# Patient Record
Sex: FEMALE | Race: WHITE | NOT HISPANIC OR LATINO | URBAN - METROPOLITAN AREA
[De-identification: names, ages, dates, MRNs, and addresses within clinical notes are randomized per-mention and may not be internally consistent; named-entity substitution may affect disease eponyms.]

---

## 2023-06-01 ENCOUNTER — INPATIENT (INPATIENT)
Facility: HOSPITAL | Age: 83
LOS: 14 days | Discharge: NURSING, REHAB OR LONG-TERM CARE | DRG: 23 | End: 2023-06-16
Attending: PSYCHIATRY & NEUROLOGY | Admitting: PSYCHIATRY & NEUROLOGY
Payer: MEDICAID

## 2023-06-01 ENCOUNTER — TRANSCRIPTION ENCOUNTER (OUTPATIENT)
Age: 83
End: 2023-06-01

## 2023-06-01 VITALS
OXYGEN SATURATION: 95 % | HEIGHT: 65 IN | SYSTOLIC BLOOD PRESSURE: 165 MMHG | WEIGHT: 169.98 LBS | RESPIRATION RATE: 18 BRPM | DIASTOLIC BLOOD PRESSURE: 82 MMHG | HEART RATE: 64 BPM

## 2023-06-01 DIAGNOSIS — I63.9 CEREBRAL INFARCTION, UNSPECIFIED: ICD-10-CM

## 2023-06-01 LAB
ALBUMIN SERPL ELPH-MCNC: 4.6 G/DL — SIGNIFICANT CHANGE UP (ref 3.3–5)
ALP SERPL-CCNC: 322 U/L — HIGH (ref 40–120)
ALT FLD-CCNC: 26 U/L — SIGNIFICANT CHANGE UP (ref 10–45)
ANION GAP SERPL CALC-SCNC: 13 MMOL/L — SIGNIFICANT CHANGE UP (ref 5–17)
APTT BLD: 32.5 SEC — SIGNIFICANT CHANGE UP (ref 27.5–35.5)
AST SERPL-CCNC: 39 U/L — SIGNIFICANT CHANGE UP (ref 10–40)
BASOPHILS # BLD AUTO: 0.07 K/UL — SIGNIFICANT CHANGE UP (ref 0–0.2)
BASOPHILS NFR BLD AUTO: 0.7 % — SIGNIFICANT CHANGE UP (ref 0–2)
BILIRUB SERPL-MCNC: 1.2 MG/DL — SIGNIFICANT CHANGE UP (ref 0.2–1.2)
BUN SERPL-MCNC: 19 MG/DL — SIGNIFICANT CHANGE UP (ref 7–23)
CALCIUM SERPL-MCNC: 9.4 MG/DL — SIGNIFICANT CHANGE UP (ref 8.4–10.5)
CHLORIDE SERPL-SCNC: 105 MMOL/L — SIGNIFICANT CHANGE UP (ref 96–108)
CO2 SERPL-SCNC: 20 MMOL/L — LOW (ref 22–31)
CREAT SERPL-MCNC: 1 MG/DL — SIGNIFICANT CHANGE UP (ref 0.5–1.3)
EGFR: 56 ML/MIN/1.73M2 — LOW
EOSINOPHIL # BLD AUTO: 0.11 K/UL — SIGNIFICANT CHANGE UP (ref 0–0.5)
EOSINOPHIL NFR BLD AUTO: 1 % — SIGNIFICANT CHANGE UP (ref 0–6)
GLUCOSE SERPL-MCNC: 116 MG/DL — HIGH (ref 70–99)
HCT VFR BLD CALC: 40.5 % — SIGNIFICANT CHANGE UP (ref 34.5–45)
HGB BLD-MCNC: 13.3 G/DL — SIGNIFICANT CHANGE UP (ref 11.5–15.5)
IMM GRANULOCYTES NFR BLD AUTO: 0.3 % — SIGNIFICANT CHANGE UP (ref 0–0.9)
INR BLD: 1.79 RATIO — HIGH (ref 0.88–1.16)
LYMPHOCYTES # BLD AUTO: 2.77 K/UL — SIGNIFICANT CHANGE UP (ref 1–3.3)
LYMPHOCYTES # BLD AUTO: 26.2 % — SIGNIFICANT CHANGE UP (ref 13–44)
MCHC RBC-ENTMCNC: 30.4 PG — SIGNIFICANT CHANGE UP (ref 27–34)
MCHC RBC-ENTMCNC: 32.8 GM/DL — SIGNIFICANT CHANGE UP (ref 32–36)
MCV RBC AUTO: 92.5 FL — SIGNIFICANT CHANGE UP (ref 80–100)
MONOCYTES # BLD AUTO: 1.11 K/UL — HIGH (ref 0–0.9)
MONOCYTES NFR BLD AUTO: 10.5 % — SIGNIFICANT CHANGE UP (ref 2–14)
NEUTROPHILS # BLD AUTO: 6.48 K/UL — SIGNIFICANT CHANGE UP (ref 1.8–7.4)
NEUTROPHILS NFR BLD AUTO: 61.3 % — SIGNIFICANT CHANGE UP (ref 43–77)
NRBC # BLD: 0 /100 WBCS — SIGNIFICANT CHANGE UP (ref 0–0)
PLATELET # BLD AUTO: 219 K/UL — SIGNIFICANT CHANGE UP (ref 150–400)
POTASSIUM SERPL-MCNC: 4.2 MMOL/L — SIGNIFICANT CHANGE UP (ref 3.5–5.3)
POTASSIUM SERPL-SCNC: 4.2 MMOL/L — SIGNIFICANT CHANGE UP (ref 3.5–5.3)
PROT SERPL-MCNC: 7.6 G/DL — SIGNIFICANT CHANGE UP (ref 6–8.3)
PROTHROM AB SERPL-ACNC: 20.9 SEC — HIGH (ref 10.5–13.4)
RBC # BLD: 4.38 M/UL — SIGNIFICANT CHANGE UP (ref 3.8–5.2)
RBC # FLD: 13.2 % — SIGNIFICANT CHANGE UP (ref 10.3–14.5)
SODIUM SERPL-SCNC: 138 MMOL/L — SIGNIFICANT CHANGE UP (ref 135–145)
TROPONIN T, HIGH SENSITIVITY RESULT: 15 NG/L — SIGNIFICANT CHANGE UP (ref 0–51)
WBC # BLD: 10.57 K/UL — HIGH (ref 3.8–10.5)
WBC # FLD AUTO: 10.57 K/UL — HIGH (ref 3.8–10.5)

## 2023-06-01 PROCEDURE — 70498 CT ANGIOGRAPHY NECK: CPT | Mod: 26,MA

## 2023-06-01 PROCEDURE — 99291 CRITICAL CARE FIRST HOUR: CPT

## 2023-06-01 PROCEDURE — 0042T: CPT | Mod: MA

## 2023-06-01 PROCEDURE — 70496 CT ANGIOGRAPHY HEAD: CPT | Mod: 26,MA

## 2023-06-01 NOTE — ED ADULT NURSE NOTE - OBJECTIVE STATEMENT
81 y/o F normally ambulatory and AAO4 presents to the ED with EMS after becoming altered around  2100 tonight, as per daughter, she wasn't making sense and had some right sided weakness. Pt has PMH of HTN, HLD, and irregular heart beat as per daughter, on Xarelto last Xarelto taken at 1800 today. Pt family member denies and previous surgery. Pt is restless but not answering any questions. Pt 88 NSR on CM. Pupils are 3mm reactive and equil. Follow some commands but not enough to complete full neuro exam.

## 2023-06-01 NOTE — ED PROVIDER NOTE - PHYSICAL EXAMINATION
Gen: WDWN, non-verbal, leading to right side on stretcher, hemodynamically stable, protecting airway/tolerating secretions   HEENT: Atraumatic head, PERRLA, EOMI, no nasal discharge, mucous membranes moist, no oropharyngeal edema/erythema/exudates   CV: Irregular rhythm (afib) with no RVR, +S1/S2, no M/R/G, equal b/l radial pulses 2+  Resp: CTAB, no W/R/R, SPO2 >95% on RA, no increased WOB   GI: Abdomen soft non-distended, NTTP, no masses/organomegaly   MSK/Skin: No open wounds, no bruising, no LE edema  Neuro: AAOx0, Left sided gaze preference and leaning to right side on stretcher, unable to perform full CN exam due to mental status, moving LUE/LLE spontaneously with no movement of RUE/RLE

## 2023-06-01 NOTE — ED PROVIDER NOTE - PROGRESS NOTE DETAILS
dw neuro - will admit to nscu for planned thrombectomy [phone] - suhail lara - daughter cell phone number

## 2023-06-01 NOTE — STROKE CODE NOTE - MRS SCORE
(1) No significant disability. Able to carry out all usual activities, despite some symptoms. (3) Moderate disability. Requires some help, but able to walk unassisted.

## 2023-06-01 NOTE — ED PROVIDER NOTE - CLINICAL SUMMARY MEDICAL DECISION MAKING FREE TEXT BOX
82-year-old female with TIA in past, afib on Xarelto, presenting with altered mental status, LKN just prior to 9:45 pm with acute onset of UE weakness/became nonverbal, no falls/trauma, AAOx0, Left sided gaze preference and leaning to right side on stretcher, unilateral FND in UE/LE c/f CVA. Code stroke called in triage, pending CTs, neuro recs. Patient not a T&K candidate due to xarelto, possible thrombectomy candidate pending CTs. Protecting airway at this time

## 2023-06-01 NOTE — ED PROVIDER NOTE - ATTENDING CONTRIBUTION TO CARE
This is a 82-year-old lady who has a history of TIA and atrial fibrillation and she is taking Xarelto.  She last took a dose at 6 PM.  Daughter states at 9 PM she could not talk anymore and arm and leg stopped working.  Patient with aphasia and leftward gaze deviation and left upper and lower extremity weakness.  Patient does not meet criteria for thrombolytics as she received 10 a inhibitor just prior to coming into the ER and her INR is above 1.7.  CT CTA performed.  Discussed with neurology.  Patient has LVO sign on CT scan and will be admitted for thrombectomy.  I discussed this with the patient's daughter and she understands.

## 2023-06-01 NOTE — ED ADULT NURSE NOTE - CHPI ED NUR SYMPTOMS POS
Partially impaired: cannot see medication labels or newsprint, but can see obstacles in path, and the surrounding layout; can count fingers at arm's length
CHANGE IN LEVEL OF CONSCIOUSNESS/DISORIENTATION

## 2023-06-01 NOTE — ED PROVIDER NOTE - OBJECTIVE STATEMENT
82-year-old female with TIA in past on Xarelto, presenting with altered mental status.  At 9:45 PM patient had witnessed upper extremity weakness, became nonverbal, with no fall/trauma.  Patient ANO x0 and unable to participate in exam/history. 82-year-old female with TIA in past, afib on Xarelto, presenting with altered mental status. At 9:45 PM patient had witnessed upper extremity weakness, became nonverbal, with no fall/trauma. Patient AAOx0 and unable to participate in exam/history. AAOx4 at baseline. Daughter reports LKN just prior to symptom onset. Last took Xarelto at 6pm.

## 2023-06-02 ENCOUNTER — APPOINTMENT (OUTPATIENT)
Dept: NEUROSURGERY | Facility: HOSPITAL | Age: 83
End: 2023-06-02

## 2023-06-02 LAB
A1C WITH ESTIMATED AVERAGE GLUCOSE RESULT: 5.9 % — HIGH (ref 4–5.6)
ANION GAP SERPL CALC-SCNC: 14 MMOL/L — SIGNIFICANT CHANGE UP (ref 5–17)
BUN SERPL-MCNC: 14 MG/DL — SIGNIFICANT CHANGE UP (ref 7–23)
CALCIUM SERPL-MCNC: 8.5 MG/DL — SIGNIFICANT CHANGE UP (ref 8.4–10.5)
CHLORIDE SERPL-SCNC: 107 MMOL/L — SIGNIFICANT CHANGE UP (ref 96–108)
CHOLEST SERPL-MCNC: 97 MG/DL — SIGNIFICANT CHANGE UP
CO2 SERPL-SCNC: 20 MMOL/L — LOW (ref 22–31)
CREAT SERPL-MCNC: 0.75 MG/DL — SIGNIFICANT CHANGE UP (ref 0.5–1.3)
EGFR: 79 ML/MIN/1.73M2 — SIGNIFICANT CHANGE UP
ESTIMATED AVERAGE GLUCOSE: 123 MG/DL — HIGH (ref 68–114)
GLUCOSE SERPL-MCNC: 156 MG/DL — HIGH (ref 70–99)
HDLC SERPL-MCNC: 47 MG/DL — LOW
LIPID PNL WITH DIRECT LDL SERPL: 33 MG/DL — SIGNIFICANT CHANGE UP
MAGNESIUM SERPL-MCNC: 1.9 MG/DL — SIGNIFICANT CHANGE UP (ref 1.6–2.6)
NON HDL CHOLESTEROL: 50 MG/DL — SIGNIFICANT CHANGE UP
PHOSPHATE SERPL-MCNC: 2.6 MG/DL — SIGNIFICANT CHANGE UP (ref 2.5–4.5)
POTASSIUM SERPL-MCNC: 3.5 MMOL/L — SIGNIFICANT CHANGE UP (ref 3.5–5.3)
POTASSIUM SERPL-SCNC: 3.5 MMOL/L — SIGNIFICANT CHANGE UP (ref 3.5–5.3)
SODIUM SERPL-SCNC: 141 MMOL/L — SIGNIFICANT CHANGE UP (ref 135–145)
T3 SERPL-MCNC: 96 NG/DL — SIGNIFICANT CHANGE UP (ref 80–200)
T4 AB SER-ACNC: 8 UG/DL — SIGNIFICANT CHANGE UP (ref 4.6–12)
T4 FREE SERPL-MCNC: 1.5 NG/DL — SIGNIFICANT CHANGE UP (ref 0.9–1.8)
TRIGL SERPL-MCNC: 85 MG/DL — SIGNIFICANT CHANGE UP
TSH SERPL-MCNC: 1.5 UIU/ML — SIGNIFICANT CHANGE UP (ref 0.27–4.2)

## 2023-06-02 PROCEDURE — 71045 X-RAY EXAM CHEST 1 VIEW: CPT | Mod: 26

## 2023-06-02 PROCEDURE — 99291 CRITICAL CARE FIRST HOUR: CPT

## 2023-06-02 PROCEDURE — 61645 PERQ ART M-THROMBECT &/NFS: CPT | Mod: LT

## 2023-06-02 PROCEDURE — 93306 TTE W/DOPPLER COMPLETE: CPT | Mod: 26

## 2023-06-02 PROCEDURE — 99223 1ST HOSP IP/OBS HIGH 75: CPT

## 2023-06-02 PROCEDURE — 71045 X-RAY EXAM CHEST 1 VIEW: CPT | Mod: 26,77

## 2023-06-02 PROCEDURE — 93970 EXTREMITY STUDY: CPT | Mod: 26

## 2023-06-02 PROCEDURE — 70450 CT HEAD/BRAIN W/O DYE: CPT | Mod: 26

## 2023-06-02 RX ORDER — PANTOPRAZOLE SODIUM 20 MG/1
40 TABLET, DELAYED RELEASE ORAL DAILY
Refills: 0 | Status: DISCONTINUED | OUTPATIENT
Start: 2023-06-02 | End: 2023-06-02

## 2023-06-02 RX ORDER — PROPOFOL 10 MG/ML
10 INJECTION, EMULSION INTRAVENOUS
Qty: 500 | Refills: 0 | Status: DISCONTINUED | OUTPATIENT
Start: 2023-06-02 | End: 2023-06-02

## 2023-06-02 RX ORDER — LOSARTAN POTASSIUM 100 MG/1
50 TABLET, FILM COATED ORAL DAILY
Refills: 0 | Status: DISCONTINUED | OUTPATIENT
Start: 2023-06-02 | End: 2023-06-02

## 2023-06-02 RX ORDER — ENTECAVIR 0.5 MG/1
0.5 TABLET ORAL
Refills: 0 | Status: DISCONTINUED | OUTPATIENT
Start: 2023-06-02 | End: 2023-06-14

## 2023-06-02 RX ORDER — LOSARTAN POTASSIUM 100 MG/1
50 TABLET, FILM COATED ORAL DAILY
Refills: 0 | Status: DISCONTINUED | OUTPATIENT
Start: 2023-06-02 | End: 2023-06-07

## 2023-06-02 RX ORDER — ATENOLOL 25 MG/1
100 TABLET ORAL DAILY
Refills: 0 | Status: DISCONTINUED | OUTPATIENT
Start: 2023-06-02 | End: 2023-06-02

## 2023-06-02 RX ORDER — CHLORHEXIDINE GLUCONATE 213 G/1000ML
1 SOLUTION TOPICAL DAILY
Refills: 0 | Status: DISCONTINUED | OUTPATIENT
Start: 2023-06-02 | End: 2023-06-16

## 2023-06-02 RX ORDER — DEXMEDETOMIDINE HYDROCHLORIDE IN 0.9% SODIUM CHLORIDE 4 UG/ML
0.2 INJECTION INTRAVENOUS
Qty: 200 | Refills: 0 | Status: DISCONTINUED | OUTPATIENT
Start: 2023-06-02 | End: 2023-06-02

## 2023-06-02 RX ORDER — NICARDIPINE HYDROCHLORIDE 30 MG/1
5 CAPSULE, EXTENDED RELEASE ORAL
Qty: 40 | Refills: 0 | Status: DISCONTINUED | OUTPATIENT
Start: 2023-06-02 | End: 2023-06-03

## 2023-06-02 RX ORDER — POLYETHYLENE GLYCOL 3350 17 G/17G
17 POWDER, FOR SOLUTION ORAL DAILY
Refills: 0 | Status: DISCONTINUED | OUTPATIENT
Start: 2023-06-02 | End: 2023-06-14

## 2023-06-02 RX ORDER — ATORVASTATIN CALCIUM 80 MG/1
80 TABLET, FILM COATED ORAL AT BEDTIME
Refills: 0 | Status: DISCONTINUED | OUTPATIENT
Start: 2023-06-02 | End: 2023-06-14

## 2023-06-02 RX ORDER — NICARDIPINE HYDROCHLORIDE 30 MG/1
5 CAPSULE, EXTENDED RELEASE ORAL
Qty: 40 | Refills: 0 | Status: DISCONTINUED | OUTPATIENT
Start: 2023-06-02 | End: 2023-06-02

## 2023-06-02 RX ORDER — CHLORHEXIDINE GLUCONATE 213 G/1000ML
15 SOLUTION TOPICAL EVERY 12 HOURS
Refills: 0 | Status: DISCONTINUED | OUTPATIENT
Start: 2023-06-02 | End: 2023-06-02

## 2023-06-02 RX ORDER — SODIUM CHLORIDE 9 MG/ML
1000 INJECTION INTRAMUSCULAR; INTRAVENOUS; SUBCUTANEOUS
Refills: 0 | Status: DISCONTINUED | OUTPATIENT
Start: 2023-06-02 | End: 2023-06-03

## 2023-06-02 RX ORDER — ATENOLOL 25 MG/1
100 TABLET ORAL DAILY
Refills: 0 | Status: DISCONTINUED | OUTPATIENT
Start: 2023-06-02 | End: 2023-06-03

## 2023-06-02 RX ORDER — SENNA PLUS 8.6 MG/1
2 TABLET ORAL AT BEDTIME
Refills: 0 | Status: DISCONTINUED | OUTPATIENT
Start: 2023-06-02 | End: 2023-06-14

## 2023-06-02 RX ORDER — PANTOPRAZOLE SODIUM 20 MG/1
40 TABLET, DELAYED RELEASE ORAL
Refills: 0 | Status: DISCONTINUED | OUTPATIENT
Start: 2023-06-02 | End: 2023-06-02

## 2023-06-02 RX ADMIN — SODIUM CHLORIDE 70 MILLILITER(S): 9 INJECTION INTRAMUSCULAR; INTRAVENOUS; SUBCUTANEOUS at 19:07

## 2023-06-02 RX ADMIN — NICARDIPINE HYDROCHLORIDE 25 MG/HR: 30 CAPSULE, EXTENDED RELEASE ORAL at 12:46

## 2023-06-02 RX ADMIN — SENNA PLUS 2 TABLET(S): 8.6 TABLET ORAL at 21:37

## 2023-06-02 RX ADMIN — LOSARTAN POTASSIUM 50 MILLIGRAM(S): 100 TABLET, FILM COATED ORAL at 13:27

## 2023-06-02 RX ADMIN — CHLORHEXIDINE GLUCONATE 15 MILLILITER(S): 213 SOLUTION TOPICAL at 17:10

## 2023-06-02 RX ADMIN — POLYETHYLENE GLYCOL 3350 17 GRAM(S): 17 POWDER, FOR SOLUTION ORAL at 17:10

## 2023-06-02 RX ADMIN — ATORVASTATIN CALCIUM 80 MILLIGRAM(S): 80 TABLET, FILM COATED ORAL at 21:37

## 2023-06-02 RX ADMIN — CHLORHEXIDINE GLUCONATE 15 MILLILITER(S): 213 SOLUTION TOPICAL at 06:19

## 2023-06-02 RX ADMIN — NICARDIPINE HYDROCHLORIDE 25 MG/HR: 30 CAPSULE, EXTENDED RELEASE ORAL at 20:27

## 2023-06-02 RX ADMIN — NICARDIPINE HYDROCHLORIDE 25 MG/HR: 30 CAPSULE, EXTENDED RELEASE ORAL at 19:07

## 2023-06-02 RX ADMIN — DEXMEDETOMIDINE HYDROCHLORIDE IN 0.9% SODIUM CHLORIDE 3.73 MICROGRAM(S)/KG/HR: 4 INJECTION INTRAVENOUS at 12:46

## 2023-06-02 RX ADMIN — CHLORHEXIDINE GLUCONATE 1 APPLICATION(S): 213 SOLUTION TOPICAL at 21:37

## 2023-06-02 NOTE — DIETITIAN INITIAL EVALUATION ADULT - PHYSCIAL ASSESSMENT
Unable to conduct Nutrition Focused Physical Assessment as pt intubated; receiving CT scan at time of RD visit.

## 2023-06-02 NOTE — PHYSICAL THERAPY INITIAL EVALUATION ADULT - ADDITIONAL COMMENTS
unable to assess at this time unable to assess at this time.   6/6: Per son at bedside, pt lives in an apartment with her daughter with +15STE. Pt was independent with all functional mobility and ADLs prior to admission, ambulated without an AD.

## 2023-06-02 NOTE — SPEECH LANGUAGE PATHOLOGY EVALUATION - SLP DIAGNOSIS
Consult received and appreciated. Chart reviewed. As per NSCU rounds and d/w SILVIA Yancey, pt remains intubated at this time. Will follow up as medically appropriate.

## 2023-06-02 NOTE — PROGRESS NOTE ADULT - SUBJECTIVE AND OBJECTIVE BOX
HPI:  82y (1940) woman with a PMHx significant for HTN, afib on Xarelto (last dose 6/1 6pm), hepatitis C on antiviral presenting with acute onset of AMS. LKW 10pm. Around 10:30pm patient become acutely altered, slumped to right side, head turned to left, not answering questions properly or moving the right side. NIHSS 22 most notable for decreased arousal, incorrect answers, not following commands, left gaze deviation that could not be overcome, right facial droop, right sided severe weakness compared to left, slurred speech, aphasia. Patient lives at home with daughter who is at bedside. Patient is Spanish speaking. Daughter reports at baseline she intermittently walks with a cane and needs minor help with ADLs such as getting dressed or running a shower, Daughter looks after her affairs. She denies dementia or cognitive impairment. mRS 3.     LKW 10pm (6/1)  NIHSS 22  pre-mRS 3  Xarelto last taken at 6pm, INR=1.79   (02 Jun 2023 01:43)    OVERNIGHT EVENTS:   No acute events overnight.    VITALS:  T(C): , Max: 36.7 (06-02-23 @ 01:50)  HR:  (64 - 104)  BP:  (152/72 - 206/117)  ABP:  (104/62 - 146/58)  RR:  (14 - 26)  SpO2:  (95% - 100%)  Wt(kg): --  Device: Avea, Mode: AC/ CMV (Assist Control/ Continuous Mandatory Ventilation), RR (machine): 14, TV (machine): 450, FiO2: 50, PEEP: 5, ITime: 1, MAP: 9, PIP: 18    06-01-23 @ 07:01  -  06-02-23 @ 07:00  --------------------------------------------------------  IN: 619 mL / OUT: 500 mL / NET: 119 mL      LABS:  Na: 138 (06-01 @ 22:38)  K: 4.2 (06-01 @ 22:38)  Cl: 105 (06-01 @ 22:38)  CO2: 20 (06-01 @ 22:38)  BUN: 19 (06-01 @ 22:38)  Cr: 1.00 (06-01 @ 22:38)  Glu: 116(06-01 @ 22:38)    Hgb: 13.3 (06-01 @ 22:38)  Hct: 40.5 (06-01 @ 22:38)  WBC: 10.57 (06-01 @ 22:38)  Plt: 219 (06-01 @ 22:38)    INR: 1.79 06-01-23 @ 22:38  PTT: 32.5 06-01-23 @ 22:38    IMAGING:   Recent imaging studies were reviewed.    MEDICATIONS:  chlorhexidine 0.12% Liquid 15 milliLiter(s) Oral Mucosa every 12 hours  chlorhexidine 4% Liquid 1 Application(s) Topical daily  dexMEDEtomidine Infusion 0.2 MICROgram(s)/kG/Hr IV Continuous <Continuous>  niCARdipine Infusion 5 mG/Hr IV Continuous <Continuous>  pantoprazole    Tablet 40 milliGRAM(s) Oral before breakfast  polyethylene glycol 3350 17 Gram(s) Oral daily  propofol Infusion 10 MICROgram(s)/kG/Min IV Continuous <Continuous>  senna 2 Tablet(s) Oral at bedtime  sodium chloride 0.9%. 1000 milliLiter(s) IV Continuous <Continuous>    EXAMINATION:  General:  calm  HEENT:  MMM  Neuro:    Cards:  RRR  Respiratory:  no respiratory distress  Adomen:  soft  Extremities:  no edema  Skin:  warm/dry HPI:  82y (1940) woman with a PMHx significant for HTN, afib on Xarelto (last dose 6/1 6pm), hepatitis C on antiviral presenting with acute onset of AMS. LKW 10pm. Around 10:30pm patient become acutely altered, slumped to right side, head turned to left, not answering questions properly or moving the right side. NIHSS 22 most notable for decreased arousal, incorrect answers, not following commands, left gaze deviation that could not be overcome, right facial droop, right sided severe weakness compared to left, slurred speech, aphasia. Patient lives at home with daughter who is at bedside. Patient is Persian speaking. Daughter reports at baseline she intermittently walks with a cane and needs minor help with ADLs such as getting dressed or running a shower, Daughter looks after her affairs. She denies dementia or cognitive impairment. mRS 3.     LKW 10pm (6/1)  NIHSS 22  pre-mRS 3  Xarelto last taken at 6pm, INR=1.79   (02 Jun 2023 01:43)    OVERNIGHT EVENTS:   admitted to [facility]    VITALS:  T(C): , Max: 36.7 (06-02-23 @ 01:50)  HR:  (64 - 104)  BP:  (152/72 - 206/117)  ABP:  (104/62 - 146/58)  RR:  (14 - 26)  SpO2:  (95% - 100%)  Wt(kg): --  Device: Avea, Mode: AC/ CMV (Assist Control/ Continuous Mandatory Ventilation), RR (machine): 14, TV (machine): 450, FiO2: 50, PEEP: 5, ITime: 1, MAP: 9, PIP: 18    06-01-23 @ 07:01  -  06-02-23 @ 07:00  --------------------------------------------------------  IN: 619 mL / OUT: 500 mL / NET: 119 mL      LABS:  Na: 138 (06-01 @ 22:38)  K: 4.2 (06-01 @ 22:38)  Cl: 105 (06-01 @ 22:38)  CO2: 20 (06-01 @ 22:38)  BUN: 19 (06-01 @ 22:38)  Cr: 1.00 (06-01 @ 22:38)  Glu: 116(06-01 @ 22:38)    Hgb: 13.3 (06-01 @ 22:38)  Hct: 40.5 (06-01 @ 22:38)  WBC: 10.57 (06-01 @ 22:38)  Plt: 219 (06-01 @ 22:38)    INR: 1.79 06-01-23 @ 22:38  PTT: 32.5 06-01-23 @ 22:38    IMAGING:   Recent imaging studies were reviewed.    MEDICATIONS:  chlorhexidine 0.12% Liquid 15 milliLiter(s) Oral Mucosa every 12 hours  chlorhexidine 4% Liquid 1 Application(s) Topical daily  dexMEDEtomidine Infusion 0.2 MICROgram(s)/kG/Hr IV Continuous <Continuous>  niCARdipine Infusion 5 mG/Hr IV Continuous <Continuous>  pantoprazole    Tablet 40 milliGRAM(s) Oral before breakfast  polyethylene glycol 3350 17 Gram(s) Oral daily  propofol Infusion 10 MICROgram(s)/kG/Min IV Continuous <Continuous>  senna 2 Tablet(s) Oral at bedtime  sodium chloride 0.9%. 1000 milliLiter(s) IV Continuous <Continuous>    EXAMINATION:  General:  calm  HEENT:  MMM  Neuro:  opens eyes, does not FC, moves L spontaneously, RUE extension, overbreathing  Cards:  RRR  Respiratory:  no respiratory distress  Adomen:  soft  Extremities:  no edema  Skin:  warm/dry

## 2023-06-02 NOTE — OCCUPATIONAL THERAPY INITIAL EVALUATION ADULT - PERTINENT HX OF CURRENT PROBLEM, REHAB EVAL
82y (1940) woman with a PMHx significant for HTN, afib on Xarelto (last dose 6/1 6pm), hepatitis C on antiviral presenting with acute onset of AMS. LKW 10pm. Around 10:30pm patient become acutely altered, slumped to right side, head turned to left, not answering questions properly or moving the right side. NIHSS 22 most notable for decreased arousal, incorrect answers, not following commands, left gaze deviation that could not be overcome, right facial droop, right sided severe weakness compared to left, slurred speech, aphasia. Patient lives at home with daughter who is at bedside. Patient is Malian speaking. Daughter reports at baseline she intermittently walks with a cane and needs minor help with ADLs such as getting dressed or running a shower, Daughter looks after her affairs. She denies dementia or cognitive impairment. mRS 3. CT angiography neck 6/1: No hemodynamically significant stenosis by NASCET criteria. No vascular dissection. Nonspecific groundglass opacities in the visualized apices of the lungs, may represent infectious versus inflammatory disease. CT angiography brain 6/1: Left proximal M1 occlusion. CT perfusion 6/1: Core infarct of 6 cc with a penumbra of 162 cc. CT Brain 6/1- No acute intracranial hemorrhage or mass effect.

## 2023-06-02 NOTE — CHART NOTE - NSCHARTNOTEFT_GEN_A_CORE
CAPRINI SCORE [CLOT] Score on Admission for     AGE RELATED RISK FACTORS                                                       MOBILITY RELATED FACTORS  [ ] Age 41-60 years                                            (1 Point)                  [ x] Bed rest                                                        (1 Point)  [ ] Age: 61-74 years                                           (2 Points)                 [ ] Plaster cast                                                   (2 Points)  [ x] Age= 75 years                                              (3 Points)                 [ ] Bed bound for more than 72 hours                 (2 Points)    DISEASE RELATED RISK FACTORS                                               GENDER SPECIFIC FACTORS  [ ] Edema in the lower extremities                       (1 Point)                  [ ] Pregnancy                                                     (1 Point)  [ ] Varicose veins                                               (1 Point)                  [ ] Post-partum < 6 weeks                                   (1 Point)             [ x] BMI > 25 Kg/m2                                            (1 Point)                  [ ] Hormonal therapy  or oral contraception          (1 Point)                 [ ] Sepsis (in the previous month)                        (1 Point)                  [ ] History of pregnancy complications                 (1 point)  [ ] Pneumonia or serious lung disease                                               [ ] Unexplained or recurrent                     (1 Point)           (in the previous month)                               (1 Point)  [ ] Abnormal pulmonary function test                     (1 Point)                 SURGERY RELATED RISK FACTORS (include planned surgeries)  [ ] Acute myocardial infarction                              (1 Point)                 [ ]  Section                                             (1 Point)  [ ] Congestive heart failure (in the previous month)  (1 Point)         [ ] Minor surgery                                                  (1 Point)   [ ] Inflammatory bowel disease                             (1 Point)                 [ ] Arthroscopic surgery                                        (2 Points)  [ ] Central venous access                                      (2 Points)                [ ] General surgery lasting more than 45 minutes   (2 Points)       [ ] Stroke (in the previous month)                          (5 Points)               [ ] Elective arthroplasty                                         (5 Points)            [ ] current or past malignancy                              (2 Points)                                                                                                       HEMATOLOGY RELATED FACTORS                                                 TRAUMA RELATED RISK FACTORS  [ ] Prior episodes of VTE                                     (3 Points)                [ ] Fracture of the hip, pelvis, or leg                       (5 Points)  [ ] Positive family history for VTE                         (3 Points)                 [ ] Acute spinal cord injury (in the previous month)  (5 Points)  [ ] Prothrombin 31082 A                                     (3 Points)                 [ ] Paralysis  (less than 1 month)                             (5 Points)  [ ] Factor V Leiden                                             (3 Points)                  [ ] Multiple Trauma within 1 month                        (5 Points)  [ ] Lupus anticoagulants                                     (3 Points)                                                           [ ] Anticardiolipin antibodies                               (3 Points)                                                       [ ] High homocysteine in the blood                      (3 Points)                                             [ ] Other congenital or acquired thrombophilia      (3 Points)                                                [ ] Heparin induced thrombocytopenia                  (3 Points)                                          Total Score [     3   ]    Risk:  Very low 0   Low 1 to 2   Moderate 3 to 4   High =5       VTE Prophylasix Recommednations:  [ x] mechanical pneumatic compression devices                                      [ ] contraindicated: _____________________  [ ] chemo prophylasix                                                                                   [x ] contraindicated ___on hold per neurosurgery__    **** HIGH LIKELIHOOD DVT PRESENT ON ADMISSION  [ ] (please order LE dopplers within 24 hours of admission)

## 2023-06-02 NOTE — PHYSICAL THERAPY INITIAL EVALUATION ADULT - ACTIVE RANGE OF MOTION EXAMINATION, REHAB EVAL
decreased AROM of RUE/Left UE Active ROM was WNL (within normal limits)/RLE Active ROM was WNL (within normal limits)/bilateral lower extremity Active ROM was WNL (within normal limits)/Right UE Active ROM was WFL (within functional limits) decreased AROM of RUE/Left UE Active ROM was WFL (within functional limits)/Left LE Active ROM was WFL (within functional limits)

## 2023-06-02 NOTE — PROGRESS NOTE ADULT - SUBJECTIVE AND OBJECTIVE BOX
SUMMARY:    HOSPITAL COURSE:    24 HOUR EVENTS:     ADMISSION SCORES:   GCS: 10 NIHSS: 22    SEDATION SCORES:  RASS: -5     REVIEW OF SYSTEMS: pt intubated    ALLERGIES: Allergies    No Known Allergies    Intolerances        VITALS/DATA/ORDERS: [x] Reviewed    DEVICES:   [] Restraints [x] PIVs [x] ET tube [] central line [] PICC [x] arterial line [x] fu [] NGT/OGT [] EVD [] LD [] EDDIE/HMV [] LiCOX [] ICP monitor [] Trach [] PEG [] Chest Tube [] other:    EXAMINATION:  General: No acute distress  HEENT: Anicteric sclerae  Cardiac: [id number]  Lungs: Clear, mechanical breath sounds  Abdomen: Soft, non-tender, +BS  Extremities: No c/c/e  Skin/Incision Site: Clean, dry and intact  Neurologic:  SUMMARY: 82y (1940) woman with a PMHx significant for HTN, afib on Xarelto (last dose 6/1 6pm), hepatitis C on antiviral presenting with acute onset of AMS. LKW 10pm. Around 10:30pm patient become acutely altered, slumped to right side, head turned to left, not answering questions properly or moving the right side. NIHSS 22 most notable for decreased arousal, incorrect answers, not following commands, left gaze deviation that could not be overcome, right facial droop, right sided severe weakness compared to left, slurred speech, aphasia. Patient lives at home with daughter who is at bedside. Patient is Danish speaking. Daughter reports at baseline she intermittently walks with a cane and needs minor help with ADLs such as getting dressed or running a shower, Daughter looks after her affairs. She denies dementia or cognitive impairment. mRS 3.     LKW 10pm (6/1)  NIHSS 22  pre-mRS 3  Xarelto last taken at 6pm, INR=1.79    HOSPITAL COURSE: s/p L M1 MT tici 2c flow, kept intubated given poor mental status    24 HOUR EVENTS: seen in nscu, arrived HTN 200s and on prop    ADMISSION SCORES:   GCS: 10 NIHSS: 22    SEDATION SCORES:  RASS: -5     REVIEW OF SYSTEMS: pt intubated    ALLERGIES: Allergies    No Known Allergies    Intolerances        VITALS/DATA/ORDERS: [x] Reviewed    DEVICES:   [] Restraints [x] PIVs [x] ET tube [] central line [] PICC [x] arterial line [x] fu [] NGT/OGT [] EVD [] LD [] EDDIE/HMV [] LiCOX [] ICP monitor [] Trach [] PEG [] Chest Tube [] other:    EXAMINATION:  General: No acute distress  HEENT: Anicteric sclerae  Cardiac: [id number]  Lungs: Clear, mechanical breath sounds  Abdomen: Soft, non-tender, +BS  Extremities: No c/c/e  Skin/Incision Site: Clean, dry and intact  Neurologic:  SUMMARY: 82y (1940) woman with a PMHx significant for HTN, afib on Xarelto (last dose 6/1 6pm), hepatitis C on antiviral presenting with acute onset of AMS. LKW 10pm. Around 10:30pm patient become acutely altered, slumped to right side, head turned to left, not answering questions properly or moving the right side. NIHSS 22 most notable for decreased arousal, incorrect answers, not following commands, left gaze deviation that could not be overcome, right facial droop, right sided severe weakness compared to left, slurred speech, aphasia. Patient lives at home with daughter who is at bedside. Patient is Russian speaking. Daughter reports at baseline she intermittently walks with a cane and needs minor help with ADLs such as getting dressed or running a shower, Daughter looks after her affairs. She denies dementia or cognitive impairment. mRS 3.     LKW 10pm (6/1)  NIHSS 22  pre-mRS 3  Xarelto last taken at 6pm, INR=1.79    HOSPITAL COURSE: s/p L M1 MT tici 2c flow, kept intubated given poor mental status    24 HOUR EVENTS: seen in nscu, arrived HTN 200s and on prop    ADMISSION SCORES:   GCS: 10 NIHSS: 22    SEDATION SCORES:  RASS: -5     REVIEW OF SYSTEMS: pt intubated    ALLERGIES: Allergies    No Known Allergies    Intolerances        VITALS/DATA/ORDERS: [x] Reviewed    DEVICES:   [] Restraints [x] PIVs [x] ET tube [] central line [] PICC [x] arterial line [x] fu [] NGT/OGT [] EVD [] LD [] EDDIE/HMV [] LiCOX [] ICP monitor [] Trach [] PEG [] Chest Tube [] other:    EXAMINATION:  General: No acute distress  HEENT: Anicteric sclerae  Cardiac: [id number]  Lungs: Clear, mechanical breath sounds  Abdomen: Soft, non-tender, +BS  Extremities: No c/c/e  Skin/Incision Site: Clean, dry and intact  Neurologic: EO to voice, L gaze does not cross PERRL 3mm R, overbreathing cough/gag+, not FC, LUE/LLE spont AG throughout and RUE WDs to nox and RLE 2/5 spont.

## 2023-06-02 NOTE — DIETITIAN INITIAL EVALUATION ADULT - ADD RECOMMEND
1.  1. Obtain subjective wt/diet history from pt/family PRN.  2. If EN feeds warranted, monitor GI tolerance. RD to remain available to adjust EN formulary, volume/rate PRN.   3. Recommend multivitamin (if no medication contraindications) to aid in prevention of micronutrient deficiencies.   4. If PO diet initiated, consider no therapeutic restrictions and defer consistency to medical team, SLP.   5. Monitor wt trends/labs/skin integrity/hydration status/bowel regularity.

## 2023-06-02 NOTE — DISCHARGE NOTE NURSING/CASE MANAGEMENT/SOCIAL WORK - PATIENT PORTAL LINK FT
You can access the FollowMyHealth Patient Portal offered by [facility] by registering at the following website: [email or web address]. By joining [facility]’s FollowMyHealth portal, you will also be able to view your health information using other applications (apps) compatible with our system.

## 2023-06-02 NOTE — PHARMACOTHERAPY INTERVENTION NOTE - COMMENTS
Reviewed appropriate routes of medication administration  MEDICATIONS  (STANDING):  chlorhexidine 0.12% Liquid 15 milliLiter(s) Oral Mucosa every 12 hours  chlorhexidine 4% Liquid 1 Application(s) Topical daily  dexMEDEtomidine Infusion 0.2 MICROgram(s)/kG/Hr (3.73 mL/Hr) IV Continuous <Continuous>  entecavir Solution 0.5 milliGRAM(s) Oral <User Schedule>  losartan 50 milliGRAM(s) Oral daily  niCARdipine Infusion 5 mG/Hr (25 mL/Hr) IV Continuous <Continuous>  pantoprazole  Injectable 40 milliGRAM(s) IV Push daily  polyethylene glycol 3350 17 Gram(s) Oral daily  senna 2 Tablet(s) Oral at bedtime  sodium chloride 0.9%. 1000 milliLiter(s) (70 mL/Hr) IV Continuous <Continuous>    MEDICATIONS  (PRN):

## 2023-06-02 NOTE — CHART NOTE - NSCHARTNOTEFT_GEN_A_CORE
Interventional Neuro Radiology  Pre-Procedure Note    HPI:  This is an 82yr old female with pmh afib on xarelto presents to [facility] ER as code stroke with altered mental status, non verbal, and limb weakness. CT head showed left mca infarct. CTA head showed left m1 occlusion. Patient transferred to neuro IR for cerebral angiogram mechanical thrombectomy       NIHSS 17 @[facility] on arrival      PAST MEDICAL & SURGICAL HISTORY:    Allergies: No Known Allergies          Labs:                         13.3   10.57 )-----------( 219      ( 01 Jun 2023 22:38 )             40.5       06-01    138  |  105  |  19  ----------------------------<  116<H>  4.2   |  20<L>  |  1.00    Assessment/Plan:   This is a 82yr old female presents as code stroke left m1 occlusion. Patient presents to neuro-IR for selective cerebral angiography and thrombectomy. Procedure/ risks/ benefits/ goals/ alternatives were explained. Risks include but are not limited to stroke/ vessel injury/ hemorrhage/ groin hematoma. All questions answered.   Informed content obtained from patient's family. Consent placed in chart.     H & P not completed prior to pre procedure note due to emergent nature of procedure. Interventional Neuro Radiology  Pre-Procedure Note    HPI:  This is an 82yr old female with pmh afib on xarelto presents to [facility] ER as code stroke with altered mental status, non verbal, and limb weakness. CT head showed left mca infarct. CTA head showed left m1 occlusion. Patient transferred to neuro IR for cerebral angiogram mechanical thrombectomy       NIHSS 17 @[facility] on arrival      PAST MEDICAL & SURGICAL HISTORY:    Allergies: No Known Allergies          Labs:                         13.3   10.57 )-----------( 219      ( 01 Jun 2023 22:38 )             40.5       06-01    138  |  105  |  19  ----------------------------<  116<H>  4.2   |  20<L>  |  1.00    Assessment/Plan:   This is a 82yr old female presents as code stroke left m1 occlusion. Patient presents to neuro-IR for selective cerebral angiography and thrombectomy. Procedure/ risks/ benefits/ goals/ alternatives were explained. Risks include but are not limited to stroke/ vessel injury/ hemorrhage/ groin hematoma. Consent was not completed prior to procedure due to emergent nature of the procedure.    H & P not completed prior to pre procedure note due to emergent nature of procedure.

## 2023-06-02 NOTE — OCCUPATIONAL THERAPY INITIAL EVALUATION ADULT - LONG TERM MEMORY, REHAB EVAL
Pt arrived via  [facility],    Per EMS reports,  Called to hypoglycic Pt. On arrival ot BG was 23, Pt lethargic nd incoherent,  , Pt received 1mg of IM glucagon. Reports that pt is now A/Ox4 .multiple unsuccessful IV attempts. 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 Per EMS reports. Vital signs Prior to arrival, as reported by EMS;   //71     HR: 97 bpm      RR 20     SpO2 98 on Ra.   GCS 15         Karley Haider RN  03/29/19 2321 impaired stated sisters name rather than own, unable to state year/impaired

## 2023-06-02 NOTE — H&P ADULT - NSHPLABSRESULTS_GEN_ALL_CORE
Labs:                        13.3   10.57 )-----------( 219      ( 01 Jun 2023 22:38 )             40.5     06-01    138  |  105  |  19  ----------------------------<  116<H>  4.2   |  20<L>  |  1.00    Ca    9.4      01 Jun 2023 22:38    TPro  7.6  /  Alb  4.6  /  TBili  1.2  /  DBili  x   /  AST  39  /  ALT  26  /  AlkPhos  322<H>  06-01    CAPILLARY BLOOD GLUCOSE    POCT Blood Glucose.: 111 mg/dL (01 Jun 2023 22:34)    LIVER FUNCTIONS - ( 01 Jun 2023 22:38 )  Alb: 4.6 g/dL / Pro: 7.6 g/dL / ALK PHOS: 322 U/L / ALT: 26 U/L / AST: 39 U/L / GGT: x           PT/INR - ( 01 Jun 2023 22:38 )   PT: 20.9 sec;   INR: 1.79 ratio      PTT - ( 01 Jun 2023 22:38 )  PTT:32.5 sec    Rads  CTH: No acute intracranial hemorrhage or mass effect.    CT angiography neck: No hemodynamically significant stenosis by NASCET criteria. No vascular dissection. Nonspecific groundglass opacities in the visualized apices of the lungs, may represent infectious versus inflammatory disease.    CT angiography brain: Left proximal M1 occlusion.    CT perfusion: Core infarct of 6 cc with a penumbra of 162 cc.

## 2023-06-02 NOTE — OCCUPATIONAL THERAPY INITIAL EVALUATION ADULT - GENERAL OBSERVATIONS, REHAB EVAL
pt received supine in bed +IV, +intubated, +ICU monitor, +NG pt received supine in bed +IV, +intubated, +ICU monitor, +NG, +B/l wrist restraints

## 2023-06-02 NOTE — PATIENT PROFILE ADULT - FALL HARM RISK - HARM RISK INTERVENTIONS
Assistance with ambulation/Communicate Risk of Fall with Harm to all staff/Reinforce activity limits and safety measures with patient and family/Tailored Fall Risk Interventions/Visual Cue: Yellow wristband and red socks/Bed in lowest position, wheels locked, appropriate side rails in place/Call bell, personal items and telephone in reach/Instruct patient to call for assistance before getting out of bed or chair/Non-slip footwear when patient is out of bed/[place] to call system/Physically safe environment - no spills, clutter or unnecessary equipment/Purposeful Proactive Rounding/Room/bathroom lighting operational, light cord in reach Assistance with ambulation/Assistance OOB with selected safe patient handling equipment/Communicate Risk of Fall with Harm to all staff/Discuss with provider need for PT consult/Monitor gait and stability/Provide patient with walking aids - walker, cane, crutches/Reinforce activity limits and safety measures with patient and family/Tailored Fall Risk Interventions/Visual Cue: Yellow wristband and red socks/Bed in lowest position, wheels locked, appropriate side rails in place/Call bell, personal items and telephone in reach/Instruct patient to call for assistance before getting out of bed or chair/Non-slip footwear when patient is out of bed/[place] to call system/Physically safe environment - no spills, clutter or unnecessary equipment/Purposeful Proactive Rounding/Room/bathroom lighting operational, light cord in reach

## 2023-06-02 NOTE — DIETITIAN INITIAL EVALUATION ADULT - PERTINENT LABORATORY DATA
06-01    138  |  105  |  19  ----------------------------<  116<H>  4.2   |  20<L>  |  1.00    Ca    9.4      01 Jun 2023 22:38    TPro  7.6  /  Alb  4.6  /  TBili  1.2  /  DBili  x   /  AST  39  /  ALT  26  /  AlkPhos  322<H>  06-01  POCT Blood Glucose.: 111 mg/dL (06-01-23 @ 22:34)

## 2023-06-02 NOTE — CHART NOTE - NSCHARTNOTEFT_GEN_A_CORE
Interventional Neuro- Radiology   Procedure Note    Procedure: Selective Cerebral Angiography and Thrombectomy  Pre- Procedure Diagnosis: left mca m1 occlusion  Post- Procedure Diagnosis: left mca m1 mechanical thrombectomy with tici 2c reperfusion achieved    : Dr. Amrita Piedra  Fellow: Dr. Carlos Eduardo Nicolas  NP: Lucia Cisneros     RN: Tami  Tech: Trey    Anesthesia:   (general anesthesia)    I/Os:  Fluids: 200  Virgen: DTV  Contrast: 60  Estimated Blood Loss: <10cc    NIHSS post procedure: 17    Preliminary Report:  Under general anesthesia, using a 8Fr sheath to the right groin examination of left common carotid artery/left internal carotid artery/ via selective cerebral angiography demonstrates left mca occlusion proceeded with mechanical thrombectomy and tici 2C reperfusion achieved. (Official note to follow).    Patient tolerated procedure well, vital signs stable, hemodynamically stable, no change in neurological status compared to pre procedure. Results discussed with neurosurgery/ patient family. Groin sheath d/c'ed, kelt device deployed, manual compression held to hemostasis, no active bleeding, no hematoma, applied, quick clot and safeguard balloon dressing applied at 130amh. stroke order set post-thrombectomy ordered. Patient transferred to NSCU for further care/ monitoring.

## 2023-06-02 NOTE — H&P ADULT - NSHPPHYSICALEXAM_GEN_ALL_CORE
Vitals:  T(C): 36.5 (06-02-23 @ 00:14), Max: 36.5 (06-01-23 @ 23:36)  HR: 95 (06-02-23 @ 00:14) (64 - 95)  BP: 166/105 (06-02-23 @ 00:14) (165/82 - 175/116)  RR: 18 (06-02-23 @ 00:14) (18 - 18)  SpO2: 98% (06-02-23 @ 00:14) (95% - 99%)    Neurologic Exam:  Mental status - Awakes to verbal or tactile stimuli but needs to be repeated due to closes eyes  Speech is not fluent. Repetition and naming not intact.   Can attempt to follow minimal simple motor commands, does not complete any command (tries to hold left arm and leg up, said name, tried to say age but said 43 though she's 83 which might be a semantic/phonemic error?)    Cranial nerves - PERRLA, visual fields not intact difficult to assess, left gaze preference that cannot be overcome by oculocephalic, right lower facial droop. unable to assess other CNs    Motor -  spontaneous movement of  LUE & LLE against gravity, pt attempts to hold up but falls before 10 seconds  RUE flaccid, no movement. Patient grimaces, extends RUE into noxious stimuli  RLE spontaneous movement noted but not anti-gravity. Moves away in the plane of bed from noxious stimuli    Sensation LUE, LLE, RLE all withdrawal & pt grimaces from noxious stimuli  extends RUE into noxious stimuli but grimaces           DTR's - deferred               Coordination - unable to assess  Gait and station - unable to assess

## 2023-06-02 NOTE — PATIENT PROFILE ADULT - VISION (WITH CORRECTIVE LENSES IF THE PATIENT USUALLY WEARS THEM):
Unable to assess, patient intubated Partially impaired: cannot see medication labels or newsprint, but can see obstacles in path, and the surrounding layout; can count fingers at arm's length

## 2023-06-02 NOTE — OCCUPATIONAL THERAPY INITIAL EVALUATION ADULT - RANGE OF MOTION EXAMINATION, LOWER EXTREMITY
bilateral LE Passive ROM was WFL  (within functional limits) Left LE Active ROM was WFL (within functional limits)/Right LE Passive ROM was WFL  (within functional limits)

## 2023-06-02 NOTE — PROGRESS NOTE ADULT - ASSESSMENT
ASSESSMENT:   stroke in setting of afib, on xarelto, likely cardioembolic    NIHSS 22  LKN 9:45  mRS 3  s/p L M1 MT w TICI 2c flow    NEURO:  [id number]  CT Head in AM, MRI post-op, Pain control  stroke team to follow and core stroke measures  Activity: [] OOB as tolerated [] Bedrest [x] PT [x] OT , SLP [] PMNR    PULM: get cxr  abg  Incentive spirometry, mobilize as tolerated    CV: cardene prn  hx of afib, failure on xarelto  check tte w bubble study  lipid panel  Keep -140mmHg, d/c a-line in AM    RENAL:  IVF until good PO intake, d/c fu in AM    GI:  Diet: npo ngt  GI prophylaxis [] not indicated [x] PPI [] other:  Bowel regimen standing    ENDO: check a1c  Goal euglycemia (-180)    HEME/ONC:  VTE prophylaxis: [x] SCDs [] chemoprophylaxis [] hold chemoprophylaxis due to: POD0    ID:  afebrile    MISC:    SOCIAL/FAMILY:  [x] awaiting [] updated at bedside [] family meeting    CODE STATUS:  [x] Full Code [] DNR [] DNI [] Palliative/Comfort Care    DISPOSITION:  [x] ICU [] Stroke Unit [] Floor [] EMU [] RCU [] PCU    [x] Patient is at high risk of neurologic deterioration/death due to:     Contact: [phone] ASSESSMENT:   stroke in setting of afib, on xarelto, likely cardioembolic    NIHSS 22  LKN 9:45  mRS 3  s/p L M1 MT w TICI 2c flow    NEURO:  [id number]  CT Head in AM, MRI post-op, Pain control  stroke team to follow and core stroke measures  Activity: [] OOB as tolerated [] Bedrest [x] PT [x] OT , SLP [] PMNR    PULM: get cxr  abg  Incentive spirometry, mobilize as tolerated    CV: cardene prn  hx of afib, failure on xarelto  check tte w bubble study  lipid panel  Keep -140mmHg, d/c a-line in AM    RENAL: normonatremia  IVF until good PO intake, d/c fu in AM    GI:  Diet: npo ngt  GI prophylaxis [] not indicated [x] PPI [] other:  Bowel regimen standing    ENDO: check a1c  Goal euglycemia (-180)    HEME/ONC:  VTE prophylaxis: [x] SCDs [] chemoprophylaxis [] hold chemoprophylaxis due to: POD0    ID:  afebrile    MISC:    SOCIAL/FAMILY:  [x] awaiting [] updated at bedside [] family meeting    CODE STATUS:  [x] Full Code [] DNR [] DNI [] Palliative/Comfort Care    DISPOSITION:  [x] ICU [] Stroke Unit [] Floor [] EMU [] RCU [] PCU    [x] Patient is at high risk of neurologic deterioration/death due to:     Contact: [phone] ASSESSMENT:   stroke in setting of afib, on xarelto, likely cardioembolic    NIHSS 22  LKN 9:45  mRS 3  s/p L M1 MT w TICI 2c flow    NEURO:  [id number]  CT Head in AM, MRI post-op, Pain control  minimize sedation, dc prop switch to precedex as tolerated  stroke team to follow and core stroke measures  Activity: [] OOB as tolerated [] Bedrest [x] PT [x] OT , SLP [] PMNR    PULM:  cxr w goot ETT placement  abg   SBT as tolerated  Incentive spirometry, mobilize as tolerated    CV: cardene prn  hx of afib, failure on xarelto  check tte w bubble study  lipid panel  Keep -140mmHg, d/c a-line in AM    RENAL: normonatremia  IVF until good PO intake,primafit    GI:  Diet: npo ngt  GI prophylaxis [] not indicated [x] PPI [] other:  Bowel regimen standing    ENDO: check a1c  Goal euglycemia (-180)    HEME/ONC:  VTE prophylaxis: [x] SCDs [] chemoprophylaxis [] hold chemoprophylaxis due to: POD0    ID:  afebrile    MISC:    SOCIAL/FAMILY:  [x] awaiting [] updated at bedside [] family meeting    CODE STATUS:  [x] Full Code [] DNR [] DNI [] Palliative/Comfort Care    DISPOSITION:  [x] ICU [] Stroke Unit [] Floor [] EMU [] RCU [] PCU    [x] Patient is at high risk of neurologic deterioration/death due to:     Contact: [phone]

## 2023-06-02 NOTE — OCCUPATIONAL THERAPY INITIAL EVALUATION ADULT - LEVEL OF INDEPENDENCE: SUPINE/SIT, REHAB EVAL
pt does not respond to commands, eyes closed throughout session/unable to perform maximum assist (25% patients effort)

## 2023-06-02 NOTE — H&P ADULT - ATTENDING COMMENTS
I reviewed available diagnostic studies, and reviewed images personally. I agree with resident's history, exam, orders placed, and plan of care. Medical issues needing to be addressed include: Cerebral infarction L MCA occlusion s/p MT w/ TICI 2c reperfusion, HTN, Afib on Xarelto (last dose 6/1), hep c on antiviral tx, AMS, aphasia, R hemiparesis. Pt appears to have been compliant with Xarelto. Obtain CT CAP for malignancy evaluation. MR brain when able. check COVID. ECHO pending. Intubated currently, post procedure. Awakens, Spontaneous eye opening, L movement, RUE extend, RLE flex.

## 2023-06-02 NOTE — DIETITIAN INITIAL EVALUATION ADULT - SIGNS/SYMPTOMS
pt intubated; no oral access or placement of enteral nutrition at this time  pt intubated; no oral access or initiation of enteral feeds at this time

## 2023-06-02 NOTE — PHARMACOTHERAPY INTERVENTION NOTE - COMMENTS
Reviewed medication list with patient's daughter and confirmed entecavir 0.5 mg daily.   MEDICATIONS  (STANDING):  chlorhexidine 0.12% Liquid 15 milliLiter(s) Oral Mucosa every 12 hours  chlorhexidine 4% Liquid 1 Application(s) Topical daily  dexMEDEtomidine Infusion 0.2 MICROgram(s)/kG/Hr (3.73 mL/Hr) IV Continuous <Continuous>  losartan 50 milliGRAM(s) Oral daily  niCARdipine Infusion 5 mG/Hr (25 mL/Hr) IV Continuous <Continuous>  pantoprazole  Injectable 40 milliGRAM(s) IV Push daily  polyethylene glycol 3350 17 Gram(s) Oral daily  senna 2 Tablet(s) Oral at bedtime  sodium chloride 0.9%. 1000 milliLiter(s) (70 mL/Hr) IV Continuous <Continuous>    MEDICATIONS  (PRN):   Reviewed medication list with patient's daughter and confirmed entecavir 0.5 mg daily with patient's pharmacy .   Home Medications:  atenolol 100 mg oral tablet: 1 tab(s) orally once a day (02 Jun 2023 18:03)  entecavir 0.5 mg oral tablet: 1 tab(s) orally once a day Administer on an empty stomach (2 hours before or after a meal).  x Hepatitis B? (02 Jun 2023 18:09)  ergocalciferol 1.25 mg (50,000 intl units) oral capsule: 1 cap(s) orally every 7 days (02 Jun 2023 18:05)  Lipitor 40 mg oral tablet: 1 tab(s) orally once a day (02 Jun 2023 18:06)  losartan 50 mg oral tablet: 1 tab(s) orally once a day (02 Jun 2023 18:05)  Xarelto 20 mg oral tablet: 1 tab(s) orally once a day (02 Jun 2023 18:04)   Reviewed medication list with patient's daughter and confirmed entecavir 0.5 mg daily with patient's pharmacy [facility] pharmacy [phone]   Home Medications:  atenolol 100 mg oral tablet: 1 tab(s) orally once a day (02 Jun 2023 18:03)  entecavir 0.5 mg oral tablet: 1 tab(s) orally once a day Administer on an empty stomach (2 hours before or after a meal).  x Hepatitis B? (02 Jun 2023 18:09)  ergocalciferol 1.25 mg (50,000 intl units) oral capsule: 1 cap(s) orally every 7 days (02 Jun 2023 18:05)  Lipitor 40 mg oral tablet: 1 tab(s) orally once a day (02 Jun 2023 18:06)  losartan 50 mg oral tablet: 1 tab(s) orally once a day (02 Jun 2023 18:05)  Xarelto 20 mg oral tablet: 1 tab(s) orally once a day (02 Jun 2023 18:04)

## 2023-06-02 NOTE — PRE-ANESTHESIA EVALUATION ADULT - NSANTHPMHFT_GEN_ALL_CORE
82F with PMH of A fib on Eliquis, found to have left upper extremity weakness and aphasia, currently AAOx0 82F with PMH of A fib on Eliquis, found to have left upper extremity weakness and aphasia, currently AAOx0.

## 2023-06-02 NOTE — PROGRESS NOTE ADULT - ASSESSMENT
ASSESSMENT:   stroke in setting of afib, on xarelto, likely cardioembolic    NIHSS 22  LKN 9:45  mRS 3  s/p L M1 MT w TICI 2c flow    NEURO:  [id number]  minimize sedation, precedex prn    stroke team to follow and core stroke measures  Activity: [] OOB as tolerated [] Bedrest [x] PT [x] OT , SLP [] PMNR    PULM:  CPAP wean to extubate  Incentive spirometry, mobilize as tolerated    CV: cardene prn  hx of afib, failure on xarelto  restart home losartan  check tte w bubble study  lipid panel  Keep -140mmHg, d/c a-line in AM    RENAL: normonatremia  IVF until good PO intake,primafit    GI:  Diet: place NGT, NPO for possible extubation  GI prophylaxis [] not indicated [x] PPI [] other:  Bowel regimen standing    ENDO: check a1c  Goal euglycemia (-180)    HEME/ONC:  VTE prophylaxis: [x] SCDs [] chemoprophylaxis [] hold chemoprophylaxis due to: POD0    ID:  afebrile    MISC:    SOCIAL/FAMILY:  [x] awaiting [] updated at bedside [] family meeting    CODE STATUS:  [x] Full Code [] DNR [] DNI [] Palliative/Comfort Care    DISPOSITION:  [x] ICU [] Stroke Unit [] Floor [] EMU [] RCU [] PCU    [x] Patient is at high risk of neurologic deterioration/death due to:     Contact: [phone]    Patient is critically ill, requiring critical care services.      I have personally and independently provided 60 minutes of critical care services.  This excludes any time spent on separate procedures or teaching. ASSESSMENT:   stroke in setting of afib, on xarelto, likely cardioembolic    NIHSS 22  LKN 9:45  mRS 3  s/p L M1 MT w TICI 2c flow    NEURO:  [id number]  minimize sedation,   off precedrex   malignancy work up per neuology     stroke team to follow and core stroke measures  Activity: [] OOB as tolerated [] Bedrest [x] PT [x] OT , SLP [] PMNR    PULM:  s/p extubation   Incentive spirometry, mobilize as tolerated    CV: cardene prn (on 2.5 right now)   hx of afib, failure on xarelto  lipitor 80 mg at bedtime   losartan and atenolol home medication   Keep -140mmHg, d/c a-line in AM  TTE 6/2 ef 60 percent, no PFO  6/2 le dopplers negative     RENAL: normonatremia  IVF until good PO intake,primafit    GI:  Diet: place NGT, NPO for possible extubation  GI prophylaxis [] not indicated [x] PPI [] other:  Bowel regimen standing    ENDO: check a1c  Goal euglycemia (-180)    HEME/ONC:  VTE prophylaxis: [x] SCDs [] chemoprophylaxis [] hold chemoprophylaxis due to: POD0  labs tonight     ID:  afebrile    MISC:    SOCIAL/FAMILY:  [x] awaiting [] updated at bedside [] family meeting    CODE STATUS:  [x] Full Code [] DNR [] DNI [] Palliative/Comfort Care    DISPOSITION:  [x] ICU [] Stroke Unit [] Floor [] EMU [] RCU [] PCU    [x] Patient is at high risk of neurologic deterioration/death due to:     Contact: [phone]    Patient is critically ill, requiring critical care services.      I have personally and independently provided 60 minutes of critical care services.  This excludes any time spent on separate procedures or teaching.

## 2023-06-02 NOTE — OCCUPATIONAL THERAPY INITIAL EVALUATION ADULT - ADL RETRAINING, OT EVAL
GOAL: pt will require min A with all UB/LB dressing tasks within 4 weeks. GOAL: Pt will require min A with all bathing tasks within 4 weeks.  GOAL: Pt will require min A in all toileting tasks within 4 weeks.

## 2023-06-02 NOTE — PROGRESS NOTE ADULT - SUBJECTIVE AND OBJECTIVE BOX
SUBJECTIVE:   82y (1940) woman with a PMHx significant for HTN, afib on Xarelto (last dose 6/1 6pm), hepatitis C on antiviral presenting with acute onset of AMS. LKW 10pm. Around 10:30pm patient become acutely altered, slumped to right side, head turned to left, not answering questions properly or moving the right side. NIHSS 22 most notable for decreased arousal, incorrect answers, not following commands, left gaze deviation that could not be overcome, right facial droop, right sided severe weakness compared to left, slurred speech, aphasia. Patient lives at home with daughter who is at bedside. Patient is Kazakh speaking. Daughter reports at baseline she intermittently walks with a cane and needs minor help with ADLs such as getting dressed or running a shower, Daughter looks after her affairs. She denies dementia or cognitive impairment. mRS 3.   Vital Signs Last 24 Hrs  T(C): 36.7 (02 Jun 2023 15:00), Max: 36.7 (02 Jun 2023 01:50)  T(F): 98 (02 Jun 2023 15:00), Max: 98.1 (02 Jun 2023 01:50)  HR: 92 (02 Jun 2023 18:45) (64 - 104)  BP: 137/74 (02 Jun 2023 18:45) (117/63 - 206/117)  BP(mean): 92 (02 Jun 2023 18:45) (74 - 143)  RR: 18 (02 Jun 2023 18:45) (14 - 26)  SpO2: 100% (02 Jun 2023 18:45) (95% - 100%)    Parameters below as of 02 Jun 2023 14:19  Patient On (Oxygen Delivery Method): ventilator        PHYSICAL EXAM:    Constitutional: No Acute Distress     Neurological: opens eyes, does not FC, moves L spontaneously, RUE extension, overbreathing    Pulmonary: Clear to Auscultation, No rales, No rhonchi, No wheezes     Cardiovascular: S1, S2, Regular rate and rhythm     Gastrointestinal: Soft, Non-tender, Non-distended     Extremities: No calf tenderness       LABS:                        13.3   10.57 )-----------( 219      ( 01 Jun 2023 22:38 )             40.5    06-01    138  |  105  |  19  ----------------------------<  116<H>  4.2   |  20<L>  |  1.00    Ca    9.4      01 Jun 2023 22:38    TPro  7.6  /  Alb  4.6  /  TBili  1.2  /  DBili  x   /  AST  39  /  ALT  26  /  AlkPhos  322<H>  06-01  PT/INR - ( 01 Jun 2023 22:38 )   PT: 20.9 sec;   INR: 1.79 ratio         PTT - ( 01 Jun 2023 22:38 )  PTT:32.5 sec    06-01 @ 07:01 - 06-02 @ 07:00  --------------------------------------------------------  IN: 619 mL / OUT: 500 mL / NET: 119 mL    06-02 @ 07:01 - 06-02 @ 18:58  --------------------------------------------------------  IN: 912 mL / OUT: 450 mL / NET: 462 mL      MEDICATIONS:  Anticoagulation:     Antibiotics:  entecavir Solution 0.5 milliGRAM(s) Oral <User Schedule>    Endo:    Neuro:  dexMEDEtomidine Infusion 0.2 MICROgram(s)/kG/Hr IV Continuous <Continuous>    Cardiac:  losartan 50 milliGRAM(s) Oral daily  niCARdipine Infusion 5 mG/Hr IV Continuous <Continuous>    Pulm:    GI/:  pantoprazole  Injectable 40 milliGRAM(s) IV Push daily  polyethylene glycol 3350 17 Gram(s) Oral daily  senna 2 Tablet(s) Oral at bedtime    Other:   chlorhexidine 0.12% Liquid 15 milliLiter(s) Oral Mucosa every 12 hours  chlorhexidine 4% Liquid 1 Application(s) Topical daily  sodium chloride 0.9%. 1000 milliLiter(s) IV Continuous <Continuous>    DIET: glucerna     IMAGING:    SUBJECTIVE:   82y (1940) woman with a PMHx significant for HTN, afib on Xarelto (last dose 6/1 6pm), hepatitis C on antiviral presenting with acute onset of AMS. LKW 10pm. Around 10:30pm patient become acutely altered, slumped to right side, head turned to left, not answering questions properly or moving the right side. NIHSS 22 most notable for decreased arousal, incorrect answers, not following commands, left gaze deviation that could not be overcome, right facial droop, right sided severe weakness compared to left, slurred speech, aphasia. Patient lives at home with daughter who is at bedside. Patient is Yoruba speaking. Daughter reports at baseline she intermittently walks with a cane and needs minor help with ADLs such as getting dressed or running a shower, Daughter looks after her affairs. She denies dementia or cognitive impairment. mRS 3.     6/2 extubated      Vital Signs Last 24 Hrs  T(C): 36.7 (02 Jun 2023 15:00), Max: 36.7 (02 Jun 2023 01:50)  T(F): 98 (02 Jun 2023 15:00), Max: 98.1 (02 Jun 2023 01:50)  HR: 92 (02 Jun 2023 18:45) (64 - 104)  BP: 137/74 (02 Jun 2023 18:45) (117/63 - 206/117)  BP(mean): 92 (02 Jun 2023 18:45) (74 - 143)  RR: 18 (02 Jun 2023 18:45) (14 - 26)  SpO2: 100% (02 Jun 2023 18:45) (95% - 100%)    Parameters below as of 02 Jun 2023 14:19  Patient On (Oxygen Delivery Method): ventilator        PHYSICAL EXAM:    Constitutional: No Acute Distress     Neurological: opens eyes, does not FC, moves L spontaneously, RUE extension, overbreathing    Pulmonary: Clear to Auscultation, No rales, No rhonchi, No wheezes     Cardiovascular: S1, S2, Regular rate and rhythm     Gastrointestinal: Soft, Non-tender, Non-distended     Extremities: No calf tenderness       LABS:                        13.3   10.57 )-----------( 219      ( 01 Jun 2023 22:38 )             40.5    06-01    138  |  105  |  19  ----------------------------<  116<H>  4.2   |  20<L>  |  1.00    Ca    9.4      01 Jun 2023 22:38    TPro  7.6  /  Alb  4.6  /  TBili  1.2  /  DBili  x   /  AST  39  /  ALT  26  /  AlkPhos  322<H>  06-01  PT/INR - ( 01 Jun 2023 22:38 )   PT: 20.9 sec;   INR: 1.79 ratio         PTT - ( 01 Jun 2023 22:38 )  PTT:32.5 sec    06-01 @ 07:01 - 06-02 @ 07:00  --------------------------------------------------------  IN: 619 mL / OUT: 500 mL / NET: 119 mL    06-02 @ 07:01 - 06-02 @ 18:58  --------------------------------------------------------  IN: 912 mL / OUT: 450 mL / NET: 462 mL      MEDICATIONS:  Anticoagulation:     Antibiotics:  entecavir Solution 0.5 milliGRAM(s) Oral <User Schedule>    Endo:    Neuro:  dexMEDEtomidine Infusion 0.2 MICROgram(s)/kG/Hr IV Continuous <Continuous>    Cardiac:  losartan 50 milliGRAM(s) Oral daily  niCARdipine Infusion 5 mG/Hr IV Continuous <Continuous>    Pulm:    GI/:  pantoprazole  Injectable 40 milliGRAM(s) IV Push daily  polyethylene glycol 3350 17 Gram(s) Oral daily  senna 2 Tablet(s) Oral at bedtime    Other:   chlorhexidine 0.12% Liquid 15 milliLiter(s) Oral Mucosa every 12 hours  chlorhexidine 4% Liquid 1 Application(s) Topical daily  sodium chloride 0.9%. 1000 milliLiter(s) IV Continuous <Continuous>    DIET: glucerna     IMAGING:

## 2023-06-02 NOTE — PHYSICAL THERAPY INITIAL EVALUATION ADULT - PASSIVE RANGE OF MOTION EXAMINATION, REHAB EVAL
decreased PROM of RUE/Left UE Passive ROM was WNL (within normal limits)/bilateral lower extremity Passive ROM was WNL/Right UE Passive ROM was WFL (within functional limits) decreased PROM of RUE/Right UE Passive ROM was WFL (within functional limits)/Right LE Passive ROM was WFL (within functional limits)

## 2023-06-02 NOTE — PROCEDURE NOTE - NSTOLERANCE_GEN_A_CORE
Try aveeno soap or oil of olay soap   Use spray deodorant   Hold off on shaving for now  All Free and clear is the best detergent for sensitive skin      Patient tolerated procedure well.

## 2023-06-02 NOTE — PHYSICAL THERAPY INITIAL EVALUATION ADULT - PERTINENT HX OF CURRENT PROBLEM, REHAB EVAL
82y (1940) woman with a PMHx significant for HTN, afib on Xarelto (last dose 6/1 6pm), hepatitis C on antiviral presenting with acute onset of AMS. LKW 10pm. Around 10:30pm patient become acutely altered, slumped to right side, head turned to left, not answering questions properly or moving the right side. NIHSS 22 most notable for decreased arousal, incorrect answers, not following commands, left gaze deviation that could not be overcome, right sided severe weakness compared to left, slurred speech, aphasia. Patient lives at home with daughter who is at bedside. Patient is Icelandic speaking. Daughter reports at baseline she intermittently walks with a cane and needs minor help with ADLs such as getting dressed or running a shower, Daughter looks after her affairs. She denies dementia or cognitive impairment. mRS 3. Not a tenecteplase candidate due to INR 1.79. Patient taken to IR for mechanical thrombectomy.    LKW 10pm (6/1)  NIHSS 22  pre-mRS 3  Xarelto last taken at 6pm, INR=1.79    Impression: acute change in mental status, right hemiparesis, left gaze preference, dysarthria & aphasia 2/2 left MCA occlusion. Mechanism possibly cardioembolic, patient in active afib in ED vs other etiology, pending work-up. CT angiography neck 6/1: No hemodynamically significant stenosis by NASCET criteria. No vascular dissection. Nonspecific groundglass opacities in the visualized apices of the lungs, may represent infectious versus inflammatory disease. CT angiography brain 6/1: Left proximal M1 occlusion. CT perfusion 6/1: Core infarct of 6 cc with a penumbra of 162 cc. CT Brain 6/1- No acute intracranial hemorrhage or mass effect.

## 2023-06-02 NOTE — DIETITIAN INITIAL EVALUATION ADULT - REASON FOR ADMISSION
Pt is an 83 yo F with PMH: HTN, A.Fib, Hepatitis C on antiviral. Presented with acute onset of AMS. Found to have stroke in setting of A.Fib, likely cardioembolic. S/P L M1 mechanical thrombectomy with TICI 2C flow.

## 2023-06-02 NOTE — PATIENT PROFILE ADULT - FUNCTIONAL SCREEN CURRENT LEVEL: COMMUNICATION, MLM
Unable to assess, patient intubated Unable to assess, patient intubated/3 = unable to understand or speak (not related to language barrier)

## 2023-06-02 NOTE — OCCUPATIONAL THERAPY INITIAL EVALUATION ADULT - PLANNED THERAPY INTERVENTIONS, OT EVAL
ADL retraining/balance training/strengthening ADL retraining/balance training/bed mobility training/strengthening/transfer training

## 2023-06-02 NOTE — PROGRESS NOTE ADULT - ASSESSMENT
ASSESSMENT:   stroke in setting of afib, on xarelto, likely cardioembolic    NIHSS 22  LKN 9:45  mRS 3  s/p L M1 MT w TICI 2c flow    NEURO:  [id number]  CT Head in AM, MRI post-op, Pain control  minimize sedation, dc prop switch to precedex as tolerated  stroke team to follow and core stroke measures  Activity: [] OOB as tolerated [] Bedrest [x] PT [x] OT , SLP [] PMNR    PULM:  cxr w goot ETT placement  abg   SBT as tolerated  Incentive spirometry, mobilize as tolerated    CV: cardene prn  hx of afib, failure on xarelto  check tte w bubble study  lipid panel  Keep -140mmHg, d/c a-line in AM    RENAL: normonatremia  IVF until good PO intake,primafit    GI:  Diet: npo ngt  GI prophylaxis [] not indicated [x] PPI [] other:  Bowel regimen standing    ENDO: check a1c  Goal euglycemia (-180)    HEME/ONC:  VTE prophylaxis: [x] SCDs [] chemoprophylaxis [] hold chemoprophylaxis due to: POD0    ID:  afebrile    MISC:    SOCIAL/FAMILY:  [x] awaiting [] updated at bedside [] family meeting    CODE STATUS:  [x] Full Code [] DNR [] DNI [] Palliative/Comfort Care    DISPOSITION:  [x] ICU [] Stroke Unit [] Floor [] EMU [] RCU [] PCU    [x] Patient is at high risk of neurologic deterioration/death due to:     Contact: [phone] ASSESSMENT:   stroke in setting of afib, on xarelto, likely cardioembolic    NIHSS 22  LKN 9:45  mRS 3  s/p L M1 MT w TICI 2c flow    NEURO:  [id number]  CT Head in AM, MRI post-op, Pain control  minimize sedation, dc prop switch to precedex as tolerated  stroke team to follow and core stroke measures  Activity: [] OOB as tolerated [] Bedrest [x] PT [x] OT , SLP [] PMNR    PULM:  CPAP wean to extubate  Incentive spirometry, mobilize as tolerated    CV: cardene prn  hx of afib, failure on xarelto  restart home losartan  check tte w bubble study  lipid panel  Keep -140mmHg, d/c a-line in AM    RENAL: normonatremia  IVF until good PO intake,primafit    GI:  Diet: place NGT, NPO for possible extubation  GI prophylaxis [] not indicated [x] PPI [] other:  Bowel regimen standing    ENDO: check a1c  Goal euglycemia (-180)    HEME/ONC:  VTE prophylaxis: [x] SCDs [] chemoprophylaxis [] hold chemoprophylaxis due to: POD0    ID:  afebrile    MISC:    SOCIAL/FAMILY:  [x] awaiting [] updated at bedside [] family meeting    CODE STATUS:  [x] Full Code [] DNR [] DNI [] Palliative/Comfort Care    DISPOSITION:  [x] ICU [] Stroke Unit [] Floor [] EMU [] RCU [] PCU    [x] Patient is at high risk of neurologic deterioration/death due to:     Contact: [phone] ASSESSMENT:   stroke in setting of afib, on xarelto, likely cardioembolic    NIHSS 22  LKN 9:45  mRS 3  s/p L M1 MT w TICI 2c flow    NEURO:  [id number]  CT Head in AM, MRI post-op, Pain control  minimize sedation, dc prop switch to precedex as tolerated  stroke team to follow and core stroke measures  Activity: [] OOB as tolerated [] Bedrest [x] PT [x] OT , SLP [] PMNR    PULM:  CPAP wean to extubate  Incentive spirometry, mobilize as tolerated    CV: cardene prn  hx of afib, failure on xarelto  asa held hemicrani watch, pending restart a/c  restart home losartan  check tte w bubble study  lipid panel  Keep -140mmHg, d/c a-line in AM    RENAL: normonatremia  IVF until good PO intake,primafit    GI:  Diet: place NGT, NPO for possible extubation  GI prophylaxis [] not indicated [x] PPI [] other:  Bowel regimen standing    ENDO: check a1c  Goal euglycemia (-180)    HEME/ONC:  VTE prophylaxis: [x] SCDs [] chemoprophylaxis [] hold chemoprophylaxis due to: POD0    ID:  afebrile    MISC:    SOCIAL/FAMILY:  [x] awaiting [] updated at bedside [] family meeting    CODE STATUS:  [x] Full Code [] DNR [] DNI [] Palliative/Comfort Care    DISPOSITION:  [x] ICU [] Stroke Unit [] Floor [] EMU [] RCU [] PCU    [x] Patient is at high risk of neurologic deterioration/death due to:     Contact: [phone]

## 2023-06-02 NOTE — DIETITIAN INITIAL EVALUATION ADULT - NSFNSGIIOFT_GEN_A_CORE
-Last BM PTA. On bowel regimen (senna, Miralax).   -Continues on NaCl 0.9% at 70ml/hr for hydration. NPO at this time.    -Last BM PTA. On bowel regimen (senna, Miralax). On Protonix as prescribed.   -Continues on NaCl 0.9% at 70ml/hr for hydration. NPO at this time.

## 2023-06-02 NOTE — H&P ADULT - ASSESSMENT
Assessment:82y (1940) woman with a PMHx significant for HTN, afib on Xarelto (last dose 6/1 6pm), hepatitis C on antiviral presenting with acute onset of AMS. LKW 10pm. Around 10:30pm patient become acutely altered, slumped to right side, head turned to left, not answering questions properly or moving the right side. NIHSS 22 most notable for decreased arousal, incorrect answers, not following commands, left gaze deviation that could not be overcome, right sided severe weakness compared to left, slurred speech, aphasia. Patient lives at home with daughter who is at bedside. Patient is [place] Island[place] speaking. Daughter reports at baseline she intermittently walks with a cane and needs minor help with ADLs such as getting dressed or running a shower, Daughter looks after her affairs. She denies dementia or cognitive impairment. mRS 3. Not a tenecteplase candidate due to INR 1.79. Patient taken to IR for mechanical thrombectomy.    LKW 10pm (6/1)  NIHSS 22  pre-mRS 3  Xarelto last taken at 6pm, INR=1.79    Impression: acute change in mental status, right hemiparesis, left gaze preference, dysarthria & aphasia 2/2 left MCA occlusion. Mechanism possibly cardioembolic, patient in active afib in ED vs other etiology, pending work-up    Plan  [] IR thrombectomy  [] NSCU deposition  [] Defer to NSGY when safe to continue xarelto for afib  [] post MT BP goal <180/105mmHg  [] A1c, lipid panel  [] MRI brain without contrast  [] TTE    Case discussed with Stroke Fellow, to be seen by stroke team in am

## 2023-06-02 NOTE — OCCUPATIONAL THERAPY INITIAL EVALUATION ADULT - DIAGNOSIS, OT EVAL
pt presents with decreased strength, endurance, ROM, cognition, communication, postural control, and balance limiting their ability to engage in ADLs and functional tasks.

## 2023-06-02 NOTE — H&P ADULT - HISTORY OF PRESENT ILLNESS
82y (1940) woman with a PMHx significant for HTN, afib on Xarelto (last dose 6/1 6pm), hepatitis C on antiviral presenting with acute onset of AMS. LKW 10pm. Around 10:30pm patient become acutely altered, slumped to right side, head turned to left, not answering questions properly or moving the right side. NIHSS 22 most notable for decreased arousal, incorrect answers, not following commands, left gaze deviation that could not be overcome, right facial droop, right sided severe weakness compared to left, slurred speech, aphasia. Patient lives at home with daughter who is at bedside. Patient is [place] speaking. Daughter reports at baseline she intermittently walks with a cane and needs minor help with ADLs such as getting dressed or running a shower, Daughter looks after her affairs. She denies dementia or cognitive impairment. mRS 3.     LKW 10pm (6/1)  NIHSS 22  pre-mRS 3  Xarelto last taken at 6pm, INR=1.79

## 2023-06-02 NOTE — OCCUPATIONAL THERAPY INITIAL EVALUATION ADULT - NS ASR FOLLOW COMMAND OT EVAL
eyes closed throughout session/unable to follow commands 75% of the time/able to follow single-step instructions

## 2023-06-02 NOTE — DIETITIAN INITIAL EVALUATION ADULT - ORAL INTAKE PTA/DIET HISTORY
Patient/Caregiver provided printed discharge information.
-Pt intubated and unable to participate in nutrition evaluation at this time. No family present.   -Baseline tolerance to chewing/swallowing, and baseline provision of energy/nutrient intake unclear.   -No documented food allergies.   -No indication of prior vitamins/supplements PTA.

## 2023-06-02 NOTE — PHYSICAL THERAPY INITIAL EVALUATION ADULT - MANUAL MUSCLE TESTING RESULTS, REHAB EVAL
assessed through spontaneous movement LUE/BLE at least 3/5, RUE unable to assess/grossly assessed due to LUE/LLE at least a 3/5 throughout, RUE 0/5, RLE 1/5/grossly assessed due to

## 2023-06-02 NOTE — SPEECH LANGUAGE PATHOLOGY EVALUATION - COMMENTS
CT angiography neck 6/1: No hemodynamically significant stenosis by NASCET criteria. No vascular dissection. Nonspecific groundglass opacities in the visualized apices of the lungs, may represent infectious versus inflammatory disease. CT angiography brain 6/1: Left proximal M1 occlusion. CT perfusion 6/1: Core infarct of 6 cc with a penumbra of 162 cc. CT Brain 6/1- No acute intracranial hemorrhage or mass effect.    SWALLOW HISTORY: No reports in SCM or in PACS prior to this admission.

## 2023-06-02 NOTE — OCCUPATIONAL THERAPY INITIAL EVALUATION ADULT - LIVES WITH, PROFILE
as per chart, pt lives with daughter, walks short distances with a cane. pt requires some assistance for ADLs/children as per son at bedside, pt lives in an apartment with daughter, however daugther works and pt is often home alone. pt has 15 steps to enter +1 HR. prior to admission, pt was independent in all ADLs and functional tasks without AE. pt does not own AE. daughter would drive pt into community/children

## 2023-06-02 NOTE — OCCUPATIONAL THERAPY INITIAL EVALUATION ADULT - RANGE OF MOTION EXAMINATION, UPPER EXTREMITY
RUE PROM p/w minimally increased tone/bilateral UE Passive ROM was WFL  (within functional limits) Left UE Active ROM was WFL (within functional limits)/Right UE Passive ROM was WFL  (within functional limits)

## 2023-06-02 NOTE — DIETITIAN INITIAL EVALUATION ADULT - PERTINENT MEDS FT
MEDICATIONS  (STANDING):  chlorhexidine 0.12% Liquid 15 milliLiter(s) Oral Mucosa every 12 hours  chlorhexidine 4% Liquid 1 Application(s) Topical daily  dexMEDEtomidine Infusion 0.2 MICROgram(s)/kG/Hr (3.73 mL/Hr) IV Continuous <Continuous>  niCARdipine Infusion 5 mG/Hr (25 mL/Hr) IV Continuous <Continuous>  pantoprazole    Tablet 40 milliGRAM(s) Oral before breakfast  polyethylene glycol 3350 17 Gram(s) Oral daily  propofol Infusion 10 MICROgram(s)/kG/Min (4.47 mL/Hr) IV Continuous <Continuous>  senna 2 Tablet(s) Oral at bedtime  sodium chloride 0.9%. 1000 milliLiter(s) (70 mL/Hr) IV Continuous <Continuous>    MEDICATIONS  (PRN):

## 2023-06-02 NOTE — DIETITIAN INITIAL EVALUATION ADULT - ENTERAL
If EN feeds warranted, consider: Jevity 1.2 at GOAL rate 65ml/hr x 24 hrs. To provide (based on dosing wt 74.5kg): 1560ml, 1872kcal (25.1kcal/kg) and 87g protein (1.17g protein/kg).

## 2023-06-03 LAB
APPEARANCE UR: CLEAR — SIGNIFICANT CHANGE UP
BACTERIA # UR AUTO: NEGATIVE — SIGNIFICANT CHANGE UP
BILIRUB UR-MCNC: NEGATIVE — SIGNIFICANT CHANGE UP
COLOR SPEC: SIGNIFICANT CHANGE UP
DIFF PNL FLD: ABNORMAL
EPI CELLS # UR: 0 /HPF — SIGNIFICANT CHANGE UP
GLUCOSE UR QL: NEGATIVE — SIGNIFICANT CHANGE UP
KETONES UR-MCNC: NEGATIVE — SIGNIFICANT CHANGE UP
LEUKOCYTE ESTERASE UR-ACNC: NEGATIVE — SIGNIFICANT CHANGE UP
NITRITE UR-MCNC: NEGATIVE — SIGNIFICANT CHANGE UP
PH UR: 6 — SIGNIFICANT CHANGE UP (ref 5–8)
PROT UR-MCNC: NEGATIVE — SIGNIFICANT CHANGE UP
RBC CASTS # UR COMP ASSIST: 1 /HPF — SIGNIFICANT CHANGE UP (ref 0–4)
SP GR SPEC: 1.02 — SIGNIFICANT CHANGE UP (ref 1.01–1.02)
UROBILINOGEN FLD QL: NEGATIVE — SIGNIFICANT CHANGE UP
WBC UR QL: 1 /HPF — SIGNIFICANT CHANGE UP (ref 0–5)

## 2023-06-03 PROCEDURE — 71045 X-RAY EXAM CHEST 1 VIEW: CPT | Mod: 26

## 2023-06-03 PROCEDURE — 99233 SBSQ HOSP IP/OBS HIGH 50: CPT

## 2023-06-03 RX ORDER — LABETALOL HCL 100 MG
100 TABLET ORAL EVERY 8 HOURS
Refills: 0 | Status: DISCONTINUED | OUTPATIENT
Start: 2023-06-03 | End: 2023-06-04

## 2023-06-03 RX ORDER — POTASSIUM CHLORIDE 20 MEQ
40 PACKET (EA) ORAL ONCE
Refills: 0 | Status: COMPLETED | OUTPATIENT
Start: 2023-06-03 | End: 2023-06-03

## 2023-06-03 RX ORDER — LABETALOL HCL 100 MG
10 TABLET ORAL ONCE
Refills: 0 | Status: COMPLETED | OUTPATIENT
Start: 2023-06-03 | End: 2023-06-03

## 2023-06-03 RX ORDER — ACETAMINOPHEN 500 MG
1000 TABLET ORAL ONCE
Refills: 0 | Status: COMPLETED | OUTPATIENT
Start: 2023-06-03 | End: 2023-06-03

## 2023-06-03 RX ORDER — LABETALOL HCL 100 MG
5 TABLET ORAL ONCE
Refills: 0 | Status: COMPLETED | OUTPATIENT
Start: 2023-06-03 | End: 2023-06-03

## 2023-06-03 RX ORDER — ENOXAPARIN SODIUM 100 MG/ML
40 INJECTION SUBCUTANEOUS
Refills: 0 | Status: DISCONTINUED | OUTPATIENT
Start: 2023-06-03 | End: 2023-06-15

## 2023-06-03 RX ORDER — LOSARTAN POTASSIUM 100 MG/1
50 TABLET, FILM COATED ORAL ONCE
Refills: 0 | Status: COMPLETED | OUTPATIENT
Start: 2023-06-03 | End: 2023-06-03

## 2023-06-03 RX ORDER — LABETALOL HCL 100 MG
100 TABLET ORAL EVERY 8 HOURS
Refills: 0 | Status: DISCONTINUED | OUTPATIENT
Start: 2023-06-03 | End: 2023-06-03

## 2023-06-03 RX ADMIN — Medication 400 MILLIGRAM(S): at 00:30

## 2023-06-03 RX ADMIN — Medication 100 MILLIGRAM(S): at 21:10

## 2023-06-03 RX ADMIN — ATORVASTATIN CALCIUM 80 MILLIGRAM(S): 80 TABLET, FILM COATED ORAL at 21:10

## 2023-06-03 RX ADMIN — CHLORHEXIDINE GLUCONATE 1 APPLICATION(S): 213 SOLUTION TOPICAL at 21:10

## 2023-06-03 RX ADMIN — ATENOLOL 100 MILLIGRAM(S): 25 TABLET ORAL at 05:24

## 2023-06-03 RX ADMIN — LOSARTAN POTASSIUM 50 MILLIGRAM(S): 100 TABLET, FILM COATED ORAL at 13:57

## 2023-06-03 RX ADMIN — Medication 40 MILLIEQUIVALENT(S): at 05:24

## 2023-06-03 RX ADMIN — Medication 5 MILLIGRAM(S): at 17:00

## 2023-06-03 RX ADMIN — LOSARTAN POTASSIUM 50 MILLIGRAM(S): 100 TABLET, FILM COATED ORAL at 05:24

## 2023-06-03 RX ADMIN — ENTECAVIR 0.5 MILLIGRAM(S): 0.5 TABLET ORAL at 05:24

## 2023-06-03 RX ADMIN — Medication 100 MILLIGRAM(S): at 17:34

## 2023-06-03 RX ADMIN — Medication 10 MILLIGRAM(S): at 13:00

## 2023-06-03 RX ADMIN — ENOXAPARIN SODIUM 40 MILLIGRAM(S): 100 INJECTION SUBCUTANEOUS at 17:38

## 2023-06-03 NOTE — SWALLOW BEDSIDE ASSESSMENT ADULT - SLP GENERAL OBSERVATIONS
Pt encountered upright in bed, on 2LNC, + KFT, + L handed wrist restraint, + icu monitoring (VSS). [facility]  utilized (#[id number]), however ineffective in improving communication. Pt Aox0 and unable to follow simple commands. Presence of mixed aphasia, dysarthria, hypophonia, wet vocal quality, and wet baseline junky cough. RN Jackie present, nasal trumpet inserted to improve suctioning, which was successful in removed a moderate amount of thick creamy secretions. Improvement in wet vocal quality. Pt encountered upright in bed, on 2LNC, + KFT, + L handed wrist restraint, + icu monitoring (VSS). [facility]  utilized (#[id number]), however ineffective in improving communication. Pt Aox0 and unable to follow simple commands. Presence of mixed aphasia, dysarthria, hypophonia, wet vocal quality, and wet baseline junky cough. RN Jackie present, nasal trumpet inserted to improve suctioning, which was successful in removal of a moderate amount of thick creamy secretions, resulting in improvement in wet vocal quality.

## 2023-06-03 NOTE — SPEECH LANGUAGE PATHOLOGY EVALUATION - SLP PERTINENT HISTORY OF CURRENT PROBLEM
82y (1940) woman with a PMHx significant for HTN, afib on Xarelto (last dose 6/1 6pm), hepatitis C on antiviral presenting with acute onset of AMS. LKW 10pm. Around 10:30pm patient become acutely altered, slumped to right side, head turned to left, not answering questions properly or moving the right side. NIHSS 22 most notable for decreased arousal, incorrect answers, not following commands, left gaze deviation that could not be overcome, right facial droop, right sided severe weakness compared to left, slurred speech, aphasia. Patient lives at home with daughter who is at bedside. Patient is Austrian speaking. Daughter reports at baseline she intermittently walks with a cane and needs minor help with ADLs such as getting dressed or running a shower, Daughter looks after her affairs. She denies dementia or cognitive impairment. mRS 3. Stroke in setting of afib, on xarelto, likely cardioembolic. Now intubated.
82y (1940) woman with a PMHx significant for HTN, afib on Xarelto (last dose 6/1 6pm), hepatitis C on antiviral presenting with acute onset of AMS. LKW 10pm. Around 10:30pm patient become acutely altered, slumped to right side, head turned to left, not answering questions properly or moving the right side. NIHSS 22 most notable for decreased arousal, incorrect answers, not following commands, left gaze deviation that could not be overcome, right facial droop, right sided severe weakness compared to left, slurred speech, aphasia. Patient lives at home with daughter who is at bedside. Patient is Sudanese speaking. Daughter reports at baseline she intermittently walks with a cane and needs minor help with ADLs such as getting dressed or running a shower, Daughter looks after her affairs. She denies dementia or cognitive impairment. mRS 3. Stroke in setting of afib, on xarelto, likely cardioembolic. Now intubated.

## 2023-06-03 NOTE — SWALLOW BEDSIDE ASSESSMENT ADULT - SWALLOW EVAL: DIAGNOSIS
Consult received and appreciated. Chart reviewed. As per NSCU rounds and d/w SILVIA Yancey, pt remains intubated at this time. Will follow up as medically appropriate.
Pt is an 81 y/o female p/w acute onset of AMS. NIHSS 22 most notable for decreased arousal, incorrect answers, not following commands, left gaze deviation that could not be overcome, right facial droop, right sided severe weakness compared to left, slurred speech, aphasia. Now s/p L M1 MT w TICI 2c flow, extubated 6/2. Pt p/w 1) oropharyngeal dysphagia. Suspected reduced secretion management in setting of baseline wet vocal quality/junky wet cough. Swallow sequence for conservative 1/2 tsp crushed ice chips remarkable for reduced oral aperture, reduced mastication and poor bolus manipulation, delayed pharyngeal trigger, reduced hyolaryngeal elevation upon digital palpation, and overt s/s aspiration (i.e. throat clearing and increase in wet vocal quality). 2) Mixed aphasia 3) Dysarthria 4) Hypophonia 5) Suggest possible cognitive-linguistic deficits, however at this time language impairment overlaying.

## 2023-06-03 NOTE — SWALLOW BEDSIDE ASSESSMENT ADULT - PHARYNGEAL PHASE
+ wet vocal quality/Delayed pharyngeal swallow/Decreased laryngeal elevation/Throat clear post oral intake

## 2023-06-03 NOTE — PROGRESS NOTE ADULT - ASSESSMENT
ASSESSMENT:   stroke in setting of afib, on xarelto, likely cardioembolic    NIHSS 22  LKN 9:45  mRS 3  s/p L M1 MT w TICI 2c flow    NEURO:  [id number]  MRI at some point  stroke team to follow and core stroke measures  Activity: [] OOB as tolerated [] Bedrest [x] PT [x] OT , SLP [] PMNR    PULM:  CPAP wean to extubate  Incentive spirometry, mobilize as tolerated    CV:   hx of afib, failure on xarelto  losartan  check tte w bubble study  lipid panel  Keep -140mmHg    RENAL: normonatremia  IVL    GI:  Diet: NGT, glucerna  GI prophylaxis [] not indicated [x] PPI [] other:  Bowel regimen standing    ENDO: check a1c  Goal euglycemia (-180)    HEME/ONC:  VTE prophylaxis: [x] SCDs [X] chemoprophylaxis [] hold chemoprophylaxis due to: POD0    ID:  afebrile    MISC:    SOCIAL/FAMILY:  [x] awaiting [] updated at bedside [] family meeting    CODE STATUS:  [x] Full Code [] DNR [] DNI [] Palliative/Comfort Care    DISPOSITION:  [x] ICU [] Stroke Unit [] Floor [] EMU [] RCU [] PCU    [x] Patient is at high risk of neurologic deterioration/death due to:     Contact: [phone] ASSESSMENT:   stroke in setting of afib, on xarelto, likely cardioembolic    NIHSS 22  LKN 9:45  mRS 3  s/p L M1 MT w TICI 2c flow    NEURO:  [id number]  MRI at some point  stroke team to follow and core stroke measures  Activity: [] OOB as tolerated [] Bedrest [x] PT [x] OT , SLP [] PMNR    PULM:  CPAP wean to extubate  Incentive spirometry, mobilize as tolerated    CV:   hx of afib, failure on xarelto  losartan  asa held hemicrani watch, pending restart a/c  check tte w bubble study  lipid panel  Keep -140mmHg    RENAL: normonatremia  IVL    GI:  Diet: NGT, glucerna  GI prophylaxis [] not indicated [x] PPI [] other:  Bowel regimen standing    ENDO: check a1c  Goal euglycemia (-180)    HEME/ONC:  VTE prophylaxis: [x] SCDs [X] chemoprophylaxis [] hold chemoprophylaxis due to: POD0    ID:  afebrile    MISC:    SOCIAL/FAMILY:  [x] awaiting [] updated at bedside [] family meeting    CODE STATUS:  [x] Full Code [] DNR [] DNI [] Palliative/Comfort Care    DISPOSITION:  [x] ICU [] Stroke Unit [] Floor [] EMU [] RCU [] PCU    [x] Patient is at high risk of neurologic deterioration/death due to:     Contact: [phone]

## 2023-06-03 NOTE — SPEECH LANGUAGE PATHOLOGY EVALUATION - SLP DIAGNOSIS
Pt is an 81 y/o female p/w acute onset of AMS. NIHSS 22 most notable for decreased arousal, incorrect answers, not following commands, left gaze deviation that could not be overcome, right facial droop, right sided severe weakness compared to left, slurred speech, aphasia. Now s/p L M1 MT w TICI 2c flow, extubated 6/2. Pt p/w 1) profound receptive and expressive language deficit, with dysarthria overlaying. Impairments characterized by unintelligible speech significantly impacted by hypophonia and dysarthria. Absent naming, absent automatic speech sequences, difficulty initiating speech, reduced basic comprehension of commands and yes/no questions, and absent ID of objects. 2) Suggest possible cognitive-linguistic deficits, however at this time language impairment overlaying. Further assessment warranted as language skills improve. Pt is an 83 y/o female p/w acute onset of AMS. NIHSS 22 most notable for decreased arousal, incorrect answers, not following commands, left gaze deviation that could not be overcome, right facial droop, right sided severe weakness compared to left, slurred speech, aphasia. Now s/p L M1 MT w TICI 2c flow, extubated 6/2. Pt p/w 1) Mixed aphasia, with dysarthria overlaying. Impairments characterized by unintelligible speech significantly impacted by hypophonia and dysarthria. Absent naming, absent automatic speech sequences, reduced basic comprehension of commands and yes/no questions, and absent ID of objects. 2) Suggest possible cognitive-linguistic deficits, however at this time language impairment overlaying. Further assessment warranted as language skills improve.

## 2023-06-03 NOTE — SPEECH LANGUAGE PATHOLOGY EVALUATION - COMMENTS
Suggest possible cognitive-linguistic deficits, however at this time language impairment overlaying. Further assessment warranted as language skills improve. CT angiography neck 6/1: No hemodynamically significant stenosis by NASCET criteria. No vascular dissection. Nonspecific groundglass opacities in the visualized apices of the lungs, may represent infectious versus inflammatory disease. CT angiography brain 6/1: Left proximal M1 occlusion. CT perfusion 6/1: Core infarct of 6 cc with a penumbra of 162 cc. CT Brain 6/1- No acute intracranial hemorrhage or mass effect.    Extubated 6/2.    SWALLOW HISTORY: No reports in SCM or in PACS prior to this admission. Goals:  1. Pt will improve expressive language skills for functional communication.  2. Pt will improve receptive language skills for functional communication.  3. Pt will improve cognitive-linguistic skills for functional communication.   4. Pt will improve motor speech skills for functional communication. DNT

## 2023-06-03 NOTE — PROGRESS NOTE ADULT - SUBJECTIVE AND OBJECTIVE BOX
HPI:  82y (1940) woman with a PMHx significant for HTN, afib on Xarelto (last dose 6/1 6pm), hepatitis C on antiviral presenting with acute onset of AMS. LKW 10pm. Around 10:30pm patient become acutely altered, slumped to right side, head turned to left, not answering questions properly or moving the right side. NIHSS 22 most notable for decreased arousal, incorrect answers, not following commands, left gaze deviation that could not be overcome, right facial droop, right sided severe weakness compared to left, slurred speech, aphasia. Patient lives at home with daughter who is at bedside. Patient is Armenian speaking. Daughter reports at baseline she intermittently walks with a cane and needs minor help with ADLs such as getting dressed or running a shower, Daughter looks after her affairs. She denies dementia or cognitive impairment. mRS 3.     LKW 10pm (6/1)  NIHSS 22  pre-mRS 3  Xarelto last taken at 6pm, INR=1.79   (02 Jun 2023 01:43)    OVERNIGHT EVENTS:   No acute events overnight.    VITALS/LABS/IMAGING/MEDS:   reviewed.      EXAMINATION:   [id number]  General:  calm  HEENT:  MMM  Neuro:  hypophonic,  FC, mimics, antigravity LUE/LLE, extends RUE, triple flex RLE  Cards:  RRR  Respiratory:  no respiratory distress  Adomen:  soft  Extremities:  no edema  Skin:  warm/dry HPI:  82y (1940) woman with a PMHx significant for HTN, afib on Xarelto (last dose 6/1 6pm), hepatitis C on antiviral presenting with acute onset of AMS. LKW 10pm. Around 10:30pm patient become acutely altered, slumped to right side, head turned to left, not answering questions properly or moving the right side. NIHSS 22 most notable for decreased arousal, incorrect answers, not following commands, left gaze deviation that could not be overcome, right facial droop, right sided severe weakness compared to left, slurred speech, aphasia. Patient lives at home with daughter who is at bedside. Patient is Macedonian speaking. Daughter reports at baseline she intermittently walks with a cane and needs minor help with ADLs such as getting dressed or running a shower, Daughter looks after her affairs. She denies dementia or cognitive impairment. mRS 3.     LKW 10pm (6/1)  NIHSS 22  pre-mRS 3  Xarelto last taken at 6pm, INR=1.79   (02 Jun 2023 01:43)    OVERNIGHT EVENTS:   No acute events overnight.    VITALS/LABS/IMAGING/MEDS:   reviewed.      EXAMINATION:  General:  calm  HEENT:  MMM  Neuro:  hypophonic,  FC, mimics, antigravity and spont LUE/LLE, extends RUE, triple flex RLE  Cards:  RRR  Respiratory:  no respiratory distress  Adomen:  soft  Extremities:  no edema  Skin:  warm/dry HPI:  82y (1940) woman with a PMHx significant for HTN, afib on Xarelto (last dose 6/1 6pm), hepatitis C on antiviral presenting with acute onset of AMS. LKW 10pm. Around 10:30pm patient become acutely altered, slumped to right side, head turned to left, not answering questions properly or moving the right side. NIHSS 22 most notable for decreased arousal, incorrect answers, not following commands, left gaze deviation that could not be overcome, right facial droop, right sided severe weakness compared to left, slurred speech, aphasia. Patient lives at home with daughter who is at bedside. Patient is Tuvaluan speaking. Daughter reports at baseline she intermittently walks with a cane and needs minor help with ADLs such as getting dressed or running a shower, Daughter looks after her affairs. She denies dementia or cognitive impairment. mRS 3.     LKW 10pm (6/1)  NIHSS 22  pre-mRS 3  Xarelto last taken at 6pm, INR=1.79   (02 Jun 2023 01:43)    OVERNIGHT EVENTS:   No acute events overnight.    VITALS/LABS/IMAGING/MEDS:   reviewed.      EXAMINATION:  General:  calm  HEENT:  MMM  Neuro:  hypophonic,  FC, mimics, antigravity and spont LUE/LLE, extends RUE, triple flex RLE  Cards:  RRR  Respiratory:  no respiratory distress, audible crackles  bilat  Adomen:  soft  Extremities:  no edema  Skin:  warm/dry

## 2023-06-03 NOTE — PROGRESS NOTE ADULT - ASSESSMENT
Assessment:82y (1940) woman with a PMHx significant for HTN, afib on Xarelto (last dose 6/1 6pm), hepatitis C on antiviral presenting with acute onset of AMS. LKW 10pm. Around 10:30pm patient become acutely altered, slumped to right side, head turned to left, not answering questions properly or moving the right side. NIHSS 22 most notable for decreased arousal, incorrect answers, not following commands, left gaze deviation that could not be overcome, right sided severe weakness compared to left, slurred speech, aphasia. Patient lives at home with daughter who is at bedside. Patient is Citizen of [place] speaking. Daughter reports at baseline she intermittently walks with a cane and needs minor help with ADLs such as getting dressed or running a shower, Daughter looks after her affairs. She denies dementia or cognitive impairment. mRS 3. Not a tenecteplase candidate due to INR 1.79. Patient taken to IR for mechanical thrombectomy.    LKW 10pm (6/1)  NIHSS 22  pre-mRS 3  Xarelto last taken at 6pm, INR=1.79    Impression: acute change in mental status, right hemiparesis, left gaze preference, dysarthria & aphasia 2/2 left MCA territiry infarct with left MCA occlusion s/p thrombectomy. Mechanism cardioembolic, patient in active afib in ED vs other etiology,  Plan  [] s/p thrombectomy  [] post MT BP goal <180/105mmHg  [] A1c, lipid panel  [] MRI brain without contrast  [] TTE  []Repeat CTH in AM to evaluate stroke burden if MRI not complete,

## 2023-06-03 NOTE — SPEECH LANGUAGE PATHOLOGY EVALUATION - SLP GENERAL OBSERVATIONS
Pt encountered upright in bed, on 2LNC, + KFT, + L handed wrist restraint, + icu monitoring (VSS). [facility]  utilized (#[id number]), however ineffective in improving communication.

## 2023-06-03 NOTE — SPEECH LANGUAGE PATHOLOGY EVALUATION - SPECIFY REASON(S)
to assess speech production, expressive and receptive language skills, and cognitive-communication skills.
to assess speech production, expressive and receptive language skills, and cognitive-communication skills

## 2023-06-03 NOTE — SWALLOW BEDSIDE ASSESSMENT ADULT - SLP PERTINENT HISTORY OF CURRENT PROBLEM
82y (1940) woman with a PMHx significant for HTN, afib on Xarelto (last dose 6/1 6pm), hepatitis C on antiviral presenting with acute onset of AMS. LKW 10pm. Around 10:30pm patient become acutely altered, slumped to right side, head turned to left, not answering questions properly or moving the right side. NIHSS 22 most notable for decreased arousal, incorrect answers, not following commands, left gaze deviation that could not be overcome, right facial droop, right sided severe weakness compared to left, slurred speech, aphasia. Patient lives at home with daughter who is at bedside. Patient is Afghan speaking. Daughter reports at baseline she intermittently walks with a cane and needs minor help with ADLs such as getting dressed or running a shower, Daughter looks after her affairs. She denies dementia or cognitive impairment. mRS 3. Stroke in setting of afib, on xarelto, likely cardioembolic. Now intubated.
82y (1940) woman with a PMHx significant for HTN, afib on Xarelto (last dose 6/1 6pm), hepatitis C on antiviral presenting with acute onset of AMS. LKW 10pm. Around 10:30pm patient become acutely altered, slumped to right side, head turned to left, not answering questions properly or moving the right side. NIHSS 22 most notable for decreased arousal, incorrect answers, not following commands, left gaze deviation that could not be overcome, right facial droop, right sided severe weakness compared to left, slurred speech, aphasia. Patient lives at home with daughter who is at bedside. Patient is Nigerien speaking. Daughter reports at baseline she intermittently walks with a cane and needs minor help with ADLs such as getting dressed or running a shower, Daughter looks after her affairs. She denies dementia or cognitive impairment. mRS 3. Stroke in setting of afib, on xarelto, likely cardioembolic. Now intubated.

## 2023-06-03 NOTE — SWALLOW BEDSIDE ASSESSMENT ADULT - COMMENTS
CT angiography neck 6/1: No hemodynamically significant stenosis by NASCET criteria. No vascular dissection. Nonspecific groundglass opacities in the visualized apices of the lungs, may represent infectious versus inflammatory disease. CT angiography brain 6/1: Left proximal M1 occlusion. CT perfusion 6/1: Core infarct of 6 cc with a penumbra of 162 cc. CT Brain 6/1- No acute intracranial hemorrhage or mass effect.    SWALLOW HISTORY: No reports in SCM or in PACS prior to this admission.
CT angiography neck 6/1: No hemodynamically significant stenosis by NASCET criteria. No vascular dissection. Nonspecific groundglass opacities in the visualized apices of the lungs, may represent infectious versus inflammatory disease. CT angiography brain 6/1: Left proximal M1 occlusion. CT perfusion 6/1: Core infarct of 6 cc with a penumbra of 162 cc. CT Brain 6/1- No acute intracranial hemorrhage or mass effect.    Extubated 6/2.    SWALLOW HISTORY: No reports in SCM or in PACS prior to this admission.

## 2023-06-03 NOTE — PROGRESS NOTE ADULT - ASSESSMENT
ASSESSMENT:   stroke in setting of afib, on xarelto, likely cardioembolic    NIHSS 22  LKN 9:45  mRS 3  s/p L M1 MT w TICI 2c flow    NEURO:  [id number]  MRI at some point  stroke team to follow and core stroke measures  Activity: [] OOB as tolerated [] Bedrest [x] PT [x] OT , SLP [] PMNR    PULM:    Incentive spirometry, mobilize as tolerated    CV: cont labetalol  hx of afib, failure on xarelto  losartan  nml ef 60% on tte  lipid panel  Keep -170mmHg    RENAL: normonatremia  IVL    GI:  Diet: NGT, glucerna  GI prophylaxis [] not indicated [x] PPI [] other:  Bowel regimen standing    ENDO: 5.9 a1c  Goal euglycemia (-180)    HEME/ONC:  VTE prophylaxis: [x] SCDs [X] chemoprophylaxis SQL    ID:  afebrile    MISC:    SOCIAL/FAMILY:  [x] awaiting [] updated at bedside [] family meeting    CODE STATUS:  [x] Full Code [] DNR [] DNI [] Palliative/Comfort Care    DISPOSITION:  [x] ICU [] Stroke Unit [] Floor [] EMU [] RCU [] PCU    [x] Patient is at high risk of neurologic deterioration/death due to:     Contact: [phone] ASSESSMENT:   stroke in setting of afib, on xarelto, likely cardioembolic    NIHSS 22  LKN 9:45  mRS 3  s/p L M1 MT w TICI 2c flow    NEURO:  [id number]  MRI at some point  stroke team to follow and core stroke measures  Activity: [] OOB as tolerated [] Bedrest [x] PT [x] OT , SLP [] PMNR    PULM:  abundant secretions  start duonebs, CPT, continue NT suction  Incentive spirometry, mobilize as tolerated    CV: cont labetalol  hx of afib, failure on xarelto  losartan  nml ef 60% on tte  lipid panel  Keep -170mmHg    RENAL: normonatremia  IVL    GI:  Diet: NGT, glucerna  GI prophylaxis [] not indicated [x] PPI [] other:  Bowel regimen standing    ENDO: 5.9 a1c  Goal euglycemia (-180)    HEME/ONC:  VTE prophylaxis: [x] SCDs [X] chemoprophylaxis SQL    ID:  febrile early 6/3  fu cultures, UA neg  check procalcitonin and mrsa    MISC:    SOCIAL/FAMILY:  [x] awaiting [] updated at bedside [] family meeting    CODE STATUS:  [x] Full Code [] DNR [] DNI [] Palliative/Comfort Care    DISPOSITION:  [x] ICU [] Stroke Unit [] Floor [] EMU [] RCU [] PCU    [x] Patient is at high risk of neurologic deterioration/death due to:     Contact: [phone]

## 2023-06-03 NOTE — PROGRESS NOTE ADULT - SUBJECTIVE AND OBJECTIVE BOX
HPI:  82y (1940) woman with a PMHx significant for HTN, afib on Xarelto (last dose 6/1 6pm), hepatitis C on antiviral presenting with acute onset of AMS. LKW 10pm. Around 10:30pm patient become acutely altered, slumped to right side, head turned to left, not answering questions properly or moving the right side. NIHSS 22 most notable for decreased arousal, incorrect answers, not following commands, left gaze deviation that could not be overcome, right facial droop, right sided severe weakness compared to left, slurred speech, aphasia. Patient lives at home with daughter who is at bedside. Patient is Bel[place]ian speaking. Daughter reports at baseline she intermittently walks with a cane and needs minor help with ADLs such as getting dressed or running a shower, Daughter looks after her affairs. She denies dementia or cognitive impairment. mRS 3.     LKW 10pm (6/1)  NIHSS 22  pre-mRS 3  Xarelto last taken at 6pm, INR=1.79   (02 Jun 2023 01:43)    OVERNIGHT EVENTS:   No acute events overnight.    VITALS:  T(C): , Max: 38.7 (06-03-23 @ 00:00)  HR:  (74 - 119)  BP:  (117/63 - 177/88)  ABP:  (97/92 - 97/92)  RR:  (15 - 29)  SpO2:  (96% - 100%)  Wt(kg): --      06-02-23 @ 07:01  -  06-03-23 @ 07:00  --------------------------------------------------------  IN: 1904.5 mL / OUT: 2150 mL / NET: -245.5 mL    06-03-23 @ 07:01  -  06-03-23 @ 10:29  --------------------------------------------------------  IN: 120 mL / OUT: 0 mL / NET: 120 mL      LABS:  Na: 141 (06-02 @ 23:23), 138 (06-01 @ 22:38)  K: 3.5 (06-02 @ 23:23), 4.2 (06-01 @ 22:38)  Cl: 107 (06-02 @ 23:23), 105 (06-01 @ 22:38)  CO2: 20 (06-02 @ 23:23), 20 (06-01 @ 22:38)  BUN: 14 (06-02 @ 23:23), 19 (06-01 @ 22:38)  Cr: 0.75 (06-02 @ 23:23), 1.00 (06-01 @ 22:38)  Glu: 156(06-02 @ 23:23), 116(06-01 @ 22:38)    Hgb: 13.3 (06-01 @ 22:38)  Hct: 40.5 (06-01 @ 22:38)  WBC: 10.57 (06-01 @ 22:38)  Plt: 219 (06-01 @ 22:38)    INR: 1.79 06-01-23 @ 22:38  PTT: 32.5 06-01-23 @ 22:38    IMAGING:   Recent imaging studies were reviewed.    MEDICATIONS:  atenolol  Tablet 100 milliGRAM(s) Oral daily  atorvastatin 80 milliGRAM(s) Oral at bedtime  chlorhexidine 4% Liquid 1 Application(s) Topical daily  entecavir Solution 0.5 milliGRAM(s) Oral <User Schedule>  losartan 50 milliGRAM(s) Oral daily  polyethylene glycol 3350 17 Gram(s) Oral daily  senna 2 Tablet(s) Oral at bedtime    EXAMINATION:   [id number]  General:  calm  HEENT:  MMM  Neuro:  hypophonic, does not FC, mimics, antigravity LUE/LLE, extends RUE, triple flex RLE  Cards:  RRR  Respiratory:  no respiratory distress  Adomen:  soft  Extremities:  no edema  Skin:  warm/dry

## 2023-06-04 LAB
ANION GAP SERPL CALC-SCNC: 15 MMOL/L — SIGNIFICANT CHANGE UP (ref 5–17)
BUN SERPL-MCNC: 17 MG/DL — SIGNIFICANT CHANGE UP (ref 7–23)
CALCIUM SERPL-MCNC: 8.8 MG/DL — SIGNIFICANT CHANGE UP (ref 8.4–10.5)
CHLORIDE SERPL-SCNC: 107 MMOL/L — SIGNIFICANT CHANGE UP (ref 96–108)
CO2 SERPL-SCNC: 17 MMOL/L — LOW (ref 22–31)
CREAT SERPL-MCNC: 0.73 MG/DL — SIGNIFICANT CHANGE UP (ref 0.5–1.3)
EGFR: 82 ML/MIN/1.73M2 — SIGNIFICANT CHANGE UP
GLUCOSE SERPL-MCNC: 164 MG/DL — HIGH (ref 70–99)
HCT VFR BLD CALC: 36.5 % — SIGNIFICANT CHANGE UP (ref 34.5–45)
HGB BLD-MCNC: 12 G/DL — SIGNIFICANT CHANGE UP (ref 11.5–15.5)
MAGNESIUM SERPL-MCNC: 2.2 MG/DL — SIGNIFICANT CHANGE UP (ref 1.6–2.6)
MCHC RBC-ENTMCNC: 30.8 PG — SIGNIFICANT CHANGE UP (ref 27–34)
MCHC RBC-ENTMCNC: 32.9 GM/DL — SIGNIFICANT CHANGE UP (ref 32–36)
MCV RBC AUTO: 93.8 FL — SIGNIFICANT CHANGE UP (ref 80–100)
MRSA PCR RESULT.: SIGNIFICANT CHANGE UP
NRBC # BLD: 0 /100 WBCS — SIGNIFICANT CHANGE UP (ref 0–0)
PHOSPHATE SERPL-MCNC: 2.5 MG/DL — SIGNIFICANT CHANGE UP (ref 2.5–4.5)
PLATELET # BLD AUTO: 179 K/UL — SIGNIFICANT CHANGE UP (ref 150–400)
POTASSIUM SERPL-MCNC: 3.8 MMOL/L — SIGNIFICANT CHANGE UP (ref 3.5–5.3)
POTASSIUM SERPL-SCNC: 3.8 MMOL/L — SIGNIFICANT CHANGE UP (ref 3.5–5.3)
PROCALCITONIN SERPL-MCNC: 0.06 NG/ML — SIGNIFICANT CHANGE UP (ref 0.02–0.1)
RBC # BLD: 3.89 M/UL — SIGNIFICANT CHANGE UP (ref 3.8–5.2)
RBC # FLD: 13.3 % — SIGNIFICANT CHANGE UP (ref 10.3–14.5)
S AUREUS DNA NOSE QL NAA+PROBE: SIGNIFICANT CHANGE UP
SODIUM SERPL-SCNC: 139 MMOL/L — SIGNIFICANT CHANGE UP (ref 135–145)
WBC # BLD: 11.81 K/UL — HIGH (ref 3.8–10.5)
WBC # FLD AUTO: 11.81 K/UL — HIGH (ref 3.8–10.5)

## 2023-06-04 PROCEDURE — 99233 SBSQ HOSP IP/OBS HIGH 50: CPT

## 2023-06-04 PROCEDURE — 71260 CT THORAX DX C+: CPT | Mod: 26

## 2023-06-04 PROCEDURE — 74177 CT ABD & PELVIS W/CONTRAST: CPT | Mod: 26

## 2023-06-04 RX ORDER — ACETAMINOPHEN 500 MG
650 TABLET ORAL EVERY 6 HOURS
Refills: 0 | Status: DISCONTINUED | OUTPATIENT
Start: 2023-06-04 | End: 2023-06-14

## 2023-06-04 RX ORDER — POTASSIUM CHLORIDE 20 MEQ
40 PACKET (EA) ORAL ONCE
Refills: 0 | Status: COMPLETED | OUTPATIENT
Start: 2023-06-04 | End: 2023-06-04

## 2023-06-04 RX ORDER — PIPERACILLIN AND TAZOBACTAM 4; .5 G/20ML; G/20ML
3.38 INJECTION, POWDER, LYOPHILIZED, FOR SOLUTION INTRAVENOUS ONCE
Refills: 0 | Status: COMPLETED | OUTPATIENT
Start: 2023-06-04 | End: 2023-06-04

## 2023-06-04 RX ORDER — ACETAMINOPHEN 500 MG
650 TABLET ORAL EVERY 6 HOURS
Refills: 0 | Status: DISCONTINUED | OUTPATIENT
Start: 2023-06-04 | End: 2023-06-04

## 2023-06-04 RX ORDER — SODIUM CHLORIDE 9 MG/ML
3 INJECTION INTRAMUSCULAR; INTRAVENOUS; SUBCUTANEOUS EVERY 6 HOURS
Refills: 0 | Status: DISCONTINUED | OUTPATIENT
Start: 2023-06-04 | End: 2023-06-04

## 2023-06-04 RX ORDER — LABETALOL HCL 100 MG
200 TABLET ORAL EVERY 8 HOURS
Refills: 0 | Status: DISCONTINUED | OUTPATIENT
Start: 2023-06-04 | End: 2023-06-05

## 2023-06-04 RX ORDER — ASPIRIN/CALCIUM CARB/MAGNESIUM 324 MG
81 TABLET ORAL DAILY
Refills: 0 | Status: DISCONTINUED | OUTPATIENT
Start: 2023-06-04 | End: 2023-06-14

## 2023-06-04 RX ORDER — LABETALOL HCL 100 MG
100 TABLET ORAL EVERY 8 HOURS
Refills: 0 | Status: DISCONTINUED | OUTPATIENT
Start: 2023-06-04 | End: 2023-06-04

## 2023-06-04 RX ORDER — IPRATROPIUM/ALBUTEROL SULFATE 18-103MCG
3 AEROSOL WITH ADAPTER (GRAM) INHALATION EVERY 6 HOURS
Refills: 0 | Status: DISCONTINUED | OUTPATIENT
Start: 2023-06-04 | End: 2023-06-13

## 2023-06-04 RX ORDER — ACETYLCYSTEINE 200 MG/ML
4 VIAL (ML) MISCELLANEOUS EVERY 6 HOURS
Refills: 0 | Status: DISCONTINUED | OUTPATIENT
Start: 2023-06-04 | End: 2023-06-13

## 2023-06-04 RX ORDER — PIPERACILLIN AND TAZOBACTAM 4; .5 G/20ML; G/20ML
3.38 INJECTION, POWDER, LYOPHILIZED, FOR SOLUTION INTRAVENOUS EVERY 8 HOURS
Refills: 0 | Status: COMPLETED | OUTPATIENT
Start: 2023-06-04 | End: 2023-06-09

## 2023-06-04 RX ORDER — LABETALOL HCL 100 MG
200 TABLET ORAL EVERY 8 HOURS
Refills: 0 | Status: DISCONTINUED | OUTPATIENT
Start: 2023-06-04 | End: 2023-06-04

## 2023-06-04 RX ORDER — ACETAMINOPHEN 500 MG
1000 TABLET ORAL ONCE
Refills: 0 | Status: COMPLETED | OUTPATIENT
Start: 2023-06-04 | End: 2023-06-04

## 2023-06-04 RX ORDER — PIPERACILLIN AND TAZOBACTAM 4; .5 G/20ML; G/20ML
3.38 INJECTION, POWDER, LYOPHILIZED, FOR SOLUTION INTRAVENOUS EVERY 8 HOURS
Refills: 0 | Status: DISCONTINUED | OUTPATIENT
Start: 2023-06-04 | End: 2023-06-04

## 2023-06-04 RX ORDER — ASPIRIN/CALCIUM CARB/MAGNESIUM 324 MG
81 TABLET ORAL DAILY
Refills: 0 | Status: DISCONTINUED | OUTPATIENT
Start: 2023-06-04 | End: 2023-06-04

## 2023-06-04 RX ADMIN — Medication 3 MILLILITER(S): at 06:17

## 2023-06-04 RX ADMIN — Medication 3 MILLILITER(S): at 00:32

## 2023-06-04 RX ADMIN — Medication 100 MILLIGRAM(S): at 05:26

## 2023-06-04 RX ADMIN — PIPERACILLIN AND TAZOBACTAM 25 GRAM(S): 4; .5 INJECTION, POWDER, LYOPHILIZED, FOR SOLUTION INTRAVENOUS at 21:36

## 2023-06-04 RX ADMIN — Medication 3 MILLILITER(S): at 11:15

## 2023-06-04 RX ADMIN — Medication 200 MILLIGRAM(S): at 21:36

## 2023-06-04 RX ADMIN — SENNA PLUS 2 TABLET(S): 8.6 TABLET ORAL at 21:36

## 2023-06-04 RX ADMIN — Medication 4 MILLILITER(S): at 00:32

## 2023-06-04 RX ADMIN — ENTECAVIR 0.5 MILLIGRAM(S): 0.5 TABLET ORAL at 05:42

## 2023-06-04 RX ADMIN — Medication 1000 MILLIGRAM(S): at 12:00

## 2023-06-04 RX ADMIN — PIPERACILLIN AND TAZOBACTAM 25 GRAM(S): 4; .5 INJECTION, POWDER, LYOPHILIZED, FOR SOLUTION INTRAVENOUS at 15:02

## 2023-06-04 RX ADMIN — Medication 40 MILLIEQUIVALENT(S): at 02:49

## 2023-06-04 RX ADMIN — ENOXAPARIN SODIUM 40 MILLIGRAM(S): 100 INJECTION SUBCUTANEOUS at 17:53

## 2023-06-04 RX ADMIN — Medication 100 MILLIGRAM(S): at 13:10

## 2023-06-04 RX ADMIN — PIPERACILLIN AND TAZOBACTAM 25 GRAM(S): 4; .5 INJECTION, POWDER, LYOPHILIZED, FOR SOLUTION INTRAVENOUS at 05:26

## 2023-06-04 RX ADMIN — Medication 81 MILLIGRAM(S): at 11:44

## 2023-06-04 RX ADMIN — Medication 4 MILLILITER(S): at 11:16

## 2023-06-04 RX ADMIN — Medication 400 MILLIGRAM(S): at 11:00

## 2023-06-04 RX ADMIN — PIPERACILLIN AND TAZOBACTAM 200 GRAM(S): 4; .5 INJECTION, POWDER, LYOPHILIZED, FOR SOLUTION INTRAVENOUS at 03:41

## 2023-06-04 RX ADMIN — Medication 4 MILLILITER(S): at 17:22

## 2023-06-04 RX ADMIN — LOSARTAN POTASSIUM 50 MILLIGRAM(S): 100 TABLET, FILM COATED ORAL at 05:25

## 2023-06-04 RX ADMIN — SODIUM CHLORIDE 3 MILLILITER(S): 9 INJECTION INTRAMUSCULAR; INTRAVENOUS; SUBCUTANEOUS at 06:17

## 2023-06-04 RX ADMIN — ATORVASTATIN CALCIUM 80 MILLIGRAM(S): 80 TABLET, FILM COATED ORAL at 21:36

## 2023-06-04 RX ADMIN — Medication 3 MILLILITER(S): at 17:22

## 2023-06-04 RX ADMIN — CHLORHEXIDINE GLUCONATE 1 APPLICATION(S): 213 SOLUTION TOPICAL at 21:37

## 2023-06-04 RX ADMIN — Medication 4 MILLILITER(S): at 06:17

## 2023-06-04 NOTE — PROGRESS NOTE ADULT - SUBJECTIVE AND OBJECTIVE BOX
HPI:  82y (1940) woman with a PMHx significant for HTN, afib on Xarelto (last dose 6/1 6pm), hepatitis C on antiviral presenting with acute onset of AMS. LKW 10pm. Around 10:30pm patient become acutely altered, slumped to right side, head turned to left, not answering questions properly or moving the right side. NIHSS 22 most notable for decreased arousal, incorrect answers, not following commands, left gaze deviation that could not be overcome, right facial droop, right sided severe weakness compared to left, slurred speech, aphasia. Patient lives at home with daughter who is at bedside. Patient is Indian speaking. Daughter reports at baseline she intermittently walks with a cane and needs minor help with ADLs such as getting dressed or running a shower, Daughter looks after her affairs. She denies dementia or cognitive impairment. mRS 3.     LKW 10pm (6/1)  NIHSS 22  pre-mRS 3  Xarelto last taken at 6pm, INR=1.79   (02 Jun 2023 01:43)    OVERNIGHT EVENTS:   No acute events overnight. Having abundant secretions on NT suctioning, kept NPO    VITALS/LABS/IMAGING/MEDS:   reviewed.    ICU Vital Signs Last 24 Hrs:    T(C): 37.4 (03 Jun 2023 23:00), Max: 37.8 (03 Jun 2023 19:00)  T(F): 99.3 (03 Jun 2023 23:00), Max: 100 (03 Jun 2023 19:00)  HR: 99 (04 Jun 2023 01:00) (74 - 100)  BP: 160/85 (04 Jun 2023 01:00) (128/62 - 175/84)  BP(mean): 101 (04 Jun 2023 01:00) (82 - 109)  ABP: --  ABP(mean): --  RR: 24 (04 Jun 2023 01:00) (16 - 24)  SpO2: 96% (04 Jun 2023 01:00) (94% - 100%)    I&O's Summary    02 Jun 2023 07:01  -  03 Jun 2023 07:00  --------------------------------------------------------  IN: 1904.5 mL / OUT: 2150 mL / NET: -245.5 mL    03 Jun 2023 07:01  -  04 Jun 2023 01:36  --------------------------------------------------------  IN: 930 mL / OUT: 500 mL / NET: 430 mL      MEDICATIONS  (STANDING):  acetylcysteine 20%  Inhalation 4 milliLiter(s) Inhalation every 6 hours  albuterol/ipratropium for Nebulization 3 milliLiter(s) Nebulizer every 6 hours  atorvastatin 80 milliGRAM(s) Oral at bedtime  chlorhexidine 4% Liquid 1 Application(s) Topical daily  enoxaparin Injectable 40 milliGRAM(s) SubCutaneous <User Schedule>  entecavir Solution 0.5 milliGRAM(s) Oral <User Schedule>  labetalol 100 milliGRAM(s) Oral every 8 hours  losartan 50 milliGRAM(s) Oral daily  polyethylene glycol 3350 17 Gram(s) Oral daily  senna 2 Tablet(s) Oral at bedtime  sodium chloride 0.9% for Nebulization 3 milliLiter(s) Nebulizer every 6 hours    MEDICATIONS  (PRN):        EXAMINATION:  General:  calm  HEENT:  MMM  Neuro:  hypophonic,  FC, mimics, antigravity and spont LUE/LLE, extends RUE, triple flex RLE  Cards:  RRR  Respiratory:  no respiratory distress, audible crackles  bilat  Adomen:  soft  Extremities:  no edema  Skin:  warm/dry

## 2023-06-04 NOTE — PROGRESS NOTE ADULT - SUBJECTIVE AND OBJECTIVE BOX
THE PATIENT WAS SEEN AND EXAMINED BY ME WITH THE HOUSESTAFF AND STROKE TEAM DURING MORNING ROUNDS.   HPI:  82y (1940) woman with a PMHx significant for HTN, afib on Xarelto (last dose 6/1 6pm), hepatitis C on antiviral presenting with acute onset of AMS. LKW 10pm. Around 10:30pm patient become acutely altered, slumped to right side, head turned to left, not answering questions properly or moving the right side. NIHSS 22 most notable for decreased arousal, incorrect answers, not following commands, left gaze deviation that could not be overcome, right facial droop, right sided severe weakness compared to left, slurred speech, aphasia. Patient lives at home with daughter who is at bedside. Patient is [place] speaking. Daughter reports at baseline she intermittently walks with a cane and needs minor help with ADLs such as getting dressed or running a shower, Daughter looks after her affairs. She denies dementia or cognitive impairment. mRS 3.     LKW 10pm (6/1)  NIHSS 22  pre-mRS 3  Xarelto last taken at 6pm, INR=1.79   (02 Jun 2023 01:43)      ROS: Otherwise negative.    SUBJECTIVE: No events overnight.  No new neurologic complaints.      acetaminophen   Oral Liquid .. 650 milliGRAM(s) Oral every 6 hours PRN  acetylcysteine 20%  Inhalation 4 milliLiter(s) Inhalation every 6 hours  albuterol/ipratropium for Nebulization 3 milliLiter(s) Nebulizer every 6 hours  aspirin  chewable 81 milliGRAM(s) Oral daily  atorvastatin 80 milliGRAM(s) Oral at bedtime  chlorhexidine 4% Liquid 1 Application(s) Topical daily  enoxaparin Injectable 40 milliGRAM(s) SubCutaneous <User Schedule>  entecavir Solution 0.5 milliGRAM(s) Oral <User Schedule>  labetalol 100 milliGRAM(s) Oral every 8 hours  losartan 50 milliGRAM(s) Oral daily  piperacillin/tazobactam IVPB.. 3.375 Gram(s) IV Intermittent every 8 hours  polyethylene glycol 3350 17 Gram(s) Oral daily  senna 2 Tablet(s) Oral at bedtime      PHYSICAL EXAM:   Vital Signs Last 24 Hrs  T(C): 37 (04 Jun 2023 13:00), Max: 38.7 (04 Jun 2023 11:00)  T(F): 98.6 (04 Jun 2023 13:00), Max: 101.7 (04 Jun 2023 11:00)  HR: 94 (04 Jun 2023 13:00) (84 - 104)  BP: 156/69 (04 Jun 2023 13:00) (143/61 - 175/84)  BP(mean): 89 (04 Jun 2023 13:00) (84 - 109)  RR: 20 (04 Jun 2023 13:00) (18 - 24)  SpO2: 100% (04 Jun 2023 13:00) (93% - 100%)    Parameters below as of 04 Jun 2023 12:14  Patient On (Oxygen Delivery Method): nasal cannula        General: No acute distress  HEENT: EOM intact, visual fields full  Abdomen: Soft, nontender, nondistended   Extremities: No edema    NEUROLOGICAL EXAM:  Mental status: Awake, alert, oriented to self, speech hypophonic, follows commands, no neglect   Cranial Nerves: right facial droop, left gaze deviation, unable to cross midline, no nystagmus, no dysarthria,  tongue midline  Motor exam: Normal tone, 0/5 RUE, 0/5 RLE, 5/5 LUE, 5/5 LLE, normal fine finger movements.  Sensation: Intact to light touch   Coordination/ Gait: No dysmetria, gait not tested  LABS:                        12.0   11.81 )-----------( 179      ( 04 Jun 2023 00:39 )             36.5    06-04    139  |  107  |  17  ----------------------------<  164<H>  3.8   |  17<L>  |  0.73    Ca    8.8      04 Jun 2023 00:42  Phos  2.5     06-04  Mg     2.2     06-04          IMAGING: Reviewed by me.   Repeat CTH pending

## 2023-06-04 NOTE — PROGRESS NOTE ADULT - ASSESSMENT
Assessment:82y (1940) woman with a PMHx significant for HTN, afib on Xarelto (last dose 6/1 6pm), hepatitis C on antiviral presenting with acute onset of AMS. LKW 10pm. Around 10:30pm patient become acutely altered, slumped to right side, head turned to left, not answering questions properly or moving the right side. NIHSS 22 most notable for decreased arousal, incorrect answers, not following commands, left gaze deviation that could not be overcome, right sided severe weakness compared to left, slurred speech, aphasia. Patient lives at home with daughter who is at bedside. Patient is [place] speaking. Daughter reports at baseline she intermittently walks with a cane and needs minor help with ADLs such as getting dressed or running a shower, Daughter looks after her affairs. She denies dementia or cognitive impairment. mRS 3. Not a tenecteplase candidate due to INR 1.79. Patient taken to IR for mechanical thrombectomy.    LKW 10pm (6/1)  NIHSS 22  pre-mRS 3  Xarelto last taken at 6pm, INR=1.79    Impression: acute change in mental status, right hemiparesis, left gaze preference, dysarthria & aphasia 2/2 left MCA territiry infarct with left MCA occlusion s/p thrombectomy. Mechanism cardioembolic, patient in active afib in ED vs other etiology,  Plan  [] s/p thrombectomy  [] post MT BP goal <180/105mmHg  [] A1c, lipid panel  [] MRI brain without contrast  [] TTE  []Repeat CTH to evaluate stroke burden if MRI not complete,

## 2023-06-04 NOTE — PROGRESS NOTE ADULT - SUBJECTIVE AND OBJECTIVE BOX
Night rounds   Patient seen and examined    T(C): 36.9 (06-04-23 @ 15:00), Max: 38.7 (06-04-23 @ 11:00)  HR: 92 (06-04-23 @ 18:02) (84 - 104)  BP: 153/74 (06-04-23 @ 17:00) (143/61 - 168/69)  RR: 20 (06-04-23 @ 17:00) (20 - 24)  SpO2: 95% (06-04-23 @ 18:02) (93% - 100%)  06-03-23 @ 07:01  -  06-04-23 @ 07:00  --------------------------------------------------------  IN: 1280 mL / OUT: 1050 mL / NET: 230 mL    06-04-23 @ 07:01  -  06-04-23 @ 19:07  --------------------------------------------------------  IN: 840 mL / OUT: 0 mL / NET: 840 mL    acetaminophen   Oral Liquid .. 650 milliGRAM(s) Oral every 6 hours PRN  acetylcysteine 20%  Inhalation 4 milliLiter(s) Inhalation every 6 hours  albuterol/ipratropium for Nebulization 3 milliLiter(s) Nebulizer every 6 hours  aspirin  chewable 81 milliGRAM(s) Oral daily  atorvastatin 80 milliGRAM(s) Oral at bedtime  chlorhexidine 4% Liquid 1 Application(s) Topical daily  enoxaparin Injectable 40 milliGRAM(s) SubCutaneous <User Schedule>  entecavir Solution 0.5 milliGRAM(s) Oral <User Schedule>  labetalol 100 milliGRAM(s) Oral every 8 hours  losartan 50 milliGRAM(s) Oral daily  piperacillin/tazobactam IVPB.. 3.375 Gram(s) IV Intermittent every 8 hours  polyethylene glycol 3350 17 Gram(s) Oral daily  senna 2 Tablet(s) Oral at bedtime        Exam unchanged from day rounds     labs and imaging reviewed     left MCA ischemic stroke cardioembolic , continue aspirin for now, will need to be shifted to full anticoagulation , MRI brain pending   secretions CT chest atelectasis, continue with suctioning and chest PT , started on zosyn today   a fib on labetalol   HTN on losartan     not critical     Christina Dietrich [facility] attending  Night rounds   Patient seen and examined    T(C): 36.9 (06-04-23 @ 15:00), Max: 38.7 (06-04-23 @ 11:00)  HR: 92 (06-04-23 @ 18:02) (84 - 104)  BP: 153/74 (06-04-23 @ 17:00) (143/61 - 168/69)  RR: 20 (06-04-23 @ 17:00) (20 - 24)  SpO2: 95% (06-04-23 @ 18:02) (93% - 100%)  06-03-23 @ 07:01  -  06-04-23 @ 07:00  --------------------------------------------------------  IN: 1280 mL / OUT: 1050 mL / NET: 230 mL    06-04-23 @ 07:01  -  06-04-23 @ 19:07  --------------------------------------------------------  IN: 840 mL / OUT: 0 mL / NET: 840 mL    acetaminophen   Oral Liquid .. 650 milliGRAM(s) Oral every 6 hours PRN  acetylcysteine 20%  Inhalation 4 milliLiter(s) Inhalation every 6 hours  albuterol/ipratropium for Nebulization 3 milliLiter(s) Nebulizer every 6 hours  aspirin  chewable 81 milliGRAM(s) Oral daily  atorvastatin 80 milliGRAM(s) Oral at bedtime  chlorhexidine 4% Liquid 1 Application(s) Topical daily  enoxaparin Injectable 40 milliGRAM(s) SubCutaneous <User Schedule>  entecavir Solution 0.5 milliGRAM(s) Oral <User Schedule>  labetalol 100 milliGRAM(s) Oral every 8 hours  losartan 50 milliGRAM(s) Oral daily  piperacillin/tazobactam IVPB.. 3.375 Gram(s) IV Intermittent every 8 hours  polyethylene glycol 3350 17 Gram(s) Oral daily  senna 2 Tablet(s) Oral at bedtime        Exam unchanged from day rounds   hypophonic,  FC WITH , HAS A LEFT GAZE PREFERENCE, doesn't overcome it, antigravity and spont LUE/LLE,  RUE trace withdrawal ,  RLE withdraws     labs and imaging reviewed       LABS:  Na: 139 (06-04 @ 00:42), 141 (06-02 @ 23:23), 138 (06-01 @ 22:38)  K: 3.8 (06-04 @ 00:42), 3.5 (06-02 @ 23:23), 4.2 (06-01 @ 22:38)  Cl: 107 (06-04 @ 00:42), 107 (06-02 @ 23:23), 105 (06-01 @ 22:38)  CO2: 17 (06-04 @ 00:42), 20 (06-02 @ 23:23), 20 (06-01 @ 22:38)  BUN: 17 (06-04 @ 00:42), 14 (06-02 @ 23:23), 19 (06-01 @ 22:38)  Cr: 0.73 (06-04 @ 00:42), 0.75 (06-02 @ 23:23), 1.00 (06-01 @ 22:38)  Glu: 164(06-04 @ 00:42), 156(06-02 @ 23:23), 116(06-01 @ 22:38)    Hgb: 12.0 (06-04 @ 00:39), 13.3 (06-01 @ 22:38)  Hct: 36.5 (06-04 @ 00:39), 40.5 (06-01 @ 22:38)  WBC: 11.81 (06-04 @ 00:39), 10.57 (06-01 @ 22:38)  Plt: 179 (06-04 @ 00:39), 219 (06-01 @ 22:38)    INR: 1.79 06-01-23 @ 22:38  PTT: 32.5 06-01-23 @ 22:38            left MCA ischemic stroke cardioembolic , LEFT m1  s/p thrombectomy TICI 2 c  continue aspirin for now, will need to be shifted to full anticoagulation , MRI brain pending   secretions CT chest atelectasis, continue with suctioning and chest PT , started on zosyn today   on TF at goal glucerna   a fib on labetalol , elevated HR, increase dose to 200 mg q 8 hr   IVL   HTN on losartan   lovenox 40 mg sc qhs     not critical     Christina Dietrich [facility] attending

## 2023-06-04 NOTE — PROGRESS NOTE ADULT - ASSESSMENT
ASSESSMENT:   stroke in setting of afib, on xarelto, likely cardioembolic    NIHSS 22  LKN 9:45  mRS 3  s/p L M1 MT w TICI 2c flow    NEURO:  [id number]  MRI at some point  stroke team to follow and core stroke measures  Activity: [] OOB as tolerated [] Bedrest [x] PT [x] OT , SLP [] PMNR    PULM:  abundant secretions, q1 suctioning   duonebs, CPT, continue NT suction, start abx if fever or desatting  Incentive spirometry, mobilize as tolerated    CV: cont labetalol  hx of afib, failure on xarelto  losartan  nml ef 60% on tte  lipid panel  Keep -170mmHg    RENAL: normonatremia  IVL    GI:  Diet: NGT, glucerna  GI prophylaxis [] not indicated [x] PPI [] other:  Bowel regimen standing    ENDO: 5.9 a1c  Goal euglycemia (-180)    HEME/ONC:  VTE prophylaxis: [x] SCDs [X] chemoprophylaxis SQL    ID:  febrile early 6/3  fu cultures, UA neg  check procalcitonin and mrsa    MISC:    SOCIAL/FAMILY:  [x] awaiting [] updated at bedside [] family meeting    CODE STATUS:  [x] Full Code [] DNR [] DNI [] Palliative/Comfort Care    DISPOSITION:  [x] ICU [] Stroke Unit [] Floor [] EMU [] RCU [] PCU    [x] Patient is at high risk of neurologic deterioration/death due to:     Contact: [phone] ASSESSMENT:   stroke in setting of afib, on xarelto, likely cardioembolic    NIHSS 22  LKN 9:45  mRS 3  s/p L M1 MT w TICI 2c flow    NEURO:  [id number]  MRI at some point  need to discuss AC vs asa  stroke team to follow and core stroke measures  Activity: [] OOB as tolerated [] Bedrest [x] PT [x] OT , SLP [] PMNR    PULM:  abundant secretions, q1 suctioning   duonebs, CPT, continue NT suction, start abx if fever or desatting  Incentive spirometry, mobilize as tolerated    CV: cont labetalol  hx of afib, failure on xarelto  losartan  nml ef 60% on tte  lipid panel  Keep -170mmHg    RENAL: normonatremia  IVL    GI:  Diet: NGT, glucerna, TF resume  GI prophylaxis [] not indicated [x] PPI [] other:  Bowel regimen standing  6/3 LBM    ENDO: 5.9 a1c  Goal euglycemia (-180)    HEME/ONC:  VTE prophylaxis: [x] SCDs [X] chemoprophylaxis SQL    ID:  febrile early 6/3  fu cultures, UA neg  neg procalcitonin and mrsa    MISC:    SOCIAL/FAMILY:  [x] awaiting [] updated at bedside [] family meeting    CODE STATUS:  [x] Full Code [] DNR [] DNI [] Palliative/Comfort Care    DISPOSITION:  [x] ICU [] Stroke Unit [] Floor [] EMU [] RCU [] PCU    [x] Patient is at high risk of neurologic deterioration/death due to:     Contact: [phone] ASSESSMENT:   stroke in setting of afib, on xarelto, likely cardioembolic    NIHSS 22  LKN 9:45  mRS 3  s/p L M1 MT w TICI 2c flow    NEURO:  [id number]  MRI at some point  need to discuss AC  in the mean time mihaela start asa  stroke team to follow and core stroke measures  Activity: [] OOB as tolerated [] Bedrest [x] PT [x] OT , SLP [] PMNR    PULM:  abundant secretions, q1 suctioning   duonebs, CPT, continue NT suction, start abx if fever or desatting  Incentive spirometry, mobilize as tolerated    CV: cont labetalol  hx of afib, failure on xarelto  losartan  nml ef 60% on tte  lipid panel  Keep -170mmHg    RENAL: normonatremia  IVL    GI:  Diet: NGT, glucerna, TF resume  GI prophylaxis [] not indicated [x] PPI [] other:  Bowel regimen standing  6/3 LBM    ENDO: 5.9 a1c  Goal euglycemia (-180)    HEME/ONC:  VTE prophylaxis: [x] SCDs [X] chemoprophylaxis SQL    ID:  febrile early 6/3  fu cultures, UA neg  neg procalcitonin and mrsa    MISC:    SOCIAL/FAMILY:  [x] awaiting [] updated at bedside [] family meeting    CODE STATUS:  [x] Full Code [] DNR [] DNI [] Palliative/Comfort Care    DISPOSITION:  [x] ICU [] Stroke Unit [] Floor [] EMU [] RCU [] PCU    [x] Patient is at high risk of neurologic deterioration/death due to:     Contact: [phone]

## 2023-06-04 NOTE — PROVIDER CONTACT NOTE (OTHER) - ACTION/TREATMENT ORDERED:
iv Tylenol given
none at this time. continue to monitor and if patients have temp after 48 hrs from 6/3//23 repeat cultures and notify provider

## 2023-06-05 LAB
ANION GAP SERPL CALC-SCNC: 12 MMOL/L — SIGNIFICANT CHANGE UP (ref 5–17)
BUN SERPL-MCNC: 21 MG/DL — SIGNIFICANT CHANGE UP (ref 7–23)
CALCIUM SERPL-MCNC: 8.2 MG/DL — LOW (ref 8.4–10.5)
CHLORIDE SERPL-SCNC: 110 MMOL/L — HIGH (ref 96–108)
CO2 SERPL-SCNC: 22 MMOL/L — SIGNIFICANT CHANGE UP (ref 22–31)
CREAT SERPL-MCNC: 0.78 MG/DL — SIGNIFICANT CHANGE UP (ref 0.5–1.3)
EGFR: 75 ML/MIN/1.73M2 — SIGNIFICANT CHANGE UP
GLUCOSE SERPL-MCNC: 153 MG/DL — HIGH (ref 70–99)
HCT VFR BLD CALC: 37.1 % — SIGNIFICANT CHANGE UP (ref 34.5–45)
HGB BLD-MCNC: 12.2 G/DL — SIGNIFICANT CHANGE UP (ref 11.5–15.5)
MAGNESIUM SERPL-MCNC: 2.4 MG/DL — SIGNIFICANT CHANGE UP (ref 1.6–2.6)
MCHC RBC-ENTMCNC: 30.8 PG — SIGNIFICANT CHANGE UP (ref 27–34)
MCHC RBC-ENTMCNC: 32.9 GM/DL — SIGNIFICANT CHANGE UP (ref 32–36)
MCV RBC AUTO: 93.7 FL — SIGNIFICANT CHANGE UP (ref 80–100)
NRBC # BLD: 0 /100 WBCS — SIGNIFICANT CHANGE UP (ref 0–0)
PHOSPHATE SERPL-MCNC: 2.8 MG/DL — SIGNIFICANT CHANGE UP (ref 2.5–4.5)
PLATELET # BLD AUTO: 183 K/UL — SIGNIFICANT CHANGE UP (ref 150–400)
POTASSIUM SERPL-MCNC: 3.9 MMOL/L — SIGNIFICANT CHANGE UP (ref 3.5–5.3)
POTASSIUM SERPL-SCNC: 3.9 MMOL/L — SIGNIFICANT CHANGE UP (ref 3.5–5.3)
RBC # BLD: 3.96 M/UL — SIGNIFICANT CHANGE UP (ref 3.8–5.2)
RBC # FLD: 13.2 % — SIGNIFICANT CHANGE UP (ref 10.3–14.5)
SODIUM SERPL-SCNC: 144 MMOL/L — SIGNIFICANT CHANGE UP (ref 135–145)
WBC # BLD: 12.03 K/UL — HIGH (ref 3.8–10.5)
WBC # FLD AUTO: 12.03 K/UL — HIGH (ref 3.8–10.5)

## 2023-06-05 PROCEDURE — 99233 SBSQ HOSP IP/OBS HIGH 50: CPT

## 2023-06-05 PROCEDURE — 70450 CT HEAD/BRAIN W/O DYE: CPT | Mod: 26

## 2023-06-05 PROCEDURE — 71045 X-RAY EXAM CHEST 1 VIEW: CPT | Mod: 26

## 2023-06-05 RX ORDER — LABETALOL HCL 100 MG
10 TABLET ORAL ONCE
Refills: 0 | Status: COMPLETED | OUTPATIENT
Start: 2023-06-05 | End: 2023-06-05

## 2023-06-05 RX ORDER — POTASSIUM CHLORIDE 20 MEQ
20 PACKET (EA) ORAL ONCE
Refills: 0 | Status: COMPLETED | OUTPATIENT
Start: 2023-06-05 | End: 2023-06-05

## 2023-06-05 RX ORDER — LABETALOL HCL 100 MG
400 TABLET ORAL EVERY 8 HOURS
Refills: 0 | Status: DISCONTINUED | OUTPATIENT
Start: 2023-06-05 | End: 2023-06-14

## 2023-06-05 RX ORDER — POTASSIUM CHLORIDE 20 MEQ
10 PACKET (EA) ORAL ONCE
Refills: 0 | Status: DISCONTINUED | OUTPATIENT
Start: 2023-06-05 | End: 2023-06-05

## 2023-06-05 RX ORDER — LABETALOL HCL 100 MG
300 TABLET ORAL EVERY 8 HOURS
Refills: 0 | Status: DISCONTINUED | OUTPATIENT
Start: 2023-06-05 | End: 2023-06-05

## 2023-06-05 RX ORDER — HYDRALAZINE HCL 50 MG
10 TABLET ORAL ONCE
Refills: 0 | Status: COMPLETED | OUTPATIENT
Start: 2023-06-05 | End: 2023-06-05

## 2023-06-05 RX ORDER — POTASSIUM CHLORIDE 20 MEQ
20 PACKET (EA) ORAL ONCE
Refills: 0 | Status: DISCONTINUED | OUTPATIENT
Start: 2023-06-05 | End: 2023-06-05

## 2023-06-05 RX ADMIN — Medication 200 MILLIGRAM(S): at 05:01

## 2023-06-05 RX ADMIN — Medication 10 MILLIGRAM(S): at 19:25

## 2023-06-05 RX ADMIN — SENNA PLUS 2 TABLET(S): 8.6 TABLET ORAL at 21:06

## 2023-06-05 RX ADMIN — ENTECAVIR 0.5 MILLIGRAM(S): 0.5 TABLET ORAL at 05:38

## 2023-06-05 RX ADMIN — Medication 10 MILLIGRAM(S): at 12:57

## 2023-06-05 RX ADMIN — PIPERACILLIN AND TAZOBACTAM 25 GRAM(S): 4; .5 INJECTION, POWDER, LYOPHILIZED, FOR SOLUTION INTRAVENOUS at 13:13

## 2023-06-05 RX ADMIN — Medication 3 MILLILITER(S): at 11:47

## 2023-06-05 RX ADMIN — LOSARTAN POTASSIUM 50 MILLIGRAM(S): 100 TABLET, FILM COATED ORAL at 05:01

## 2023-06-05 RX ADMIN — Medication 3 MILLILITER(S): at 00:06

## 2023-06-05 RX ADMIN — Medication 300 MILLIGRAM(S): at 21:06

## 2023-06-05 RX ADMIN — PIPERACILLIN AND TAZOBACTAM 25 GRAM(S): 4; .5 INJECTION, POWDER, LYOPHILIZED, FOR SOLUTION INTRAVENOUS at 21:06

## 2023-06-05 RX ADMIN — Medication 4 MILLILITER(S): at 11:47

## 2023-06-05 RX ADMIN — Medication 4 MILLILITER(S): at 00:06

## 2023-06-05 RX ADMIN — Medication 3 MILLILITER(S): at 23:51

## 2023-06-05 RX ADMIN — Medication 4 MILLILITER(S): at 17:54

## 2023-06-05 RX ADMIN — Medication 4 MILLILITER(S): at 05:42

## 2023-06-05 RX ADMIN — Medication 4 MILLILITER(S): at 23:51

## 2023-06-05 RX ADMIN — ENOXAPARIN SODIUM 40 MILLIGRAM(S): 100 INJECTION SUBCUTANEOUS at 17:05

## 2023-06-05 RX ADMIN — Medication 3 MILLILITER(S): at 17:54

## 2023-06-05 RX ADMIN — POLYETHYLENE GLYCOL 3350 17 GRAM(S): 17 POWDER, FOR SOLUTION ORAL at 17:05

## 2023-06-05 RX ADMIN — Medication 20 MILLIEQUIVALENT(S): at 12:35

## 2023-06-05 RX ADMIN — ATORVASTATIN CALCIUM 80 MILLIGRAM(S): 80 TABLET, FILM COATED ORAL at 21:06

## 2023-06-05 RX ADMIN — PIPERACILLIN AND TAZOBACTAM 25 GRAM(S): 4; .5 INJECTION, POWDER, LYOPHILIZED, FOR SOLUTION INTRAVENOUS at 05:01

## 2023-06-05 RX ADMIN — Medication 3 MILLILITER(S): at 05:42

## 2023-06-05 RX ADMIN — Medication 81 MILLIGRAM(S): at 12:52

## 2023-06-05 RX ADMIN — Medication 300 MILLIGRAM(S): at 13:13

## 2023-06-05 NOTE — PROGRESS NOTE ADULT - SUBJECTIVE AND OBJECTIVE BOX
O: EXAM STABLE     T(C): 36.7 (06-05-23 @ 15:00), Max: 37.6 (06-05-23 @ 07:00)  HR: 83 (06-05-23 @ 18:19) (83 - 105)  BP: 169/93 (06-05-23 @ 17:00) (147/82 - 175/92)  RR: 20 (06-05-23 @ 17:00) (18 - 24)  SpO2: 100% (06-05-23 @ 18:19) (95% - 100%)  06-04-23 @ 07:01  -  06-05-23 @ 07:00  --------------------------------------------------------  IN: 1845 mL / OUT: 900 mL / NET: 945 mL    06-05-23 @ 07:01  -  06-05-23 @ 18:35  --------------------------------------------------------  IN: 990 mL / OUT: 400 mL / NET: 590 mL    acetaminophen   Oral Liquid .. 650 milliGRAM(s) Oral every 6 hours PRN  acetylcysteine 20%  Inhalation 4 milliLiter(s) Inhalation every 6 hours  albuterol/ipratropium for Nebulization 3 milliLiter(s) Nebulizer every 6 hours  aspirin  chewable 81 milliGRAM(s) Oral daily  atorvastatin 80 milliGRAM(s) Oral at bedtime  chlorhexidine 4% Liquid 1 Application(s) Topical daily  enoxaparin Injectable 40 milliGRAM(s) SubCutaneous <User Schedule>  entecavir Solution 0.5 milliGRAM(s) Oral <User Schedule>  hydrALAZINE Injectable 10 milliGRAM(s) IV Push once  labetalol 300 milliGRAM(s) Oral every 8 hours  losartan 50 milliGRAM(s) Oral daily  piperacillin/tazobactam IVPB.. 3.375 Gram(s) IV Intermittent every 8 hours  polyethylene glycol 3350 17 Gram(s) Oral daily  senna 2 Tablet(s) Oral at bedtime      Exam unchanged from day rounds   hypophonic,  FC WITH , HAS A LEFT GAZE PREFERENCE, doesn't overcome it, antigravity and spont LUE/LLE,  RUE trace withdrawal ,  RLE withdraws     labs and imaging reviewed         left MCA ischemic stroke cardioembolic , LEFT m1  s/p thrombectomy TICI 2 c  continue aspirin for now, will need to be shifted to full anticoagulation , MRI brain pending   CT head showed slight increase in mass effect, ? petechial hemorrhage, will monitor   secretions continue with suctioning and chest PT   pneumonia on zosyn today   on TF at goal glucerna   a fib on labetalol  200 mg q 8 hr   IVL   HTN on losartan   lovenox 40 mg sc qhs     not critical     Christina Dietrich [facility] attending    O: EXAM STABLE     T(C): 36.7 (06-05-23 @ 15:00), Max: 37.6 (06-05-23 @ 07:00)  HR: 83 (06-05-23 @ 18:19) (83 - 105)  BP: 169/93 (06-05-23 @ 17:00) (147/82 - 175/92)  RR: 20 (06-05-23 @ 17:00) (18 - 24)  SpO2: 100% (06-05-23 @ 18:19) (95% - 100%)  06-04-23 @ 07:01  -  06-05-23 @ 07:00  --------------------------------------------------------  IN: 1845 mL / OUT: 900 mL / NET: 945 mL    06-05-23 @ 07:01  -  06-05-23 @ 18:35  --------------------------------------------------------  IN: 990 mL / OUT: 400 mL / NET: 590 mL    acetaminophen   Oral Liquid .. 650 milliGRAM(s) Oral every 6 hours PRN  acetylcysteine 20%  Inhalation 4 milliLiter(s) Inhalation every 6 hours  albuterol/ipratropium for Nebulization 3 milliLiter(s) Nebulizer every 6 hours  aspirin  chewable 81 milliGRAM(s) Oral daily  atorvastatin 80 milliGRAM(s) Oral at bedtime  chlorhexidine 4% Liquid 1 Application(s) Topical daily  enoxaparin Injectable 40 milliGRAM(s) SubCutaneous <User Schedule>  entecavir Solution 0.5 milliGRAM(s) Oral <User Schedule>  hydrALAZINE Injectable 10 milliGRAM(s) IV Push once  labetalol 300 milliGRAM(s) Oral every 8 hours  losartan 50 milliGRAM(s) Oral daily  piperacillin/tazobactam IVPB.. 3.375 Gram(s) IV Intermittent every 8 hours  polyethylene glycol 3350 17 Gram(s) Oral daily  senna 2 Tablet(s) Oral at bedtime      Exam unchanged from day rounds   hypophonic,  FC WITH , HAS A LEFT GAZE PREFERENCE, doesn't overcome it, antigravity and spont LUE/LLE,  RUE trace withdrawal ,  RLE withdraws     labs and imaging reviewed         left MCA ischemic stroke cardioembolic , LEFT m1  s/p thrombectomy TICI 2 c  continue aspirin for now, will need to be shifted to full anticoagulation , MRI brain pending   CT head showed slight increase in mass effect, ? petechial hemorrhage, will monitor   neuro checks q 2 hr   secretions continue with suctioning and chest PT every 1 hr, on mucomyst and  duonebs and chest PT   pneumonia on zosyn    on TF at goal glucerna   a fib on labetalol  300 mg q 8 hr increase labetol to 400 mg q 8 hr   IVL   HTN on losartan   lovenox 40 mg sc qhs     not critical     Christina Dietrich [facility] attending

## 2023-06-05 NOTE — PROGRESS NOTE ADULT - SUBJECTIVE AND OBJECTIVE BOX
HPI:  82y (1940) woman with a PMHx significant for HTN, afib on Xarelto (last dose 6/1 6pm), hepatitis C on antiviral presenting with acute onset of AMS. LKW 10pm. Around 10:30pm patient become acutely altered, slumped to right side, head turned to left, not answering questions properly or moving the right side. NIHSS 22 most notable for decreased arousal, incorrect answers, not following commands, left gaze deviation that could not be overcome, right facial droop, right sided severe weakness compared to left, slurred speech, aphasia. Patient lives at home with daughter who is at bedside. Patient is [place]i speaking. Daughter reports at baseline she intermittently walks with a cane and needs minor help with ADLs such as getting dressed or running a shower, Daughter looks after her affairs. She denies dementia or cognitive impairment. mRS 3.     LKW 10pm (6/1)  NIHSS 22  pre-mRS 3  Xarelto last taken at 6pm, INR=1.79   (02 Jun 2023 01:43)    OVERNIGHT EVENTS:   No acute events overnight.    VITALS:  T(C): , Max: 38.7 (06-04-23 @ 11:00)  HR:  (84 - 105)  BP:  (153/74 - 166/78)  ABP: --  RR:  (19 - 24)  SpO2:  (95% - 100%)  Wt(kg): --      06-04-23 @ 07:01  -  06-05-23 @ 07:00  --------------------------------------------------------  IN: 1760 mL / OUT: 900 mL / NET: 860 mL      LABS:  Na: 144 (06-05 @ 01:38), 139 (06-04 @ 00:42), 141 (06-02 @ 23:23)  K: 3.9 (06-05 @ 01:38), 3.8 (06-04 @ 00:42), 3.5 (06-02 @ 23:23)  Cl: 110 (06-05 @ 01:38), 107 (06-04 @ 00:42), 107 (06-02 @ 23:23)  CO2: 22 (06-05 @ 01:38), 17 (06-04 @ 00:42), 20 (06-02 @ 23:23)  BUN: 21 (06-05 @ 01:38), 17 (06-04 @ 00:42), 14 (06-02 @ 23:23)  Cr: 0.78 (06-05 @ 01:38), 0.73 (06-04 @ 00:42), 0.75 (06-02 @ 23:23)  Glu: 153(06-05 @ 01:38), 164(06-04 @ 00:42), 156(06-02 @ 23:23)    Hgb: 12.2 (06-05 @ 01:38), 12.0 (06-04 @ 00:39)  Hct: 37.1 (06-05 @ 01:38), 36.5 (06-04 @ 00:39)  WBC: 12.03 (06-05 @ 01:38), 11.81 (06-04 @ 00:39)  Plt: 183 (06-05 @ 01:38), 179 (06-04 @ 00:39)    INR:   PTT:     IMAGING:   Recent imaging studies were reviewed.    MEDICATIONS:  acetaminophen   Oral Liquid .. 650 milliGRAM(s) Oral every 6 hours PRN  acetylcysteine 20%  Inhalation 4 milliLiter(s) Inhalation every 6 hours  albuterol/ipratropium for Nebulization 3 milliLiter(s) Nebulizer every 6 hours  aspirin  chewable 81 milliGRAM(s) Oral daily  atorvastatin 80 milliGRAM(s) Oral at bedtime  chlorhexidine 4% Liquid 1 Application(s) Topical daily  enoxaparin Injectable 40 milliGRAM(s) SubCutaneous <User Schedule>  entecavir Solution 0.5 milliGRAM(s) Oral <User Schedule>  labetalol 200 milliGRAM(s) Oral every 8 hours  losartan 50 milliGRAM(s) Oral daily  piperacillin/tazobactam IVPB.. 3.375 Gram(s) IV Intermittent every 8 hours  polyethylene glycol 3350 17 Gram(s) Oral daily  senna 2 Tablet(s) Oral at bedtime    EXAMINATION:  General:  calm  HEENT:  MMM  Neuro:   Cards:  RRR  Respiratory:  no respiratory distress  Adomen:  soft  Extremities:  no edema  Skin:  warm/dry HPI:  82y (1940) woman with a PMHx significant for HTN, afib on Xarelto (last dose 6/1 6pm), hepatitis C on antiviral presenting with acute onset of AMS. LKW 10pm. Around 10:30pm patient become acutely altered, slumped to right side, head turned to left, not answering questions properly or moving the right side. NIHSS 22 most notable for decreased arousal, incorrect answers, not following commands, left gaze deviation that could not be overcome, right facial droop, right sided severe weakness compared to left, slurred speech, aphasia. Patient lives at home with daughter who is at bedside. Patient is [place] speaking. Daughter reports at baseline she intermittently walks with a cane and needs minor help with ADLs such as getting dressed or running a shower, Daughter looks after her affairs. She denies dementia or cognitive impairment. mRS 3.     LKW 10pm (6/1)  NIHSS 22  pre-mRS 3  Xarelto last taken at 6pm, INR=1.79   (02 Jun 2023 01:43)    OVERNIGHT EVENTS:   CT CAP done    VITALS:  T(C): , Max: 38.7 (06-04-23 @ 11:00)  HR:  (84 - 105)  BP:  (153/74 - 166/78)  ABP: --  RR:  (19 - 24)  SpO2:  (95% - 100%)  Wt(kg): --      06-04-23 @ 07:01  -  06-05-23 @ 07:00  --------------------------------------------------------  IN: 1760 mL / OUT: 900 mL / NET: 860 mL      LABS:  Na: 144 (06-05 @ 01:38), 139 (06-04 @ 00:42), 141 (06-02 @ 23:23)  K: 3.9 (06-05 @ 01:38), 3.8 (06-04 @ 00:42), 3.5 (06-02 @ 23:23)  Cl: 110 (06-05 @ 01:38), 107 (06-04 @ 00:42), 107 (06-02 @ 23:23)  CO2: 22 (06-05 @ 01:38), 17 (06-04 @ 00:42), 20 (06-02 @ 23:23)  BUN: 21 (06-05 @ 01:38), 17 (06-04 @ 00:42), 14 (06-02 @ 23:23)  Cr: 0.78 (06-05 @ 01:38), 0.73 (06-04 @ 00:42), 0.75 (06-02 @ 23:23)  Glu: 153(06-05 @ 01:38), 164(06-04 @ 00:42), 156(06-02 @ 23:23)    Hgb: 12.2 (06-05 @ 01:38), 12.0 (06-04 @ 00:39)  Hct: 37.1 (06-05 @ 01:38), 36.5 (06-04 @ 00:39)  WBC: 12.03 (06-05 @ 01:38), 11.81 (06-04 @ 00:39)  Plt: 183 (06-05 @ 01:38), 179 (06-04 @ 00:39)    INR:   PTT:     IMAGING:   Recent imaging studies were reviewed.    MEDICATIONS:  acetaminophen   Oral Liquid .. 650 milliGRAM(s) Oral every 6 hours PRN  acetylcysteine 20%  Inhalation 4 milliLiter(s) Inhalation every 6 hours  albuterol/ipratropium for Nebulization 3 milliLiter(s) Nebulizer every 6 hours  aspirin  chewable 81 milliGRAM(s) Oral daily  atorvastatin 80 milliGRAM(s) Oral at bedtime  chlorhexidine 4% Liquid 1 Application(s) Topical daily  enoxaparin Injectable 40 milliGRAM(s) SubCutaneous <User Schedule>  entecavir Solution 0.5 milliGRAM(s) Oral <User Schedule>  labetalol 200 milliGRAM(s) Oral every 8 hours  losartan 50 milliGRAM(s) Oral daily  piperacillin/tazobactam IVPB.. 3.375 Gram(s) IV Intermittent every 8 hours  polyethylene glycol 3350 17 Gram(s) Oral daily  senna 2 Tablet(s) Oral at bedtime    EXAMINATION:  General:  calm  HEENT:  MMM  Neuro: mimics, eyes open, hypophonic, LUE antigravity, minimal RUE, triple flex RLE  Cards:  RRR  Respiratory:  no respiratory distress  Adomen:  soft  Extremities:  no edema  Skin:  warm/dry

## 2023-06-05 NOTE — CHART NOTE - NSCHARTNOTEFT_GEN_A_CORE
Pt is an 83 y/o female p/w acute onset of AMS. NIHSS 22 most notable for decreased arousal, incorrect answers, not following commands, left gaze deviation that could not be overcome, right facial droop, right sided severe weakness compared to left, slurred speech, aphasia. Now s/p L M1 MT w TICI 2c flow, extubated 6/2.    6/3/23: Initial bedside swallow evaluation completed. Pt p/w 1) oropharyngeal dysphagia. Suspected reduced secretion management in setting of baseline wet vocal quality/junky wet cough. Swallow sequence for conservative 1/2 tsp crushed ice chips remarkable for reduced oral aperture, reduced mastication and poor bolus manipulation, delayed pharyngeal trigger, reduced hyolaryngeal elevation upon digital palpation, and overt s/s aspiration (i.e. throat clearing and increase in wet vocal quality). 2) Mixed aphasia 3) Dysarthria 4) Hypophonia 5) Suggest possible cognitive-linguistic deficits.    6/5/23: Pt seen bedside for repeat bedside swallow evaluation. Pt encountered awake in bed; + 4-5LO2NC; +nasal trumpet in right nares. Per discussion with MICHAEL Mejia and SILVIA Miles, pt requires less suctioning compared to previous days, suctioning fair secretions every 4 hours. Video language line  utilized in [facility], ID#[id number]. Pt AA&Ox1 (self); +hoarse/hypophonic vocal quality. Pt continues to present with clinical signs of an oropharyngeal dysphagia. Swallow sequence is marked by reduced stripping of bolus from spoon with delayed oral transport, delayed swallow initiation (up to 15-20 second delay) across 1/2 tsp trials of purees, moderately thick liquids and 2x crushed ice chips. Clear/dry vocal quality post moderately thick and crushed ice chips. Moderate anterior and right lateral sulcus residue post 1/2 tsp puree with mild throat clear post swallow. Reduced awareness of oral stasis, therefore, removed by this SLP. Pt able to achieve a clear/dry vocal quality post oral care. Given above, pt remains at risk of aspiration on conservative textures at this time. Suggest optimizing pt with re-evaluation at bedside to assess candidacy for an instrumental exam to assess candidacy for an oral diet. Discussed plan with pt as well as NSCU team. Purposeful proactive rounding reinforced and 5Ps addressed. Pt left NAD.    Impression: Pt presents with clinical signs of an oropharyngeal dysphagia.    Rec:  1) NPO, with non-oral nutrition/hydration/medications.  2) Aspiration precautions for secretions and enteral feeds  3) Suctioning via nasal trumpet as needed  4) Re-evaluation by this service on 6/7 to assess candidacy for an instrumental exam of swallowing    Discussed above with Pt; MICHAEL Mejia; SILVIA Esposito M.A. CCC-SLP. TEAMS PREFERRED

## 2023-06-05 NOTE — PROGRESS NOTE ADULT - ASSESSMENT
ASSESSMENT:   stroke in setting of afib, on xarelto, likely cardioembolic    NIHSS 22  LKN 9:45  mRS 3  s/p L M1 MT w TICI 2c flow    NEURO:  [id number]  MRI at some point  need to discuss AC  in the mean time mihaela start asa  stroke team to follow and core stroke measures  Activity: [] OOB as tolerated [] Bedrest [x] PT [x] OT , SLP [] PMNR    PULM:  abundant secretions, q1 suctioning   duonebs, CPT, continue NT suction, start abx if fever or desatting  Incentive spirometry, mobilize as tolerated    CV: cont labetalol  hx of afib, failure on xarelto  losartan  nml ef 60% on tte  lipid panel  Keep -170mmHg    RENAL: normonatremia  IVL    GI:  Diet: NGT, glucerna, TF resume  GI prophylaxis [] not indicated [x] PPI [] other:  Bowel regimen standing  6/3 LBM    ENDO: 5.9 a1c  Goal euglycemia (-180)    HEME/ONC:  VTE prophylaxis: [x] SCDs [X] chemoprophylaxis SQL    ID:  febrile early 6/3  fu cultures, UA neg  neg procalcitonin and mrsa    MISC:    SOCIAL/FAMILY:  [x] awaiting [] updated at bedside [] family meeting    CODE STATUS:  [x] Full Code [] DNR [] DNI [] Palliative/Comfort Care    DISPOSITION:  [x] ICU [] Stroke Unit [] Floor [] EMU [] RCU [] PCU    [x] Patient is at high risk of neurologic deterioration/death due to:     Contact: [phone] ASSESSMENT:   stroke in setting of afib, on xarelto, likely cardioembolic    NIHSS 22  LKN 9:45  mRS 3  s/p L M1 MT w TICI 2c flow    NEURO:  [id number]  MRI at some point  need to discuss AC  in the mean time mihaela start asa  stroke team to follow and core stroke measures  Activity: [] OOB as tolerated [] Bedrest [x] PT [x] OT , SLP [] PMNR    PULM:  secretions improved   duonebs, CPT, continue NT suction, start abx if fever or desatting  Incentive spirometry, mobilize as tolerated    CV: cont labetalol  ASA 81  hx of afib, failure on xarelto  losartan  nml ef 60% on tte  lipid panel  Keep -170mmHg    RENAL: normonatremia  IVL    GI:  Diet: NGT, glucerna, TF resume  GI prophylaxis [] not indicated [x] PPI [] other:  Bowel regimen standing  6/3 LBM    ENDO: 5.9 a1c  Goal euglycemia (-180)    HEME/ONC:  VTE prophylaxis: [x] SCDs [X] chemoprophylaxis SQL    ID:  febrile early 6/3  fu cultures, UA neg  zosyn for fever, secretions  neg procalcitonin and mrsa    MISC:    SOCIAL/FAMILY:  [x] awaiting [] updated at bedside [] family meeting    CODE STATUS:  [x] Full Code [] DNR [] DNI [] Palliative/Comfort Care    DISPOSITION:  [x] ICU [] Stroke Unit [] Floor [] EMU [] RCU [] PCU    [x] Patient is at high risk of neurologic deterioration/death due to:     Contact: [phone]

## 2023-06-05 NOTE — PROGRESS NOTE ADULT - SUBJECTIVE AND OBJECTIVE BOX
Patient seen and examined at bedside.    --Anticoagulation--  aspirin  chewable 81 milliGRAM(s) Oral daily  enoxaparin Injectable 40 milliGRAM(s) SubCutaneous <User Schedule>    T(C): 37.2 (06-04-23 @ 23:00), Max: 38.7 (06-04-23 @ 11:00)  HR: 100 (06-05-23 @ 00:44) (84 - 105)  BP: 155/82 (06-04-23 @ 23:00) (153/74 - 168/69)  RR: 24 (06-04-23 @ 23:00) (20 - 24)  SpO2: 100% (06-05-23 @ 00:44) (93% - 100%)  Wt(kg): --    Exam: Mongolian-speaking, AOx2-3 (has trouble with year), Left side FC and 5/5 strength, RUE trace withdrawal, RLE withdraws

## 2023-06-06 PROCEDURE — 70551 MRI BRAIN STEM W/O DYE: CPT | Mod: 26

## 2023-06-06 PROCEDURE — 99233 SBSQ HOSP IP/OBS HIGH 50: CPT

## 2023-06-06 RX ADMIN — CHLORHEXIDINE GLUCONATE 1 APPLICATION(S): 213 SOLUTION TOPICAL at 05:09

## 2023-06-06 RX ADMIN — Medication 400 MILLIGRAM(S): at 13:57

## 2023-06-06 RX ADMIN — Medication 4 MILLILITER(S): at 17:06

## 2023-06-06 RX ADMIN — Medication 3 MILLILITER(S): at 17:06

## 2023-06-06 RX ADMIN — ENTECAVIR 0.5 MILLIGRAM(S): 0.5 TABLET ORAL at 05:56

## 2023-06-06 RX ADMIN — ENOXAPARIN SODIUM 40 MILLIGRAM(S): 100 INJECTION SUBCUTANEOUS at 17:57

## 2023-06-06 RX ADMIN — PIPERACILLIN AND TAZOBACTAM 25 GRAM(S): 4; .5 INJECTION, POWDER, LYOPHILIZED, FOR SOLUTION INTRAVENOUS at 05:08

## 2023-06-06 RX ADMIN — LOSARTAN POTASSIUM 50 MILLIGRAM(S): 100 TABLET, FILM COATED ORAL at 05:08

## 2023-06-06 RX ADMIN — Medication 4 MILLILITER(S): at 05:06

## 2023-06-06 RX ADMIN — PIPERACILLIN AND TAZOBACTAM 25 GRAM(S): 4; .5 INJECTION, POWDER, LYOPHILIZED, FOR SOLUTION INTRAVENOUS at 21:32

## 2023-06-06 RX ADMIN — PIPERACILLIN AND TAZOBACTAM 25 GRAM(S): 4; .5 INJECTION, POWDER, LYOPHILIZED, FOR SOLUTION INTRAVENOUS at 14:59

## 2023-06-06 RX ADMIN — Medication 400 MILLIGRAM(S): at 05:07

## 2023-06-06 RX ADMIN — ATORVASTATIN CALCIUM 80 MILLIGRAM(S): 80 TABLET, FILM COATED ORAL at 21:18

## 2023-06-06 RX ADMIN — Medication 3 MILLILITER(S): at 23:07

## 2023-06-06 RX ADMIN — Medication 81 MILLIGRAM(S): at 11:30

## 2023-06-06 RX ADMIN — Medication 4 MILLILITER(S): at 23:07

## 2023-06-06 RX ADMIN — CHLORHEXIDINE GLUCONATE 1 APPLICATION(S): 213 SOLUTION TOPICAL at 21:19

## 2023-06-06 RX ADMIN — Medication 400 MILLIGRAM(S): at 21:18

## 2023-06-06 RX ADMIN — Medication 3 MILLILITER(S): at 05:06

## 2023-06-06 NOTE — CHART NOTE - NSCHARTNOTEFT_GEN_A_CORE
Nutrition Follow Up Note  Patient seen for: Nutrition Follow Up      Chart reviewed, events noted. Pt is an 81 yo F with PMH: HTN, A.Fib, Hep C on antiviral. Presented with acute onset AMS. Found to have a stroke in setting of A.Fib, likely cardioembolic. S/P L M1 mechanical thrombectomy TICI 2C flow. Continues on antibiotics as ordered for fever.    Source: [] Patient       [x] EMR        [x] RN        [] Family at bedside       [x] Other: interdisciplinary medical team    -If unable to interview patient: [] Trach/Vent/BiPAP  [x] Disoriented/confused/inappropriate to interview    Diet Order:   Diet, NPO with Tube Feed:   Tube Feeding Modality: Nasogastric  Glucerna 1.2 Jude (GLUCERNARTH)  Total Volume for 24 Hours (mL): 1440  Continuous  Starting Tube Feed Rate {mL per Hour}: 20  Increase Tube Feed Rate by (mL): 10     Every 4 hours  Until Goal Tube Feed Rate (mL per Hour): 60  Tube Feed Duration (in Hours): 24  Tube Feed Start Time: 16:00  Supplement Feeding Modality:  Nasogastric  Probiotic Yogurt/Smoothie Cans or Servings Per Day:  2       Frequency:  Daily (06-02-23)    EN Order Provides: 1440ml, 1728kcal and 86g protein    EN Provision (per nursing flow sheet):   (6/6): feeds infusing at 30ml/hr; held x 3 hours this AM; received 330ml thus far today.   (6/5): 96% of goal  (6/4): 46% of goal  (6/3): 57% of goal; feeds titrating up towards goal rate  Feeds initiated on 6/3. Was NPO between 6/1-6/3.   Pt has received on average </=75% x 7 days.     Is current diet order appropriate/adequate? Yes    Nutrition-related concerns:  -Last BM (6/6): x 1; (6/5): x 1. On bowel regimen (senna, Miralax).   -On antibiotics as prescribed. Prescribed Hong Active 2x daily to aid in gut ron.   -S/P swallow evaluation 6/3 and 6/5; recommend that pt remain NPO with non-oral means of nutrition/hydration at this time. Plan for re-assessment on 6/7.   -A1c 5.9%. Not on apparent antihyperglycemic regimen. On low carbohydrate enteral feeds. No hx of DM noted.     Weights:   Daily     MEDICATIONS  (STANDING):  atorvastatin  entecavir Solution  labetalol  losartan  piperacillin/tazobactam IVPB..  polyethylene glycol 3350  senna    Pertinent Labs:   A1C with Estimated Average Glucose Result: 5.9 % (06-02-23 @ 03:48)    Finger Sticks:    Triglycerides, Serum: 85 mg/dL (06-02-23 @ 03:48)    Skin per nursing documentation: surgical incision R groin 6/2; no documented pressure injuries   Edema: 1+ generalized     (based on dosing wt 74.5kg):   Estimated Energy Needs: (23-28kcal/kg): 1714-2086kcal  Estimated Protein Needs: (1.0-1.2g protein/kg): 75-89g protein   Estimated Fluid Needs: defer to team    Previous Nutrition Diagnosis: inadequate energy intake   Nutrition Diagnosis is: [x] ongoing  [] resolved [] not applicable     New Nutrition Diagnosis: acute moderate protein calorie malnutrition   Related To: inadequate energy intake in setting of complex clinical course, altered neurological function with altered swallowing ability   As Evidenced By: energy intake on average </=75% x >/7 days; 1+ edema     Nutrition Care Plan:  [x] In Progress  [] Achieved  [] Not applicable       Recommendations:      1. Recommend to continue Glucerna 1.2 at GOAL rate 60ml/hr x 24 hrs. Based on dosing wt 74.5kg, provides 1440ml, 1728kcal (23.2kcal/kg) and 86g protein (1.15g protein/kg).   2. Monitor GI tolerance. RD to remain available to adjust EN formulary, volume/rate PRN.   3. If PO diet initiated, consider low sodium diet (if no sodium goal parameters); consider consistent carbohydrate (if POCT BG trending high). Defer consistency to medical team, SLP.   4. Recommend multivitamin (if no medication contraindications) to aid in prevention of micronutrient deficiencies.   5. Monitor wt trends/labs/skin integrity/hydration status/bowel regularity.     Monitoring and Evaluation:   Continue to monitor nutritional intake, tolerance to diet prescription, weights, labs, skin integrity    RD remains available upon request and will follow up per protocol    Alison Kleiner, RD, [facility] Pager # [phone]

## 2023-06-06 NOTE — PROGRESS NOTE ADULT - SUBJECTIVE AND OBJECTIVE BOX
Neurology Progress Note    SUBJECTIVE/OBJECTIVE/INTERVAL EVENTS: Patient seen and examined at bedside w/ neuro attending and team.     INTERVAL HISTORY: NAEO. Pt stable and slowly improving.     VITALS & EXAMINATION:  Vital Signs Last 24 Hrs  T(C): 37.6 (06 Jun 2023 16:00), Max: 37.6 (06 Jun 2023 12:00)  T(F): 99.6 (06 Jun 2023 16:00), Max: 99.7 (06 Jun 2023 12:00)  HR: 102 (06 Jun 2023 16:00) (83 - 108)  BP: 164/95 (06 Jun 2023 16:00) (126/74 - 190/96)  BP(mean): 124 (06 Jun 2023 16:00) (81 - 134)  RR: 20 (06 Jun 2023 16:00) (18 - 24)  SpO2: 91% (06 Jun 2023 16:00) (91% - 100%)    Parameters below as of 06 Jun 2023 14:08  Patient On (Oxygen Delivery Method): room air    General: No acute distress  HEENT: EOM intact, visual fields full  Abdomen: Soft, nontender, nondistended   Extremities: No edema    NEUROLOGICAL EXAM:  Mental status: Eyes open, lethargic, oriented to self and place, speech hypophonic, no dysarthria, follows simple commands but not 2-step commands, no neglect   Cranial Nerves: Right nasolabial flattening, no gaze deviation,  Motor exam: Normal tone, 0/5 RUE, 1/5 RLE, 5/5 LUE, 5/5 LLE, normal fine finger movements.  Sensation: Intact to light touch   Coordination/ Gait: No dysmetria, gait not tested      LABORATORY:  CBC                       12.2   12.03 )-----------( 183      ( 05 Jun 2023 01:38 )             37.1     Chem 06-05    144  |  110<H>  |  21  ----------------------------<  153<H>  3.9   |  22  |  0.78    Ca    8.2<L>      05 Jun 2023 01:38  Phos  2.8     06-05  Mg     2.4     06-05      LFTs   Coagulopathy   Lipid Panel 06-02 Chol 97 LDL -- HDL 47<L> Trig 85  A1c   Cardiac enzymes     U/A   CSF  Immunological  Other    STUDIES & IMAGING: (EEG, CT, MR, U/S, TTE/TERE):  < from: CT Head No Cont (06.05.23 @ 08:27) >  IMPRESSION:  Evolvingleft MCA distribution infarct. More well-defined lucency and   slight increased mass effect on the left lateral ventricle. Subtle   increased attenuation at the level of the basal ganglia on the left may   indicate a component of evolving hemorrhage.Correlation with MR would be   helpful. Close interval follow-up recommended    < end of copied text >

## 2023-06-06 NOTE — PROGRESS NOTE ADULT - THIS PATIENT HAS THE FOLLOWING CONDITION(S)/DIAGNOSES ON THIS ADMISSION:
None
None
Cerebral Edema/Brain Compression / Herniation
None
Cerebral Edema/Brain Compression / Herniation
Encephalopathy
Encephalopathy/Cerebral Edema
Encephalopathy/Cerebral Edema

## 2023-06-06 NOTE — DIETITIAN NUTRITION RISK NOTIFICATION - TREATMENT: THE FOLLOWING DIET HAS BEEN RECOMMENDED
Diet, NPO with Tube Feed:   Tube Feeding Modality: Nasogastric  Glucerna 1.2 Jude (GLUCERNARTH)  Total Volume for 24 Hours (mL): 1440  Continuous  Starting Tube Feed Rate {mL per Hour}: 20  Increase Tube Feed Rate by (mL): 10     Every 4 hours  Until Goal Tube Feed Rate (mL per Hour): 60  Tube Feed Duration (in Hours): 24  Tube Feed Start Time: 16:00  Supplement Feeding Modality:  Nasogastric  Probiotic Yogurt/Smoothie Cans or Servings Per Day:  2       Frequency:  Daily (06-02-23 @ 15:24) [Active]

## 2023-06-06 NOTE — PROGRESS NOTE ADULT - ASSESSMENT
82y (1940) woman with a PMHx significant for HTN, afib on Xarelto (last dose 6/1 6pm), hepatitis C on antiviral presenting with acute onset of AMS. LKW 10pm. Around 10:30pm patient become acutely altered, slumped to right side, head turned to left, not answering questions properly or moving the right side. NIHSS 22 most notable for decreased arousal, incorrect answers, not following commands, left gaze deviation that could not be overcome, right sided severe weakness compared to left, slurred speech, aphasia. Patient lives at home with daughter who is at bedside. Patient is Ghanaian speaking. Daughter reports at baseline she intermittently walks with a cane and needs minor help with ADLs such as getting dressed or running a shower, Daughter looks after her affairs. She denies dementia or cognitive impairment. mRS 3. Not a tenecteplase candidate due to INR 1.79. Patient taken to IR for mechanical thrombectomy.    LKW 10pm (6/1)  NIHSS 22  pre-mRS 3  Xarelto last taken at 6pm, INR=1.79    Impression: acute change in mental status, right hemiparesis, left gaze preference, dysarthria & aphasia 2/2 left MCA territory infarct with left MCA occlusion s/p thrombectomy. Mechanism cardioembolic.    Plan:  [x] s/p thrombectomy (TICI 2C)  [x] post MT BP goal <180/105mmHg  [x] A1c (5.9), lipid panel (LDL 33)  [] MRI brain without contrast  [x] TTE  [x] Repeat CTH to evaluate stroke burden if MRI not complete,   [] Neurochecks and vitals per ICU protocol      Case d/w stroke attending.

## 2023-06-06 NOTE — PROGRESS NOTE ADULT - SUBJECTIVE AND OBJECTIVE BOX
Patient seen and examined at bedside.    --Anticoagulation--  aspirin  chewable 81 milliGRAM(s) Oral daily  enoxaparin Injectable 40 milliGRAM(s) SubCutaneous <User Schedule>    T(C): 37 (06-05-23 @ 19:00), Max: 37.6 (06-05-23 @ 07:00)  HR: 95 (06-05-23 @ 23:57) (83 - 106)  BP: 129/77 (06-05-23 @ 23:00) (129/77 - 181/100)  RR: 24 (06-05-23 @ 23:00) (18 - 24)  SpO2: 100% (06-05-23 @ 23:57) (96% - 100%)  Wt(kg): --    Exam: AOx3, hypophonic EOS, PERRL, L gaze but goes tp midline, mimics, L side spontaneous/AG, Right side trace withdrawal

## 2023-06-07 LAB
ANION GAP SERPL CALC-SCNC: 12 MMOL/L — SIGNIFICANT CHANGE UP (ref 5–17)
BUN SERPL-MCNC: 27 MG/DL — HIGH (ref 7–23)
CALCIUM SERPL-MCNC: 8.8 MG/DL — SIGNIFICANT CHANGE UP (ref 8.4–10.5)
CHLORIDE SERPL-SCNC: 113 MMOL/L — HIGH (ref 96–108)
CO2 SERPL-SCNC: 20 MMOL/L — LOW (ref 22–31)
CREAT SERPL-MCNC: 0.71 MG/DL — SIGNIFICANT CHANGE UP (ref 0.5–1.3)
EGFR: 84 ML/MIN/1.73M2 — SIGNIFICANT CHANGE UP
GLUCOSE SERPL-MCNC: 131 MG/DL — HIGH (ref 70–99)
HCT VFR BLD CALC: 37.1 % — SIGNIFICANT CHANGE UP (ref 34.5–45)
HGB BLD-MCNC: 11.9 G/DL — SIGNIFICANT CHANGE UP (ref 11.5–15.5)
MAGNESIUM SERPL-MCNC: 2.7 MG/DL — HIGH (ref 1.6–2.6)
MCHC RBC-ENTMCNC: 30.5 PG — SIGNIFICANT CHANGE UP (ref 27–34)
MCHC RBC-ENTMCNC: 32.1 GM/DL — SIGNIFICANT CHANGE UP (ref 32–36)
MCV RBC AUTO: 95.1 FL — SIGNIFICANT CHANGE UP (ref 80–100)
NRBC # BLD: 0 /100 WBCS — SIGNIFICANT CHANGE UP (ref 0–0)
PHOSPHATE SERPL-MCNC: 3.5 MG/DL — SIGNIFICANT CHANGE UP (ref 2.5–4.5)
PLATELET # BLD AUTO: 228 K/UL — SIGNIFICANT CHANGE UP (ref 150–400)
POTASSIUM SERPL-MCNC: 4 MMOL/L — SIGNIFICANT CHANGE UP (ref 3.5–5.3)
POTASSIUM SERPL-SCNC: 4 MMOL/L — SIGNIFICANT CHANGE UP (ref 3.5–5.3)
RBC # BLD: 3.9 M/UL — SIGNIFICANT CHANGE UP (ref 3.8–5.2)
RBC # FLD: 13.4 % — SIGNIFICANT CHANGE UP (ref 10.3–14.5)
SODIUM SERPL-SCNC: 145 MMOL/L — SIGNIFICANT CHANGE UP (ref 135–145)
WBC # BLD: 8.35 K/UL — SIGNIFICANT CHANGE UP (ref 3.8–10.5)
WBC # FLD AUTO: 8.35 K/UL — SIGNIFICANT CHANGE UP (ref 3.8–10.5)

## 2023-06-07 PROCEDURE — 99233 SBSQ HOSP IP/OBS HIGH 50: CPT

## 2023-06-07 PROCEDURE — 99222 1ST HOSP IP/OBS MODERATE 55: CPT

## 2023-06-07 RX ORDER — LOSARTAN POTASSIUM 100 MG/1
100 TABLET, FILM COATED ORAL DAILY
Refills: 0 | Status: DISCONTINUED | OUTPATIENT
Start: 2023-06-08 | End: 2023-06-14

## 2023-06-07 RX ORDER — LABETALOL HCL 100 MG
10 TABLET ORAL ONCE
Refills: 0 | Status: COMPLETED | OUTPATIENT
Start: 2023-06-07 | End: 2023-06-07

## 2023-06-07 RX ORDER — LOSARTAN POTASSIUM 100 MG/1
50 TABLET, FILM COATED ORAL ONCE
Refills: 0 | Status: COMPLETED | OUTPATIENT
Start: 2023-06-07 | End: 2023-06-07

## 2023-06-07 RX ADMIN — SENNA PLUS 2 TABLET(S): 8.6 TABLET ORAL at 21:16

## 2023-06-07 RX ADMIN — Medication 400 MILLIGRAM(S): at 05:00

## 2023-06-07 RX ADMIN — ATORVASTATIN CALCIUM 80 MILLIGRAM(S): 80 TABLET, FILM COATED ORAL at 21:16

## 2023-06-07 RX ADMIN — Medication 4 MILLILITER(S): at 17:40

## 2023-06-07 RX ADMIN — ENOXAPARIN SODIUM 40 MILLIGRAM(S): 100 INJECTION SUBCUTANEOUS at 18:18

## 2023-06-07 RX ADMIN — LOSARTAN POTASSIUM 50 MILLIGRAM(S): 100 TABLET, FILM COATED ORAL at 05:00

## 2023-06-07 RX ADMIN — Medication 10 MILLIGRAM(S): at 12:03

## 2023-06-07 RX ADMIN — Medication 4 MILLILITER(S): at 23:20

## 2023-06-07 RX ADMIN — Medication 4 MILLILITER(S): at 05:24

## 2023-06-07 RX ADMIN — Medication 4 MILLILITER(S): at 11:58

## 2023-06-07 RX ADMIN — Medication 81 MILLIGRAM(S): at 12:47

## 2023-06-07 RX ADMIN — PIPERACILLIN AND TAZOBACTAM 25 GRAM(S): 4; .5 INJECTION, POWDER, LYOPHILIZED, FOR SOLUTION INTRAVENOUS at 14:00

## 2023-06-07 RX ADMIN — Medication 3 MILLILITER(S): at 17:40

## 2023-06-07 RX ADMIN — LOSARTAN POTASSIUM 50 MILLIGRAM(S): 100 TABLET, FILM COATED ORAL at 12:12

## 2023-06-07 RX ADMIN — CHLORHEXIDINE GLUCONATE 1 APPLICATION(S): 213 SOLUTION TOPICAL at 21:17

## 2023-06-07 RX ADMIN — Medication 3 MILLILITER(S): at 23:20

## 2023-06-07 RX ADMIN — PIPERACILLIN AND TAZOBACTAM 25 GRAM(S): 4; .5 INJECTION, POWDER, LYOPHILIZED, FOR SOLUTION INTRAVENOUS at 05:01

## 2023-06-07 RX ADMIN — ENTECAVIR 0.5 MILLIGRAM(S): 0.5 TABLET ORAL at 05:54

## 2023-06-07 RX ADMIN — PIPERACILLIN AND TAZOBACTAM 25 GRAM(S): 4; .5 INJECTION, POWDER, LYOPHILIZED, FOR SOLUTION INTRAVENOUS at 21:56

## 2023-06-07 RX ADMIN — Medication 3 MILLILITER(S): at 05:24

## 2023-06-07 RX ADMIN — Medication 3 MILLILITER(S): at 11:59

## 2023-06-07 RX ADMIN — Medication 400 MILLIGRAM(S): at 21:16

## 2023-06-07 RX ADMIN — Medication 400 MILLIGRAM(S): at 14:01

## 2023-06-07 NOTE — PROGRESS NOTE ADULT - ASSESSMENT
ASSESSMENT: 82y (1940) woman with a PMHx significant for HTN, afib on Xarelto (last dose 6/1 6pm), hepatitis C on antiviral presenting with acute onset of AMS. LKW 10pm. Around 10:30pm patient become acutely altered, slumped to right side, head turned to left, not answering questions properly or moving the right side. NIHSS 22 most notable for decreased arousal, incorrect answers, not following commands, left gaze deviation that could not be overcome, right sided severe weakness compared to left, slurred speech, aphasia. Patient lives at home with daughter who is at bedside. Patient is [place]ar[place] speaking. Daughter reports at baseline she intermittently walks with a cane and needs minor help with ADLs such as getting dressed or running a shower, Daughter looks after her affairs. She denies dementia or cognitive impairment. mRS 3. Not a tenecteplase candidate due to INR 1.79. Patient taken to IR for mechanical thrombectomy.    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    NEURO: S/p thrombectomy. Continue close monitoring for neurologic deterioration, Gradual normotension, atorvastatin 80mg to LDL goal less than 70, MRI Brain w/o performed, pending read. Physical therapy/OT/Speech eval/treatment for acute rehab.     ANTITHROMBOTIC THERAPY: aspirin 81mg daily. Will need to resume anticoagulation when deemed safe, likely switch to Eliquis 5mg bid.     PULMONARY: CXR with L basilar opacities, unchanged, likely aspiration PNA. saturating well, NC as needed. Mucomyst 20% 4mL q6h, Duonebs q6h, chest PT for secretions. On Zosyn for aspiration PNA.     CARDIOVASCULAR: TTE as above, cardiac monitoring. Labetalol 400mg q8h, losartan 50mg daily                        SBP goal: 120-160    GASTROINTESTINAL:  dysphagia screen failed, seen by S/S NPO for now and will be re-evaluated. Bowel regimen     Diet: NG tube in. Glucerna 1.2 at goal plus 2 cans of probiotic.     RENAL: BUN/Cr within normal limits, good urine output      Na Goal: Greater than 135     Virgen: none    HEMATOLOGY: H/H without change, Platelets normal      DVT ppx: Heparin s.c [] LMWH [x]     ID: afebrile, no leukocytosis. On Zosyn for aspiration PNA, last day Jun 9th. On entecavir daily for Hep C.      OTHER: On restraints for pulling at lines.     DISPOSITION: Acute rehab    CORE MEASURES:        Admission NIHSS: 22     Tenecteplase: [] YES [x] NO      LDL/HDL: 33/47     Statin Therapy: Yes     Dysphagia Screen: [] PASS [x] FAIL     Smoking [] YES [x] NO      Afib [x] YES [] NO     Stroke Education [x] YES [] NO    Obtain screening lower extremity venous ultrasound in patients who meet 1 or more of the following criteria as patient is high risk for DVT/PE on admission: COMPLETED - NEGATIVE  [] History of DVT/PE  []Hypercoagulable states (Factor V Leiden, Cancer, OCP, etc. )  []Prolonged immobility (hemiplegia/hemiparesis/post operative or any other extended immobilization)  [] Transferred from outside facility (Rehab or Long term care)  [] Age </= to 50 ASSESSMENT: 82y (1940) woman with a PMHx significant for HTN, afib on Xarelto (last dose 6/1 6pm), hepatitis C on antiviral presenting with acute onset of AMS. LKW 10pm. Around 10:30pm patient become acutely altered, slumped to right side, head turned to left, not answering questions properly or moving the right side. NIHSS 22 most notable for decreased arousal, incorrect answers, not following commands, left gaze deviation that could not be overcome, right sided severe weakness compared to left, slurred speech, aphasia. Patient lives at home with daughter who is at bedside. Patient is Ivorian speaking. Daughter reports at baseline she intermittently walks with a cane and needs minor help with ADLs such as getting dressed or running a shower, Daughter looks after her affairs. She denies dementia or cognitive impairment. mRS 3. Not a tenecteplase candidate due to INR 1.79. Patient taken to IR for mechanical thrombectomy.    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    NEURO: S/p thrombectomy. Continue close monitoring for neurologic deterioration, Gradual normotension, atorvastatin 80mg to LDL goal less than 70, MRI Brain w/o and as above. Physical therapy/OT/Speech eval/treatment for acute rehab.     ANTITHROMBOTIC THERAPY: aspirin 81mg daily. Will anticoagulation Eliquis 5mg bid, 10-12 days from inciting event.     PULMONARY: CXR with L basilar opacities, unchanged, likely aspiration PNA. saturating well, NC as needed. Mucomyst 20% 4mL q6h, Duonebs q6h, chest PT for secretions. On Zosyn for aspiration PNA.     CARDIOVASCULAR: TTE as above, cardiac monitoring. Labetalol 400mg q8h, losartan 50mg daily                        SBP goal: 120-160    GASTROINTESTINAL:  dysphagia screen failed, seen by S/S NPO for now and will be re-evaluated. Bowel regimen     Diet: NG tube in. Glucerna 1.2 at goal plus 2 cans of probiotic.     RENAL: BUN/Cr within normal limits, good urine output      Na Goal: Greater than 135     Virgen: none    HEMATOLOGY: H/H without change, Platelets normal      DVT ppx: Heparin s.c [] LMWH [x]     ID: afebrile, no leukocytosis. On Zosyn for aspiration PNA, last day Jun 9th. On entecavir daily for Hep C.      OTHER: On restraints for pulling at lines.     DISPOSITION: Acute rehab    CORE MEASURES:        Admission NIHSS: 22     Tenecteplase: [] YES [x] NO      LDL/HDL: 33/47     Statin Therapy: Yes     Dysphagia Screen: [] PASS [x] FAIL     Smoking [] YES [x] NO      Afib [x] YES [] NO     Stroke Education [x] YES [] NO    Obtain screening lower extremity venous ultrasound in patients who meet 1 or more of the following criteria as patient is high risk for DVT/PE on admission: COMPLETED - NEGATIVE  [] History of DVT/PE  []Hypercoagulable states (Factor V Leiden, Cancer, OCP, etc. )  []Prolonged immobility (hemiplegia/hemiparesis/post operative or any other extended immobilization)  [] Transferred from outside facility (Rehab or Long term care)  [] Age </= to 50 ASSESSMENT: 82y (1940) woman with a PMHx significant for HTN, afib on Xarelto (last dose 6/1 6pm), hepatitis C on antiviral presenting with acute onset of AMS. LKW 10pm. Around 10:30pm patient become acutely altered, slumped to right side, head turned to left, not answering questions properly or moving the right side. NIHSS 22 most notable for decreased arousal, incorrect answers, not following commands, left gaze deviation that could not be overcome, right sided severe weakness compared to left, slurred speech, aphasia. Patient lives at home with daughter who is at bedside. Patient is Senegalese speaking. Daughter reports at baseline she intermittently walks with a cane and needs minor help with ADLs such as getting dressed or running a shower, Daughter looks after her affairs. She denies dementia or cognitive impairment. mRS 3. Not a tenecteplase candidate due to INR 1.79. Patient taken to IR for mechanical thrombectomy.    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    NEURO: S/p thrombectomy. Continue close monitoring for neurologic deterioration, Gradual normotension, atorvastatin 80mg to LDL goal less than 70, MRI Brain w/o and as above. Physical therapy/OT/Speech eval/treatment for acute rehab.     ANTITHROMBOTIC THERAPY: aspirin 81mg daily. Will change to anticoagulation with Eliquis 5mg bid, 10-12 days from inciting event.     PULMONARY: CXR with L basilar opacities, unchanged, likely aspiration PNA. saturating well, NC as needed. Mucomyst 20% 4mL q6h, Duonebs q6h, chest PT for secretions. On Zosyn for aspiration PNA.     CARDIOVASCULAR: TTE as above, cardiac monitoring. Labetalol 400mg q8h, losartan 50mg daily                        SBP goal: 120-160    GASTROINTESTINAL:  dysphagia screen failed, seen by S/S NPO for now and will be re-evaluated. Bowel regimen     Diet: NG tube in. Glucerna 1.2 at goal plus 2 cans of probiotic.     RENAL: BUN/Cr within normal limits, good urine output      Na Goal: Greater than 135     Virgen: none    HEMATOLOGY: H/H without change, Platelets normal      DVT ppx: Heparin s.c [] LMWH [x]     ID: afebrile, no leukocytosis. On Zosyn for aspiration PNA, last day Jun 9th. On entecavir daily for Hep C.      OTHER: On restraints for pulling at lines.     DISPOSITION: Acute rehab    CORE MEASURES:        Admission NIHSS: 22     Tenecteplase: [] YES [x] NO      LDL/HDL: 33/47     Statin Therapy: Yes     Dysphagia Screen: [] PASS [x] FAIL     Smoking [] YES [x] NO      Afib [x] YES [] NO     Stroke Education [x] YES [] NO    Obtain screening lower extremity venous ultrasound in patients who meet 1 or more of the following criteria as patient is high risk for DVT/PE on admission: COMPLETED - NEGATIVE  [] History of DVT/PE  []Hypercoagulable states (Factor V Leiden, Cancer, OCP, etc. )  []Prolonged immobility (hemiplegia/hemiparesis/post operative or any other extended immobilization)  [] Transferred from outside facility (Rehab or Long term care)  [] Age </= to 50

## 2023-06-07 NOTE — CHART NOTE - NSCHARTNOTEFT_GEN_A_CORE
Pt is an 81 y/o female p/w acute onset of AMS. NIHSS 22 most notable for decreased arousal, incorrect answers, not following commands, left gaze deviation that could not be overcome, right facial droop, right sided severe weakness compared to left, slurred speech, aphasia. Now s/p L M1 MT w TICI 2c flow, extubated 6/2.    Patient seen for bedside swallow evaluations 6/3 and 6/5 with recommendations for NPO, with non-oral nutrition/hydration/medications.    Today, pt seen for bedside swallow re-evaluation. D/W RN Myrtle, pt stable overnight. Encountered pt semi-supine in bed, on room air, awake/alert, + KFT, + icu monitoring (VSS).  Video language line  utilized in [facility], ID#[id number]. Pt oriented to self, month/year. Improvement in aphasia noted. +hoarse/hypophonic vocal quality. Oral cavity clear. Pt presented with PO trials of moderately thick liquids via tsp. Swallow sequence remarkable for improved oral aperture, suspected delayed AP transport, delayed pharyngeal trigger, and suspected reduced hyolaryngeal elevation upon digital palpation. Mild right lateral sulcus residue cleared with cued reswallow. Presence of wet vocal quality following conservative PO trials. Weak cued cough ineffective in clearing wet vocal quality. Purposeful proactive rounding reinforced and 5Ps addressed. Pt left NAD.    Impression: Pt presents with clinical signs of an oropharyngeal dysphagia in setting of L MCA and will benefit from instrumental assessment to assess swallow function.     Plan discussed with Neurology team/MICHAEL Kern, patient's daughter, and patient.    Rec:  1) NPO, with non-oral nutrition/hydration/medications.  2) Aspiration precautions for secretions and enteral feeds  3) Maintain good oral hygiene.   4) Tentative FEES 6/8    Rafia Monae CCC-SLP   Prefer teams or [phone]

## 2023-06-07 NOTE — PROGRESS NOTE ADULT - SUBJECTIVE AND OBJECTIVE BOX
THE PATIENT WAS SEEN AND EXAMINED BY ME WITH THE HOUSESTAFF AND STROKE TEAM DURING MORNING ROUNDS.   HPI:  82y (1940) woman with a PMHx significant for HTN, afib on Xarelto (last dose 6/1 6pm), hepatitis C on antiviral presenting with acute onset of AMS. LKW 10pm. Around 10:30pm patient become acutely altered, slumped to right side, head turned to left, not answering questions properly or moving the right side. NIHSS 22 most notable for decreased arousal, incorrect answers, not following commands, left gaze deviation that could not be overcome, right facial droop, right sided severe weakness compared to left, slurred speech, aphasia. Patient lives at home with daughter who is at bedside. Patient is Mozambican speaking. Daughter reports at baseline she intermittently walks with a cane and needs minor help with ADLs such as getting dressed or running a shower, Daughter looks after her affairs. She denies dementia or cognitive impairment. mRS 3.     LKW 10pm (6/1)  NIHSS 22  pre-mRS 3  Xarelto last taken at 6pm, INR=1.79   (02 Jun 2023 01:43)    ROS: Otherwise negative.    SUBJECTIVE: Had MRI performed overnight. No new neurologic complaints.      acetaminophen   Oral Liquid .. 650 milliGRAM(s) Oral every 6 hours PRN  acetylcysteine 20%  Inhalation 4 milliLiter(s) Inhalation every 6 hours  albuterol/ipratropium for Nebulization 3 milliLiter(s) Nebulizer every 6 hours  aspirin  chewable 81 milliGRAM(s) Oral daily  atorvastatin 80 milliGRAM(s) Oral at bedtime  chlorhexidine 4% Liquid 1 Application(s) Topical daily  enoxaparin Injectable 40 milliGRAM(s) SubCutaneous <User Schedule>  entecavir Solution 0.5 milliGRAM(s) Oral <User Schedule>  labetalol 400 milliGRAM(s) Oral every 8 hours  losartan 50 milliGRAM(s) Oral daily  piperacillin/tazobactam IVPB.. 3.375 Gram(s) IV Intermittent every 8 hours  polyethylene glycol 3350 17 Gram(s) Oral daily  senna 2 Tablet(s) Oral at bedtime      PHYSICAL EXAM:   Vital Signs Last 24 Hrs  T(C): 37.1 (07 Jun 2023 04:00), Max: 37.6 (06 Jun 2023 12:00)  T(F): 98.8 (07 Jun 2023 04:00), Max: 99.7 (06 Jun 2023 12:00)  HR: 95 (07 Jun 2023 07:25) (82 - 108)  BP: 173/85 (07 Jun 2023 07:25) (160/75 - 190/96)  BP(mean): 122 (07 Jun 2023 07:25) (103 - 131)  RR: 34 (07 Jun 2023 07:25) (17 - 34)  SpO2: 96% (07 Jun 2023 07:25) (91% - 100%)    Parameters below as of 07 Jun 2023 05:52  Patient On (Oxygen Delivery Method): room air    General: No acute distress  HEENT: EOM intact, visual fields full  Abdomen: Soft, nontender, nondistended   Extremities: No edema    NEUROLOGICAL EXAM:  Mental status: Eyes open, lethargic, oriented to self and place, speech hypophonic, no dysarthria, follows simple commands but not 2-step commands, no neglect   Cranial Nerves: Right nasolabial flattening, no gaze deviation,  Motor exam: Normal tone, 0/5 RUE, 1/5 RLE, 5/5 LUE, 5/5 LLE, normal fine finger movements.  Sensation: Intact to light touch   Coordination/ Gait: No dysmetria, gait not tested    LABS:                        11.9   8.35  )-----------( 228      ( 07 Jun 2023 00:13 )             37.1    06-07    145  |  113<H>  |  27<H>  ----------------------------<  131<H>  4.0   |  20<L>  |  0.71    Ca    8.8      07 Jun 2023 00:12  Phos  3.5     06-07  A1C with Estimated Average Glucose (06.02.23 @ 03:48)   A1C with Estimated Average Glucose Result: 5.9:Mg     2.7     06-07  Lipid Profile (06.02.23 @ 03:48)   LDL Cholesterol Calculated: 33 mg/dL  IMAGING: Reviewed by me.     CT Brain Stroke Protocol (06.01.23 @ 23:25)  IMPRESSION:  No acute intracranial hemorrhage or mass effect.      CT Brain Perfusion Maps Stroke (06.01.23 @ 23:10)  IMPRESSION:    CT angiography neck: No hemodynamically significant stenosis by NASCET   criteria. No vascular dissection. Nonspecific groundglass opacities in   the visualized apices of thelungs, may represent infectious versus   inflammatory disease.    CT angiography brain: Left proximal M1 occlusion.    CT perfusion: Core infarct of 6 cc with a penumbra of 162 cc.    CT Head No Cont (06.02.23 @ 09:59)  IMPRESSION:    An evolving left MCA distribution infarct is noted as described.      CT Head No Cont (06.05.23 @ 08:27)  IMPRESSION:  Evolvingleft MCA distribution infarct. More well-defined lucency and   slight increased mass effect on the left lateral ventricle. Subtle   increased attenuation at the level of the basal ganglia on the left may   indicate a component of evolving hemorrhage.    TTE W or WO Ultrasound Enhancing Agent (06.02.23 @ 10:21)  CONCLUSIONS:      1. Normal left ventricular cavitysize. The left ventricular wall thickness is normal. The left ventricular systolic function is normal with an ejection fraction of 60 % by Bassett's method of disks.   2. There is severe (grade 3) left ventricular diastolic dysfunction.   3. Normal right ventricular cavity size, normal wall thickness and normal systolic function. The tricuspid annular plane systolic excursion (TAPSE) is 1.4 cm (normal >=1.7 cm).   4. The left atrium is moderately dilated.   5. The right atrium is dilated.   6. Moderate aortic stenosis.   7. No evidence of aortic regurgitation.   8. No prior echocardiogram is available for comparison.               THE PATIENT WAS SEEN AND EXAMINED BY ME WITH THE HOUSESTAFF AND STROKE TEAM DURING MORNING ROUNDS.   HPI:  82y (1940) woman with a PMHx significant for HTN, afib on Xarelto (last dose 6/1 6pm), hepatitis C on antiviral presenting with acute onset of AMS. LKW 10pm. Around 10:30pm patient become acutely altered, slumped to right side, head turned to left, not answering questions properly or moving the right side. NIHSS 22 most notable for decreased arousal, incorrect answers, not following commands, left gaze deviation that could not be overcome, right facial droop, right sided severe weakness compared to left, slurred speech, aphasia. Patient lives at home with daughter who is at bedside. Patient is Maltese speaking. Daughter reports at baseline she intermittently walks with a cane and needs minor help with ADLs such as getting dressed or running a shower, Daughter looks after her affairs. She denies dementia or cognitive impairment. mRS 3.     LKW 10pm (6/1)  NIHSS 22  pre-mRS 3  Xarelto last taken at 6pm, INR=1.79   (02 Jun 2023 01:43)    ROS: Otherwise negative.    SUBJECTIVE: Had MRI performed overnight. No new neurologic complaints.      acetaminophen   Oral Liquid .. 650 milliGRAM(s) Oral every 6 hours PRN  acetylcysteine 20%  Inhalation 4 milliLiter(s) Inhalation every 6 hours  albuterol/ipratropium for Nebulization 3 milliLiter(s) Nebulizer every 6 hours  aspirin  chewable 81 milliGRAM(s) Oral daily  atorvastatin 80 milliGRAM(s) Oral at bedtime  chlorhexidine 4% Liquid 1 Application(s) Topical daily  enoxaparin Injectable 40 milliGRAM(s) SubCutaneous <User Schedule>  entecavir Solution 0.5 milliGRAM(s) Oral <User Schedule>  labetalol 400 milliGRAM(s) Oral every 8 hours  losartan 50 milliGRAM(s) Oral daily  piperacillin/tazobactam IVPB.. 3.375 Gram(s) IV Intermittent every 8 hours  polyethylene glycol 3350 17 Gram(s) Oral daily  senna 2 Tablet(s) Oral at bedtime      PHYSICAL EXAM:   Vital Signs Last 24 Hrs  T(C): 37.1 (07 Jun 2023 04:00), Max: 37.6 (06 Jun 2023 12:00)  T(F): 98.8 (07 Jun 2023 04:00), Max: 99.7 (06 Jun 2023 12:00)  HR: 95 (07 Jun 2023 07:25) (82 - 108)  BP: 173/85 (07 Jun 2023 07:25) (160/75 - 190/96)  BP(mean): 122 (07 Jun 2023 07:25) (103 - 131)  RR: 34 (07 Jun 2023 07:25) (17 - 34)  SpO2: 96% (07 Jun 2023 07:25) (91% - 100%)    Parameters below as of 07 Jun 2023 05:52  Patient On (Oxygen Delivery Method): room air    General: No acute distress  HEENT: EOM intact, visual fields full  Abdomen: Soft, nontender, nondistended   Extremities: No edema    NEUROLOGICAL EXAM:  Mental status: Eyes open, lethargic, oriented to self and place, speech hypophonic, no dysarthria, follows simple commands but not 2-step commands, no neglect   Cranial Nerves: Right nasolabial flattening, no gaze deviation,  Motor exam: Normal tone, 0/5 RUE, 1/5 RLE, 5/5 LUE, 5/5 LLE, normal fine finger movements.  Sensation: Intact to light touch   Coordination/ Gait: No dysmetria, gait not tested    LABS:                        11.9   8.35  )-----------( 228      ( 07 Jun 2023 00:13 )             37.1    06-07    145  |  113<H>  |  27<H>  ----------------------------<  131<H>  4.0   |  20<L>  |  0.71    Ca    8.8      07 Jun 2023 00:12  Phos  3.5     06-07  A1C with Estimated Average Glucose (06.02.23 @ 03:48)   A1C with Estimated Average Glucose Result: 5.9:Mg     2.7     06-07  Lipid Profile (06.02.23 @ 03:48)   LDL Cholesterol Calculated: 33 mg/dL  IMAGING: Reviewed by me.     CT Brain Stroke Protocol (06.01.23 @ 23:25)  IMPRESSION:  No acute intracranial hemorrhage or mass effect.      CT Brain Perfusion Maps Stroke (06.01.23 @ 23:10)  IMPRESSION:    CT angiography neck: No hemodynamically significant stenosis by NASCET   criteria. No vascular dissection. Nonspecific groundglass opacities in   the visualized apices of thelungs, may represent infectious versus   inflammatory disease.    CT angiography brain: Left proximal M1 occlusion.    CT perfusion: Core infarct of 6 cc with a penumbra of 162 cc.    CT Head No Cont (06.02.23 @ 09:59)  IMPRESSION:    An evolving left MCA distribution infarct is noted as described.      CT Head No Cont (06.05.23 @ 08:27)  IMPRESSION:  Evolvingleft MCA distribution infarct. More well-defined lucency and   slight increased mass effect on the left lateral ventricle. Subtle   increased attenuation at the level of the basal ganglia on the left may   indicate a component of evolving hemorrhage.    TTE W or WO Ultrasound Enhancing Agent (06.02.23 @ 10:21)  CONCLUSIONS:      1. Normal left ventricular cavitysize. The left ventricular wall thickness is normal. The left ventricular systolic function is normal with an ejection fraction of 60 % by Bassett's method of disks.   2. There is severe (grade 3) left ventricular diastolic dysfunction.   3. Normal right ventricular cavity size, normal wall thickness and normal systolic function. The tricuspid annular plane systolic excursion (TAPSE) is 1.4 cm (normal >=1.7 cm).   4. The left atrium is moderately dilated.   5. The right atrium is dilated.   6. Moderate aortic stenosis.   7. No evidence of aortic regurgitation.   8. No prior echocardiogram is available for comparison.      MR Head No Cont (06.06.23 @ 22:31)  Parenchymal volume loss and chronic microvascular ischemic changes   identified    Abnormal T2 prolongation with restricted diffusion is seen involving the   posterior left insula, left temporal frontal and parietal cortical   subcortical region is identified. These finding does demonstrate abnormal   susceptibility and is compatible with a hemorrhagic left MCA infarct.   There is localized mass effect seen consisting sulcal effacement. Mass   effect on the left lateral ventricle seen. No significant shift or   herniation is seen.    The large vessels demonstrate normal flow-voids    Minimal mucosal thickening is seen involving both ethmoid sinuses.    Both mastoid and middle ear regions appear clear.    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

## 2023-06-07 NOTE — CONSULT NOTE ADULT - SUBJECTIVE AND OBJECTIVE BOX
HPI:  82y (1940) woman with a PMHx significant for HTN, afib on Xarelto (last dose 6/1 6pm), hepatitis C on antiviral presenting with acute onset of AMS. LKW 10pm. Around 10:30pm patient become acutely altered, slumped to right side, head turned to left, not answering questions properly or moving the right side. NIHSS 22 most notable for decreased arousal, incorrect answers, not following commands, left gaze deviation that could not be overcome, right facial droop, right sided severe weakness compared to left, slurred speech, aphasia. Patient lives at home with daughter who is at bedside. Patient is Croatian speaking. Daughter reports at baseline she intermittently walks with a cane and needs minor help with ADLs such as getting dressed or running a shower, Daughter looks after her affairs. She denies dementia or cognitive impairment. mRS 3.     Patient was admitted on 6/1, s/p thrombectomy, seen today in ICU.     REVIEW OF SYSTEMS  Constitutional - No fever, No weight loss, No fatigue  HEENT - No eye pain, No visual disturbances, No difficulty hearing, No tinnitus, No vertigo, No neck pain  Respiratory - No cough, No wheezing, No shortness of breath  Cardiovascular - No chest pain, No palpitations  Gastrointestinal - No abdominal pain, No nausea, No vomiting, No diarrhea, No constipation  Genitourinary - No dysuria, No frequency, No hematuria, No incontinence  Psychiatric - No depression, No anxiety    VITALS  T(C): 37 (06-07-23 @ 12:00), Max: 37.6 (06-06-23 @ 16:00)  HR: 97 (06-07-23 @ 14:00) (82 - 106)  BP: 172/84 (06-07-23 @ 14:00) (164/95 - 196/99)  RR: 29 (06-07-23 @ 14:00) (17 - 34)  SpO2: 96% (06-07-23 @ 14:00) (91% - 100%)  Wt(kg): --    PAST MEDICAL & SURGICAL HISTORY  see HPI     SOCIAL HISTORY  Smoking - Denied  EtOH - Denied   Drugs - Denied    FUNCTIONAL HISTORY  Lives in apt, 15 steps to enter  Independent AMB and ADLs PTA     CURRENT FUNCTIONAL STATUS  6/7 SLP  oropharyngeal dysphagia   NPO    6/6 PT/OT  following 75% simple commands  bed mobility max assist x2  sitting EOB x 10 min, min assist   tachycardic        FAMILY HISTORY   no pertinent history in first degree relatives     RECENT LABS/IMAGING  CBC Full  -  ( 07 Jun 2023 00:13 )  WBC Count : 8.35 K/uL  RBC Count : 3.90 M/uL  Hemoglobin : 11.9 g/dL  Hematocrit : 37.1 %  Platelet Count - Automated : 228 K/uL  Mean Cell Volume : 95.1 fl  Mean Cell Hemoglobin : 30.5 pg  Mean Cell Hemoglobin Concentration : 32.1 gm/dL  Auto Neutrophil # : x  Auto Lymphocyte # : x  Auto Monocyte # : x  Auto Eosinophil # : x  Auto Basophil # : x  Auto Neutrophil % : x  Auto Lymphocyte % : x  Auto Monocyte % : x  Auto Eosinophil % : x  Auto Basophil % : x    06-07    145  |  113<H>  |  27<H>  ----------------------------<  131<H>  4.0   |  20<L>  |  0.71    Ca    8.8      07 Jun 2023 00:12  Phos  3.5     06-07  Mg     2.7     06-07    < from: MR Head No Cont (06.06.23 @ 22:31) >    IMPRESSION: 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    < end of copied text >        ALLERGIES  No Known Allergies      MEDICATIONS   acetaminophen   Oral Liquid .. 650 milliGRAM(s) Oral every 6 hours PRN  acetylcysteine 20%  Inhalation 4 milliLiter(s) Inhalation every 6 hours  albuterol/ipratropium for Nebulization 3 milliLiter(s) Nebulizer every 6 hours  aspirin  chewable 81 milliGRAM(s) Oral daily  atorvastatin 80 milliGRAM(s) Oral at bedtime  chlorhexidine 4% Liquid 1 Application(s) Topical daily  enoxaparin Injectable 40 milliGRAM(s) SubCutaneous <User Schedule>  entecavir Solution 0.5 milliGRAM(s) Oral <User Schedule>  labetalol 400 milliGRAM(s) Oral every 8 hours  piperacillin/tazobactam IVPB.. 3.375 Gram(s) IV Intermittent every 8 hours  polyethylene glycol 3350 17 Gram(s) Oral daily  senna 2 Tablet(s) Oral at bedtime      ----------------------------------------------------------------------------------------  PHYSICAL EXAM  Constitutional - NAD, Comfortable, laying in bed   +NGT   Chest - Breathing comfortably  Cardiovascular - S1S2   Abdomen - Soft   Extremities - wrist restraint   Neurologic Exam -                    Cognitive - Awake, Alert, AAO to self, place     Communication - hypophonic, able to name     Cranial Nerves - right facial weakness      Motor - right hemiplegia      Psychiatric - Mood stable, Affect WNL  ----------------------------------------------------------------------------------------  ASSESSMENT/PLAN  82yFemale h/o HTN, afib on xarelto, hep C with functional deficits after left MCA hemorrhagic infarct, s/p thrombectomy  dysphagia, NPO   Pain - Tylenol  DVT PPX - SCDs, lovenox   Rehab - Will continue to follow for ongoing rehab needs and recommendations.   continue bedside therapy   Recommend ACUTE inpatient rehabilitation for the functional deficits consisting of 3 hours of therapy/day & 24 hour RN/daily PMR physician for comorbid medical management. Patient will be able to tolerate 3 hours a day.

## 2023-06-08 DIAGNOSIS — I63.9 CEREBRAL INFARCTION, UNSPECIFIED: ICD-10-CM

## 2023-06-08 DIAGNOSIS — E78.5 HYPERLIPIDEMIA, UNSPECIFIED: ICD-10-CM

## 2023-06-08 DIAGNOSIS — I10 ESSENTIAL (PRIMARY) HYPERTENSION: ICD-10-CM

## 2023-06-08 DIAGNOSIS — I48.19 OTHER PERSISTENT ATRIAL FIBRILLATION: ICD-10-CM

## 2023-06-08 LAB
ANION GAP SERPL CALC-SCNC: 14 MMOL/L — SIGNIFICANT CHANGE UP (ref 5–17)
BUN SERPL-MCNC: 29 MG/DL — HIGH (ref 7–23)
CALCIUM SERPL-MCNC: 8.9 MG/DL — SIGNIFICANT CHANGE UP (ref 8.4–10.5)
CHLORIDE SERPL-SCNC: 113 MMOL/L — HIGH (ref 96–108)
CO2 SERPL-SCNC: 18 MMOL/L — LOW (ref 22–31)
CREAT SERPL-MCNC: 0.73 MG/DL — SIGNIFICANT CHANGE UP (ref 0.5–1.3)
CULTURE RESULTS: SIGNIFICANT CHANGE UP
CULTURE RESULTS: SIGNIFICANT CHANGE UP
EGFR: 82 ML/MIN/1.73M2 — SIGNIFICANT CHANGE UP
GLUCOSE SERPL-MCNC: 128 MG/DL — HIGH (ref 70–99)
HCT VFR BLD CALC: 36.6 % — SIGNIFICANT CHANGE UP (ref 34.5–45)
HGB BLD-MCNC: 11.8 G/DL — SIGNIFICANT CHANGE UP (ref 11.5–15.5)
MAGNESIUM SERPL-MCNC: 2.5 MG/DL — SIGNIFICANT CHANGE UP (ref 1.6–2.6)
MCHC RBC-ENTMCNC: 30.6 PG — SIGNIFICANT CHANGE UP (ref 27–34)
MCHC RBC-ENTMCNC: 32.2 GM/DL — SIGNIFICANT CHANGE UP (ref 32–36)
MCV RBC AUTO: 94.8 FL — SIGNIFICANT CHANGE UP (ref 80–100)
NRBC # BLD: 0 /100 WBCS — SIGNIFICANT CHANGE UP (ref 0–0)
PHOSPHATE SERPL-MCNC: 3.4 MG/DL — SIGNIFICANT CHANGE UP (ref 2.5–4.5)
PLATELET # BLD AUTO: 247 K/UL — SIGNIFICANT CHANGE UP (ref 150–400)
POTASSIUM SERPL-MCNC: 4 MMOL/L — SIGNIFICANT CHANGE UP (ref 3.5–5.3)
POTASSIUM SERPL-SCNC: 4 MMOL/L — SIGNIFICANT CHANGE UP (ref 3.5–5.3)
RBC # BLD: 3.86 M/UL — SIGNIFICANT CHANGE UP (ref 3.8–5.2)
RBC # FLD: 13.2 % — SIGNIFICANT CHANGE UP (ref 10.3–14.5)
SODIUM SERPL-SCNC: 145 MMOL/L — SIGNIFICANT CHANGE UP (ref 135–145)
SPECIMEN SOURCE: SIGNIFICANT CHANGE UP
SPECIMEN SOURCE: SIGNIFICANT CHANGE UP
WBC # BLD: 8.94 K/UL — SIGNIFICANT CHANGE UP (ref 3.8–10.5)
WBC # FLD AUTO: 8.94 K/UL — SIGNIFICANT CHANGE UP (ref 3.8–10.5)

## 2023-06-08 PROCEDURE — 99233 SBSQ HOSP IP/OBS HIGH 50: CPT

## 2023-06-08 RX ORDER — LABETALOL HCL 100 MG
10 TABLET ORAL EVERY 4 HOURS
Refills: 0 | Status: DISCONTINUED | OUTPATIENT
Start: 2023-06-08 | End: 2023-06-14

## 2023-06-08 RX ORDER — LABETALOL HCL 100 MG
10 TABLET ORAL ONCE
Refills: 0 | Status: COMPLETED | OUTPATIENT
Start: 2023-06-08 | End: 2023-06-08

## 2023-06-08 RX ADMIN — ENOXAPARIN SODIUM 40 MILLIGRAM(S): 100 INJECTION SUBCUTANEOUS at 17:48

## 2023-06-08 RX ADMIN — PIPERACILLIN AND TAZOBACTAM 25 GRAM(S): 4; .5 INJECTION, POWDER, LYOPHILIZED, FOR SOLUTION INTRAVENOUS at 21:16

## 2023-06-08 RX ADMIN — Medication 400 MILLIGRAM(S): at 13:05

## 2023-06-08 RX ADMIN — Medication 10 MILLIGRAM(S): at 12:15

## 2023-06-08 RX ADMIN — Medication 10 MILLIGRAM(S): at 08:35

## 2023-06-08 RX ADMIN — Medication 400 MILLIGRAM(S): at 05:49

## 2023-06-08 RX ADMIN — Medication 4 MILLILITER(S): at 17:13

## 2023-06-08 RX ADMIN — PIPERACILLIN AND TAZOBACTAM 25 GRAM(S): 4; .5 INJECTION, POWDER, LYOPHILIZED, FOR SOLUTION INTRAVENOUS at 13:06

## 2023-06-08 RX ADMIN — Medication 10 MILLIGRAM(S): at 21:14

## 2023-06-08 RX ADMIN — Medication 4 MILLILITER(S): at 23:08

## 2023-06-08 RX ADMIN — Medication 400 MILLIGRAM(S): at 21:17

## 2023-06-08 RX ADMIN — Medication 4 MILLILITER(S): at 05:23

## 2023-06-08 RX ADMIN — Medication 3 MILLILITER(S): at 11:16

## 2023-06-08 RX ADMIN — Medication 3 MILLILITER(S): at 17:27

## 2023-06-08 RX ADMIN — ATORVASTATIN CALCIUM 80 MILLIGRAM(S): 80 TABLET, FILM COATED ORAL at 21:17

## 2023-06-08 RX ADMIN — Medication 10 MILLIGRAM(S): at 22:36

## 2023-06-08 RX ADMIN — CHLORHEXIDINE GLUCONATE 1 APPLICATION(S): 213 SOLUTION TOPICAL at 21:17

## 2023-06-08 RX ADMIN — Medication 10 MILLIGRAM(S): at 18:05

## 2023-06-08 RX ADMIN — Medication 10 MILLIGRAM(S): at 10:10

## 2023-06-08 RX ADMIN — Medication 3 MILLILITER(S): at 23:08

## 2023-06-08 RX ADMIN — ENTECAVIR 0.5 MILLIGRAM(S): 0.5 TABLET ORAL at 06:20

## 2023-06-08 RX ADMIN — Medication 4 MILLILITER(S): at 11:16

## 2023-06-08 RX ADMIN — PIPERACILLIN AND TAZOBACTAM 25 GRAM(S): 4; .5 INJECTION, POWDER, LYOPHILIZED, FOR SOLUTION INTRAVENOUS at 05:49

## 2023-06-08 RX ADMIN — Medication 81 MILLIGRAM(S): at 11:20

## 2023-06-08 RX ADMIN — Medication 3 MILLILITER(S): at 05:23

## 2023-06-08 RX ADMIN — LOSARTAN POTASSIUM 100 MILLIGRAM(S): 100 TABLET, FILM COATED ORAL at 05:49

## 2023-06-08 NOTE — SWALLOW FEES ASSESSMENT ADULT - DIAGNOSTIC IMPRESSIONS
Pt is an 83 y/o female p/w acute onset of AMS. NIHSS 22 most notable for decreased arousal, incorrect answers, not following commands, left gaze deviation that could not be overcome, right facial droop, right sided severe weakness compared to left, slurred speech, aphasia. Now s/p L M1 MT w TICI 2c flow, extubated 6/2. Patient presents on Fiberoptic Endoscopic Evaluation of Swallowing and Sensory Testing (FEES) with an oropharyngeal dysphagia. Moderately thick liquids via and thin puree trials remarkable for mild anterior spillage due to R sided facial weakness and silent laryngeal penetration during the swallow. Thick puree results in laryngeal penetration to the level of the vocal cords, pt sensate which is effective in preventing aspiration. Mild pharyngeal residue for all consistencies is reduced with subsequent swallows. Pt is not a candidate for compensatory strategies given reduced cognitive status. Patient will benefit from restorative swallow therapy prior to repeat instrumental assessment.    Disorders: reduced labial strength, reduced lingual strength/control, delayed initiation of pharyngeal trigger, pharyngeal contractibility, reduced supraglottic sensation Pt is an 81 y/o female p/w acute onset of AMS. NIHSS 22 most notable for decreased arousal, incorrect answers, not following commands, left gaze deviation that could not be overcome, right facial droop, right sided severe weakness compared to left, slurred speech, aphasia. Now s/p L M1 MT w TICI 2c flow, extubated 6/2. Patient presents on Fiberoptic Endoscopic Evaluation of Swallowing and Sensory Testing (FEES) with an oropharyngeal dysphagia. Moderately thick liquids via tsp and thin puree trials remarkable for R sided mild anterior spillage and silent laryngeal penetration during the swallow. Thick puree results in laryngeal penetration to the level of the vocal cords, pt sensate which is effective in preventing aspiration. Mild pharyngeal residue for all consistencies is reduced with subsequent swallows. Pt is not a candidate for compensatory strategies given reduced cognitive status. Patient will benefit from restorative swallow therapy prior to repeat instrumental assessment.    Disorders: reduced labial strength, reduced lingual strength/control, delayed initiation of pharyngeal trigger, pharyngeal contractibility, reduced supraglottic sensation

## 2023-06-08 NOTE — CONSULT NOTE ADULT - SUBJECTIVE AND OBJECTIVE BOX
CHIEF COMPLAINT:    HISTORY OF PRESENT ILLNESS:    82y (1940) woman with a PMHx significant for HTN, afib on Xarelto (last dose 6/1 6pm), hepatitis C on antiviral presenting with acute onset of AMS. LKW 10pm. Around 10:30pm patient become acutely altered, slumped to right side, head turned to left, not answering questions properly or moving the right side. NIHSS 22 most notable for decreased arousal, incorrect answers, not following commands, left gaze deviation that could not be overcome, right facial droop, right sided severe weakness compared to left, slurred speech, aphasia. Patient lives at home with daughter who is at bedside. Patient is Rwandan speaking. Daughter reports at baseline she intermittently walks with a cane and needs minor help with ADLs such as getting dressed or running a shower, Daughter looks after her affairs. She denies dementia or cognitive impairment. mRS 3.     LKW 10pm (6/1)  NIHSS 22  pre-mRS 3  Xarelto last taken at 6pm, INR=1.79   (02 Jun 2023 01:43)  has been hypertensive   family at bedside       PAST MEDICAL & SURGICAL HISTORY:          MEDICATIONS:  aspirin  chewable 81 milliGRAM(s) Oral daily  enoxaparin Injectable 40 milliGRAM(s) SubCutaneous <User Schedule>  labetalol 400 milliGRAM(s) Oral every 8 hours  labetalol Injectable 10 milliGRAM(s) IV Push every 4 hours PRN  losartan 100 milliGRAM(s) Oral daily    entecavir Solution 0.5 milliGRAM(s) Oral <User Schedule>  piperacillin/tazobactam IVPB.. 3.375 Gram(s) IV Intermittent every 8 hours    acetylcysteine 20%  Inhalation 4 milliLiter(s) Inhalation every 6 hours  albuterol/ipratropium for Nebulization 3 milliLiter(s) Nebulizer every 6 hours    acetaminophen   Oral Liquid .. 650 milliGRAM(s) Oral every 6 hours PRN    polyethylene glycol 3350 17 Gram(s) Oral daily  senna 2 Tablet(s) Oral at bedtime    atorvastatin 80 milliGRAM(s) Oral at bedtime    chlorhexidine 4% Liquid 1 Application(s) Topical daily      FAMILY HISTORY:      SOCIAL HISTORY:    [ ] Non-smoker  [ ] Smoker  [ ] Alcohol    Allergies    No Known Allergies    Intolerances    	    REVIEW OF SYSTEMS:  CONSTITUTIONAL: No fever, weight loss, + fatigue  EYES: No eye pain, visual disturbances, or discharge  ENMT:  No difficulty hearing, tinnitus, vertigo; No sinus or throat pain  NECK: No pain or stiffness  RESPIRATORY: No cough, wheezing, chills or hemoptysis; No Shortness of Breath  CARDIOVASCULAR: No chest pain, palpitations, passing out, dizziness, or leg swelling  GASTROINTESTINAL: No abdominal or epigastric pain. No nausea, vomiting, or hematemesis; No diarrhea or constipation. No melena or hematochezia.  GENITOURINARY: No dysuria, frequency, hematuria, or incontinence  NEUROLOGICAL: No headaches, memory loss, loss of strength, numbness, or tremors  SKIN: No itching, burning, rashes, or lesions   LYMPH Nodes: No enlarged glands  ENDOCRINE: No heat or cold intolerance; No hair loss  MUSCULOSKELETAL: No joint pain or swelling; No muscle, back, or extremity pain  PSYCHIATRIC: No depression, anxiety, mood swings, or difficulty sleeping  HEME/LYMPH: No easy bruising, or bleeding gums  ALLERY AND IMMUNOLOGIC: No hives or eczema	    [ ] All others negative	  [ ] Unable to obtain    PHYSICAL EXAM:  T(C): 36.6 (06-08-23 @ 12:00), Max: 36.9 (06-08-23 @ 08:00)  HR: 117 (06-08-23 @ 12:00) (75 - 117)  BP: 182/84 (06-08-23 @ 12:00) (151/79 - 206/105)  RR: 21 (06-08-23 @ 12:00) (17 - 32)  SpO2: 96% (06-08-23 @ 12:00) (94% - 100%)  Wt(kg): --  I&O's Summary    07 Jun 2023 07:01  -  08 Jun 2023 07:00  --------------------------------------------------------  IN: 1450 mL / OUT: 750 mL / NET: 700 mL    08 Jun 2023 07:01  -  08 Jun 2023 12:32  --------------------------------------------------------  IN: 120 mL / OUT: 0 mL / NET: 120 mL        Appearance: NAD +NGT   HEENT:   Normal oral mucosa, PERRL, EOMI	  Lymphatic: No lymphadenopathy  Cardiovascular: Irregular  S1 S2, No JVD, No murmurs, No edema  Respiratory: Lungs clear to auscultation	  Psychiatry: A & O x 3, Mood & affect appropriate  Gastrointestinal:  Soft, Non-tender, + BS	  Skin: No rashes, No ecchymoses, No cyanosis	  Extremities: Normal range of motion, No clubbing, cyanosis or edema  Vascular: Peripheral pulses palpable 2+ bilaterally  NEUROLOGICAL EXAM:  Mental status: Eyes open, lethargic, oriented to self and place, speech hypophonic, no dysarthria, follows simple commands but not 2-step commands, no neglect   Cranial Nerves: Right nasolabial flattening, no gaze deviation,  Motor exam: Normal tone, 0/5 RUE, 1/5 RLE, 5/5 LUE, 5/5 LLE, normal fine finger movements.  Sensation: Intact to light touch   Coordination/ Gait: No dysmetria, gait not tested  TELEMETRY: 	  Afib 80-90s   ECG:  	afib non specific stt changes   RADIOLOGY:  < from: CT Head No Cont (06.05.23 @ 08:27) >    ACC: [id number] EXAM:  CT BRAIN   ORDERED BY: PEREZ CANTU     PROCEDURE DATE:  06/05/2023          INTERPRETATION:  INDICATIONS:  Left MCA stroke follow-up    TECHNIQUE:  Serial axial images were obtained from the skull base to the   vertex without intravenous contrast using portable technique.    COMPARISON EXAMINATION: 6/2/2023    FINDINGS:  VENTRICLES AND SULCI: Mass effect on the left lateral ventricle is   appreciated. This is slightly increased compared with the prior. The   midlineremains preserved.  INTRA-AXIAL: Evolving left MCA distribution infarct with more conspicuous   lucency identified in the left MCA territory. Some subtle high   attenuation in the basal ganglia consistent with possible small component   of hemorrhageat this level. Close interval follow-up recommended.  EXTRA-AXIAL:  No mass or collection is seen.  VISUALIZED SINUSES:  Clear.  VISUALIZED MASTOIDS:  Clear.  CALVARIUM:  Normal.  MISCELLANEOUS: NG and ET tube are appreciated    IMPRESSION:  Evolvingleft MCA distribution infarct. More well-defined lucency and   slight increased mass effect on the left lateral ventricle. Subtle   increased attenuation at the level of the basal ganglia on the left may   indicate a component of evolving hemorrhage.Correlation with MR would be   helpful. Close interval follow-up recommended    --- End of Report ---            STEFFANY HERNANDEZ MD; Attending Radiologist  This document has been electronically signed. Jun 5 2023  9:56AM    < end of copied text >  < from: CT Chest w/ IV Cont (06.04.23 @ 14:03) >    ACC: [id number] EXAM:  CT ABDOMEN AND PELVIS IC   ORDERED BY: RADHA DIA     ACC: [id number] EXAM:  CT CHEST IC   ORDERED BY: RADHA DIA     PROCEDURE DATE:  06/04/2023          INTERPRETATION:  CLINICAL INFORMATION: Recent left MCA stroke. Concern   for malignancy.    COMPARISON: None.    CONTRAST/COMPLICATIONS:  IV Contrast: Omnipaque 350  90 cc administered   10 cc discarded  Oral Contrast: NONE  Complications: None reported at time of study completion    PROCEDURE:  CT of the Chest, Abdomen and Pelvis was performed.  Sagittal and coronal reformats were performed.    FINDINGS:  CHEST:  LUNGS AND LARGE AIRWAYS: Patent central airways. Mild bibasilar linear   type atelectasis.  PLEURA: Trace left  pleural effusion.  VESSELS: Atherosclerotic changes of the aorta and coronary arteries.  HEART: Heart size is normal. No pericardial effusion.  MEDIASTINUM AND BERTHA: No lymphadenopathy.  CHEST WALL AND LOWER NECK: Well-circumscribed right sided breast mass   measuring 3.6 x 1.9 cm with associated calcification (3:109).    ABDOMEN AND PELVIS:  LIVER: Within normal limits.  BILE DUCTS: Normal caliber.  GALLBLADDER: Layering sludge and/or stones.  SPLEEN: Small inferior splenic infarct.  PANCREAS: Within normal limits.  ADRENALS: Within normal limits.  KIDNEYS/URETERS: Several regions of cortical hypoenhancement in the right   kidney may be related to pyelonephritis or infarct.    BLADDER: Within normal limits.  REPRODUCTIVE ORGANS: Fibroid uterus. No adnexal mass.    BOWEL: Nasogastric tube with tip in the stomach. No bowel obstruction.   Appendix is normal.  PERITONEUM: No ascites.  VESSELS: Atherosclerotic changes.  RETROPERITONEUM/LYMPH NODES: No lymphadenopathy.  ABDOMINAL WALL: Within normal limits.  BONES: Degenerative changes. Paget's disease of the left hemipelvis.    IMPRESSION:  A 3.6 cm well-circumscribed right breast mass with associated   calcification for which dedicated breast imaging is recommended.    Small inferior splenic infarct.    Several regions of cortical hypoenhancement in the right kidney may be   related to pyelonephritis or infarct.    --- End of Report ---           AMAYA JORDAN MD; Resident Radiologist  This document has been electronically signed.  CHA WALTER MD; Attending Radiologist  This document has been electronically signed. Jun 4 2023  3:06PM    < end of copied text >    OTHER: 	  	  LABS:	 	    CARDIAC MARKERS:                                  11.8   8.94  )-----------( 247      ( 08 Jun 2023 02:49 )             36.6     06-08    145  |  113<H>  |  29<H>  ----------------------------<  128<H>  4.0   |  18<L>  |  0.73    Ca    8.9      08 Jun 2023 02:49  Phos  3.4     06-08  Mg     2.5     06-08      proBNP:   Lipid Profile:   HgA1c:   TSH:

## 2023-06-08 NOTE — PROGRESS NOTE ADULT - ASSESSMENT
ASSESSMENT: 82y (1940) woman with a PMHx significant for HTN, afib on Xarelto (last dose 6/1 6pm), hepatitis C on antiviral presenting with acute onset of AMS. LKW 10pm. Around 10:30pm patient become acutely altered, slumped to right side, head turned to left, not answering questions properly or moving the right side. NIHSS 22 most notable for decreased arousal, incorrect answers, not following commands, left gaze deviation that could not be overcome, right sided severe weakness compared to left, slurred speech, aphasia. Patient lives at home with daughter who is at bedside. Patient is Vietnamese speaking. Daughter reports at baseline she intermittently walks with a cane and needs minor help with ADLs such as getting dressed or running a shower, Daughter looks after her affairs. She denies dementia or cognitive impairment. mRS 3. Not a tenecteplase candidate due to INR 1.79. Patient taken to IR for mechanical thrombectomy.    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    Plan  NEURO: S/p thrombectomy. Continue close monitoring for neurologic deterioration, Gradual normotension, atorvastatin 80mg to LDL goal less than 70, MRI Brain w/o and as above. Physical therapy/OT/Speech eval/treatment for acute rehab.     ANTITHROMBOTIC THERAPY: aspirin 81mg daily. Will change to anticoagulation with Eliquis 5mg bid, 10-12 days from inciting event.     PULMONARY: CXR with L basilar opacities, unchanged, likely aspiration PNA. saturating well, NC as needed. Mucomyst 20% 4mL q6h, Duonebs q6h, chest PT for secretions. On Zosyn for aspiration PNA for 5 days.    CARDIOVASCULAR: TTE as above, cardiac monitoring. Labetalol 400mg q8h, losartan 50mg daily                        SBP goal: 120-160  Due to poorly controlled HTN, Cardiology consulted    DERMATOLOGY: family complain of rash on sternum, derm consulted    GASTROINTESTINAL:  dysphagia screen failed, seen by S/S NPO for now and will be re-evaluated. Bowel regimen     Diet: NG tube in. Glucerna 1.2 at goal plus 2 cans of probiotic.     RENAL: BUN/Cr within normal limits, good urine output      Na Goal: Greater than 135     Virgen: none    HEMATOLOGY: H/H without change, Platelets normal      DVT ppx: Heparin s.c [] LMWH [x]     ID: afebrile, no leukocytosis. On Zosyn for aspiration PNA, last day Jun 9th. On entecavir daily for Hep C.      OTHER: On restraints for pulling at lines.     DISPOSITION: Acute rehab    CORE MEASURES:        Admission NIHSS: 22     Tenecteplase: [] YES [x] NO      LDL/HDL: 33/47     Statin Therapy: Yes     Dysphagia Screen: [] PASS [x] FAIL     Smoking [] YES [x] NO      Afib [x] YES [] NO     Stroke Education [x] YES [] NO    Obtain screening lower extremity venous ultrasound in patients who meet 1 or more of the following criteria as patient is high risk for DVT/PE on admission: COMPLETED - NEGATIVE  [] History of DVT/PE  []Hypercoagulable states (Factor V Leiden, Cancer, OCP, etc. )  []Prolonged immobility (hemiplegia/hemiparesis/post operative or any other extended immobilization)  [] Transferred from outside facility (Rehab or Long term care)  [] Age </= to 50    Case see and discussed with Neurology Attending Dr. Libman

## 2023-06-08 NOTE — PROGRESS NOTE ADULT - SUBJECTIVE AND OBJECTIVE BOX
Neurology Progress Note    SUBJECTIVE/OBJECTIVE/INTERVAL EVENTS: Patient seen and examined at bedside w/ neuro attending and team. Patient reports feeling better and her ability to move has improved.    INTERVAL HISTORY: 82y woman with a PMHx significant for HTN, afib on Xarelto (last dose 6/1 6pm), hepatitis C on antiviral presenting with acute onset of AMS. LKW 10pm. Around 10:30pm patient become acutely altered, slumped to right side, head turned to left, not answering questions properly or moving the right side. NIHSS 22 most notable for decreased arousal, incorrect answers, not following commands, left gaze deviation that could not be overcome, right facial droop, right sided severe weakness compared to left, slurred speech, aphasia. Patient lives at home with daughter who is at bedside. Patient is Kiswahili speaking. Daughter reports at baseline she intermittently walks with a cane and needs minor help with ADLs such as getting dressed or running a shower, Daughter looks after her affairs. She denies dementia or cognitive impairment. mRS 3. S/p thrombectomy s/p TICI 2C mechanism cardioembolic from known afib.    REVIEW OF SYSTEMS: Otherwise denies fever, chills, headaches, vision changes, blurry vision, double vision, nausea, vomiting, hearing change, focal weakness, focal numbness, parasthesias, bowel/ bladder incontinence.  Few questions of a 10-system ROS was performed and is negative except for those items noted above and/or in the HPI.    MEDICATIONS  (STANDING):  acetylcysteine 20%  Inhalation 4 milliLiter(s) Inhalation every 6 hours  albuterol/ipratropium for Nebulization 3 milliLiter(s) Nebulizer every 6 hours  aspirin  chewable 81 milliGRAM(s) Oral daily  atorvastatin 80 milliGRAM(s) Oral at bedtime  chlorhexidine 4% Liquid 1 Application(s) Topical daily  enoxaparin Injectable 40 milliGRAM(s) SubCutaneous <User Schedule>  entecavir Solution 0.5 milliGRAM(s) Oral <User Schedule>  labetalol 400 milliGRAM(s) Oral every 8 hours  losartan 100 milliGRAM(s) Oral daily  piperacillin/tazobactam IVPB.. 3.375 Gram(s) IV Intermittent every 8 hours  polyethylene glycol 3350 17 Gram(s) Oral daily  senna 2 Tablet(s) Oral at bedtime    MEDICATIONS  (PRN):  acetaminophen   Oral Liquid .. 650 milliGRAM(s) Oral every 6 hours PRN Temp greater or equal to 38C (100.4F), Mild Pain (1 - 3)  labetalol Injectable 10 milliGRAM(s) IV Push every 4 hours PRN Systolic blood pressure > 160      VITALS & EXAMINATION:  Vital Signs Last 24 Hrs  T(C): 36.6 (08 Jun 2023 12:00), Max: 36.9 (08 Jun 2023 08:00)  T(F): 97.9 (08 Jun 2023 12:00), Max: 98.4 (08 Jun 2023 08:00)  HR: 112 (08 Jun 2023 12:38) (75 - 117)  BP: 177/88 (08 Jun 2023 12:38) (151/79 - 206/105)  BP(mean): 124 (08 Jun 2023 12:38) (106 - 148)  RR: 22 (08 Jun 2023 12:38) (17 - 32)  SpO2: 92% (08 Jun 2023 12:38) (92% - 100%)    Parameters below as of 08 Jun 2023 11:16  Patient On (Oxygen Delivery Method): room air        General:  Constitutional: Female, appears stated age, nontoxic, not in distress,    Head: Normocephalic;   Resp: breathing comfortably     Neurological (>12):  Mental status: awake and alert, attentive to examiner. oriented to self and place (hospital), does not know age or year. follows simple commands. does not follow cross commands  , speech hypophonic, no dysarthria, follows simple commands but not 2-step commands, no neglect   Cranial Nerves: Right nasolabial flattening, no BTT on right, right gaze palsy can be overcome voluntarily  Motor exam: Normal tone, 0/5 RUE, 0/5 RLE, 5/5 LUE, 5/5 LLE, normal fine finger movements. triple flexion of RLE  Sensation: Intact to light touch   Coordination/ Gait: No dysmetria, gait not tested    LABORATORY:  CBC                       11.8   8.94  )-----------( 247      ( 08 Jun 2023 02:49 )             36.6     Chem 06-08    145  |  113<H>  |  29<H>  ----------------------------<  128<H>  4.0   |  18<L>  |  0.73    Ca    8.9      08 Jun 2023 02:49  Phos  3.4     06-08  Mg     2.5     06-08      LFTs   Coagulopathy   Lipid Panel 06-02 Chol 97 LDL -- HDL 47<L> Trig 85  A1c     STUDIES & IMAGING: (EEG, CT, MR, U/S, TTE/TERE):    CT Brain Stroke Protocol 06.01.23   IMPRESSION: No acute intracranial hemorrhage or mass effect.  CT angiography neck: No hemodynamically significant stenosis by NASCET   criteria. No vascular dissection. Nonspecific ground glass opacities in   the visualized apices of the lungs, may represent infectious versus   inflammatory disease.  CT angiography brain: Left proximal M1 occlusion.  CT perfusion: Core infarct of 6 cc with a penumbra of 162 cc.    CT Head No Cont (06.02.23 @ 09:59)  IMPRESSION: An evolving left MCA distribution infarct is noted as described.    CT Head No Cont (06.05.23 @ 08:27)  IMPRESSION: Evolving left MCA distribution infarct. More well-defined lucency and   slight increased mass effect on the left lateral ventricle. Subtle   increased attenuation at the level of the basal ganglia on the left may   indicate a component of evolving hemorrhage.    TTE W or WO Ultrasound Enhancing Agent (06.02.23 @ 10:21)   1. Normal left ventricular cavity size. The left ventricular wall thickness is normal. The left ventricular systolic function is normal with an ejection fraction of 60 % by Bassett's method of disks.   2. There is severe (grade 3) left ventricular diastolic dysfunction.   3. Normal right ventricular cavity size, normal wall thickness and normal systolic function. The tricuspid annular plane systolic excursion (TAPSE) is 1.4 cm (normal >=1.7 cm).   4. The left atrium is moderately dilated.   5. The right atrium is dilated.   6. Moderate aortic stenosis.   7. No evidence of aortic regurgitation.   8. No prior echocardiogram is available for comparison.    MR Head No Cont 6.06.23   Parenchymal volume loss and chronic microvascular ischemic changes identified    Abnormal T2 prolongation with restricted diffusion is seen involving the   posterior left insula, left temporal frontal and parietal cortical   subcortical region is identified. These finding does demonstrate abnormal   susceptibility and is compatible with a hemorrhagic left MCA infarct.   There is localized mass effect seen consisting sulcal effacement. Mass   effect on the left lateral ventricle seen. No significant shift or   herniation is seen.    The large vessels demonstrate normal flow-voids    Minimal mucosal thickening is seen involving both ethmoid sinuses.    Both mastoid and middle ear regions appear clear.    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

## 2023-06-08 NOTE — SWALLOW FEES ASSESSMENT ADULT - SLP GENERAL OBSERVATIONS
Pt encountered upright in bed, on room air + KFT, + L handed wrist restraint, + tele (O2 100%; /69). [facility]  utilized (#[id number]). Pt AOx2 and able to follow 50% of simple commands. Improvement in mixed aphasia. Vocal quality hoarse/hypophonic.

## 2023-06-08 NOTE — SWALLOW FEES ASSESSMENT ADULT - COMMENTS
+ b/l vocal cord contact  + contact ulcers on posterior VC  + L pyriform sinus hematoma, unclear if related to NGT placement CT angiography neck 6/1: No hemodynamically significant stenosis by NASCET criteria. No vascular dissection. Nonspecific groundglass opacities in the visualized apices of the lungs, may represent infectious versus inflammatory disease. CT angiography brain 6/1: Left proximal M1 occlusion. CT perfusion 6/1: Core infarct of 6 cc with a penumbra of 162 cc. CT Brain 6/1- No acute intracranial hemorrhage or mass effect.    Extubated 6/2.    SWALLOW HISTORY: No reports in SCM or in PACS prior to this admission.  Patient seen for bedside swallow evaluations 6/3, 6/5, and 6/7 with recommendations for NPO, with non-oral nutrition/hydration/medications.

## 2023-06-08 NOTE — SWALLOW FEES ASSESSMENT ADULT - ADDITIONAL RECOMMENDATIONS
Goals:  1. Maintain good oral hygiene.   2. Pt will complete dysphagia exercises to improve swallow function.

## 2023-06-08 NOTE — SWALLOW FEES ASSESSMENT ADULT - LARYNGEAL PENETRATION DURING SWALLOW - SILENT
material observed within interarytenoid space, R arytenoid, and laryngeal vestibule. Reduced w/ cued subsequent swallow, however not eliminated./Trace/Mild material observed s/p swallow w/ material noted in laryngeal vestibule. Reduces to trace with cued subsequent swallow./Trace/Mild

## 2023-06-08 NOTE — SWALLOW FEES ASSESSMENT ADULT - SLP PERTINENT HISTORY OF CURRENT PROBLEM
82y (1940) woman with a PMHx significant for HTN, afib on Xarelto (last dose 6/1 6pm), hepatitis C on antiviral presenting with acute onset of AMS. LKW 10pm. Around 10:30pm patient become acutely altered, slumped to right side, head turned to left, not answering questions properly or moving the right side. NIHSS 22 most notable for decreased arousal, incorrect answers, not following commands, left gaze deviation that could not be overcome, right facial droop, right sided severe weakness compared to left, slurred speech, aphasia. Patient lives at home with daughter who is at bedside. Patient is Slovenian speaking. Daughter reports at baseline she intermittently walks with a cane and needs minor help with ADLs such as getting dressed or running a shower, Daughter looks after her affairs. She denies dementia or cognitive impairment. mRS 3. Stroke in setting of afib, on xarelto, likely cardioembolic. Now intubated.

## 2023-06-08 NOTE — SWALLOW FEES ASSESSMENT ADULT - LARYNGEAL PENETRATION BEFORE THE SWALLOW - COUGH
spillage over the R lateral channel and R aryepiglottic fold to the level of the vocal cords, pt sensate by coughing/Mild

## 2023-06-09 LAB
ANION GAP SERPL CALC-SCNC: 13 MMOL/L — SIGNIFICANT CHANGE UP (ref 5–17)
BUN SERPL-MCNC: 31 MG/DL — HIGH (ref 7–23)
CALCIUM SERPL-MCNC: 8.8 MG/DL — SIGNIFICANT CHANGE UP (ref 8.4–10.5)
CHLORIDE SERPL-SCNC: 111 MMOL/L — HIGH (ref 96–108)
CO2 SERPL-SCNC: 19 MMOL/L — LOW (ref 22–31)
CREAT SERPL-MCNC: 0.72 MG/DL — SIGNIFICANT CHANGE UP (ref 0.5–1.3)
EGFR: 83 ML/MIN/1.73M2 — SIGNIFICANT CHANGE UP
GLUCOSE SERPL-MCNC: 145 MG/DL — HIGH (ref 70–99)
HCT VFR BLD CALC: 37.2 % — SIGNIFICANT CHANGE UP (ref 34.5–45)
HGB BLD-MCNC: 12.1 G/DL — SIGNIFICANT CHANGE UP (ref 11.5–15.5)
MAGNESIUM SERPL-MCNC: 2.3 MG/DL — SIGNIFICANT CHANGE UP (ref 1.6–2.6)
MCHC RBC-ENTMCNC: 30.2 PG — SIGNIFICANT CHANGE UP (ref 27–34)
MCHC RBC-ENTMCNC: 32.5 GM/DL — SIGNIFICANT CHANGE UP (ref 32–36)
MCV RBC AUTO: 92.8 FL — SIGNIFICANT CHANGE UP (ref 80–100)
NRBC # BLD: 0 /100 WBCS — SIGNIFICANT CHANGE UP (ref 0–0)
PHOSPHATE SERPL-MCNC: 3.5 MG/DL — SIGNIFICANT CHANGE UP (ref 2.5–4.5)
PLATELET # BLD AUTO: 244 K/UL — SIGNIFICANT CHANGE UP (ref 150–400)
POTASSIUM SERPL-MCNC: 3.9 MMOL/L — SIGNIFICANT CHANGE UP (ref 3.5–5.3)
POTASSIUM SERPL-SCNC: 3.9 MMOL/L — SIGNIFICANT CHANGE UP (ref 3.5–5.3)
RBC # BLD: 4.01 M/UL — SIGNIFICANT CHANGE UP (ref 3.8–5.2)
RBC # FLD: 12.9 % — SIGNIFICANT CHANGE UP (ref 10.3–14.5)
SODIUM SERPL-SCNC: 143 MMOL/L — SIGNIFICANT CHANGE UP (ref 135–145)
WBC # BLD: 7.74 K/UL — SIGNIFICANT CHANGE UP (ref 3.8–10.5)
WBC # FLD AUTO: 7.74 K/UL — SIGNIFICANT CHANGE UP (ref 3.8–10.5)

## 2023-06-09 PROCEDURE — 99233 SBSQ HOSP IP/OBS HIGH 50: CPT

## 2023-06-09 RX ORDER — AMLODIPINE BESYLATE 2.5 MG/1
10 TABLET ORAL DAILY
Refills: 0 | Status: DISCONTINUED | OUTPATIENT
Start: 2023-06-09 | End: 2023-06-09

## 2023-06-09 RX ORDER — LABETALOL HCL 100 MG
10 TABLET ORAL ONCE
Refills: 0 | Status: COMPLETED | OUTPATIENT
Start: 2023-06-09 | End: 2023-06-09

## 2023-06-09 RX ORDER — AMLODIPINE BESYLATE 2.5 MG/1
10 TABLET ORAL DAILY
Refills: 0 | Status: DISCONTINUED | OUTPATIENT
Start: 2023-06-09 | End: 2023-06-14

## 2023-06-09 RX ORDER — HYDRALAZINE HCL 50 MG
10 TABLET ORAL
Refills: 0 | Status: DISCONTINUED | OUTPATIENT
Start: 2023-06-09 | End: 2023-06-14

## 2023-06-09 RX ADMIN — Medication 10 MILLIGRAM(S): at 01:58

## 2023-06-09 RX ADMIN — Medication 3 MILLILITER(S): at 05:12

## 2023-06-09 RX ADMIN — ENOXAPARIN SODIUM 40 MILLIGRAM(S): 100 INJECTION SUBCUTANEOUS at 17:02

## 2023-06-09 RX ADMIN — Medication 3 MILLILITER(S): at 23:08

## 2023-06-09 RX ADMIN — Medication 10 MILLIGRAM(S): at 20:09

## 2023-06-09 RX ADMIN — Medication 4 MILLILITER(S): at 05:12

## 2023-06-09 RX ADMIN — Medication 81 MILLIGRAM(S): at 11:09

## 2023-06-09 RX ADMIN — Medication 3 MILLILITER(S): at 11:06

## 2023-06-09 RX ADMIN — Medication 10 MILLIGRAM(S): at 10:40

## 2023-06-09 RX ADMIN — AMLODIPINE BESYLATE 10 MILLIGRAM(S): 2.5 TABLET ORAL at 11:09

## 2023-06-09 RX ADMIN — Medication 4 MILLILITER(S): at 23:08

## 2023-06-09 RX ADMIN — Medication 10 MILLIGRAM(S): at 04:04

## 2023-06-09 RX ADMIN — Medication 4 MILLILITER(S): at 11:06

## 2023-06-09 RX ADMIN — Medication 10 MILLIGRAM(S): at 08:30

## 2023-06-09 RX ADMIN — Medication 4 MILLILITER(S): at 17:06

## 2023-06-09 RX ADMIN — LOSARTAN POTASSIUM 100 MILLIGRAM(S): 100 TABLET, FILM COATED ORAL at 05:21

## 2023-06-09 RX ADMIN — ENTECAVIR 0.5 MILLIGRAM(S): 0.5 TABLET ORAL at 05:22

## 2023-06-09 RX ADMIN — Medication 400 MILLIGRAM(S): at 05:21

## 2023-06-09 RX ADMIN — Medication 3 MILLILITER(S): at 17:06

## 2023-06-09 RX ADMIN — PIPERACILLIN AND TAZOBACTAM 25 GRAM(S): 4; .5 INJECTION, POWDER, LYOPHILIZED, FOR SOLUTION INTRAVENOUS at 05:22

## 2023-06-09 NOTE — CHART NOTE - NSCHARTNOTEFT_GEN_A_CORE
Nutrition Follow Up Note  Patient seen for: initial malnutrition follow up. Chart reviewed and events noted.     Source: [] Patient       [x] Medical Record        [x] RN        [] Family at bedside       [] Other:    -If unable to interview patient: [] Trach/Vent/BiPAP  [x] Disoriented/confused/inappropriate to interview    Nutrition-Related Events:   - Pressors:  [] Yes    [x] No   - Propofol:  [] Yes    [x] No         - Rate: __mL/hr. If maintained x 24 hours, propofol will provide:     Diet Order:   Diet, NPO with Tube Feed:   Tube Feeding Modality: Nasogastric  Glucerna 1.2 Jude (GLUCERNARTH)  Total Volume for 24 Hours (mL): 1440  Continuous  Starting Tube Feed Rate {mL per Hour}: 20  Increase Tube Feed Rate by (mL): 10     Every 4 hours  Until Goal Tube Feed Rate (mL per Hour): 60  Tube Feed Duration (in Hours): 24  Tube Feed Start Time: 16:00  Supplement Feeding Modality:  Nasogastric  Probiotic Yogurt/Smoothie Cans or Servings Per Day:  2       Frequency:  Daily (23)    EN Order Provides: total volume 1440 mL, 1728 kcals, 86 gm protein, and 1159 mL free water. Meets ~25 kcals/kG and 1.2 gm protein/kG based on daily wt 70.1 kG ()  Current Pump Rate: 60 mL/hr    4-Day EN Average Provision per RN flowsheet (since first RD assessment): 1147.5 mL, 1377 kcals, and 69 gm protein  *Feeds intermittently held for medication order. Per special instructions of entecavir given at 06:00 "HOLD TUBE FEEDS 2 HOURS BEFORE AND 2 HOURS AFTER ENTECAVIR"    Is current diet order appropriate/adequate? See recommendations below    PO intake :   [] >75%  Adequate    [] 50-75%  Fair       [] <50%  Poor   [x] N/A    Nutrition-related concerns:  - Seen for FEES  with recommendation, "NPO, with non-oral nutrition/hydration/medications."  - A1c 5.9%. Not on apparent antihyperglycemic regimen. On low carbohydrate enteral feeds. No hx of DM noted.     GI:  Last BM 6/8 x3.   Bowel Regimen? [x] Yes   [] No    Weights:   Daily Weight in k.1 ()  No other daily wts to address.   RD will continue to trend as new wts available/able.     Drug Dosing Weight  Height (cm): 165.1 (2023 00:14)  Weight (kg): 74.5 (2023 00:14)  BMI (kg/m2): 27.3 (2023 00:14)    MEDICATIONS  (STANDING):  aspirin  chewable 81 milliGRAM(s) Oral daily  atorvastatin 80 milliGRAM(s) Oral at bedtime  enoxaparin Injectable 40 milliGRAM(s) SubCutaneous <User Schedule>  entecavir Solution 0.5 milliGRAM(s) Oral <User Schedule>  labetalol 400 milliGRAM(s) Oral every 8 hours  losartan 100 milliGRAM(s) Oral daily  polyethylene glycol 3350 17 Gram(s) Oral daily  senna 2 Tablet(s) Oral at bedtime    Pertinent Labs:  @ 03:13: Na 143, BUN 31<H>, Cr 0.72, <H>, K+ 3.9, Phos 3.5, Mg 2.3    A1C with Estimated Average Glucose Result: 5.9 % (23 @ 03:48)    Triglycerides, Serum: 85 mg/dL (23 @ 03:48)    Skin per nursing documentation: No pressure injuries noted.  Edema per nursing documentation: None noted thus far today     Based on daily wt 70.1 kG ()  Estimated Energy Needs: (25-30 kcals/kg): 1752.5-2103 kcal  Estimated Protein Needs: (1.2-1.4 g protein/kg): 84-98 g protein   Estimated Fluid Needs: defer to team    Previous Nutrition Diagnosis: Acute moderate protein calorie malnutrition   Nutrition Diagnosis is: [x] ongoing  [] resolved [] not applicable     Nutrition Care Plan:  [x] In Progress  [] Achieved  [] Not applicable    New Nutrition Diagnosis: [x] Not applicable    Nutrition Interventions:     Education Provided:       [] Yes:  [x] No: N/A    Recommendations:      1) Given tube feeds requiring to be held for entecavir, Glucerna 1.5 at rate 65 mL/hr x 18 hrs total volume 1170 mL, 1755 kcals, 97 gm protein, and 888 mL free water. Meets ~25 kcals/kG and 1.4 gm protein/kG based on daily wt 70.1 kG ().  2) Multivitamin (if no medication contraindications) to aid in prevention of micronutrient deficiencies.     Monitoring and Evaluation:   Continue to monitor nutritional intake, tolerance to diet prescription, weights, labs, skin integrity    RD remains available upon request and will follow up per protocol  Evelyne Gonzalez RD, MS, CDN, [facility] Pager #[phone] Nutrition Follow Up Note  Patient seen for: initial malnutrition follow up. Chart reviewed and events noted.     Source: [] Patient       [x] Medical Record        [x] RN        [] Family at bedside       [] Other:    -If unable to interview patient: [] Trach/Vent/BiPAP  [x] Disoriented/confused/inappropriate to interview    Nutrition-Related Events:   - Pressors:  [] Yes    [x] No   - Propofol:  [] Yes    [x] No         - Rate: __mL/hr. If maintained x 24 hours, propofol will provide:     Diet Order:   Diet, NPO with Tube Feed:   Tube Feeding Modality: Nasogastric  Glucerna 1.2 Jude (GLUCERNARTH)  Total Volume for 24 Hours (mL): 1440  Continuous  Starting Tube Feed Rate {mL per Hour}: 20  Increase Tube Feed Rate by (mL): 10     Every 4 hours  Until Goal Tube Feed Rate (mL per Hour): 60  Tube Feed Duration (in Hours): 24  Tube Feed Start Time: 16:00  Supplement Feeding Modality:  Nasogastric  Probiotic Yogurt/Smoothie Cans or Servings Per Day:  2       Frequency:  Daily (23)    EN Order Provides: total volume 1440 mL, 1728 kcals, 86 gm protein, and 1159 mL free water. Meets ~25 kcals/kG and 1.2 gm protein/kG based on daily wt 70.1 kG ()  Current Pump Rate: 60 mL/hr    4-Day EN Average Provision per RN flowsheet (since first RD assessment): 1147.5 mL, 1377 kcals, and 69 gm protein  *Feeds intermittently held for medication order. Per special instructions of entecavir given at 06:00 "HOLD TUBE FEEDS 2 HOURS BEFORE AND 2 HOURS AFTER ENTECAVIR"    Is current diet order appropriate/adequate? See recommendations below    PO intake :   [] >75%  Adequate    [] 50-75%  Fair       [] <50%  Poor   [x] N/A    Nutrition-related concerns:  - Seen for FEES  with recommendation, "NPO, with non-oral nutrition/hydration/medications."  - A1c 5.9%. Not on apparent antihyperglycemic regimen. On low carbohydrate enteral feeds. No hx of DM noted.     GI:  Last BM 6/8 x3.   Bowel Regimen? [x] Yes   [] No    Weights:   Daily Weight in k.1 ()  No other daily wts to address.   RD will continue to trend as new wts available/able.     Drug Dosing Weight  Height (cm): 165.1 (2023 00:14)  Weight (kg): 74.5 (2023 00:14)  BMI (kg/m2): 27.3 (2023 00:14)    MEDICATIONS  (STANDING):  aspirin  chewable 81 milliGRAM(s) Oral daily  atorvastatin 80 milliGRAM(s) Oral at bedtime  enoxaparin Injectable 40 milliGRAM(s) SubCutaneous <User Schedule>  entecavir Solution 0.5 milliGRAM(s) Oral <User Schedule>  labetalol 400 milliGRAM(s) Oral every 8 hours  losartan 100 milliGRAM(s) Oral daily  polyethylene glycol 3350 17 Gram(s) Oral daily  senna 2 Tablet(s) Oral at bedtime    Pertinent Labs:  @ 03:13: Na 143, BUN 31<H>, Cr 0.72, <H>, K+ 3.9, Phos 3.5, Mg 2.3    A1C with Estimated Average Glucose Result: 5.9 % (23 @ 03:48)    Triglycerides, Serum: 85 mg/dL (23 @ 03:48)    Skin per nursing documentation: No pressure injuries noted.  Edema per nursing documentation: None noted thus far today     Based on daily wt 70.1 kG ()  Estimated Energy Needs: (25-30 kcals/kg): 1752.5-2103 kcal  Estimated Protein Needs: (1.2-1.4 g protein/kg): 84-98 g protein   Estimated Fluid Needs: defer to team    Previous Nutrition Diagnosis: Acute moderate protein calorie malnutrition   Nutrition Diagnosis is: [x] ongoing  [] resolved [] not applicable     Nutrition Care Plan:  [x] In Progress  [] Achieved  [] Not applicable    New Nutrition Diagnosis: [x] Not applicable    Nutrition Interventions:     Education Provided:       [] Yes:  [x] No: N/A    Recommendations:      1) Given tube feeds requiring to be held for entecavir, Glucerna 1.5 at rate 65 mL/hr x 18 hrs total volume 1170 mL, 1755 kcals, 97 gm protein, and 888 mL free water. Meets ~25 kcals/kG and 1.4 gm protein/kG based on daily wt 70.1 kG ().  -> Probiotic drink deferred to providers.   2) Multivitamin (if no medication contraindications) to aid in prevention of micronutrient deficiencies.     Monitoring and Evaluation:   Continue to monitor nutritional intake, tolerance to diet prescription, weights, labs, skin integrity    RD remains available upon request and will follow up per protocol  Evelyne Gonzalez RD, MS, CDN, [facility] Pager #[phone]

## 2023-06-09 NOTE — PROGRESS NOTE ADULT - ASSESSMENT
82y (1940) woman with a PMHx significant for HTN, afib on Xarelto (last dose 6/1 6pm), hepatitis C on antiviral presenting with acute onset of AMS. LKW 10pm. Around 10:30pm patient become acutely altered, slumped to right side, head turned to left, not answering questions properly or moving the right side. NIHSS 22 most notable for decreased arousal, incorrect answers, not following commands, left gaze deviation that could not be overcome, right sided severe weakness compared to left, slurred speech, aphasia. Patient lives at home with daughter who is at bedside. Patient is [place] speaking. Daughter reports at baseline she intermittently walks with a cane and needs minor help with ADLs such as getting dressed or running a shower, Daughter looks after her affairs. She denies dementia or cognitive impairment. mRS 3. Not a tenecteplase candidate due to INR 1.79. Patient taken to IR for mechanical thrombectomy.    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    Plan  NEURO: S/p thrombectomy. Continue close monitoring for neurologic deterioration, Gradual normotension, atorvastatin 80mg to LDL goal less than 70, MRI Brain w/o and as above. Physical therapy/OT/Speech eval/treatment for acute rehab.     ANTITHROMBOTIC THERAPY: aspirin 81mg daily. Will change to anticoagulation with Eliquis 5mg bid, 10-12 days from inciting event.     PULMONARY: CXR with L basilar opacities, unchanged, likely aspiration PNA. saturating well, NC as needed. Mucomyst 20% 4mL q6h, Duonebs q6h, chest PT for secretions. On Zosyn for aspiration PNA for 5 days ((final dose today 6/9).    CARDIOVASCULAR: TTE as above, cardiac monitoring. Labetalol 400mg q8h, losartan 50mg daily                        SBP goal: 120-160  Due to poorly controlled HTN, Cardiology consulted    DERMATOLOGY: family complain of rash on sternum, derm consulted    GASTROINTESTINAL:  dysphagia screen failed, seen by S/S NPO for now and will be re-evaluated. Bowel regimen     Diet: NG tube in. Glucerna 1.2 at goal plus 2 cans of probiotic.     RENAL: BUN/Cr within normal limits, good urine output      Na Goal: Greater than 135     Virgen: none    HEMATOLOGY: H/H without change, Platelets normal      DVT ppx: Heparin s.c [] LMWH [x]     ID: afebrile, no leukocytosis. On Zosyn for aspiration PNA, last day Jun 9th. On entecavir daily for Hep C.      OTHER: On restraints for pulling at lines.     DISPOSITION: Acute rehab    CORE MEASURES:        Admission NIHSS: 22     Tenecteplase: [] YES [x] NO      LDL/HDL: 33/47     Statin Therapy: Yes     Dysphagia Screen: [] PASS [x] FAIL     Smoking [] YES [x] NO      Afib [x] YES [] NO     Stroke Education [x] YES [] NO    Obtain screening lower extremity venous ultrasound in patients who meet 1 or more of the following criteria as patient is high risk for DVT/PE on admission: COMPLETED - NEGATIVE  [] History of DVT/PE  []Hypercoagulable states (Factor V Leiden, Cancer, OCP, etc. )  []Prolonged immobility (hemiplegia/hemiparesis/post operative or any other extended immobilization)  [] Transferred from outside facility (Rehab or Long term care)  [] Age </= to 50     82y (1940) woman with a PMHx significant for HTN, afib on Xarelto (last dose 6/1 6pm), hepatitis C on antiviral presenting with acute onset of AMS. LKW 10pm. Around 10:30pm patient become acutely altered, slumped to right side, head turned to left, not answering questions properly or moving the right side. NIHSS 22 most notable for decreased arousal, incorrect answers, not following commands, left gaze deviation that could not be overcome, right sided severe weakness compared to left, slurred speech, aphasia. Patient lives at home with daughter who is at bedside. Patient is [place]occan speaking. Daughter reports at baseline she intermittently walks with a cane and needs minor help with ADLs such as getting dressed or running a shower, Daughter looks after her affairs. She denies dementia or cognitive impairment. mRS 3. Not a tenecteplase candidate due to INR 1.79. Patient taken to IR for mechanical thrombectomy.    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    Plan  NEURO: S/p thrombectomy. Continue close monitoring for neurologic deterioration, Normotension, atorvastatin 80mg to LDL goal less than 70, MRI Brain w/o and as above. Physical therapy/OT/Speech eval/treatment for acute rehab.     ANTITHROMBOTIC THERAPY: aspirin 81mg daily. Will change to anticoagulation with Eliquis 5mg bid, 10-12 days from inciting event.     PULMONARY: CXR with L basilar opacities, unchanged, likely aspiration PNA. saturating well, NC as needed. Mucomyst 20% 4mL q6h, Duonebs q6h, chest PT for secretions. On Zosyn for aspiration PNA for 5 days ((final dose today 6/9).    CARDIOVASCULAR: TTE as above, cardiac monitoring. Labetalol 400mg q8h, losartan 100mg daily                        SBP goal: 120-160  Due to poorly controlled HTN, Cardiology consulted    DERMATOLOGY: family complain of rash on sternum, derm consulted    GASTROINTESTINAL:  dysphagia screen failed, seen by S/S NPO for now and will be re-evaluated. Bowel regimen     Diet: NG tube in. Glucerna 1.2 at goal plus 2 cans of probiotic.     RENAL: BUN/Cr within normal limits, good urine output      Na Goal: Greater than 135     Virgen: none    HEMATOLOGY: H/H without change, Platelets normal      DVT ppx: Heparin s.c [] LMWH [x]     ID: afebrile, no leukocytosis. On Zosyn for aspiration PNA, last day Jun 9th. On entecavir daily for Hep C.      OTHER: On restraints for pulling at lines.     DISPOSITION: Acute rehab    CORE MEASURES:        Admission NIHSS: 22     Tenecteplase: [] YES [x] NO      LDL/HDL: 33/47     Statin Therapy: Yes     Dysphagia Screen: [] PASS [x] FAIL     Smoking [] YES [x] NO      Afib [x] YES [] NO     Stroke Education [x] YES [] NO    Obtain screening lower extremity venous ultrasound in patients who meet 1 or more of the following criteria as patient is high risk for DVT/PE on admission: COMPLETED - NEGATIVE  [] History of DVT/PE  []Hypercoagulable states (Factor V Leiden, Cancer, OCP, etc. )  []Prolonged immobility (hemiplegia/hemiparesis/post operative or any other extended immobilization)  [] Transferred from outside facility (Rehab or Long term care)  [] Age </= to 50     82y (1940) woman with a PMHx significant for HTN, afib on Xarelto (last dose 6/1 6pm), hepatitis C on antiviral presenting with acute onset of AMS. LKW 10pm. Around 10:30pm patient become acutely altered, slumped to right side, head turned to left, not answering questions properly or moving the right side. NIHSS 22 most notable for decreased arousal, incorrect answers, not following commands, left gaze deviation that could not be overcome, right sided severe weakness compared to left, slurred speech, aphasia. Patient lives at home with daughter who is at bedside. Patient is [place] speaking. Daughter reports at baseline she intermittently walks with a cane and needs minor help with ADLs such as getting dressed or running a shower, Daughter looks after her affairs. She denies dementia or cognitive impairment. mRS 3. Not a tenecteplase candidate due to INR 1.79. Patient taken to IR for mechanical thrombectomy.    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    Plan  NEURO: S/p thrombectomy. Continue close monitoring for neurologic deterioration, Normotension, atorvastatin 80mg to LDL goal less than 70, MRI Brain w/o and as above. Physical therapy/OT/Speech eval/treatment for acute rehab.     ANTITHROMBOTIC THERAPY: aspirin 81mg daily. Will change to anticoagulation with Eliquis 5mg bid, 10-12 days from inciting event.     PULMONARY: CXR with L basilar opacities, unchanged, likely aspiration PNA. saturating well, NC as needed. Mucomyst 20% 4mL q6h, Duonebs q6h, chest PT for secretions. On Zosyn for aspiration PNA for 5 days ((final dose today 6/9).    CARDIOVASCULAR: TTE as above, cardiac monitoring. Labetalol 400mg q8h, losartan 100mg daily                        SBP goal: 120-160  Due to poorly controlled HTN, Cardiology consulted    DERMATOLOGY: family complain of rash on sternum, derm consulted    GASTROINTESTINAL:  dysphagia screen failed, seen by S/S NPO for now and will be re-evaluated. Bowel regimen     Diet: NG tube in. Glucerna 1.2 at goal plus 2 cans of probiotic.     RENAL: BUN/Cr within normal limits, good urine output      Na Goal: Greater than 135     Virgen: none    HEMATOLOGY: H/H without change, Platelets normal      DVT ppx: Heparin s.c [] LMWH [x]     ID: afebrile, no leukocytosis. On Zosyn for aspiration PNA, last day Jun 9th. On entecavir daily for Hep C.      OTHER: On restraints for pulling at lines.     DISPOSITION: Acute rehab    CORE MEASURES:        Admission NIHSS: 22     Tenecteplase: [] YES [x] NO      LDL/HDL: 33/47     Statin Therapy: Yes     Dysphagia Screen: [] PASS [x] FAIL     Smoking [] YES [x] NO      Afib [x] YES [] NO     Stroke Education [x] YES [] NO    Obtain screening lower extremity venous ultrasound in patients who meet 1 or more of the following criteria as patient is high risk for DVT/PE on admission: COMPLETED - NEGATIVE  [] History of DVT/PE  []Hypercoagulable states (Factor V Leiden, Cancer, OCP, etc. )  []Prolonged immobility (hemiplegia/hemiparesis/post operative or any other extended immobilization)  [] Transferred from outside facility (Rehab or Long term care)  [] Age </= to 50

## 2023-06-09 NOTE — PROGRESS NOTE ADULT - SUBJECTIVE AND OBJECTIVE BOX
SUBJECTIVE/INTERVAL HISTORY:  ***    INTERVAL HISTORY: 82y woman with a PMHx significant for HTN, afib on Xarelto (last dose 6/1 6pm), hepatitis C on antiviral presenting with acute onset of AMS. LKW 10pm. Around 10:30pm patient become acutely altered, slumped to right side, head turned to left, not answering questions properly or moving the right side. NIHSS 22 most notable for decreased arousal, incorrect answers, not following commands, left gaze deviation that could not be overcome, right facial droop, right sided severe weakness compared to left, slurred speech, aphasia. Patient lives at home with daughter who is at bedside. Patient is Spanish speaking. Daughter reports at baseline she intermittently walks with a cane and needs minor help with ADLs such as getting dressed or running a shower, Daughter looks after her affairs. She denies dementia or cognitive impairment. mRS 3. S/p thrombectomy s/p TICI 2C mechanism cardioembolic from known afib.      MEDICATIONS:  MEDICATIONS  (STANDING):  acetylcysteine 20%  Inhalation 4 milliLiter(s) Inhalation every 6 hours  albuterol/ipratropium for Nebulization 3 milliLiter(s) Nebulizer every 6 hours  aspirin  chewable 81 milliGRAM(s) Oral daily  atorvastatin 80 milliGRAM(s) Oral at bedtime  chlorhexidine 4% Liquid 1 Application(s) Topical daily  enoxaparin Injectable 40 milliGRAM(s) SubCutaneous <User Schedule>  entecavir Solution 0.5 milliGRAM(s) Oral <User Schedule>  labetalol 400 milliGRAM(s) Oral every 8 hours  losartan 100 milliGRAM(s) Oral daily  polyethylene glycol 3350 17 Gram(s) Oral daily  senna 2 Tablet(s) Oral at bedtime    MEDICATIONS  (PRN):  acetaminophen   Oral Liquid .. 650 milliGRAM(s) Oral every 6 hours PRN Temp greater or equal to 38C (100.4F), Mild Pain (1 - 3)  labetalol Injectable 10 milliGRAM(s) IV Push every 4 hours PRN Systolic blood pressure > 160      VITALS & EXAMINATION:  Vital Signs Last 24 Hrs  T(C): 36.4 (09 Jun 2023 04:00), Max: 36.9 (08 Jun 2023 08:00)  T(F): 97.5 (09 Jun 2023 04:00), Max: 98.4 (08 Jun 2023 08:00)  HR: 100 (09 Jun 2023 06:00) (83 - 117)  BP: 164/80 (09 Jun 2023 06:00) (146/72 - 206/105)  BP(mean): 115 (09 Jun 2023 06:00) (99 - 139)  RR: 18 (09 Jun 2023 06:00) (17 - 34)  SpO2: 97% (09 Jun 2023 06:00) (92% - 100%)    Parameters below as of 09 Jun 2023 05:44  Patient On (Oxygen Delivery Method): room air      ***    LABS:  CBC                       12.1   7.74  )-----------( 244      ( 09 Jun 2023 03:13 )             37.2     Chem 06-09    143  |  111<H>  |  31<H>  ----------------------------<  145<H>  3.9   |  19<L>  |  0.72    Ca    8.8      09 Jun 2023 03:13  Phos  3.5     06-09  Mg     2.3     06-09          STUDIES & IMAGING:    CT Brain Stroke Protocol 06.01.23   IMPRESSION: No acute intracranial hemorrhage or mass effect.  CT angiography neck: No hemodynamically significant stenosis by NASCET   criteria. No vascular dissection. Nonspecific ground glass opacities in   the visualized apices of the lungs, may represent infectious versus   inflammatory disease.  CT angiography brain: Left proximal M1 occlusion.  CT perfusion: Core infarct of 6 cc with a penumbra of 162 cc.    CT Head No Cont (06.02.23 @ 09:59)  IMPRESSION: An evolving left MCA distribution infarct is noted as described.    CT Head No Cont (06.05.23 @ 08:27)  IMPRESSION: Evolving left MCA distribution infarct. More well-defined lucency and   slight increased mass effect on the left lateral ventricle. Subtle   increased attenuation at the level of the basal ganglia on the left may   indicate a component of evolving hemorrhage.    TTE W or WO Ultrasound Enhancing Agent (06.02.23 @ 10:21)   1. Normal left ventricular cavity size. The left ventricular wall thickness is normal. The left ventricular systolic function is normal with an ejection fraction of 60 % by Bassett's method of disks.   2. There is severe (grade 3) left ventricular diastolic dysfunction.   3. Normal right ventricular cavity size, normal wall thickness and normal systolic function. The tricuspid annular plane systolic excursion (TAPSE) is 1.4 cm (normal >=1.7 cm).   4. The left atrium is moderately dilated.   5. The right atrium is dilated.   6. Moderate aortic stenosis.   7. No evidence of aortic regurgitation.   8. No prior echocardiogram is available for comparison.    MR Head No Cont 6.06.23   Parenchymal volume loss and chronic microvascular ischemic changes identified    Abnormal T2 prolongation with restricted diffusion is seen involving the   posterior left insula, left temporal frontal and parietal cortical   subcortical region is identified. These finding does demonstrate abnormal   susceptibility and is compatible with a hemorrhagic left MCA infarct.   There is localized mass effect seen consisting sulcal effacement. Mass   effect on the left lateral ventricle seen. No significant shift or   herniation is seen.    The large vessels demonstrate normal flow-voids    Minimal mucosal thickening is seen involving both ethmoid sinuses.    Both mastoid and middle ear regions appear clear.    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   SUBJECTIVE/INTERVAL HISTORY:  Patient seen and examined at bedside this AM. Overnight she was noted to be hypertensive to SBP 170s to 180s, requiring labetalol 10mg IVP x2. This AM states she is feeling okay..    INTERVAL HISTORY: 82y woman with a PMHx significant for HTN, afib on Xarelto (last dose 6/1 6pm), hepatitis C on antiviral presenting with acute onset of AMS. LKW 10pm. Around 10:30pm patient become acutely altered, slumped to right side, head turned to left, not answering questions properly or moving the right side. NIHSS 22 most notable for decreased arousal, incorrect answers, not following commands, left gaze deviation that could not be overcome, right facial droop, right sided severe weakness compared to left, slurred speech, aphasia. Patient lives at home with daughter who is at bedside. Patient is [place] citizen speaking. Daughter reports at baseline she intermittently walks with a cane and needs minor help with ADLs such as getting dressed or running a shower, Daughter looks after her affairs. She denies dementia or cognitive impairment. mRS 3. S/p thrombectomy s/p TICI 2C mechanism cardioembolic from known afib.      MEDICATIONS:  MEDICATIONS  (STANDING):  acetylcysteine 20%  Inhalation 4 milliLiter(s) Inhalation every 6 hours  albuterol/ipratropium for Nebulization 3 milliLiter(s) Nebulizer every 6 hours  aspirin  chewable 81 milliGRAM(s) Oral daily  atorvastatin 80 milliGRAM(s) Oral at bedtime  chlorhexidine 4% Liquid 1 Application(s) Topical daily  enoxaparin Injectable 40 milliGRAM(s) SubCutaneous <User Schedule>  entecavir Solution 0.5 milliGRAM(s) Oral <User Schedule>  labetalol 400 milliGRAM(s) Oral every 8 hours  losartan 100 milliGRAM(s) Oral daily  polyethylene glycol 3350 17 Gram(s) Oral daily  senna 2 Tablet(s) Oral at bedtime    MEDICATIONS  (PRN):  acetaminophen   Oral Liquid .. 650 milliGRAM(s) Oral every 6 hours PRN Temp greater or equal to 38C (100.4F), Mild Pain (1 - 3)  labetalol Injectable 10 milliGRAM(s) IV Push every 4 hours PRN Systolic blood pressure > 160      VITALS & EXAMINATION:  Vital Signs Last 24 Hrs  T(C): 36.4 (09 Jun 2023 04:00), Max: 36.9 (08 Jun 2023 08:00)  T(F): 97.5 (09 Jun 2023 04:00), Max: 98.4 (08 Jun 2023 08:00)  HR: 100 (09 Jun 2023 06:00) (83 - 117)  BP: 164/80 (09 Jun 2023 06:00) (146/72 - 206/105)  BP(mean): 115 (09 Jun 2023 06:00) (99 - 139)  RR: 18 (09 Jun 2023 06:00) (17 - 34)  SpO2: 97% (09 Jun 2023 06:00) (92% - 100%)    Parameters below as of 09 Jun 2023 05:44  Patient On (Oxygen Delivery Method): room air      General:  Constitutional: Elderly woman, appears stated age, NAD.  Head: Normocephalic   Resp: breathing comfortably     Neurological (>12):  Mental status: Eyes initially closed, asleep, open to voice, after which she is awake and alert, attentive to examiner. Oriented to self and place as (hospital. Follows simple commands only.  Speech/Language: Hypophonic, minimal verbal outlet.  Cranial Nerves: Right nasolabial flattening, no BTT on right, right gaze palsy can be overcome voluntarily  Motor exam: Normal tone, 0/5 RUE, 0/5 RLE, 5/5 LUE, 5/5 LLE, normal fine finger movements. triple flexion of RLE  Sensation: Intact to light touch   Coordination/ Gait: Not assessed.      LABS:  CBC                       12.1   7.74  )-----------( 244      ( 09 Jun 2023 03:13 )             37.2     Chem 06-09    143  |  111<H>  |  31<H>  ----------------------------<  145<H>  3.9   |  19<L>  |  0.72    Ca    8.8      09 Jun 2023 03:13  Phos  3.5     06-09  Mg     2.3     06-09          STUDIES & IMAGING:    CT Brain Stroke Protocol 06.01.23   IMPRESSION: No acute intracranial hemorrhage or mass effect.  CT angiography neck: No hemodynamically significant stenosis by NASCET   criteria. No vascular dissection. Nonspecific ground glass opacities in   the visualized apices of the lungs, may represent infectious versus   inflammatory disease.  CT angiography brain: Left proximal M1 occlusion.  CT perfusion: Core infarct of 6 cc with a penumbra of 162 cc.    CT Head No Cont (06.02.23 @ 09:59)  IMPRESSION: An evolving left MCA distribution infarct is noted as described.    CT Head No Cont (06.05.23 @ 08:27)  IMPRESSION: Evolving left MCA distribution infarct. More well-defined lucency and   slight increased mass effect on the left lateral ventricle. Subtle   increased attenuation at the level of the basal ganglia on the left may   indicate a component of evolving hemorrhage.    TTE W or WO Ultrasound Enhancing Agent (06.02.23 @ 10:21)   1. Normal left ventricular cavity size. The left ventricular wall thickness is normal. The left ventricular systolic function is normal with an ejection fraction of 60 % by Bassett's method of disks.   2. There is severe (grade 3) left ventricular diastolic dysfunction.   3. Normal right ventricular cavity size, normal wall thickness and normal systolic function. The tricuspid annular plane systolic excursion (TAPSE) is 1.4 cm (normal >=1.7 cm).   4. The left atrium is moderately dilated.   5. The right atrium is dilated.   6. Moderate aortic stenosis.   7. No evidence of aortic regurgitation.   8. No prior echocardiogram is available for comparison.    MR Head No Cont 6.06.23   Parenchymal volume loss and chronic microvascular ischemic changes identified    Abnormal T2 prolongation with restricted diffusion is seen involving the   posterior left insula, left temporal frontal and parietal cortical   subcortical region is identified. These finding does demonstrate abnormal   susceptibility and is compatible with a hemorrhagic left MCA infarct.   There is localized mass effect seen consisting sulcal effacement. Mass   effect on the left lateral ventricle seen. No significant shift or   herniation is seen.    The large vessels demonstrate normal flow-voids    Minimal mucosal thickening is seen involving both ethmoid sinuses.    Both mastoid and middle ear regions appear clear.    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   SUBJECTIVE/INTERVAL HISTORY:  Patient seen and examined at bedside this AM. Overnight she was noted to be hypertensive to SBP 170s to 180s, requiring labetalol 10mg IVP x2. This AM states she is feeling okay..    INTERVAL HISTORY: 82y woman with a PMHx significant for HTN, afib on Xarelto (last dose 6/1 6pm), hepatitis C on antiviral presenting with acute onset of AMS. LKW 10pm. Around 10:30pm patient become acutely altered, slumped to right side, head turned to left, not answering questions properly or moving the right side. NIHSS 22 most notable for decreased arousal, incorrect answers, not following commands, left gaze deviation that could not be overcome, right facial droop, right sided severe weakness compared to left, slurred speech, aphasia. Patient lives at home with daughter who is at bedside. Patient is [place] speaking. Daughter reports at baseline she intermittently walks with a cane and needs minor help with ADLs such as getting dressed or running a shower, Daughter looks after her affairs. She denies dementia or cognitive impairment. mRS 3. S/p thrombectomy s/p TICI 2C mechanism cardioembolic from known afib.      MEDICATIONS:  MEDICATIONS  (STANDING):  acetylcysteine 20%  Inhalation 4 milliLiter(s) Inhalation every 6 hours  albuterol/ipratropium for Nebulization 3 milliLiter(s) Nebulizer every 6 hours  aspirin  chewable 81 milliGRAM(s) Oral daily  atorvastatin 80 milliGRAM(s) Oral at bedtime  chlorhexidine 4% Liquid 1 Application(s) Topical daily  enoxaparin Injectable 40 milliGRAM(s) SubCutaneous <User Schedule>  entecavir Solution 0.5 milliGRAM(s) Oral <User Schedule>  labetalol 400 milliGRAM(s) Oral every 8 hours  losartan 100 milliGRAM(s) Oral daily  polyethylene glycol 3350 17 Gram(s) Oral daily  senna 2 Tablet(s) Oral at bedtime    MEDICATIONS  (PRN):  acetaminophen   Oral Liquid .. 650 milliGRAM(s) Oral every 6 hours PRN Temp greater or equal to 38C (100.4F), Mild Pain (1 - 3)  labetalol Injectable 10 milliGRAM(s) IV Push every 4 hours PRN Systolic blood pressure > 160      VITALS & EXAMINATION:  Vital Signs Last 24 Hrs  T(C): 36.4 (09 Jun 2023 04:00), Max: 36.9 (08 Jun 2023 08:00)  T(F): 97.5 (09 Jun 2023 04:00), Max: 98.4 (08 Jun 2023 08:00)  HR: 100 (09 Jun 2023 06:00) (83 - 117)  BP: 164/80 (09 Jun 2023 06:00) (146/72 - 206/105)  BP(mean): 115 (09 Jun 2023 06:00) (99 - 139)  RR: 18 (09 Jun 2023 06:00) (17 - 34)  SpO2: 97% (09 Jun 2023 06:00) (92% - 100%)    Parameters below as of 09 Jun 2023 05:44  Patient On (Oxygen Delivery Method): room air      General:  Constitutional: Elderly woman, appears stated age, NAD.  Head: Normocephalic   Resp: breathing comfortably     Neurological (>12):  Mental status: awake, alert, A&Ox3. Follows simple commands only.  Speech/Language: Hypophonic but improving, minimal verbal outlet.  Cranial Nerves: Right nasolabial flattening, no BTT on right, right gaze palsy can be overcome voluntarily  Motor exam: Normal tone, 0/5 RUE, 0/5 RLE, 5/5 LUE, 5/5 LLE, normal fine finger movements. triple flexion of RLE  Sensation: Intact to light touch   Coordination/ Gait: Not assessed.      LABS:  CBC                       12.1   7.74  )-----------( 244      ( 09 Jun 2023 03:13 )             37.2     Chem 06-09    143  |  111<H>  |  31<H>  ----------------------------<  145<H>  3.9   |  19<L>  |  0.72    Ca    8.8      09 Jun 2023 03:13  Phos  3.5     06-09  Mg     2.3     06-09          STUDIES & IMAGING:    CT Brain Stroke Protocol 06.01.23   IMPRESSION: No acute intracranial hemorrhage or mass effect.  CT angiography neck: No hemodynamically significant stenosis by NASCET   criteria. No vascular dissection. Nonspecific ground glass opacities in   the visualized apices of the lungs, may represent infectious versus   inflammatory disease.  CT angiography brain: Left proximal M1 occlusion.  CT perfusion: Core infarct of 6 cc with a penumbra of 162 cc.    CT Head No Cont (06.02.23 @ 09:59)  IMPRESSION: An evolving left MCA distribution infarct is noted as described.    CT Head No Cont (06.05.23 @ 08:27)  IMPRESSION: Evolving left MCA distribution infarct. More well-defined lucency and   slight increased mass effect on the left lateral ventricle. Subtle   increased attenuation at the level of the basal ganglia on the left may   indicate a component of evolving hemorrhage.    TTE W or WO Ultrasound Enhancing Agent (06.02.23 @ 10:21)   1. Normal left ventricular cavity size. The left ventricular wall thickness is normal. The left ventricular systolic function is normal with an ejection fraction of 60 % by Bassett's method of disks.   2. There is severe (grade 3) left ventricular diastolic dysfunction.   3. Normal right ventricular cavity size, normal wall thickness and normal systolic function. The tricuspid annular plane systolic excursion (TAPSE) is 1.4 cm (normal >=1.7 cm).   4. The left atrium is moderately dilated.   5. The right atrium is dilated.   6. Moderate aortic stenosis.   7. No evidence of aortic regurgitation.   8. No prior echocardiogram is available for comparison.    MR Head No Cont 6.06.23   Parenchymal volume loss and chronic microvascular ischemic changes identified    Abnormal T2 prolongation with restricted diffusion is seen involving the   posterior left insula, left temporal frontal and parietal cortical   subcortical region is identified. These finding does demonstrate abnormal   susceptibility and is compatible with a hemorrhagic left MCA infarct.   There is localized mass effect seen consisting sulcal effacement. Mass   effect on the left lateral ventricle seen. No significant shift or   herniation is seen.    The large vessels demonstrate normal flow-voids    Minimal mucosal thickening is seen involving both ethmoid sinuses.    Both mastoid and middle ear regions appear clear.    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   SUBJECTIVE/INTERVAL HISTORY:  Patient seen and examined at bedside this AM. Overnight she was noted to be hypertensive to SBP 170s to 180s, requiring labetalol 10mg IVP x2. This AM states she is feeling okay..    INTERVAL HISTORY: 82y woman with a PMHx significant for HTN, afib on Xarelto (last dose 6/1 6pm), hepatitis C on antiviral presenting with acute onset of AMS. LKW 10pm. Around 10:30pm patient become acutely altered, slumped to right side, head turned to left, not answering questions properly or moving the right side. NIHSS 22 most notable for decreased arousal, incorrect answers, not following commands, left gaze deviation that could not be overcome, right facial droop, right sided severe weakness compared to left, slurred speech, aphasia. Patient lives at home with daughter who is at bedside. Patient is [place] speaking. Daughter reports at baseline she intermittently walks with a cane and needs minor help with ADLs such as getting dressed or running a shower, Daughter looks after her affairs. She denies dementia or cognitive impairment. mRS 3. S/p thrombectomy s/p TICI 2C mechanism cardioembolic from known afib.      MEDICATIONS:  MEDICATIONS  (STANDING):  acetylcysteine 20%  Inhalation 4 milliLiter(s) Inhalation every 6 hours  albuterol/ipratropium for Nebulization 3 milliLiter(s) Nebulizer every 6 hours  aspirin  chewable 81 milliGRAM(s) Oral daily  atorvastatin 80 milliGRAM(s) Oral at bedtime  chlorhexidine 4% Liquid 1 Application(s) Topical daily  enoxaparin Injectable 40 milliGRAM(s) SubCutaneous <User Schedule>  entecavir Solution 0.5 milliGRAM(s) Oral <User Schedule>  labetalol 400 milliGRAM(s) Oral every 8 hours  losartan 100 milliGRAM(s) Oral daily  polyethylene glycol 3350 17 Gram(s) Oral daily  senna 2 Tablet(s) Oral at bedtime    MEDICATIONS  (PRN):  acetaminophen   Oral Liquid .. 650 milliGRAM(s) Oral every 6 hours PRN Temp greater or equal to 38C (100.4F), Mild Pain (1 - 3)  labetalol Injectable 10 milliGRAM(s) IV Push every 4 hours PRN Systolic blood pressure > 160      VITALS & EXAMINATION:  Vital Signs Last 24 Hrs  T(C): 36.4 (09 Jun 2023 04:00), Max: 36.9 (08 Jun 2023 08:00)  T(F): 97.5 (09 Jun 2023 04:00), Max: 98.4 (08 Jun 2023 08:00)  HR: 100 (09 Jun 2023 06:00) (83 - 117)  BP: 164/80 (09 Jun 2023 06:00) (146/72 - 206/105)  BP(mean): 115 (09 Jun 2023 06:00) (99 - 139)  RR: 18 (09 Jun 2023 06:00) (17 - 34)  SpO2: 97% (09 Jun 2023 06:00) (92% - 100%)    Parameters below as of 09 Jun 2023 05:44  Patient On (Oxygen Delivery Method): room air      General:  Constitutional: Elderly woman, appears stated age, NAD.  Head: Normocephalic   Resp: breathing comfortably     Neurological (>12):  Mental status: awake, alert, A&Ox3. Follows simple commands only.  Speech/Language: Hypophonic but improving, minimal verbal outlet.  Cranial Nerves: Right nasolabial flattening, no BTT on right, right gaze palsy can be overcome voluntarily  Motor exam: Normal tone, 0/5 RUE, 0/5 RLE, 5/5 LUE, 5/5 LLE, normal fine finger movements. triple flexion of RLE  Sensation: Intact to light touch   Coordination/ Gait: Not assessed.      LABS:  CBC                       12.1   7.74  )-----------( 244      ( 09 Jun 2023 03:13 )             37.2     Chem 06-09    143  |  111<H>  |  31<H>  ----------------------------<  145<H>  3.9   |  19<L>  |  0.72    Ca    8.8      09 Jun 2023 03:13  Phos  3.5     06-09  Mg     2.3     06-09        STUDIES & IMAGING:    CT Brain Stroke Protocol 06.01.23   IMPRESSION: No acute intracranial hemorrhage or mass effect.  CT angiography neck: No hemodynamically significant stenosis by NASCET   criteria. No vascular dissection. Nonspecific ground glass opacities in   the visualized apices of the lungs, may represent infectious versus   inflammatory disease.  CT angiography brain: Left proximal M1 occlusion.  CT perfusion: Core infarct of 6 cc with a penumbra of 162 cc.    CT Head No Cont (06.02.23 @ 09:59)  IMPRESSION: An evolving left MCA distribution infarct is noted as described.    CT Head No Cont (06.05.23 @ 08:27)  IMPRESSION: Evolving left MCA distribution infarct. More well-defined lucency and   slight increased mass effect on the left lateral ventricle. Subtle   increased attenuation at the level of the basal ganglia on the left may   indicate a component of evolving hemorrhage.    TTE W or WO Ultrasound Enhancing Agent (06.02.23 @ 10:21)   1. Normal left ventricular cavity size. The left ventricular wall thickness is normal. The left ventricular systolic function is normal with an ejection fraction of 60 % by Bassett's method of disks.   2. There is severe (grade 3) left ventricular diastolic dysfunction.   3. Normal right ventricular cavity size, normal wall thickness and normal systolic function. The tricuspid annular plane systolic excursion (TAPSE) is 1.4 cm (normal >=1.7 cm).   4. The left atrium is moderately dilated.   5. The right atrium is dilated.   6. Moderate aortic stenosis.   7. No evidence of aortic regurgitation.   8. No prior echocardiogram is available for comparison.    MR Head No Cont 6.06.23   Parenchymal volume loss and chronic microvascular ischemic changes identified    Abnormal T2 prolongation with restricted diffusion is seen involving the   posterior left insula, left temporal frontal and parietal cortical   subcortical region is identified. These finding does demonstrate abnormal   susceptibility and is compatible with a hemorrhagic left MCA infarct.   There is localized mass effect seen consisting sulcal effacement. Mass   effect on the left lateral ventricle seen. No significant shift or   herniation is seen.    The large vessels demonstrate normal flow-voids    Minimal mucosal thickening is seen involving both ethmoid sinuses.    Both mastoid and middle ear regions appear clear.    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   SUBJECTIVE/INTERVAL HISTORY:  Patient seen and examined at bedside this AM. Overnight she was noted to be hypertensive to SBP 170s to 180s, requiring labetalol 10mg IVP x2. This AM states she is feeling okay..    INTERVAL HISTORY: 82y woman with a PMHx significant for HTN, afib on Xarelto (last dose 6/1 6pm), hepatitis C on antiviral presenting with acute onset of AMS. LKW 10pm. Around 10:30pm patient become acutely altered, slumped to right side, head turned to left, not answering questions properly or moving the right side. NIHSS 22 most notable for decreased arousal, incorrect answers, not following commands, left gaze deviation that could not be overcome, right facial droop, right sided severe weakness compared to left, slurred speech, aphasia. Patient lives at home with daughter who is at bedside. Patient is [place] speaking. Daughter reports at baseline she intermittently walks with a cane and needs minor help with ADLs such as getting dressed or running a shower, Daughter looks after her affairs. She denies dementia or cognitive impairment. mRS 3. S/p thrombectomy s/p TICI 2C mechanism cardioembolic from known afib.      MEDICATIONS:  MEDICATIONS  (STANDING):  acetylcysteine 20%  Inhalation 4 milliLiter(s) Inhalation every 6 hours  albuterol/ipratropium for Nebulization 3 milliLiter(s) Nebulizer every 6 hours  aspirin  chewable 81 milliGRAM(s) Oral daily  atorvastatin 80 milliGRAM(s) Oral at bedtime  chlorhexidine 4% Liquid 1 Application(s) Topical daily  enoxaparin Injectable 40 milliGRAM(s) SubCutaneous <User Schedule>  entecavir Solution 0.5 milliGRAM(s) Oral <User Schedule>  labetalol 400 milliGRAM(s) Oral every 8 hours  losartan 100 milliGRAM(s) Oral daily  polyethylene glycol 3350 17 Gram(s) Oral daily  senna 2 Tablet(s) Oral at bedtime    MEDICATIONS  (PRN):  acetaminophen   Oral Liquid .. 650 milliGRAM(s) Oral every 6 hours PRN Temp greater or equal to 38C (100.4F), Mild Pain (1 - 3)  labetalol Injectable 10 milliGRAM(s) IV Push every 4 hours PRN Systolic blood pressure > 160      VITALS & EXAMINATION:  Vital Signs Last 24 Hrs  T(C): 36.4 (09 Jun 2023 04:00), Max: 36.9 (08 Jun 2023 08:00)  T(F): 97.5 (09 Jun 2023 04:00), Max: 98.4 (08 Jun 2023 08:00)  HR: 100 (09 Jun 2023 06:00) (83 - 117)  BP: 164/80 (09 Jun 2023 06:00) (146/72 - 206/105)  BP(mean): 115 (09 Jun 2023 06:00) (99 - 139)  RR: 18 (09 Jun 2023 06:00) (17 - 34)  SpO2: 97% (09 Jun 2023 06:00) (92% - 100%)    Parameters below as of 09 Jun 2023 05:44  Patient On (Oxygen Delivery Method): room air      General:  Constitutional: Elderly woman, appears stated age, NAD.  Head: Normocephalic   Resp: breathing comfortably     Neurological (>12):  Mental status: awake, alert, A&Ox3. Follows simple commands only.  Speech/Language: moderately Hypophonic, initially minimal verbal outlet but with prompting generates fluent speech in [place]  Cranial Nerves: Right nasolabial flattening, no BTT on right, right gaze palsy can be overcome voluntarily  Motor exam: Normal tone, 0/5 RUE, 0/5 RLE, 5/5 LUE, 5/5 LLE,  triple flexion of RLE  Sensation: Intact to light touch   Coordination/ Gait: Not assessed.      LABS:  CBC                       12.1   7.74  )-----------( 244      ( 09 Jun 2023 03:13 )             37.2     Chem 06-09    143  |  111<H>  |  31<H>  ----------------------------<  145<H>  3.9   |  19<L>  |  0.72    Ca    8.8      09 Jun 2023 03:13  Phos  3.5     06-09  Mg     2.3     06-09        STUDIES & IMAGING:    CT Brain Stroke Protocol 06.01.23   IMPRESSION: No acute intracranial hemorrhage or mass effect.  CT angiography neck: No hemodynamically significant stenosis by NASCET   criteria. No vascular dissection. Nonspecific ground glass opacities in   the visualized apices of the lungs, may represent infectious versus   inflammatory disease.  CT angiography brain: Left proximal M1 occlusion.  CT perfusion: Core infarct of 6 cc with a penumbra of 162 cc.    CT Head No Cont (06.02.23 @ 09:59)  IMPRESSION: An evolving left MCA distribution infarct is noted as described.    CT Head No Cont (06.05.23 @ 08:27)  IMPRESSION: Evolving left MCA distribution infarct. More well-defined lucency and   slight increased mass effect on the left lateral ventricle. Subtle   increased attenuation at the level of the basal ganglia on the left may   indicate a component of evolving hemorrhage.    TTE W or WO Ultrasound Enhancing Agent (06.02.23 @ 10:21)   1. Normal left ventricular cavity size. The left ventricular wall thickness is normal. The left ventricular systolic function is normal with an ejection fraction of 60 % by Bassett's method of disks.   2. There is severe (grade 3) left ventricular diastolic dysfunction.   3. Normal right ventricular cavity size, normal wall thickness and normal systolic function. The tricuspid annular plane systolic excursion (TAPSE) is 1.4 cm (normal >=1.7 cm).   4. The left atrium is moderately dilated.   5. The right atrium is dilated.   6. Moderate aortic stenosis.   7. No evidence of aortic regurgitation.   8. No prior echocardiogram is available for comparison.    MR Head No Cont 6.06.23   Parenchymal volume loss and chronic microvascular ischemic changes identified    Abnormal T2 prolongation with restricted diffusion is seen involving the   posterior left insula, left temporal frontal and parietal cortical   subcortical region is identified. These finding does demonstrate abnormal   susceptibility and is compatible with a hemorrhagic left MCA infarct.   There is localized mass effect seen consisting sulcal effacement. Mass   effect on the left lateral ventricle seen. No significant shift or   herniation is seen.    The large vessels demonstrate normal flow-voids    Minimal mucosal thickening is seen involving both ethmoid sinuses.    Both mastoid and middle ear regions appear clear.    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

## 2023-06-09 NOTE — PROGRESS NOTE ADULT - PROBLEM SELECTOR PLAN 1
likely embolic as she was not taking xarelto WITH food   will switch to eliquis   check TTE   monitor on tele   check thyroid panel.

## 2023-06-09 NOTE — PROGRESS NOTE ADULT - SUBJECTIVE AND OBJECTIVE BOX
{\rtf1\ezjahy07514\ansi\yhwzonr2806\ftnbj\uc1\deff0  {\fonttbl{\f0 \fnil Segoe UI;}{\f1 \fnil \fcharset0 Segoe UI;}{\f2 \fnil Times New Breezy;}}  {\colortbl ;\cib540\vxfgg602\raio665 ;\red0\green0\blue0 ;\red0\green0\dwck254 ;\red0\green0\blue0 ;}  {\stylesheet{\f0\fs20 Normal;}{\cs1 Default Paragraph Font;}{\cs2\f0\fs16 Line Number;}{\cs3\f2\fs24\ul\cf3 Hyperlink;}}  {\*\revtbl{Unknown;}}  \hayhqh82093\jyzave36829\sxwkw1031\kclcf5065\acbtd7597\cehbr8015\fjritjl790\bxumuux004\nogrowautofit\pyxkwd627\formshade\nofeaturethrottle1\dntblnsbdb\fet4\aendnotes\aftnnrlc\pgbrdrhead\pgbrdrfoot  \sectd\zzdjen68320\upmtdp32943\guttersxn0\xnsxgfzz4227\ypzmtsea1117\cryjksfg6433\iynrixaj6311\uvqnkru503\ejgoiaa702\sbkpage\pgncont\pgndec  \plain\plain\f0\fs24\pard\plain\f0\fs24\plain\f0\fs20\lcdl1299\hich\f0\dbch\f0\loch\f0\fs20 Subjective: Patient seen and examined. No new events except as noted. \par  remains in ICU \par  hypertensive \par  family at bedside \par  \par  \par  REVIEW OF SYSTEMS:\par  \par  CONSTITUTIONAL: + weakness, fevers or chills\par  EYES/ENT: No visual changes;  No vertigo or throat pain \par  NECK: No pain or stiffness\par  RESPIRATORY: No cough, wheezing, hemoptysis; No shortness of breath\par  CARDIOVASCULAR: No chest pain or palpitations\par  GASTROINTESTINAL: No abdominal or epigastric pain. No nausea, vomiting, or hematemesis; No diarrhea or constipation. No melena or hematochezia.\par  GENITOURINARY: No dysuria, frequency or hematuria\par  NEUROLOGICAL: +numbness or weakness\par  SKIN: No itching, burning, rashes, or lesions \par  All other review of systems is negative unless indicated above.\par  \par  MEDICATIONS:\par  MEDICATIONS  (STANDING):\par  acetylcysteine 20%  Inhalation 4 milliLiter(s) Inhalation every 6 hours\par  albuterol/ipratropium for Nebulization 3 milliLiter(s) Nebulizer every 6 hours\par  aspirin  chewable 81 milliGRAM(s) Oral daily\par  atorvastatin 80 milliGRAM(s) Oral at bedtime\par  chlorhexidine 4% Liquid 1 Application(s) Topical daily\par  enoxaparin Injectable 40 milliGRAM(s) SubCutaneous <User Schedule>\par  entecavir Solution 0.5 milliGRAM(s) Oral <User Schedule>\par  labetalol 400 milliGRAM(s) Oral every 8 hours\par  losartan 100 milliGRAM(s) Oral daily\par  polyethylene glycol 3350 17 Gram(s) Oral daily\par  senna 2 Tablet(s) Oral at bedtime\par  \par  \par  PHYSICAL EXAM:\par  T(C): 36.4 (06-09-23 @ 04:00), Max: 36.6 (06-08-23 @ 12:00)\par  HR: 105 (06-09-23 @ 09:00) (83 - 117)\par  BP: 163/112 (06-09-23 @ 09:00) (146/72 - 192/91)\par  RR: 21 (06-09-23 @ 09:00) (17 - 34)\par  SpO2: 94% (06-09-23 @ 09:00) (92% - 100%)\par  Wt(kg): --\par  I&O's Summary\par  \par  08 Jun 2023 07:01  -  09 Jun 2023 07:00\par  --------------------------------------------------------\par  IN: 1375 mL / OUT: 550 mL / NET: 825 mL\par  \par  09 Jun 2023 07:01  -  09 Jun 2023 10:35\par  --------------------------------------------------------\par  IN: 180 mL / OUT: 0 mL / NET: 180 mL\par  \par  \par  \par  \par  Appearance: NAD +NGT \tab\par  HEENT:   Dry  oral mucosa, PERRL, EOMI\tab\par  Lymphatic: No lymphadenopathy , no edema\par  Cardiovascular: irregular  S1 S2, No JVD, No murmurs , Peripheral pulses palpable 2+ bilaterally\par  Respiratory: decreased bs \par  Gastrointestinal:  Soft, Non-tender, + BS\tab\par  Skin: No rashes, No ecchymoses, No cyanosis, warm to touch\par  Musculoskeletal: Normal range of motion, normal strength\par  Psychiatry:  Mood & affect appropriate\par  Ext: No edema\par  Neurological (>12):\par  Mental status: awake and alert, attentive to examiner. oriented to self and place (hospital), does not know age or year. follows simple commands. does not follow cross commands\par  , speech hypophonic, no dysarthria, follows simple commands but not 2-step commands, no neglect \par  Cranial Nerves: Right nasolabial flattening, no BTT on right, right gaze palsy can be overcome voluntarily\par  Motor exam: Normal tone, 0/5 RUE, 0/5 RLE, 5/5 LUE, 5/5 LLE, normal fine finger movements. triple flexion of RLE\par  Sensation: Intact to light touch \par  Coordination/ Gait: No dysmetria, gait not tested\par  \par  \par  LABS:\par  \par  CARDIAC MARKERS:\par           \par             12.1 \par  7.74  )-----------( 244      ( 09 Jun 2023 03:13 )\par             37.2 \par  \par  06-09\par  \par  143  |  111<H>  |  31<H>\par  ----------------------------<  145<H>\par  3.9   |  19<L>  |  0.72\par  \par  Ca    8.8      09 Jun 2023 03:13\par  Phos  3.5     06-09\par  Mg     2.3     06-09\par  \par  TELEMETRY: \tab   Afib 80-90s\par  ECG:  \tab\par  RADIOLOGY: \par  DIAGNOSTIC TESTING:\par  [ ] Echocardiogram:\par  [ ]  Catheterization:\par  [ ] Stress Test:  \par  OTHER: \tab\par  s\par  \trowd\wtqhhn42\lastrow\vicndrx20\trpaddfl3\eaxpruh39\trpaddfr3\trpaddt0\trpaddft3\trpaddb0\trpaddfb3\trleft0  \clvertalt\tiytxc80\clpadft3\mjnsee28\clpadfr3\clpadl0\clpadfl3\clpadb0\clpadfb3\bxino8497  \clvertalt\xlmrla70\clpadft3\pbhcxs08\clpadfr3\clpadl0\clpadfl3\clpadb0\clpadfb3\qaejx3949  \pard\intbl\ssparaaux0\s0\fi-120\li120\ql\plain\f0\fs24{\*\bkmkstart [id number]}{\*\bkmkend [id number]}\plain\f0\fs20\rnvd5079\hich\f0\dbch\f0\loch\f0\fs20 \'b7 \plain\f1\fs20\aiuf8370\hich\f1\dbch\f1\loch\f1\cf2\fs20\b Unsuccessful Strategies Trialed   During Procedure\plain\f0\fs20\xvlm3679\hich\f0\dbch\f0\loch\f0\fs20\cell  \pard\intbl\ssparaaux0\s0\ql\plain\f0\fs24\plain\f0\fs20\yoye8109\hich\f0\dbch\f0\loch\f0\fs20 Pt not a candidate for compensatory strategies given reduced cognitive status\cell  \intbl\row  \pard\ssparaaux0\s0\ql\plain\f0\fs24\plain\f0\fs20\znqk7576\hich\f0\dbch\f0\loch\f0\fs20\par  {\*\bkmkstart [id number]}{\*\bkmkend [id number]}\plain\f1\fs20\eihi0879\hich\f1\dbch\f1\loch\f1\cf2\fs20\b\ul Recommendations:\plain\f0\fs20\uamt7539\hich\f0\dbch\f0\loch\f0\fs20  \par  \trowd\yiqmpr29\gtxageq09\trpaddfl3\bdclwfq46\trpaddfr3\trpaddt0\trpaddft3\trpaddb0\trpaddfb3\trleft0  \clvertalt\mppcye06\clpadft3\gedsiy66\clpadfr3\clpadl0\clpadfl3\clpadb0\clpadfb3\tfyhk2983  \clvertalt\ncqrgp95\clpadft3\yddoqz19\clpadfr3\clpadl0\clpadfl3\clpadb0\clpadfb3\lwjrx6948  \pard\intbl\ssparaaux0\s0\fi-120\li120\ql\plain\f0\fs24{\*\bkmkstart [id number]}{\*\bkmkend [id number]}\plain\f0\fs20\tyss6153\hich\f0\dbch\f0\loch\f0\fs20 \'b7 \plain\f1\fs20\drxm8824\hich\f1\dbch\f1\loch\f1\cf2\fs20\b Diagnostic Impressions\plain\f0\fs20\wdqa3386\hich\f0\dbch\f0\loch\f0\fs20\cell  \pard\intbl\ssparaaux0\s0\ql\plain\f0\fs24\plain\f0\fs20\gwob4018\hich\f0\dbch\f0\loch\f0\fs20 Pt is an 83 y/o female p/w acute onset of AMS. NIHSS 22 most notable for decreased arousal, incorrect answers, not following commands, left gaze deviation that   could not be overcome, right facial droop, right sided severe weakness compared to left, slurred speech, aphasia. Now s/p L M1 MT w TICI 2c flow, extubated 6/2. Patient presents on Fiberoptic Endoscopic Evaluation of Swallowing and Sensory Testing (FEES)   with an oropharyngeal dysphagia. Moderately thick liquids via tsp and thin puree trials remarkable for R sided mild anterior spillage and silent laryngeal penetration during the swallow. Thick puree results in laryngeal penetration to the level of the   vocal cords, pt sensate which is effective in preventing aspiration. Mild pharyngeal residue for all consistencies is reduced with subsequent swallows. Pt is not a candidate for compensatory strategies given reduced cognitive status. Patient will benefit   from restorative swallow therapy prior to repeat instrumental assessment.\par  \par  Disorders: reduced labial strength, reduced lingual strength/control, delayed initiation of pharyngeal trigger, pharyngeal contractibility, reduced supraglottic sensation\cell  \intbl\row  \pard\intbl\ssparaaux0\s0\ql\plain\f0\fs24\plain\f0\fs20\eipy5378\hich\f0\dbch\f0\loch\f0\fs20\cell  \pard\intbl\ssparaaux0\s0\ql\plain\f0\fs24\plain\f1\fs20\azqu6921\hich\f1\dbch\f1\loch\f1\cf2\fs20\strike\plain\f0\fs20\sazz0908\hich\f0\dbch\f0\loch\f0\fs20\cell  \intbl\row  \pard\intbl\ssparaaux0\s0\fi-120\li120\ql\plain\f0\fs24{\*\bkmkstart [id number]}{\*\bkmkend [id number]}\plain\f0\fs20\fkby7417\hich\f0\dbch\f0\loch\f0\fs20 \'b7 \plain\f1\fs20\pwby1479\hich\f1\dbch\f1\loch\f1\cf2\fs20\b Recommended Consistency\plain\f0\fs20\dzpw7454\hich\f0\dbch\f0\loch\f0\fs20\cell  \pard\intbl\ssparaaux0\s0\ql\plain\f0\fs24\plain\f0\fs20\utes3445\hich\f0\dbch\f0\loch\f0\fs20 NPO, with non-oral nutrition/hydration/medications.\cell  \intbl\row  \pard\intbl\ssparaaux0\s0\fi-120\li120\ql\plain\f0\fs24{\*\bkmkstart [id number]}{\*\bkmkend [id number]}\plain\f0\fs20\cwem2948\hich\f0\dbch\f0\loch\f0\fs20 \'b7 \plain\f1\fs20\gajg4464\hich\f1\dbch\f1\loch\f1\cf2\fs20\b Aspiration Precautions\plain\f0\fs20\aekd1528\hich\f0\dbch\f0\loch\f0\fs20\cell  \pard\intbl\ssparaaux0\s0\ql\plain\f0\fs24\plain\f0\fs20\tahe2946\hich\f0\dbch\f0\loch\f0\fs20 yes  Strict aspiration and reflux precautions!\cell  \intbl\row  \pard\intbl\ssparaaux0\s0\fi-120\li120\ql\plain\f0\fs24{\*\bkmkstart [id number]}{\*\bkmkend [id number]}\plain\f0\fs20\fmfv9444\hich\f0\dbch\f0\loch\f0\fs20 \'b7 \plain\f1\fs20\togj6217\hich\f1\dbch\f1\loch\f1\cf2\fs20\b Anticipated Discharge Disposition\plain\f0\fs20\nark4342\hich\f0\dbch\f0\loch\f0\fs20\cell  \pard\intbl\ssparaaux0\s0\ql\plain\f0\fs24\plain\f0\fs20\ejbu0363\hich\f0\dbch\f0\loch\f0\fs20 Acute rehab\cell  \intbl\row  \pard\intbl\ssparaaux0\s0\fi-120\li120\ql\plain\f0\fs24{\*\bkmkstart [id number]}{\*\bkmkend [id number]}\plain\f0\fs20\hnzz5862\hich\f0\dbch\f0\loch\f0\fs20 \'b7 \plain\f1\fs20\okqv8579\hich\f1\dbch\f1\loch\f1\cf2\fs20\b Additional Recommendations\plain\f0\fs20\qgex9834\hich\f0\dbch\f0\loch\f0\fs20\cell  \pard\intbl\ssparaaux0\s0\ql\plain\f0\fs24\plain\f0\fs20\ojuv5745\hich\f0\dbch\f0\loch\f0\fs20 Goals:\par  1. Maintain good oral hygiene. \par  2. Pt will complete dysphagia exercises to improve swallow function.\cell  \intbl\row  \trowd\sxhdkq91\lastrow\kdtusbu45\trpaddfl3\oeaskyn36\trpaddfr3\trpaddt0\trpaddft3\trpaddb0\trpaddfb3\trleft0  \clvertalt\suavyq36\clpadft3\zycdmj83\clpadfr3\clpadl0\clpadfl3\clpadb0\clpadfb3\ajvvr7043  \clvertalt\jbpnot26\clpadft3\bvdhkj52\clpadfr3\clpadl0\clpadfl3\clpadb0\clpadfb3\phmtj8868  \pard\intbl\ssparaaux0\s0\fi-120\li120\ql\plain\f0\fs24{\*\bkmkstart [id number]}{\*\bkmkend [id number]}\plain\f0\fs20\gegx1068\hich\f0\dbch\f0\loch\f0\fs20 \'b7 \plain\f1\fs20\lxnq7387\hich\f1\dbch\f1\loch\f1\cf2\fs20\b The above findings were discussed   with\plain\f0\fs20\arzr8436\hich\f0\dbch\f0\loch\f0\fs20\cell  \pard\intbl\ssparaaux0\s0\ql\plain\f0\fs24\plain\f0\fs20\idid4148\hich\f0\dbch\f0\loch\f0\fs20 Dr. More, RN Myrtle, patient's daughter, patient\cell  \intbl\row  \pard\ssparaaux0\s0\ql\plain\f0\fs24\plain\f0\fs20\vmvh3426\hich\f0\dbch\f0\loch\f0\fs20\par  \par  {\*\bkmkstart bkClinDocSignatures}{\*\bkmkend bkClinDocSignatures}{\*\bkmkstart bkcommentSBK}{\*\bkmkend bkcommentSBK}\plain\f1\fs20\trgu1640\hich\f1\dbch\f1\loch\f1\cf2\fs20\b Electronic Signatures:\plain\f0\fs20\vift6101\hich\f0\dbch\f0\loch\f0\fs20\par  \plain\f1\fs20\pndz5935\hich\f1\dbch\f1\loch\f1\cf2\fs20\b\ul Rafia Monae (Speech Pathologist)\plain\f0\fs20\teyl5315\hich\f0\dbch\f0\loch\f0\fs20   \plain\f1\fs18\fpxo8717\hich\f1\dbch\f1\loch\f1\cf2\fs18 (Signed 08-Jun-2023 15:40)\par  \pard\plain\f0\fs24\plain\f1\fs18\iybz8877\hich\f1\dbch\f1\loch\f1\cf2\fs18\tab\plain\f1\fs20\vqqd1476\hich\f1\dbch\f1\loch\f1\cf2\fs20\b\i Authored: \plain\f1\fs20\rtpy2020\hich\f1\dbch\f1\loch\f1\cf2\fs20\i Swallow FEES Assessment Adult, Recommendations\plain\f0\fs20\yvpo3695\hich\f0\dbch\f0\loch\f0\fs20\par  \par  \par  \par  \ql\plain\f0\fs24\plain\f0\fs20\nqch3039\hich\f0\dbch\f0\loch\f0\fs20\par  }

## 2023-06-09 NOTE — CHART NOTE - NSCHARTNOTESELECT_GEN_ALL_CORE
IR/Event Note
Speech and Swallow
IR/Event Note
Nutrition Services
Nutrition Services
Speech & Swallow
VTE Assessment/Event Note

## 2023-06-09 NOTE — PROGRESS NOTE ADULT - ASSESSMENT
82y (1940) woman with a PMHx significant for HTN, afib on Xarelto (last dose 6/1 6pm), hepatitis C on antiviral presenting with acute onset of AMS. LKW 10pm. Around 10:30pm patient become acutely altered, slumped to right side, head turned to left, not answering questions properly or moving the right side. NIHSS 22 most notable for decreased arousal, incorrect answers, not following commands, left gaze deviation that could not be overcome, right facial droop, right sided severe weakness compared to left, slurred speech, aphasia. Patient lives at home with daughter who is at bedside. Patient is [place] speaking. Daughter reports at baseline she intermittently walks with a cane and needs minor help with ADLs such as getting dressed or running a shower, Daughter looks after her affairs. She denies dementia or cognitive impairment. mRS 3.     LKW 10pm (6/1)  NIHSS 22  pre-mRS 3  Xarelto last taken at 6pm, INR=1.79   (02 Jun 2023 01:43)  has been hypertensive   family at bedside

## 2023-06-10 LAB
ANION GAP SERPL CALC-SCNC: 14 MMOL/L — SIGNIFICANT CHANGE UP (ref 5–17)
BUN SERPL-MCNC: 26 MG/DL — HIGH (ref 7–23)
CALCIUM SERPL-MCNC: 9.5 MG/DL — SIGNIFICANT CHANGE UP (ref 8.4–10.5)
CHLORIDE SERPL-SCNC: 109 MMOL/L — HIGH (ref 96–108)
CO2 SERPL-SCNC: 21 MMOL/L — LOW (ref 22–31)
CREAT SERPL-MCNC: 0.65 MG/DL — SIGNIFICANT CHANGE UP (ref 0.5–1.3)
EGFR: 87 ML/MIN/1.73M2 — SIGNIFICANT CHANGE UP
GLUCOSE BLDC GLUCOMTR-MCNC: 111 MG/DL — HIGH (ref 70–99)
GLUCOSE BLDC GLUCOMTR-MCNC: 121 MG/DL — HIGH (ref 70–99)
GLUCOSE SERPL-MCNC: 125 MG/DL — HIGH (ref 70–99)
HCT VFR BLD CALC: 40.8 % — SIGNIFICANT CHANGE UP (ref 34.5–45)
HGB BLD-MCNC: 13.5 G/DL — SIGNIFICANT CHANGE UP (ref 11.5–15.5)
MAGNESIUM SERPL-MCNC: 2.3 MG/DL — SIGNIFICANT CHANGE UP (ref 1.6–2.6)
MCHC RBC-ENTMCNC: 30.3 PG — SIGNIFICANT CHANGE UP (ref 27–34)
MCHC RBC-ENTMCNC: 33.1 GM/DL — SIGNIFICANT CHANGE UP (ref 32–36)
MCV RBC AUTO: 91.5 FL — SIGNIFICANT CHANGE UP (ref 80–100)
NRBC # BLD: 0 /100 WBCS — SIGNIFICANT CHANGE UP (ref 0–0)
PHOSPHATE SERPL-MCNC: 3.3 MG/DL — SIGNIFICANT CHANGE UP (ref 2.5–4.5)
PLATELET # BLD AUTO: 318 K/UL — SIGNIFICANT CHANGE UP (ref 150–400)
POTASSIUM SERPL-MCNC: 3.9 MMOL/L — SIGNIFICANT CHANGE UP (ref 3.5–5.3)
POTASSIUM SERPL-SCNC: 3.9 MMOL/L — SIGNIFICANT CHANGE UP (ref 3.5–5.3)
RBC # BLD: 4.46 M/UL — SIGNIFICANT CHANGE UP (ref 3.8–5.2)
RBC # FLD: 13.1 % — SIGNIFICANT CHANGE UP (ref 10.3–14.5)
SODIUM SERPL-SCNC: 144 MMOL/L — SIGNIFICANT CHANGE UP (ref 135–145)
WBC # BLD: 9.69 K/UL — SIGNIFICANT CHANGE UP (ref 3.8–10.5)
WBC # FLD AUTO: 9.69 K/UL — SIGNIFICANT CHANGE UP (ref 3.8–10.5)

## 2023-06-10 PROCEDURE — 71045 X-RAY EXAM CHEST 1 VIEW: CPT | Mod: 26,76

## 2023-06-10 RX ORDER — LABETALOL HCL 100 MG
10 TABLET ORAL ONCE
Refills: 0 | Status: COMPLETED | OUTPATIENT
Start: 2023-06-10 | End: 2023-06-10

## 2023-06-10 RX ADMIN — Medication 4 MILLILITER(S): at 23:23

## 2023-06-10 RX ADMIN — Medication 10 MILLIGRAM(S): at 09:00

## 2023-06-10 RX ADMIN — Medication 10 MILLIGRAM(S): at 06:01

## 2023-06-10 RX ADMIN — Medication 3 MILLILITER(S): at 23:23

## 2023-06-10 RX ADMIN — ENOXAPARIN SODIUM 40 MILLIGRAM(S): 100 INJECTION SUBCUTANEOUS at 18:34

## 2023-06-10 RX ADMIN — Medication 4 MILLILITER(S): at 05:12

## 2023-06-10 RX ADMIN — Medication 3 MILLILITER(S): at 05:12

## 2023-06-10 RX ADMIN — Medication 10 MILLIGRAM(S): at 17:30

## 2023-06-10 RX ADMIN — CHLORHEXIDINE GLUCONATE 1 APPLICATION(S): 213 SOLUTION TOPICAL at 23:36

## 2023-06-10 RX ADMIN — CHLORHEXIDINE GLUCONATE 1 APPLICATION(S): 213 SOLUTION TOPICAL at 06:00

## 2023-06-10 RX ADMIN — Medication 10 MILLIGRAM(S): at 16:33

## 2023-06-10 RX ADMIN — Medication 3 MILLILITER(S): at 17:39

## 2023-06-10 RX ADMIN — Medication 10 MILLIGRAM(S): at 00:50

## 2023-06-10 RX ADMIN — ATORVASTATIN CALCIUM 80 MILLIGRAM(S): 80 TABLET, FILM COATED ORAL at 23:36

## 2023-06-10 RX ADMIN — Medication 3 MILLILITER(S): at 12:00

## 2023-06-10 RX ADMIN — Medication 4 MILLILITER(S): at 17:42

## 2023-06-10 RX ADMIN — LOSARTAN POTASSIUM 100 MILLIGRAM(S): 100 TABLET, FILM COATED ORAL at 23:35

## 2023-06-10 RX ADMIN — Medication 4 MILLILITER(S): at 12:00

## 2023-06-10 NOTE — PROGRESS NOTE ADULT - ASSESSMENT
82y (1940) woman with a PMHx significant for HTN, afib on Xarelto (last dose 6/1 6pm), hepatitis C on antiviral presenting with acute onset of AMS. LKW 10pm. Around 10:30pm patient become acutely altered, slumped to right side, head turned to left, not answering questions properly or moving the right side. NIHSS 22 most notable for decreased arousal, incorrect answers, not following commands, left gaze deviation that could not be overcome, right facial droop, right sided severe weakness compared to left, slurred speech, aphasia. Patient lives at home with daughter who is at bedside. Patient is [place] speaking. Daughter reports at baseline she intermittently walks with a cane and needs minor help with ADLs such as getting dressed or running a shower, Daughter looks after her affairs. She denies dementia or cognitive impairment. mRS 3.     LKW 10pm (6/1)  NIHSS 22  pre-mRS 3  Xarelto last taken at 6pm, INR=1.79   (02 Jun 2023 01:43)  has been hypertensive   family at bedside

## 2023-06-10 NOTE — PROGRESS NOTE ADULT - SUBJECTIVE AND OBJECTIVE BOX
THE PATIENT WAS SEEN AND EXAMINED BY ME WITH THE HOUSESTAFF AND STROKE TEAM DURING MORNING ROUNDS.   HPI:  82y woman with a PMHx significant for HTN, afib on Xarelto (last dose 6/1 6pm), hepatitis C on antiviral presenting with acute onset of AMS. LKW 10pm. Around 10:30pm patient become acutely altered, slumped to right side, head turned to left, not answering questions properly or moving the right side. NIHSS 22 most notable for decreased arousal, incorrect answers, not following commands, left gaze deviation that could not be overcome, right facial droop, right sided severe weakness compared to left, slurred speech, aphasia. Patient lives at home with daughter who is at bedside. Patient is Tajik speaking. Daughter reports at baseline she intermittently walks with a cane and needs minor help with ADLs such as getting dressed or running a shower, Daughter looks after her affairs. She denies dementia or cognitive impairment. mRS 3. S/p thrombectomy s/p TICI 2C mechanism cardioembolic from known afib., LKW 10pm (6/1), NIHSS 22, pre-mRS 3,     SUBJECTIVE: No events overnight.  No new neurologic complaints, ROS reported negative unless otherwise noted.      acetaminophen   Oral Liquid .. 650 milliGRAM(s) Oral every 6 hours PRN  acetylcysteine 20%  Inhalation 4 milliLiter(s) Inhalation every 6 hours  albuterol/ipratropium for Nebulization 3 milliLiter(s) Nebulizer every 6 hours  amLODIPine   Tablet 10 milliGRAM(s) Oral daily  aspirin  chewable 81 milliGRAM(s) Oral daily  atorvastatin 80 milliGRAM(s) Oral at bedtime  chlorhexidine 4% Liquid 1 Application(s) Topical daily  enoxaparin Injectable 40 milliGRAM(s) SubCutaneous <User Schedule>  entecavir Solution 0.5 milliGRAM(s) Oral <User Schedule>  hydrALAZINE Injectable 10 milliGRAM(s) IV Push every 3 hours PRN  labetalol 400 milliGRAM(s) Oral every 8 hours  labetalol Injectable 10 milliGRAM(s) IV Push every 4 hours PRN  losartan 100 milliGRAM(s) Oral daily  polyethylene glycol 3350 17 Gram(s) Oral daily  senna 2 Tablet(s) Oral at bedtime      PHYSICAL EXAM:   Vital Signs Last 24 Hrs  T(C): 36.7 (10 David 2023 12:00), Max: 36.7 (10 David 2023 08:00)  T(F): 98.1 (10 David 2023 12:00), Max: 98.1 (10 David 2023 08:00)  HR: 141 (10 David 2023 14:00) (93 - 141)  BP: 156/72 (10 David 2023 14:00) (110/79 - 186/80)  BP(mean): 104 (10 David 2023 14:00) (90 - 161)  RR: 21 (10 David 2023 14:00) (18 - 42)  SpO2: 94% (10 David 2023 14:00) (92% - 99%)    Parameters below as of 10 David 2023 12:41  Patient On (Oxygen Delivery Method): room air        General: No acute distress  HEENT: EOM intact, visual fields full  Abdomen: Soft, nontender, nondistended   Extremities: No edema    NEUROLOGICAL EXAM:  Mental status: eyes closed, opens yes to voice, awake, alert, oriented x3, hypophonia, minimal verbal output, requires prompting to generate fluent speech,   Cranial Nerves:  Right nasolabial flattening, no BTT on right, right gaze palsy can be overcome voluntarily  Motor exam: Normal tone, right hemiplegia: RUE 0/5, RLE 0/5, triple flexion, LUE 5/5, LLE 5/5,   Sensation: Intact to light touch   Coordination/ Gait: unable to participate with exam     LABS:                        13.5   9.69  )-----------( 318      ( 10 David 2023 04:49 )             40.8    06-10    144  |  109<H>  |  26<H>  ----------------------------<  125<H>  3.9   |  21<L>  |  0.65    Ca    9.5      10 David 2023 04:49  Phos  3.3     06-10  Mg     2.3     06-10          IMAGING: Reviewed by me.     CT Brain Stroke Protocol 06.01.23: No acute intracranial hemorrhage or mass effect.    CT angiography neck 06/01/23: No hemodynamically significant stenosis by NASCET criteria. No vascular dissection. Nonspecific ground glass opacities in the visualized apices of the lungs, may represent infectious versus inflammatory disease.    CT angiography brain 06/01/23: Left proximal M1 occlusion.    CT perfusion 06/01/23:: Core infarct of 6 cc with a penumbra of 162 cc.    CT Head No Cont (06.02.23 @ 09:59): An evolving left MCA distribution infarct is noted as described.    CT Head No Cont (06.05.23 @ 08:27) Evolving left MCA distribution infarct. More well-defined lucency and slight increased mass effect on the left lateral ventricle. Subtle increased attenuation at the level of the basal ganglia on the left may indicate a component of evolving hemorrhage.    MR Head No Cont 6.06.23 Parenchymal volume loss and chronic microvascular ischemic changes identified Abnormal T2 prolongation with restricted diffusion is seen involving the posterior left insula, left temporal frontal and parietal cortical subcortical region is identified. These finding does demonstrate abnormal susceptibility and is compatible with a hemorrhagic left MCA infarct. There is localized mass effect seen consisting sulcal effacement. Mass effect on the left lateral ventricle seen. No significant shift or herniation is seen. The large vessels demonstrate normal flow-voids Minimal mucosal thickening is seen involving both ethmoid sinuses. Both mastoid and middle ear regions appear clear. 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

## 2023-06-10 NOTE — PROGRESS NOTE ADULT - ASSESSMENT
82y (1940) woman with a PMHx significant for HTN, afib on Xarelto (last dose 6/1 6pm), hepatitis C on antiviral presenting with acute onset of AMS. LKW 10pm. Around 10:30pm patient become acutely altered, slumped to right side, head turned to left, not answering questions properly or moving the right side. NIHSS 22 most notable for decreased arousal, incorrect answers, not following commands, left gaze deviation that could not be overcome, right sided severe weakness compared to left, slurred speech, aphasia. Patient lives at home with daughter who is at bedside. Patient is Algerian speaking. Daughter reports at baseline she intermittently walks with a cane and needs minor help with ADLs such as getting dressed or running a shower, Daughter looks after her affairs. She denies dementia or cognitive impairment. mRS 3. Not a tenecteplase candidate due to INR 1.79. Patient taken to IR for mechanical thrombectomy.    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  s/p thrombectomy s/p TICI 2C mechanism cardioembolic from known afib.    Plan  NEURO: Overall stable neurologically,  Continue close monitoring for neurologic deterioration, Normotension, atorvastatin 80mg to LDL goal less than 70, MRI Brain w/o and as above. Physical therapy/OT/Speech eval/treatment for acute rehab.     ANTITHROMBOTIC THERAPY: aspirin 81mg daily. Will change to anticoagulation with Eliquis 5mg bid, 10-12 days from inciting event an donce POa ccess obtained.     PULMONARY: CXR (06/05)  with L basilar opacities, unchanged, likely aspiration PNA. On Zosyn for aspiration PNA for 5 days ((final dose today 6/9).  saturating well, NC as needed. Mucomyst 20% 4mL q6h, Duonebs q6h, chest PT for secretions.     CARDIOVASCULAR: TTE EF 60% severe (grade 3) left ventricular diastolic dysfunction. The left atrium is moderately dilated. The right atrium is dilated and Moderate aortic stenosi, cardiac monitoring. continue current cardiac meds for BOP mgt, Dr Zhu (cardio) consult appreciated                        SBP goal: 120-160mmHg    GASTROINTESTINAL:  dysphagia screen failed, s/p FEEST on 06/08: with recommendations for NPO,  s/s to  re-evaluate on 06/04/23 . Bowel regimen. NGT pulled by pt overnight, multiple attempts were made, Xray ordered to check for placement      Diet: NG tube in. Glucerna 1.2 at goal plus 2 cans of probiotic.     RENAL: BUN/Cr 26/0.65, will provide Free water bolus once NGT in place, good urine output      Na Goal: Greater than 135     Virgen: none    HEMATOLOGY: H/H without change, Platelets normal      DVT ppx: LMWH     ID: afebrile, no leukocytosis. On Zosyn for aspiration PNA, last day Jun 9th. On entecavir daily for Hep C.      OTHER: On restraints for pulling at lines.     DISPOSITION: Acute rehab as per PT/OT eval once stable and medical work up is complete    CORE MEASURES:        Admission NIHSS: 22     Tenecteplase: [] YES [x] NO      LDL/HDL: 33/47     Statin Therapy: Yes     Dysphagia Screen: [] PASS [x] FAIL     Smoking [] YES [x] NO      Afib [x] YES [] NO     Stroke Education [x] YES [] NO    Obtain screening lower extremity venous ultrasound in patients who meet 1 or more of the following criteria as patient is high risk for DVT/PE on admission: COMPLETED - NEGATIVE  [] History of DVT/PE  []Hypercoagulable states (Factor V Leiden, Cancer, OCP, etc. )  []Prolonged immobility (hemiplegia/hemiparesis/post operative or any other extended immobilization)  [] Transferred from outside facility (Rehab or Long term care)  [] Age </= to 50

## 2023-06-10 NOTE — PROGRESS NOTE ADULT - PROBLEM SELECTOR PLAN 1
likely embolic as she was not taking xarelto WITH food   will switch to eliquis   check TTE   monitor on tele

## 2023-06-10 NOTE — PROGRESS NOTE ADULT - SUBJECTIVE AND OBJECTIVE BOX
Subjective: Patient seen and examined. No new events except as noted.   remains in ICU     REVIEW OF SYSTEMS:    CONSTITUTIONAL: + weakness, fevers or chills  EYES/ENT: No visual changes;  No vertigo or throat pain   NECK: No pain or stiffness  RESPIRATORY: No cough, wheezing, hemoptysis; No shortness of breath  CARDIOVASCULAR: No chest pain or palpitations  GASTROINTESTINAL: No abdominal or epigastric pain. No nausea, vomiting, or hematemesis; No diarrhea or constipation. No melena or hematochezia.  GENITOURINARY: No dysuria, frequency or hematuria  NEUROLOGICAL: No numbness or weakness  SKIN: No itching, burning, rashes, or lesions   All other review of systems is negative unless indicated above.    MEDICATIONS:  MEDICATIONS  (STANDING):  acetylcysteine 20%  Inhalation 4 milliLiter(s) Inhalation every 6 hours  albuterol/ipratropium for Nebulization 3 milliLiter(s) Nebulizer every 6 hours  amLODIPine   Tablet 10 milliGRAM(s) Oral daily  aspirin  chewable 81 milliGRAM(s) Oral daily  atorvastatin 80 milliGRAM(s) Oral at bedtime  chlorhexidine 4% Liquid 1 Application(s) Topical daily  enoxaparin Injectable 40 milliGRAM(s) SubCutaneous <User Schedule>  entecavir Solution 0.5 milliGRAM(s) Oral <User Schedule>  labetalol 400 milliGRAM(s) Oral every 8 hours  losartan 100 milliGRAM(s) Oral daily  polyethylene glycol 3350 17 Gram(s) Oral daily  senna 2 Tablet(s) Oral at bedtime      PHYSICAL EXAM:  T(C): 37.2 (06-10-23 @ 20:00), Max: 37.2 (06-10-23 @ 20:00)  HR: 118 (06-10-23 @ 20:00) (106 - 141)  BP: 150/90 (06-10-23 @ 20:00) (147/63 - 191/144)  RR: 23 (06-10-23 @ 20:00) (17 - 40)  SpO2: 97% (06-10-23 @ 20:00) (93% - 100%)  Wt(kg): --  I&O's Summary    09 Jun 2023 07:01  -  10 David 2023 07:00  --------------------------------------------------------  IN: 1088 mL / OUT: 450 mL / NET: 638 mL    10 David 2023 07:01  -  10 David 2023 21:54  --------------------------------------------------------  IN: 0 mL / OUT: 0 mL / NET: 0 mL      Appearance: NAD +NGT 	  HEENT:   Dry  oral mucosa, PERRL, EOMI	  Lymphatic: No lymphadenopathy , no edema  Cardiovascular: irregular  S1 S2, No JVD, No murmurs , Peripheral pulses palpable 2+ bilaterally  Respiratory: decreased bs   Gastrointestinal:  Soft, Non-tender, + BS	  Skin: No rashes, No ecchymoses, No cyanosis, warm to touch  Musculoskeletal: Normal range of motion, normal strength  Psychiatry:  Mood & affect appropriate  Ext: No edema  Neurological (>12):  Mental status: awake and alert, attentive to examiner. oriented to self and place (hospital), does not know age or year. follows simple commands. does not follow cross commands  , speech hypophonic, no dysarthria, follows simple commands but not 2-step commands, no neglect   Cranial Nerves: Right nasolabial flattening, no BTT on right, right gaze palsy can be overcome voluntarily  Motor exam: Normal tone, 0/5 RUE, 0/5 RLE, 5/5 LUE, 5/5 LLE, normal fine finger movements. triple flexion of RLE  Sensation: Intact to light touch   Coordination/ Gait: No dysmetria, gait not testedLABS:    CARDIAC MARKERS:                                13.5   9.69  )-----------( 318      ( 10 David 2023 04:49 )             40.8     06-10    144  |  109<H>  |  26<H>  ----------------------------<  125<H>  3.9   |  21<L>  |  0.65    Ca    9.5      10 David 2023 04:49  Phos  3.3     06-10  Mg     2.3     06-10      proBNP:   Lipid Profile:   HgA1c:   TSH:             TELEMETRY: 	AF    ECG:  	  RADIOLOGY:   DIAGNOSTIC TESTING:  [ ] Echocardiogram:  [ ]  Catheterization:  [ ] Stress Test:    OTHER:

## 2023-06-11 LAB
ANION GAP SERPL CALC-SCNC: 12 MMOL/L — SIGNIFICANT CHANGE UP (ref 5–17)
BASE EXCESS BLDA CALC-SCNC: -3.8 MMOL/L — LOW (ref -2–3)
BUN SERPL-MCNC: 34 MG/DL — HIGH (ref 7–23)
CALCIUM SERPL-MCNC: 8.7 MG/DL — SIGNIFICANT CHANGE UP (ref 8.4–10.5)
CHLORIDE SERPL-SCNC: 113 MMOL/L — HIGH (ref 96–108)
CO2 BLDA-SCNC: 19 MMOL/L — SIGNIFICANT CHANGE UP (ref 19–24)
CO2 SERPL-SCNC: 17 MMOL/L — LOW (ref 22–31)
CREAT SERPL-MCNC: 0.91 MG/DL — SIGNIFICANT CHANGE UP (ref 0.5–1.3)
EGFR: 63 ML/MIN/1.73M2 — SIGNIFICANT CHANGE UP
GAS PNL BLDA: SIGNIFICANT CHANGE UP
GLUCOSE SERPL-MCNC: 173 MG/DL — HIGH (ref 70–99)
HCO3 BLDA-SCNC: 18 MMOL/L — LOW (ref 21–28)
HCT VFR BLD CALC: 37.4 % — SIGNIFICANT CHANGE UP (ref 34.5–45)
HGB BLD-MCNC: 12.3 G/DL — SIGNIFICANT CHANGE UP (ref 11.5–15.5)
MAGNESIUM SERPL-MCNC: 2.2 MG/DL — SIGNIFICANT CHANGE UP (ref 1.6–2.6)
MCHC RBC-ENTMCNC: 30.5 PG — SIGNIFICANT CHANGE UP (ref 27–34)
MCHC RBC-ENTMCNC: 32.9 GM/DL — SIGNIFICANT CHANGE UP (ref 32–36)
MCV RBC AUTO: 92.8 FL — SIGNIFICANT CHANGE UP (ref 80–100)
NRBC # BLD: 0 /100 WBCS — SIGNIFICANT CHANGE UP (ref 0–0)
PCO2 BLDA: 26 MMHG — LOW (ref 32–45)
PH BLDA: 7.46 — HIGH (ref 7.35–7.45)
PHOSPHATE SERPL-MCNC: 3.6 MG/DL — SIGNIFICANT CHANGE UP (ref 2.5–4.5)
PLATELET # BLD AUTO: 325 K/UL — SIGNIFICANT CHANGE UP (ref 150–400)
PO2 BLDA: 113 MMHG — HIGH (ref 83–108)
POTASSIUM SERPL-MCNC: 3.9 MMOL/L — SIGNIFICANT CHANGE UP (ref 3.5–5.3)
POTASSIUM SERPL-SCNC: 3.9 MMOL/L — SIGNIFICANT CHANGE UP (ref 3.5–5.3)
RBC # BLD: 4.03 M/UL — SIGNIFICANT CHANGE UP (ref 3.8–5.2)
RBC # FLD: 13.1 % — SIGNIFICANT CHANGE UP (ref 10.3–14.5)
SAO2 % BLDA: 99.7 % — HIGH (ref 94–98)
SODIUM SERPL-SCNC: 142 MMOL/L — SIGNIFICANT CHANGE UP (ref 135–145)
WBC # BLD: 9.22 K/UL — SIGNIFICANT CHANGE UP (ref 3.8–10.5)
WBC # FLD AUTO: 9.22 K/UL — SIGNIFICANT CHANGE UP (ref 3.8–10.5)

## 2023-06-11 PROCEDURE — 70450 CT HEAD/BRAIN W/O DYE: CPT | Mod: 26

## 2023-06-11 RX ORDER — SODIUM CHLORIDE 9 MG/ML
1000 INJECTION INTRAMUSCULAR; INTRAVENOUS; SUBCUTANEOUS ONCE
Refills: 0 | Status: COMPLETED | OUTPATIENT
Start: 2023-06-11 | End: 2023-06-11

## 2023-06-11 RX ADMIN — Medication 4 MILLILITER(S): at 11:18

## 2023-06-11 RX ADMIN — AMLODIPINE BESYLATE 10 MILLIGRAM(S): 2.5 TABLET ORAL at 12:19

## 2023-06-11 RX ADMIN — Medication 3 MILLILITER(S): at 11:18

## 2023-06-11 RX ADMIN — Medication 3 MILLILITER(S): at 05:23

## 2023-06-11 RX ADMIN — POLYETHYLENE GLYCOL 3350 17 GRAM(S): 17 POWDER, FOR SOLUTION ORAL at 18:31

## 2023-06-11 RX ADMIN — Medication 3 MILLILITER(S): at 23:04

## 2023-06-11 RX ADMIN — Medication 400 MILLIGRAM(S): at 09:20

## 2023-06-11 RX ADMIN — Medication 400 MILLIGRAM(S): at 01:00

## 2023-06-11 RX ADMIN — SODIUM CHLORIDE 1000 MILLILITER(S): 9 INJECTION INTRAMUSCULAR; INTRAVENOUS; SUBCUTANEOUS at 02:00

## 2023-06-11 RX ADMIN — ATORVASTATIN CALCIUM 80 MILLIGRAM(S): 80 TABLET, FILM COATED ORAL at 21:08

## 2023-06-11 RX ADMIN — Medication 3 MILLILITER(S): at 18:00

## 2023-06-11 RX ADMIN — Medication 10 MILLIGRAM(S): at 00:07

## 2023-06-11 RX ADMIN — Medication 81 MILLIGRAM(S): at 12:19

## 2023-06-11 RX ADMIN — ENTECAVIR 0.5 MILLIGRAM(S): 0.5 TABLET ORAL at 06:00

## 2023-06-11 RX ADMIN — ENOXAPARIN SODIUM 40 MILLIGRAM(S): 100 INJECTION SUBCUTANEOUS at 18:31

## 2023-06-11 RX ADMIN — CHLORHEXIDINE GLUCONATE 1 APPLICATION(S): 213 SOLUTION TOPICAL at 21:09

## 2023-06-11 RX ADMIN — Medication 4 MILLILITER(S): at 17:59

## 2023-06-11 RX ADMIN — Medication 4 MILLILITER(S): at 05:23

## 2023-06-11 RX ADMIN — SENNA PLUS 2 TABLET(S): 8.6 TABLET ORAL at 21:08

## 2023-06-11 RX ADMIN — Medication 4 MILLILITER(S): at 23:03

## 2023-06-11 NOTE — PROGRESS NOTE ADULT - ASSESSMENT
82y (1940) woman with a PMHx significant for HTN, afib on Xarelto (last dose 6/1 6pm), hepatitis C on antiviral presenting with acute onset of AMS. LKW 10pm. Around 10:30pm patient become acutely altered, slumped to right side, head turned to left, not answering questions properly or moving the right side. NIHSS 22 most notable for decreased arousal, incorrect answers, not following commands, left gaze deviation that could not be overcome, right facial droop, right sided severe weakness compared to left, slurred speech, aphasia. Patient lives at home with daughter who is at bedside. Patient is Mauritian speaking. Daughter reports at baseline she intermittently walks with a cane and needs minor help with ADLs such as getting dressed or running a shower, Daughter looks after her affairs. She denies dementia or cognitive impairment. mRS 3.     LKW 10pm (6/1)  NIHSS 22  pre-mRS 3  Xarelto last taken at 6pm, INR=1.79   (02 Jun 2023 01:43)  has been hypertensive   family at bedside

## 2023-06-11 NOTE — PROGRESS NOTE ADULT - SUBJECTIVE AND OBJECTIVE BOX
THE PATIENT WAS SEEN AND EXAMINED BY ME WITH THE HOUSESTAFF AND STROKE TEAM DURING MORNING ROUNDS.   HPI:  82y woman with a PMHx significant for HTN, afib on Xarelto (last dose 6/1 6pm), hepatitis C on antiviral presenting with acute onset of AMS. LKW 10pm. Around 10:30pm patient become acutely altered, slumped to right side, head turned to left, not answering questions properly or moving the right side. NIHSS 22 most notable for decreased arousal, incorrect answers, not following commands, left gaze deviation that could not be overcome, right facial droop, right sided severe weakness compared to left, slurred speech, aphasia. Patient lives at home with daughter who is at bedside. Patient is Yi speaking. Daughter reports at baseline she intermittently walks with a cane and needs minor help with ADLs such as getting dressed or running a shower, Daughter looks after her affairs. She denies dementia or cognitive impairment. mRS 3. S/p thrombectomy s/p TICI 2C mechanism cardioembolic from known afib., LKW 10pm (6/1), NIHSS 22, pre-mRS 3,     SUBJECTIVE: No events overnight.  No new neurologic complaints, ROS reported negative unless otherwise noted.      acetaminophen   Oral Liquid .. 650 milliGRAM(s) Oral every 6 hours PRN  acetylcysteine 20%  Inhalation 4 milliLiter(s) Inhalation every 6 hours  albuterol/ipratropium for Nebulization 3 milliLiter(s) Nebulizer every 6 hours  amLODIPine   Tablet 10 milliGRAM(s) Oral daily  aspirin  chewable 81 milliGRAM(s) Oral daily  atorvastatin 80 milliGRAM(s) Oral at bedtime  chlorhexidine 4% Liquid 1 Application(s) Topical daily  enoxaparin Injectable 40 milliGRAM(s) SubCutaneous <User Schedule>  entecavir Solution 0.5 milliGRAM(s) Oral <User Schedule>  hydrALAZINE Injectable 10 milliGRAM(s) IV Push every 3 hours PRN  labetalol 400 milliGRAM(s) Oral every 8 hours  labetalol Injectable 10 milliGRAM(s) IV Push every 4 hours PRN  losartan 100 milliGRAM(s) Oral daily  polyethylene glycol 3350 17 Gram(s) Oral daily  senna 2 Tablet(s) Oral at bedtime      PHYSICAL EXAM:   Vital Signs Last 24 Hrs  T(C): 37.3 (11 Jun 2023 12:00), Max: 37.3 (11 Jun 2023 12:00)  T(F): 99.1 (11 Jun 2023 12:00), Max: 99.1 (11 Jun 2023 12:00)  HR: 95 (11 Jun 2023 15:00) (91 - 142)  BP: 109/55 (11 Jun 2023 15:00) (71/48 - 191/144)  BP(mean): 77 (11 Jun 2023 15:00) (56 - 157)  RR: 18 (11 Jun 2023 15:00) (16 - 34)  SpO2: 93% (11 Jun 2023 15:00) (92% - 100%)    Parameters below as of 11 Jun 2023 12:00  Patient On (Oxygen Delivery Method): room air        General: No acute distress  HEENT: EOM intact, visual fields full  Abdomen: Soft, nontender, nondistended   Extremities: No edema    NEUROLOGICAL EXAM:  Mental status: eyes open,  awake, alert, oriented x3, hypophonia, minimal verbal output, requires prompting to generate fluent speech,   Cranial Nerves:  Right nasolabial flattening, no BTT on right, right gaze palsy can be overcome voluntarily  Motor exam: Normal tone, right hemiplegia: RUE 0/5, RLE 0/5, triple flexion, LUE 5/5, LLE 5/5,   Sensation: Intact to light touch   Coordination/ Gait: unable to participate with exam     LABS:                        12.3   9.22  )-----------( 325      ( 11 Jun 2023 02:33 )             37.4    06-11    142  |  113<H>  |  34<H>  ----------------------------<  173<H>  3.9   |  17<L>  |  0.91    Ca    8.7      11 Jun 2023 02:33  Phos  3.6     06-11  Mg     2.2     06-11          IMAGING: Reviewed by me.     [facility] (06/11):  Improved subacute moderate to large left MCA territory infarction. Stable petechial hemorrhage. Mild chronic microvascular   changes without evidence of a new acute transcortical infarction or hemorrhage.    MR Head No Cont 6.06.23 Parenchymal volume loss and chronic microvascular ischemic changes identified Abnormal T2 prolongation with restricted diffusion is seen involving the posterior left insula, left temporal frontal and parietal cortical subcortical region is identified. These finding does demonstrate abnormal susceptibility and is compatible with a hemorrhagic left MCA infarct. There is localized mass effect seen consisting sulcal effacement. Mass effect on the left lateral ventricle seen. No significant shift or herniation is seen. The large vessels demonstrate normal flow-voids Minimal mucosal thickening is seen involving both ethmoid sinuses. Both mastoid and middle ear regions appear clear. 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    CT Head No Cont (06.05.23 @ 08:27) Evolving left MCA distribution infarct. More well-defined lucency and slight increased mass effect on the left lateral ventricle. Subtle increased attenuation at the level of the basal ganglia on the left may indicate a component of evolving hemorrhage.    CT Head No Cont (06.02.23 @ 09:59): An evolving left MCA distribution infarct .    CT Brain Stroke Protocol 06.01.23: No acute intracranial hemorrhage or mass effect.    CT angiography neck 06/01/23: No hemodynamically significant stenosis by NASCET criteria. No vascular dissection. Nonspecific ground glass opacities in the visualized apices of the lungs, may represent infectious versus inflammatory disease.    CT angiography brain 06/01/23: Left proximal M1 occlusion.    CT perfusion 06/01/23:: Core infarct of 6 cc with a penumbra of 162 cc.

## 2023-06-11 NOTE — PROGRESS NOTE ADULT - ASSESSMENT
82y (1940) woman with a PMHx significant for HTN, afib on Xarelto (last dose 6/1 6pm), hepatitis C on antiviral presenting with acute onset of AMS. LKW 10pm. Around 10:30pm patient become acutely altered, slumped to right side, head turned to left, not answering questions properly or moving the right side. NIHSS 22 most notable for decreased arousal, incorrect answers, not following commands, left gaze deviation that could not be overcome, right sided severe weakness compared to left, slurred speech, aphasia. Patient lives at home with daughter who is at bedside. Patient is Bulgarian speaking. Daughter reports at baseline she intermittently walks with a cane and needs minor help with ADLs such as getting dressed or running a shower, Daughter looks after her affairs. She denies dementia or cognitive impairment. mRS 3. Not a tenecteplase candidate due to INR 1.79. Patient taken to IR for mechanical thrombectomy.    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  s/p thrombectomy s/p TICI 2C mechanism cardioembolic from known afib.    Plan  NEURO: Overall stable neurologically, repeat CT shows Subtle increased attenuation at the level of the basal ganglia on the left may indicate a component of evolving hemorrhage.  Continue close monitoring for neurologic deterioration, Normotension, atorvastatin 80mg to LDL goal less than 70, MRI Brain w/o and as above. Physical therapy/OT/Speech eval/treatment for acute rehab.     ANTITHROMBOTIC THERAPY: aspirin 81mg daily. Will change to anticoagulation with Eliquis 5mg bid, 10-12 days from inciting event an donce POa ccess obtained.     PULMONARY: tachypnea on 06/10 RR 19-27 saturating well, Contacde Dr Villatoro (Pulm) for further eval/recommendations, CXR (06/05)  with L basilar opacities, unchanged, likely aspiration PNA. Pt completed Zosyn for aspiration PNA for 5 days ((final dose 6/9).  NC as needed. Mucomyst 20% 4mL q6h, Duonebs q6h, chest PT for secretions.     CARDIOVASCULAR: TTE EF 60% severe (grade 3) left ventricular diastolic dysfunction. The left atrium is moderately dilated. The right atrium is dilated and Moderate aortic stenosi, cardiac monitoring. continue current cardiac meds for BOP mgt, Dr Zhu (cardio) consult appreciated                        SBP goal: 120-160mmHg    GASTROINTESTINAL:  dysphagia screen failed, s/p FEEST on 06/08: with recommendations for NPO,  s/s to  re-evaluate on 06/04/23 . Bowel regimen. NGT pulled by pt overnight, multiple attempts were made, Xray ordered to check for placement      Diet: NG tube in. Glucerna 1.2 at goal plus 2 cans of probiotic.     RENAL: BUN/Cr 34/0.91: Prerenal likely due to dehydration , will provide Free water bolus once NGT in place, good urine output      Na Goal: Greater than 135     Virgen: none    HEMATOLOGY: H/H without change, Platelets normal      DVT ppx: LMWH     ID: afebrile, no leukocytosis. On Zosyn for aspiration PNA, last day Jun 9th. On entecavir daily for Hep C.      OTHER: On restraints for pulling at lines.     DISPOSITION: Acute rehab as per PT/OT eval once stable and medical work up is complete    CORE MEASURES:        Admission NIHSS: 22     Tenecteplase: [] YES [x] NO      LDL/HDL: 33/47     Statin Therapy: Yes     Dysphagia Screen: [] PASS [x] FAIL     Smoking [] YES [x] NO      Afib [x] YES [] NO     Stroke Education [x] YES [] NO    Obtain screening lower extremity venous ultrasound in patients who meet 1 or more of the following criteria as patient is high risk for DVT/PE on admission: COMPLETED - NEGATIVE  [] History of DVT/PE  []Hypercoagulable states (Factor V Leiden, Cancer, OCP, etc. )  []Prolonged immobility (hemiplegia/hemiparesis/post operative or any other extended immobilization)  [] Transferred from outside facility (Rehab or Long term care)  [] Age </= to 50

## 2023-06-11 NOTE — PROGRESS NOTE ADULT - SUBJECTIVE AND OBJECTIVE BOX
Subjective: Patient seen and examined. No new events except as noted.   remains in ICU     REVIEW OF SYSTEMS:    CONSTITUTIONAL: + weakness, fevers or chills  EYES/ENT: No visual changes;  No vertigo or throat pain   NECK: No pain or stiffness  RESPIRATORY: No cough, wheezing, hemoptysis; No shortness of breath  CARDIOVASCULAR: No chest pain or palpitations  GASTROINTESTINAL: No abdominal or epigastric pain. No nausea, vomiting, or hematemesis; No diarrhea or constipation. No melena or hematochezia.  GENITOURINARY: No dysuria, frequency or hematuria  NEUROLOGICAL:+numbness or weakness  SKIN: No itching, burning, rashes, or lesions   All other review of systems is negative unless indicated above.    MEDICATIONS:  MEDICATIONS  (STANDING):  acetylcysteine 20%  Inhalation 4 milliLiter(s) Inhalation every 6 hours  albuterol/ipratropium for Nebulization 3 milliLiter(s) Nebulizer every 6 hours  amLODIPine   Tablet 10 milliGRAM(s) Oral daily  aspirin  chewable 81 milliGRAM(s) Oral daily  atorvastatin 80 milliGRAM(s) Oral at bedtime  chlorhexidine 4% Liquid 1 Application(s) Topical daily  enoxaparin Injectable 40 milliGRAM(s) SubCutaneous <User Schedule>  entecavir Solution 0.5 milliGRAM(s) Oral <User Schedule>  labetalol 400 milliGRAM(s) Oral every 8 hours  losartan 100 milliGRAM(s) Oral daily  polyethylene glycol 3350 17 Gram(s) Oral daily  senna 2 Tablet(s) Oral at bedtime      PHYSICAL EXAM:  T(C): 36.5 (06-11-23 @ 04:00), Max: 37.2 (06-10-23 @ 20:00)  HR: 109 (06-11-23 @ 06:00) (91 - 142)  BP: 152/77 (06-11-23 @ 06:00) (71/48 - 191/144)  RR: 18 (06-11-23 @ 06:00) (17 - 37)  SpO2: 96% (06-11-23 @ 06:00) (92% - 100%)  Wt(kg): --  I&O's Summary    10 David 2023 07:01  -  11 Jun 2023 07:00  --------------------------------------------------------  IN: 1575 mL / OUT: 900 mL / NET: 675 mL          Appearance: NAD +NGT 	  HEENT:   Dry  oral mucosa, PERRL, EOMI	  Lymphatic: No lymphadenopathy , no edema  Cardiovascular: irregular  S1 S2, No JVD, No murmurs , Peripheral pulses palpable 2+ bilaterally  Respiratory: decreased bs   Gastrointestinal:  Soft, Non-tender, + BS	  Skin: No rashes, No ecchymoses, No cyanosis, warm to touch  Musculoskeletal: Normal range of motion, normal strength  Psychiatry:  Mood & affect appropriate  Ext: No edema  Neurological (>12):  Mental status: awake and alert, attentive to examiner. oriented to self and place (hospital), does not know age or year. follows simple commands. does not follow cross commands  , speech hypophonic, no dysarthria, follows simple commands but not 2-step commands, no neglect   Cranial Nerves: Right nasolabial flattening, no BTT on right, right gaze palsy can be overcome voluntarily  Motor exam: Normal tone, 0/5 RUE, 0/5 RLE, 5/5 LUE, 5/5 LLE, normal fine finger movements. triple flexion of RLE  Sensation: Intact to light touch   Coordination/ Gait: No dysmetria, gait not tested      LABS:    CARDIAC MARKERS:                                12.3   9.22  )-----------( 325      ( 11 Jun 2023 02:33 )             37.4     06-11    142  |  113<H>  |  34<H>  ----------------------------<  173<H>  3.9   |  17<L>  |  0.91    Ca    8.7      11 Jun 2023 02:33  Phos  3.6     06-11  Mg     2.2     06-11          TELEMETRY: 	  AF  ECG:  	  RADIOLOGY:   DIAGNOSTIC TESTING:  [ ] Echocardiogram:  [ ]  Catheterization:  [ ] Stress Test:    OTHER:

## 2023-06-12 DIAGNOSIS — I48.91 UNSPECIFIED ATRIAL FIBRILLATION: ICD-10-CM

## 2023-06-12 DIAGNOSIS — R06.82 TACHYPNEA, NOT ELSEWHERE CLASSIFIED: ICD-10-CM

## 2023-06-12 LAB
ANION GAP SERPL CALC-SCNC: 11 MMOL/L — SIGNIFICANT CHANGE UP (ref 5–17)
BUN SERPL-MCNC: 44 MG/DL — HIGH (ref 7–23)
CALCIUM SERPL-MCNC: 8.9 MG/DL — SIGNIFICANT CHANGE UP (ref 8.4–10.5)
CHLORIDE SERPL-SCNC: 114 MMOL/L — HIGH (ref 96–108)
CO2 SERPL-SCNC: 21 MMOL/L — LOW (ref 22–31)
CREAT SERPL-MCNC: 0.82 MG/DL — SIGNIFICANT CHANGE UP (ref 0.5–1.3)
EGFR: 71 ML/MIN/1.73M2 — SIGNIFICANT CHANGE UP
GLUCOSE SERPL-MCNC: 137 MG/DL — HIGH (ref 70–99)
HCT VFR BLD CALC: 37 % — SIGNIFICANT CHANGE UP (ref 34.5–45)
HGB BLD-MCNC: 12.1 G/DL — SIGNIFICANT CHANGE UP (ref 11.5–15.5)
MAGNESIUM SERPL-MCNC: 2.4 MG/DL — SIGNIFICANT CHANGE UP (ref 1.6–2.6)
MCHC RBC-ENTMCNC: 30.6 PG — SIGNIFICANT CHANGE UP (ref 27–34)
MCHC RBC-ENTMCNC: 32.7 GM/DL — SIGNIFICANT CHANGE UP (ref 32–36)
MCV RBC AUTO: 93.4 FL — SIGNIFICANT CHANGE UP (ref 80–100)
NRBC # BLD: 0 /100 WBCS — SIGNIFICANT CHANGE UP (ref 0–0)
PHOSPHATE SERPL-MCNC: 3.5 MG/DL — SIGNIFICANT CHANGE UP (ref 2.5–4.5)
PLATELET # BLD AUTO: 309 K/UL — SIGNIFICANT CHANGE UP (ref 150–400)
POTASSIUM SERPL-MCNC: 4 MMOL/L — SIGNIFICANT CHANGE UP (ref 3.5–5.3)
POTASSIUM SERPL-SCNC: 4 MMOL/L — SIGNIFICANT CHANGE UP (ref 3.5–5.3)
RBC # BLD: 3.96 M/UL — SIGNIFICANT CHANGE UP (ref 3.8–5.2)
RBC # FLD: 13.2 % — SIGNIFICANT CHANGE UP (ref 10.3–14.5)
SODIUM SERPL-SCNC: 146 MMOL/L — HIGH (ref 135–145)
WBC # BLD: 9.47 K/UL — SIGNIFICANT CHANGE UP (ref 3.8–10.5)
WBC # FLD AUTO: 9.47 K/UL — SIGNIFICANT CHANGE UP (ref 3.8–10.5)

## 2023-06-12 PROCEDURE — 71045 X-RAY EXAM CHEST 1 VIEW: CPT | Mod: 26

## 2023-06-12 PROCEDURE — 99233 SBSQ HOSP IP/OBS HIGH 50: CPT

## 2023-06-12 RX ORDER — PANTOPRAZOLE SODIUM 20 MG/1
40 TABLET, DELAYED RELEASE ORAL DAILY
Refills: 0 | Status: DISCONTINUED | OUTPATIENT
Start: 2023-06-12 | End: 2023-06-16

## 2023-06-12 RX ADMIN — ATORVASTATIN CALCIUM 80 MILLIGRAM(S): 80 TABLET, FILM COATED ORAL at 22:23

## 2023-06-12 RX ADMIN — Medication 4 MILLILITER(S): at 05:37

## 2023-06-12 RX ADMIN — Medication 400 MILLIGRAM(S): at 17:04

## 2023-06-12 RX ADMIN — Medication 3 MILLILITER(S): at 11:06

## 2023-06-12 RX ADMIN — ENOXAPARIN SODIUM 40 MILLIGRAM(S): 100 INJECTION SUBCUTANEOUS at 17:03

## 2023-06-12 RX ADMIN — Medication 4 MILLILITER(S): at 17:08

## 2023-06-12 RX ADMIN — Medication 3 MILLILITER(S): at 17:07

## 2023-06-12 RX ADMIN — ENTECAVIR 0.5 MILLIGRAM(S): 0.5 TABLET ORAL at 06:03

## 2023-06-12 RX ADMIN — Medication 81 MILLIGRAM(S): at 11:29

## 2023-06-12 RX ADMIN — SENNA PLUS 2 TABLET(S): 8.6 TABLET ORAL at 22:23

## 2023-06-12 RX ADMIN — AMLODIPINE BESYLATE 10 MILLIGRAM(S): 2.5 TABLET ORAL at 11:29

## 2023-06-12 RX ADMIN — CHLORHEXIDINE GLUCONATE 1 APPLICATION(S): 213 SOLUTION TOPICAL at 22:24

## 2023-06-12 RX ADMIN — Medication 3 MILLILITER(S): at 05:37

## 2023-06-12 RX ADMIN — Medication 4 MILLILITER(S): at 11:06

## 2023-06-12 RX ADMIN — POLYETHYLENE GLYCOL 3350 17 GRAM(S): 17 POWDER, FOR SOLUTION ORAL at 17:04

## 2023-06-12 RX ADMIN — Medication 400 MILLIGRAM(S): at 02:16

## 2023-06-12 RX ADMIN — Medication 400 MILLIGRAM(S): at 10:30

## 2023-06-12 NOTE — PROGRESS NOTE ADULT - ASSESSMENT
82y (1940) woman with a PMHx significant for HTN, afib on Xarelto (last dose 6/1 6pm), hepatitis C on antiviral presenting with acute onset of AMS. LKW 10pm. Around 10:30pm patient become acutely altered, slumped to right side, head turned to left, not answering questions properly or moving the right side. NIHSS 22 most notable for decreased arousal, incorrect answers, not following commands, left gaze deviation that could not be overcome, right facial droop, right sided severe weakness compared to left, slurred speech, aphasia. Patient lives at home with daughter who is at bedside. Patient is Turkish speaking. Daughter reports at baseline she intermittently walks with a cane and needs minor help with ADLs such as getting dressed or running a shower, Daughter looks after her affairs. She denies dementia or cognitive impairment. mRS 3.     LKW 10pm (6/1)  NIHSS 22  pre-mRS 3  Xarelto last taken at 6pm, INR=1.79   (02 Jun 2023 01:43)  has been hypertensive   family at bedside

## 2023-06-12 NOTE — PROGRESS NOTE ADULT - SUBJECTIVE AND OBJECTIVE BOX
Subjective: Patient seen and examined. No new events except as noted.   remains in ICU   HR and BP stable     REVIEW OF SYSTEMS:    CONSTITUTIONAL: + weakness, fevers or chills  EYES/ENT: No visual changes;  No vertigo or throat pain   NECK: No pain or stiffness  RESPIRATORY: No cough, wheezing, hemoptysis; No shortness of breath  CARDIOVASCULAR: No chest pain or palpitations  GASTROINTESTINAL: No abdominal or epigastric pain. No nausea, vomiting, or hematemesis; No diarrhea or constipation. No melena or hematochezia.  GENITOURINARY: No dysuria, frequency or hematuria  NEUROLOGICAL: + numbness or weakness  SKIN: No itching, burning, rashes, or lesions   All other review of systems is negative unless indicated above.    MEDICATIONS:  MEDICATIONS  (STANDING):  acetylcysteine 20%  Inhalation 4 milliLiter(s) Inhalation every 6 hours  albuterol/ipratropium for Nebulization 3 milliLiter(s) Nebulizer every 6 hours  amLODIPine   Tablet 10 milliGRAM(s) Oral daily  aspirin  chewable 81 milliGRAM(s) Oral daily  atorvastatin 80 milliGRAM(s) Oral at bedtime  chlorhexidine 4% Liquid 1 Application(s) Topical daily  enoxaparin Injectable 40 milliGRAM(s) SubCutaneous <User Schedule>  entecavir Solution 0.5 milliGRAM(s) Oral <User Schedule>  labetalol 400 milliGRAM(s) Oral every 8 hours  losartan 100 milliGRAM(s) Oral daily  polyethylene glycol 3350 17 Gram(s) Oral daily  senna 2 Tablet(s) Oral at bedtime      PHYSICAL EXAM:  T(C): 36.4 (06-12-23 @ 08:00), Max: 37.3 (06-11-23 @ 12:00)  HR: 97 (06-12-23 @ 09:00) (90 - 110)  BP: 127/64 (06-12-23 @ 09:00) (101/50 - 158/65)  RR: 30 (06-12-23 @ 09:00) (17 - 36)  SpO2: 98% (06-12-23 @ 09:00) (90% - 100%)  Wt(kg): --  I&O's Summary    11 Jun 2023 07:01  -  12 Jun 2023 07:00  --------------------------------------------------------  IN: 1855 mL / OUT: 1100 mL / NET: 755 mL            Appearance: NAD +NGT 	  HEENT:   Dry  oral mucosa, PERRL, EOMI	  Lymphatic: No lymphadenopathy , no edema  Cardiovascular: irregular  S1 S2, No JVD, No murmurs , Peripheral pulses palpable 2+ bilaterally  Respiratory: decreased bs   Gastrointestinal:  Soft, Non-tender, + BS	  Skin: No rashes, No ecchymoses, No cyanosis, warm to touch  Musculoskeletal: Normal range of motion, normal strength  Psychiatry:  Mood & affect appropriate  Ext: No edema  Neurological (>12):  Mental status: awake and alert, attentive to examiner. oriented to self and place (hospital), does not know age or year. follows simple commands. does not follow cross commands  , speech hypophonic, no dysarthria, follows simple commands but not 2-step commands, no neglect   Cranial Nerves: Right nasolabial flattening, no BTT on right, right gaze palsy can be overcome voluntarily  Motor exam: Normal tone, 0/5 RUE, 0/5 RLE, 5/5 LUE, 5/5 LLE, normal fine finger movements. triple flexion of RLE  Sensation: Intact to light touch   Coordination/ Gait: No dysmetria, gait not tested        LABS:    CARDIAC MARKERS:                                12.1   9.47  )-----------( 309      ( 12 Jun 2023 00:34 )             37.0     06-12    146<H>  |  114<H>  |  44<H>  ----------------------------<  137<H>  4.0   |  21<L>  |  0.82    Ca    8.9      12 Jun 2023 00:34  Phos  3.5     06-12  Mg     2.4     06-12      proBNP:   Lipid Profile:   HgA1c:   TSH:             TELEMETRY: 	  AF 80-90s  ECG:  	  RADIOLOGY: < from: MR Head No Cont (06.06.23 @ 22:31) >    ACC: [id number] EXAM:  MR BRAIN   ORDERED BY: KATELYN MTZ     PROCEDURE DATE:  06/06/2023          INTERPRETATION:  Clinical indications: Suspected CVA.    MRI of the brain was performed sagittal T1 axial T1 T2 T2 FLAIR diffusion   and susceptibilityweighted sequence.    This exam is compared with prior head CT performed on June 5, 2020    Parenchymal volume loss and chronic microvascular ischemic changes   identified    Abnormal T2 prolongation with restricted diffusion is seen involving the   posterior left insula, left temporal frontal and parietal cortical   subcortical region is identified. These finding does demonstrate abnormal   susceptibility and is compatible with a hemorrhagic left MCA infarct.   There is localized mass effect seen consisting sulcal effacement. Mass   effect on the left lateral ventricle seen. No significant shift or   herniation is seen.    The large vessels demonstrate normal flow-voids    Minimal mucosal thickening is seen involving both ethmoid sinuses.    Both mastoid and middle ear regions appear clear.    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    --- End of Report ---            PRINCE DEWEY MD; Attending Radiologist  This document has been electronically signed. Jun 7 2023  9:17AM    < end of copied text >    DIAGNOSTIC TESTING:  [ ] Echocardiogram:  [ ]  Catheterization:  [ ] Stress Test:    OTHER:

## 2023-06-12 NOTE — CONSULT NOTE ADULT - SUBJECTIVE AND OBJECTIVE BOX
PULMONARY CONSULT    HPI: 81 y/o Malagasy speaking F with PMH of HTN, afib on Xarelto (last dose 6/1 6pm), hepatitis C on antiviral.  #[id number]. Presents with acute onset of AMS. 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 Course c/b possible aspiration PNA - s/p course of Zosyn. Called to consult for tachypnea overnight with episode of desaturation. Denies cough, SOB, CP, pleuritic CP.         PAST MEDICAL & SURGICAL HISTORY:  HTN  Afib  Hep C      Allergies  No Known Allergies      FAMILY HISTORY: non contributory        Social history: never a smoke     Review of Systems: PEDRO limtied   CONSTITUTIONAL: No fever, chills, or fatigue  EYES: No eye pain, visual disturbances, or discharge  ENMT:  No difficulty hearing, tinnitus, vertigo; No sinus or throat pain  NECK: No pain or stiffness  RESPIRATORY: Per above  CARDIOVASCULAR: No chest pain, palpitations, dizziness, or leg swelling  GASTROINTESTINAL: No abdominal or epigastric pain. No nausea, vomiting, or hematemesis; No diarrhea or constipation. No melena or hematochezia.  GENITOURINARY: No dysuria, frequency, hematuria, or incontinence  NEUROLOGICAL: Per above   SKIN: No itching, burning, rashes, or lesions   MUSCULOSKELETAL: No joint pain or swelling; No muscle, back, or extremity pain  PSYCHIATRIC: No depression, anxiety, mood swings, or difficulty sleeping      Medications:  MEDICATIONS  (STANDING):  acetylcysteine 20%  Inhalation 4 milliLiter(s) Inhalation every 6 hours  albuterol/ipratropium for Nebulization 3 milliLiter(s) Nebulizer every 6 hours  amLODIPine   Tablet 10 milliGRAM(s) Oral daily  aspirin  chewable 81 milliGRAM(s) Oral daily  atorvastatin 80 milliGRAM(s) Oral at bedtime  chlorhexidine 4% Liquid 1 Application(s) Topical daily  enoxaparin Injectable 40 milliGRAM(s) SubCutaneous <User Schedule>  entecavir Solution 0.5 milliGRAM(s) Oral <User Schedule>  labetalol 400 milliGRAM(s) Oral every 8 hours  losartan 100 milliGRAM(s) Oral daily  polyethylene glycol 3350 17 Gram(s) Oral daily  senna 2 Tablet(s) Oral at bedtime    MEDICATIONS  (PRN):  acetaminophen   Oral Liquid .. 650 milliGRAM(s) Oral every 6 hours PRN Temp greater or equal to 38C (100.4F), Mild Pain (1 - 3)  hydrALAZINE Injectable 10 milliGRAM(s) IV Push every 3 hours PRN SBP > 160  labetalol Injectable 10 milliGRAM(s) IV Push every 4 hours PRN Systolic blood pressure > 160            Vital Signs Last 24 Hrs  T(C): 36.4 (12 Jun 2023 08:00), Max: 37.3 (11 Jun 2023 12:00)  T(F): 97.6 (12 Jun 2023 08:00), Max: 99.1 (11 Jun 2023 12:00)  HR: 97 (12 Jun 2023 09:00) (90 - 110)  BP: 127/64 (12 Jun 2023 09:00) (101/50 - 158/65)  BP(mean): 89 (12 Jun 2023 09:00) (71 - 94)  RR: 30 (12 Jun 2023 09:00) (17 - 36)  SpO2: 98% (12 Jun 2023 09:00) (90% - 100%)    Parameters below as of 12 Jun 2023 08:00  Patient On (Oxygen Delivery Method): nasal cannula  O2 Flow (L/min): 4      ABG - ( 11 Jun 2023 05:38 )  pH, Arterial: 7.43  pH, Blood: x     /  pCO2: 29    /  pO2: 201   / HCO3: 19    / Base Excess: -3.9  /  SaO2: 99.7                    06-11 @ 07:01  -  06-12 @ 07:00  --------------------------------------------------------  IN: 1855 mL / OUT: 1100 mL / NET: 755 mL          LABS:                        12.1   9.47  )-----------( 309      ( 12 Jun 2023 00:34 )             37.0     06-12    146<H>  |  114<H>  |  44<H>  ----------------------------<  137<H>  4.0   |  21<L>  |  0.82    Ca    8.9      12 Jun 2023 00:34  Phos  3.5     06-12  Mg     2.4     06-12                CULTURES:      (collected 06-03-23 @ 00:46)  Source: .Blood Blood  Final Report (06-08-23 @ 02:00):    No Growth Final     (collected 06-03-23 @ 00:46)  Source: .Blood Blood  Final Report (06-08-23 @ 02:00):    No Growth Final              Physical Examination:    General: No acute distress.      HEENT: Pupils equal, reactive to light.  Symmetric.    PULM: Clear to auscultation bilaterally, no significant sputum production    CVS: S1, S2    ABD: Soft, nondistended, nontender, normoactive bowel sounds, no masses    EXT: No edema, nontender    SKIN: Warm and well perfused, no rashes noted.    NEURO: A&O x 2     RADIOLOGY REVIEWED  CXR: grossly clear

## 2023-06-12 NOTE — CONSULT NOTE ADULT - PROBLEM SELECTOR RECOMMENDATION 9
reportedly tachypnea with episode of desaturation overnight   -S/p course of Zosyn for ?PNA (completed 6/9). CXR grossly clear, CT chest with small L effusion/atelectasis seen on CT chest from 6/4.  -Seen on 3LNC, non-labored. Placed on RA, o2 sats maintaining mid 90s.  -Afebrile  -CXR ordered  -If tachypnea and hypoxia reoccur and CXR unremarkable, consider CTA chest to r/o PE  -Keep sats >90% with o2 PRN
likely embolic as she was not taking xarelto WITH food   will switch to eliquis   check TTE   monitor on tele   check thyroid panel

## 2023-06-12 NOTE — CONSULT NOTE ADULT - SUBJECTIVE AND OBJECTIVE BOX
Chief Complaint:  Patient is a 82y old  Female who presents with a chief complaint of stroke (12 Jun 2023 13:18)      Date of service: 06-12-23 @ 15:25    HPI:    The patient is a 82 year old female with afib on xarelto who presented with CVA. The patient is being treated for tachypnea. She takes entecavir for ?history of hepatitis. She does not participate in the interview.      Allergies:  No Known Allergies      Home Medications:    Hospital Medications:  acetaminophen   Oral Liquid .. 650 milliGRAM(s) Oral every 6 hours PRN  acetylcysteine 20%  Inhalation 4 milliLiter(s) Inhalation every 6 hours  albuterol/ipratropium for Nebulization 3 milliLiter(s) Nebulizer every 6 hours  amLODIPine   Tablet 10 milliGRAM(s) Oral daily  aspirin  chewable 81 milliGRAM(s) Oral daily  atorvastatin 80 milliGRAM(s) Oral at bedtime  chlorhexidine 4% Liquid 1 Application(s) Topical daily  enoxaparin Injectable 40 milliGRAM(s) SubCutaneous <User Schedule>  entecavir Solution 0.5 milliGRAM(s) Oral <User Schedule>  hydrALAZINE Injectable 10 milliGRAM(s) IV Push every 3 hours PRN  labetalol 400 milliGRAM(s) Oral every 8 hours  labetalol Injectable 10 milliGRAM(s) IV Push every 4 hours PRN  losartan 100 milliGRAM(s) Oral daily  pantoprazole  Injectable 40 milliGRAM(s) IV Push daily  polyethylene glycol 3350 17 Gram(s) Oral daily  senna 2 Tablet(s) Oral at bedtime      PMHX/PSHX:      Family history:      Social History:   Denies ethanol use.  Denies illicit drug use.    ROS:     General:  No wt loss, fevers, chills, night sweats, fatigue,   Eyes:  Good vision, no reported pain  ENT:  No sore throat, pain, runny nose, dysphagia  CV:  No pain, palpitations, hypo/hypertension  Resp:  No dyspnea, cough, tachypnea, wheezing  GI:  See HPI  :  No pain, bleeding, incontinence, nocturia  Muscle:  No pain, weakness  Neuro:  No weakness, tingling, memory problems  Psych:  No fatigue, insomnia, mood problems, depression  Endocrine:  No polyuria, polydipsia, cold/heat intolerance  Heme:  No petechiae, ecchymosis, easy bruisability  Integumentary:  No rash, edema      PHYSICAL EXAM:     GENERAL:  Appears stated age, well-groomed, well-nourished, no distress  HEENT:  NC/AT,  conjunctivae anicteric, clear and pink,   NECK: supple, trachea midline  CHEST:  Full & symmetric excursion, + increased effort, breath sounds clear  HEART:  Regular rhythm, no JVD  ABDOMEN:  Soft, non-tender, non-distended, normoactive bowel sounds,  no masses , no hepatosplenomegaly  EXTREMITIES:  no cyanosis,clubbing or edema  SKIN:  No rash, erythema, or, ecchymoses, no jaundice  NEURO:  Alert, non-focal, no asterixis    RECTAL: Deferred      Vital Signs:  Vital Signs Last 24 Hrs  T(C): 36.8 (12 Jun 2023 12:00), Max: 37.1 (11 Jun 2023 20:00)  T(F): 98.2 (12 Jun 2023 12:00), Max: 98.8 (11 Jun 2023 20:00)  HR: 90 (12 Jun 2023 14:15) (84 - 110)  BP: 116/54 (12 Jun 2023 14:15) (94/50 - 158/65)  BP(mean): 78 (12 Jun 2023 14:15) (66 - 94)  RR: 28 (12 Jun 2023 14:15) (19 - 37)  SpO2: 94% (12 Jun 2023 14:15) (90% - 100%)    Parameters below as of 12 Jun 2023 11:14  Patient On (Oxygen Delivery Method): room air      Daily     Daily     LABS: Labs personally reviewed by me:                        12.1   9.47  )-----------( 309      ( 12 Jun 2023 00:34 )             37.0     06-12    146<H>  |  114<H>  |  44<H>  ----------------------------<  137<H>  4.0   |  21<L>  |  0.82    Ca    8.9      12 Jun 2023 00:34  Phos  3.5     06-12  Mg     2.4     06-12                Imaging personally reviewed by me:

## 2023-06-12 NOTE — PROGRESS NOTE ADULT - ASSESSMENT
82y (1940) woman with a PMHx significant for HTN, afib on Xarelto (last dose 6/1 6pm), hepatitis C on antiviral presenting with acute onset of AMS. LKW 10pm. Around 10:30pm patient become acutely altered, slumped to right side, head turned to left, not answering questions properly or moving the right side. NIHSS 22 most notable for decreased arousal, incorrect answers, not following commands, left gaze deviation that could not be overcome, right sided severe weakness compared to left, slurred speech, aphasia. Patient lives at home with daughter who is at bedside. Patient is Armenian speaking. Daughter reports at baseline she intermittently walks with a cane and needs minor help with ADLs such as getting dressed or running a shower, Daughter looks after her affairs. She denies dementia or cognitive impairment. mRS 3. Not a tenecteplase candidate due to INR 1.79. Patient taken to IR for mechanical thrombectomy.    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 Mechanism cardioembolic from known afib. S/p thrombectomy s/p TICI 2C.      Plan  NEURO: Overall stable neurologically, repeat CT shows Subtle increased attenuation at the level of the basal ganglia on the left may indicate a component of evolving hemorrhage.  Continue close monitoring for neurologic deterioration, Normotension, atorvastatin 80mg to LDL goal less than 70, MRI Brain w/o and as above. Physical therapy/OT/Speech eval/treatment for acute rehab.     ANTITHROMBOTIC THERAPY: aspirin 81mg daily. Will change to anticoagulation with Eliquis 5mg bid, 10-12 days from inciting event and once PO access obtained.     PULMONARY: tachypnea on 06/10 RR 19-27 saturating well, Pulmonology consulted (Dr Villatoro) for further eval/recommendations. CXR (06/05)  with L basilar opacities, unchanged, likely aspiration PNA. Pt completed Zosyn for aspiration PNA for 5 days ((final dose 6/9).  NC as needed. Mucomyst 20% 4mL q6h, Duonebs q6h, chest PT for secretions.     CARDIOVASCULAR: TTE EF 60% severe (grade 3) left ventricular diastolic dysfunction. The left atrium is moderately dilated. The right atrium is dilated and Moderate aortic stenosi, cardiac monitoring. continue current cardiac meds for BOP mgt, Dr Zhu (cardio) consult appreciated                        SBP goal: 120-160mmHg    GASTROINTESTINAL:  dysphagia screen failed, s/p FEEST on 06/08: with recommendations for NPO,  s/s to  re-evaluate on 06/04/23 . Bowel regimen. NGT pulled by pt overnight, multiple attempts were made, Xray ordered to check for placement      Diet: NG tube in. Glucerna 1.2 at goal plus 2 cans of probiotic.     RENAL: BUN/Cr 44/0.82: Prerenal likely due to dehydration , s/p free water bolus 6/11, good urine output      Na Goal: Greater than 135     Virgen: none    HEMATOLOGY: H/H without change, Platelets normal      DVT ppx: LMWH     ID: afebrile, no leukocytosis. On Zosyn for aspiration PNA, last day Jun 9th. On entecavir daily for Hep C.      OTHER: On restraints for pulling at lines.     DISPOSITION: Acute rehab as per PT/OT eval once stable and medical work up is complete    CORE MEASURES:        Admission NIHSS: 22     Tenecteplase: [] YES [x] NO      LDL/HDL: 33/47     Statin Therapy: Yes     Dysphagia Screen: [] PASS [x] FAIL     Smoking [] YES [x] NO      Afib [x] YES [] NO     Stroke Education [x] YES [] NO    Obtain screening lower extremity venous ultrasound in patients who meet 1 or more of the following criteria as patient is high risk for DVT/PE on admission: COMPLETED - NEGATIVE  [] History of DVT/PE  []Hypercoagulable states (Factor V Leiden, Cancer, OCP, etc. )  []Prolonged immobility (hemiplegia/hemiparesis/post operative or any other extended immobilization)  [] Transferred from outside facility (Rehab or Long term care)  [] Age </= to 50

## 2023-06-12 NOTE — CONSULT NOTE ADULT - NS ATTEND AMEND GEN_ALL_CORE FT
pt w grossly clear cxr  sat from 90 to 97 on ra with deep breathing  likely atelectasis  suggest incentive spirometry

## 2023-06-12 NOTE — CONSULT NOTE ADULT - PROBLEM SELECTOR RECOMMENDATION 2
L MCA territory infarct with L MCA occlusion  -S/p thrombectomy s/p TICI 2C  -Management per stroke team

## 2023-06-12 NOTE — PROGRESS NOTE ADULT - SUBJECTIVE AND OBJECTIVE BOX
SUBJECTIVE/INTERVAL HISTORY:  Patient seen and examined at bedside this AM with neurology attending and team. No acute events noted. Daughter on phone, assists with translation. Patient states she does not have c/o pain at this time. Discussed with daughter the possibility of PEG placement, to which she was agreeable.     ROS: As above, otherwise negative.       MEDICATIONS:  MEDICATIONS  (STANDING):  acetylcysteine 20%  Inhalation 4 milliLiter(s) Inhalation every 6 hours  albuterol/ipratropium for Nebulization 3 milliLiter(s) Nebulizer every 6 hours  amLODIPine   Tablet 10 milliGRAM(s) Oral daily  aspirin  chewable 81 milliGRAM(s) Oral daily  atorvastatin 80 milliGRAM(s) Oral at bedtime  chlorhexidine 4% Liquid 1 Application(s) Topical daily  enoxaparin Injectable 40 milliGRAM(s) SubCutaneous <User Schedule>  entecavir Solution 0.5 milliGRAM(s) Oral <User Schedule>  labetalol 400 milliGRAM(s) Oral every 8 hours  losartan 100 milliGRAM(s) Oral daily  polyethylene glycol 3350 17 Gram(s) Oral daily  senna 2 Tablet(s) Oral at bedtime    MEDICATIONS  (PRN):  acetaminophen   Oral Liquid .. 650 milliGRAM(s) Oral every 6 hours PRN Temp greater or equal to 38C (100.4F), Mild Pain (1 - 3)  hydrALAZINE Injectable 10 milliGRAM(s) IV Push every 3 hours PRN SBP > 160  labetalol Injectable 10 milliGRAM(s) IV Push every 4 hours PRN Systolic blood pressure > 160      VITALS & EXAMINATION:  Vital Signs Last 24 Hrs  T(C): 36.8 (12 Jun 2023 12:00), Max: 37.1 (11 Jun 2023 20:00)  T(F): 98.2 (12 Jun 2023 12:00), Max: 98.8 (11 Jun 2023 20:00)  HR: 84 (12 Jun 2023 13:00) (84 - 110)  BP: 94/50 (12 Jun 2023 13:00) (94/50 - 158/65)  BP(mean): 66 (12 Jun 2023 13:00) (66 - 94)  RR: 32 (12 Jun 2023 13:00) (18 - 37)  SpO2: 94% (12 Jun 2023 13:00) (90% - 100%)    Parameters below as of 12 Jun 2023 11:14  Patient On (Oxygen Delivery Method): room air    General: Elderly W, laying in bed, NAD.   Extremities: B/l UE edema noted.     NEUROLOGICAL EXAM:  Mental status: Eyes initially closed, open to voice, after which she visually attends to examiner and others in the room. Oriented to person, place as "hospital", and month.   Speech/Language: Hypophonia, minimal verbal output, requires prompting to generate fluent speech,   Cranial Nerves:  EOMs full  on left gaze OU, crosses midline on right gaze OU but does not fully bury the sclera. Right nasolabial flattening, rest of CN assessment limited.   Motor exam: RUE 0/5, RLE triple flexion, LUE 5/5, LLE 5/5.   Sensation: Intact in the LUE/LLE. RLE triple flexion, no grimace. RUE no grimace or withdrawal.   Coordination/ Gait: patient unable to participate with exam.       LABS:  CBC                       12.1   9.47  )-----------( 309      ( 12 Jun 2023 00:34 )             37.0     Chem 06-12    146<H>  |  114<H>  |  44<H>  ----------------------------<  137<H>  4.0   |  21<L>  |  0.82    Ca    8.9      12 Jun 2023 00:34  Phos  3.5     06-12  Mg     2.4     06-12      LFTs   Coagulopathy   Lipid Panel 06-02 Chol 97 LDL -- HDL 47<L> Trig 85  A1c   Cardiac enzymes         STUDIES & IMAGING:    [facility] (06/11):  Improved subacute moderate to large left MCA territory infarction. Stable petechial hemorrhage. Mild chronic microvascular   changes without evidence of a new acute transcortical infarction or hemorrhage.    MR Head No Cont 6.06.23 Parenchymal volume loss and chronic microvascular ischemic changes identified Abnormal T2 prolongation with restricted diffusion is seen involving the posterior left insula, left temporal frontal and parietal cortical subcortical region is identified. These finding does demonstrate abnormal susceptibility and is compatible with a hemorrhagic left MCA infarct. There is localized mass effect seen consisting sulcal effacement. Mass effect on the left lateral ventricle seen. No significant shift or herniation is seen. The large vessels demonstrate normal flow-voids Minimal mucosal thickening is seen involving both ethmoid sinuses. Both mastoid and middle ear regions appear clear. 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    CT Head No Cont (06.05.23 @ 08:27) Evolving left MCA distribution infarct. More well-defined lucency and slight increased mass effect on the left lateral ventricle. Subtle increased attenuation at the level of the basal ganglia on the left may indicate a component of evolving hemorrhage.    CT Head No Cont (06.02.23 @ 09:59): An evolving left MCA distribution infarct .    CT Brain Stroke Protocol 06.01.23: No acute intracranial hemorrhage or mass effect.    CT angiography neck 06/01/23: No hemodynamically significant stenosis by NASCET criteria. No vascular dissection. Nonspecific ground glass opacities in the visualized apices of the lungs, may represent infectious versus inflammatory disease.    CT angiography brain 06/01/23: Left proximal M1 occlusion.    CT perfusion 06/01/23:: Core infarct of 6 cc with a penumbra of 162 cc.

## 2023-06-13 LAB
ANION GAP SERPL CALC-SCNC: 14 MMOL/L — SIGNIFICANT CHANGE UP (ref 5–17)
APTT BLD: 44.1 SEC — HIGH (ref 27.5–35.5)
BASOPHILS # BLD AUTO: 0.06 K/UL — SIGNIFICANT CHANGE UP (ref 0–0.2)
BASOPHILS NFR BLD AUTO: 0.6 % — SIGNIFICANT CHANGE UP (ref 0–2)
BUN SERPL-MCNC: 40 MG/DL — HIGH (ref 7–23)
CALCIUM SERPL-MCNC: 9.3 MG/DL — SIGNIFICANT CHANGE UP (ref 8.4–10.5)
CHLORIDE SERPL-SCNC: 110 MMOL/L — HIGH (ref 96–108)
CO2 SERPL-SCNC: 21 MMOL/L — LOW (ref 22–31)
CREAT SERPL-MCNC: 0.75 MG/DL — SIGNIFICANT CHANGE UP (ref 0.5–1.3)
EGFR: 79 ML/MIN/1.73M2 — SIGNIFICANT CHANGE UP
EOSINOPHIL # BLD AUTO: 0.18 K/UL — SIGNIFICANT CHANGE UP (ref 0–0.5)
EOSINOPHIL NFR BLD AUTO: 1.8 % — SIGNIFICANT CHANGE UP (ref 0–6)
GLUCOSE BLDC GLUCOMTR-MCNC: 130 MG/DL — HIGH (ref 70–99)
GLUCOSE BLDC GLUCOMTR-MCNC: 152 MG/DL — HIGH (ref 70–99)
GLUCOSE SERPL-MCNC: 152 MG/DL — HIGH (ref 70–99)
HCT VFR BLD CALC: 35.7 % — SIGNIFICANT CHANGE UP (ref 34.5–45)
HGB BLD-MCNC: 11.6 G/DL — SIGNIFICANT CHANGE UP (ref 11.5–15.5)
IMM GRANULOCYTES NFR BLD AUTO: 2 % — HIGH (ref 0–0.9)
INR BLD: 1.22 RATIO — HIGH (ref 0.88–1.16)
LYMPHOCYTES # BLD AUTO: 1.19 K/UL — SIGNIFICANT CHANGE UP (ref 1–3.3)
LYMPHOCYTES # BLD AUTO: 11.8 % — LOW (ref 13–44)
MCHC RBC-ENTMCNC: 30.7 PG — SIGNIFICANT CHANGE UP (ref 27–34)
MCHC RBC-ENTMCNC: 32.5 GM/DL — SIGNIFICANT CHANGE UP (ref 32–36)
MCV RBC AUTO: 94.4 FL — SIGNIFICANT CHANGE UP (ref 80–100)
MONOCYTES # BLD AUTO: 0.72 K/UL — SIGNIFICANT CHANGE UP (ref 0–0.9)
MONOCYTES NFR BLD AUTO: 7.2 % — SIGNIFICANT CHANGE UP (ref 2–14)
NEUTROPHILS # BLD AUTO: 7.7 K/UL — HIGH (ref 1.8–7.4)
NEUTROPHILS NFR BLD AUTO: 76.6 % — SIGNIFICANT CHANGE UP (ref 43–77)
NRBC # BLD: 0 /100 WBCS — SIGNIFICANT CHANGE UP (ref 0–0)
PLATELET # BLD AUTO: 317 K/UL — SIGNIFICANT CHANGE UP (ref 150–400)
POTASSIUM SERPL-MCNC: 4.3 MMOL/L — SIGNIFICANT CHANGE UP (ref 3.5–5.3)
POTASSIUM SERPL-SCNC: 4.3 MMOL/L — SIGNIFICANT CHANGE UP (ref 3.5–5.3)
PROCALCITONIN SERPL-MCNC: 0.08 NG/ML — SIGNIFICANT CHANGE UP (ref 0.02–0.1)
PROTHROM AB SERPL-ACNC: 14.1 SEC — HIGH (ref 10.5–13.4)
RBC # BLD: 3.78 M/UL — LOW (ref 3.8–5.2)
RBC # FLD: 13.1 % — SIGNIFICANT CHANGE UP (ref 10.3–14.5)
SODIUM SERPL-SCNC: 145 MMOL/L — SIGNIFICANT CHANGE UP (ref 135–145)
WBC # BLD: 10.05 K/UL — SIGNIFICANT CHANGE UP (ref 3.8–10.5)
WBC # FLD AUTO: 10.05 K/UL — SIGNIFICANT CHANGE UP (ref 3.8–10.5)

## 2023-06-13 PROCEDURE — G0452: CPT | Mod: 26

## 2023-06-13 PROCEDURE — 74230 X-RAY XM SWLNG FUNCJ C+: CPT | Mod: 26

## 2023-06-13 PROCEDURE — 99232 SBSQ HOSP IP/OBS MODERATE 35: CPT

## 2023-06-13 PROCEDURE — 99233 SBSQ HOSP IP/OBS HIGH 50: CPT

## 2023-06-13 RX ORDER — IPRATROPIUM/ALBUTEROL SULFATE 18-103MCG
3 AEROSOL WITH ADAPTER (GRAM) INHALATION EVERY 6 HOURS
Refills: 0 | Status: DISCONTINUED | OUTPATIENT
Start: 2023-06-13 | End: 2023-06-16

## 2023-06-13 RX ADMIN — Medication 3 MILLILITER(S): at 01:19

## 2023-06-13 RX ADMIN — Medication 81 MILLIGRAM(S): at 12:06

## 2023-06-13 RX ADMIN — LOSARTAN POTASSIUM 100 MILLIGRAM(S): 100 TABLET, FILM COATED ORAL at 12:06

## 2023-06-13 RX ADMIN — Medication 4 MILLILITER(S): at 05:35

## 2023-06-13 RX ADMIN — Medication 400 MILLIGRAM(S): at 17:14

## 2023-06-13 RX ADMIN — ATORVASTATIN CALCIUM 80 MILLIGRAM(S): 80 TABLET, FILM COATED ORAL at 21:30

## 2023-06-13 RX ADMIN — CHLORHEXIDINE GLUCONATE 1 APPLICATION(S): 213 SOLUTION TOPICAL at 21:30

## 2023-06-13 RX ADMIN — ENTECAVIR 0.5 MILLIGRAM(S): 0.5 TABLET ORAL at 09:44

## 2023-06-13 RX ADMIN — Medication 4 MILLILITER(S): at 01:18

## 2023-06-13 RX ADMIN — AMLODIPINE BESYLATE 10 MILLIGRAM(S): 2.5 TABLET ORAL at 12:06

## 2023-06-13 RX ADMIN — ENOXAPARIN SODIUM 40 MILLIGRAM(S): 100 INJECTION SUBCUTANEOUS at 17:14

## 2023-06-13 RX ADMIN — Medication 400 MILLIGRAM(S): at 01:19

## 2023-06-13 RX ADMIN — Medication 3 MILLILITER(S): at 05:35

## 2023-06-13 RX ADMIN — PANTOPRAZOLE SODIUM 40 MILLIGRAM(S): 20 TABLET, DELAYED RELEASE ORAL at 12:06

## 2023-06-13 RX ADMIN — POLYETHYLENE GLYCOL 3350 17 GRAM(S): 17 POWDER, FOR SOLUTION ORAL at 17:14

## 2023-06-13 RX ADMIN — Medication 400 MILLIGRAM(S): at 09:49

## 2023-06-13 RX ADMIN — SENNA PLUS 2 TABLET(S): 8.6 TABLET ORAL at 21:30

## 2023-06-13 NOTE — SWALLOW VFSS/MBS ASSESSMENT ADULT - LARYNGEAL PENETRATION AFTER THE SWALLOW - SILENT
d/t oral residue; on attempts for subsequent swallow, residue results in mild silent deep laryngeal penetration over the laryngeal surface of the epiglottis to the vocal cords. Weak cued cough ineffective in clearing material, trace remains at vocal cords and trace in laryngeal vestibule. due to pharyngeal residue; silent laryngeal penetration over the laryngeal surface of the epiglottis and AE folds/Mild due to pharyngeal residue; silent laryngeal penetration over the laryngeal surface of the epiglottis and AE folds; trace remains shallow in laryngeal vestibule, does not descend or increase on subsequent trials/Mild

## 2023-06-13 NOTE — ADVANCED PRACTICE NURSE CONSULT - RECOMMEDATIONS
impression    impression        Recommendations    1. Bilateral  , sacral / buttocks  loyda rectal area  inconstance fungal rash    Topical therapy- sacral/bilateral buttocks- cleanse w/incontinent cleanser, pat dry & apply tammy moisture barrier antifungal cream  twice daily & prn soiling .  monitor    for changes .  2. Right and left heel  pressure  injury prevention. Elevate heels; apply Complete Cair air fluidized boots; ensure that the soles of the feet are not resting on the foot board of the bed.  3  Incontinent management - incontinent cleanser, pads,  loyda care  BID  4. Maintain on an alternating air with low air loss surface   5. Turn & reposition every 2 hr; Use positioning pillow to turn and reposition, soft pillow between bony prominences; continue measures to decrease friction/shear/pressure.  6. Nutrition optimization.  7.  waffle cushion when out of bed to chair .   plan of care reviewed with MICHAEL fitch impression  bilateral sacrum/buttocks loyda rectal area inconstance dermatitis         Recommendations    1. Bilateral  , sacral / buttocks  loyda rectal area  inconstance fungal rash    Topical therapy- sacral/bilateral buttocks- cleanse w/incontinent cleanser, pat dry & apply tammy moisture barrier antifungal cream  twice daily & prn soiling .  monitor    for changes .  2. Right and left heel  pressure  injury prevention. Elevate heels; apply Complete Cair air fluidized boots; ensure that the soles of the feet are not resting on the foot board of the bed.  3  Incontinent management - incontinent cleanser, pads,  loyda care  BID  4. Maintain on an alternating air with low air loss surface   5. Turn & reposition every 2 hr; Use positioning pillow to turn and reposition, soft pillow between bony prominences; continue measures to decrease friction/shear/pressure.  6. Nutrition optimization.  7.  waffle cushion when out of bed to chair .   plan of care reviewed with MICHAEL fitch impression  bilateral sacrum/buttocks lyoda rectal area inconstance dermatitis         Recommendations    1. Bilateral  , sacral / buttocks  loyda rectal area  inconstance dermatitis    Topical therapy- sacral/bilateral buttocks- cleanse w/incontinent cleanser, pat dry & apply tammy moisture barrier  cream  twice daily & prn soiling .  monitor    for changes .  2. Right and left heel  pressure  injury prevention. Elevate heels; apply Complete Cair air fluidized boots; ensure that the soles of the feet are not resting on the foot board of the bed.  3  Incontinent management - incontinent cleanser, pads,  loyda care  BID  4. Maintain on an alternating air with low air loss surface   5. Turn & reposition every 2 hr; Use positioning pillow to turn and reposition, soft pillow between bony prominences; continue measures to decrease friction/shear/pressure.  6. Nutrition optimization.  7.  waffle cushion when out of bed to chair .   plan of care reviewed with MICHAEL fitch

## 2023-06-13 NOTE — SWALLOW VFSS/MBS ASSESSMENT ADULT - SLP GENERAL OBSERVATIONS
Pt encountered upright in HENRY chair, on room air + KFT, + L handed wrist restraint, + RN escort present. [facility]  utilized (#[id number]). Pt AOx2 and able to follow 50% of simple commands. Improvement in mixed aphasia. Vocal quality hoarse/hypophonic.

## 2023-06-13 NOTE — PROGRESS NOTE ADULT - ASSESSMENT
1. Acute cva  per neurology    2. dysphagia  for EGD/PEG    3. Asymptomatic cholelithiasis  observe for symptoms    4. ?history of hepatitis B  will obtain colateral from family

## 2023-06-13 NOTE — PROGRESS NOTE ADULT - PROBLEM SELECTOR PLAN 1
reportedly tachypnea with episode of desaturation overnight   -S/p course of Zosyn for ?PNA (completed 6/9). CXR grossly clear, CT chest with small L effusion/atelectasis seen on CT chest from 6/4.  -Hypoxia resolved. Suspect 2nd to atelectasis as sats increase with deep breathing from low 90s to high 90s.  -Incentive spirometry   -Keep sats >90% with o2 PRN.   -Pt nonlabored, no longer tachypneic   -Afebrile  -CXR 6/12 grossly clear   -Duoneb changed to q6h PRN  -Mucomyst d/c'd   -If tachypnea reoccurs, consider CTA chest to r/o PE

## 2023-06-13 NOTE — PROGRESS NOTE ADULT - SUBJECTIVE AND OBJECTIVE BOX
THE PATIENT WAS SEEN AND EXAMINED BY ME WITH THE HOUSESTAFF AND STROKE TEAM DURING MORNING ROUNDS.   HPI:  82y woman with a PMHx significant for HTN, afib on Xarelto (last dose 6/1 6pm), hepatitis C on antiviral presenting with acute onset of AMS. LKW 10pm. Around 10:30pm patient become acutely altered, slumped to right side, head turned to left, not answering questions properly or moving the right side. NIHSS 22 most notable for decreased arousal, incorrect answers, not following commands, left gaze deviation that could not be overcome, right facial droop, right sided severe weakness compared to left, slurred speech, aphasia. Patient lives at home with daughter who is at bedside. Patient is Occitan speaking. Daughter reports at baseline she intermittently walks with a cane and needs minor help with ADLs such as getting dressed or running a shower, Daughter looks after her affairs. She denies dementia or cognitive impairment. mRS 3. S/p thrombectomy s/p TICI 2C mechanism cardioembolic from known afib., LKW 10pm (6/1), NIHSS 22, pre-mRS 3,     SUBJECTIVE: No events overnight.  No new neurologic complaints, ROS reported negative unless otherwise noted.      acetaminophen   Oral Liquid .. 650 milliGRAM(s) Oral every 6 hours PRN  acetylcysteine 20%  Inhalation 4 milliLiter(s) Inhalation every 6 hours  albuterol/ipratropium for Nebulization 3 milliLiter(s) Nebulizer every 6 hours  amLODIPine   Tablet 10 milliGRAM(s) Oral daily  aspirin  chewable 81 milliGRAM(s) Oral daily  atorvastatin 80 milliGRAM(s) Oral at bedtime  chlorhexidine 4% Liquid 1 Application(s) Topical daily  enoxaparin Injectable 40 milliGRAM(s) SubCutaneous <User Schedule>  entecavir Solution 0.5 milliGRAM(s) Oral <User Schedule>  hydrALAZINE Injectable 10 milliGRAM(s) IV Push every 3 hours PRN  labetalol 400 milliGRAM(s) Oral every 8 hours  labetalol Injectable 10 milliGRAM(s) IV Push every 4 hours PRN  losartan 100 milliGRAM(s) Oral daily  pantoprazole  Injectable 40 milliGRAM(s) IV Push daily  polyethylene glycol 3350 17 Gram(s) Oral daily  senna 2 Tablet(s) Oral at bedtime      PHYSICAL EXAM:   Vital Signs Last 24 Hrs  T(C): 36.7 (13 Jun 2023 05:00), Max: 36.8 (12 Jun 2023 12:00)  T(F): 98 (13 Jun 2023 05:00), Max: 98.2 (12 Jun 2023 12:00)  HR: 95 (13 Jun 2023 05:00) (84 - 97)  BP: 144/79 (13 Jun 2023 05:00) (94/50 - 144/79)  BP(mean): 83 (12 Jun 2023 20:00) (66 - 108)  RR: 18 (13 Jun 2023 05:00) (18 - 37)  SpO2: 94% (13 Jun 2023 05:00) (91% - 100%)    Parameters below as of 13 Jun 2023 05:00  Patient On (Oxygen Delivery Method): room air    General: No acute distress  HEENT: EOM intact, visual fields full  Abdomen: Soft, nontender, nondistended   Extremities: No edema    NEUROLOGICAL EXAM:  Mental status: eyes open,  awake, alert, oriented x3, hypophonia, minimal verbal output, requires prompting to generate fluent speech,   Cranial Nerves:  Right nasolabial flattening, no BTT on right, right gaze palsy can be overcome voluntarily  Motor exam: Normal tone, right hemiplegia: RUE 0/5, RLE 0/5, triple flexion, LUE 5/5, LLE 5/5,   Sensation: Intact to light touch   Coordination/ Gait: unable to participate with exam     LABS:                        12.1   9.47  )-----------( 309      ( 12 Jun 2023 00:34 )             37.0    06-13    145  |  110<H>  |  40<H>  ----------------------------<  152<H>  4.3   |  21<L>  |  0.75    Ca    9.3      13 Jun 2023 06:39  Phos  3.5     06-12  Mg     2.4     06-12    PT/INR - ( 13 Jun 2023 06:39 )   PT: 14.1 sec;   INR: 1.22 ratio         PTT - ( 13 Jun 2023 06:39 )  PTT:44.1 sec    IMAGING: Reviewed by me.   [facility] (06/11):  Improved subacute moderate to large left MCA territory infarction. Stable petechial hemorrhage. Mild chronic microvascular changes without evidence of a new acute transcortical infarction or hemorrhage.    MR Head No Cont 6.06.23 Parenchymal volume loss and chronic microvascular ischemic changes identified Abnormal T2 prolongation with restricted diffusion is seen involving the posterior left insula, left temporal frontal and parietal cortical subcortical region is identified. These finding does demonstrate abnormal susceptibility and is compatible with a hemorrhagic left MCA infarct. There is localized mass effect seen consisting sulcal effacement. Mass effect on the left lateral ventricle seen. No significant shift or herniation is seen. The large vessels demonstrate normal flow-voids Minimal mucosal thickening is seen involving both ethmoid sinuses. Both mastoid and middle ear regions appear clear. 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    CT Head No Cont (06.05.23 @ 08:27) Evolving left MCA distribution infarct. More well-defined lucency and slight increased mass effect on the left lateral ventricle. Subtle increased attenuation at the level of the basal ganglia on the left may indicate a component of evolving hemorrhage.    CT Head No Cont (06.02.23 @ 09:59): An evolving left MCA distribution infarct .    CT Brain Stroke Protocol 06.01.23: No acute intracranial hemorrhage or mass effect.    CT angiography neck 06/01/23: No hemodynamically significant stenosis by NASCET criteria. No vascular dissection. Nonspecific ground glass opacities in the visualized apices of the lungs, may represent infectious versus inflammatory disease.    CT angiography brain 06/01/23: Left proximal M1 occlusion.    CT perfusion 06/01/23:: Core infarct of 6 cc with a penumbra of 162 cc.

## 2023-06-13 NOTE — PROGRESS NOTE ADULT - SUBJECTIVE AND OBJECTIVE BOX
SUBJECTIVE  Patient was seen and examined at the bedside this AM. Able to state her name, but speech otherwise difficult to comprehend. NGT present, pending MBS today. Soft restraints to Left UE.      CURRENT FUNCTIONAL STATUS  Bed Mobility - max assist x2  Transfers - max assist x2      REVIEW OF SYSTEMS  ROS limited by condition      RECENT LABS/IMAGING  CBC Full  -  ( 12 Jun 2023 00:34 )  WBC Count : 9.47 K/uL  RBC Count : 3.96 M/uL  Hemoglobin : 12.1 g/dL  Hematocrit : 37.0 %  Platelet Count - Automated : 309 K/uL  Mean Cell Volume : 93.4 fl  Mean Cell Hemoglobin : 30.6 pg  Mean Cell Hemoglobin Concentration : 32.7 gm/dL  Auto Neutrophil # : x  Auto Lymphocyte # : x  Auto Monocyte # : x  Auto Eosinophil # : x  Auto Basophil # : x  Auto Neutrophil % : x  Auto Lymphocyte % : x  Auto Monocyte % : x  Auto Eosinophil % : x  Auto Basophil % : x    06-13    145  |  110<H>  |  40<H>  ----------------------------<  152<H>  4.3   |  21<L>  |  0.75    Ca    9.3      13 Jun 2023 06:39  Phos  3.5     06-12  Mg     2.4     06-12      VITALS  T(C): 36.7 (06-13-23 @ 09:36), Max: 36.8 (06-12-23 @ 12:00)  HR: 94 (06-13-23 @ 09:50) (84 - 109)  BP: 146/84 (06-13-23 @ 09:50) (94/50 - 157/75)  RR: 18 (06-13-23 @ 09:36) (18 - 32)  SpO2: 96% (06-13-23 @ 09:36) (91% - 100%)  Wt(kg): --    MEDICATIONS   acetaminophen   Oral Liquid .. 650 milliGRAM(s) every 6 hours PRN  albuterol/ipratropium for Nebulization 3 milliLiter(s) every 6 hours PRN  amLODIPine   Tablet 10 milliGRAM(s) daily  aspirin  chewable 81 milliGRAM(s) daily  atorvastatin 80 milliGRAM(s) at bedtime  chlorhexidine 4% Liquid 1 Application(s) daily  enoxaparin Injectable 40 milliGRAM(s) <User Schedule>  entecavir Solution 0.5 milliGRAM(s) <User Schedule>  hydrALAZINE Injectable 10 milliGRAM(s) every 3 hours PRN  labetalol 400 milliGRAM(s) every 8 hours  labetalol Injectable 10 milliGRAM(s) every 4 hours PRN  losartan 100 milliGRAM(s) daily  pantoprazole  Injectable 40 milliGRAM(s) daily  polyethylene glycol 3350 17 Gram(s) daily  senna 2 Tablet(s) at bedtime      --------------------------------------------------------------------  PHYSICAL EXAM:  GENERAL: NAD  EYES: conjunctiva and sclera clear  CHEST/LUNG: Breathing comfortably on RA  HEART: Warm and well perfused  ABDOMEN: (+) NTGT  EXTREMITIES: Left UE in soft restraints  NERVOUS SYSTEM:  Able to state her name, but speech otherwise was difficult to comprehend. Moves Left UE/LE spontaneously; no movement noted to Right UE/LE    --------------------------------------------------------------------  ASSESSMENT/PLAN  82yFemale h/o HTN, afib on xarelto, hep C with functional deficits after left MCA hemorrhagic infarct, s/p thrombectomy  dysphagia, NPO with NGT. Pending MBS today  Pain - Tylenol  DVT PPX - SCDs, lovenox   Rehab - Will continue to follow for ongoing rehab needs and recommendations.   continue bedside therapy   Recommend ACUTE inpatient rehabilitation for the functional deficits consisting of 3 hours of therapy/day & 24 hour RN/daily PMR physician for comorbid medical management. Patient will be able to tolerate 3 hours a day.

## 2023-06-13 NOTE — SWALLOW VFSS/MBS ASSESSMENT ADULT - ORAL PHASE
Mild-moderate R sided lateral sulci residue/Reduced anterior - posterior transport/Incomplete tongue to palate contact/Uncontrolled bolus / spillover in cassandra-pharynx Mild-moderate R sided lateral sulci residue/Reduced anterior - posterior transport/Incomplete tongue to palate contact/Uncontrolled bolus / spillover in cassandra-pharynx/Uncontrolled bolus / spillover in hypopharynx Severe R sided lateral sulci residue - requiring eventual suctioning with Yankauer. Pt unable to clear IND./Reduced anterior - posterior transport/Incomplete tongue to palate contact/Uncontrolled bolus / spillover in cassandra-pharynx Mild R sided lateral sulci residue/Reduced anterior - posterior transport/Incomplete tongue to palate contact/Uncontrolled bolus / spillover in cassandra-pharynx/Uncontrolled bolus / spillover in hypopharynx

## 2023-06-13 NOTE — SWALLOW VFSS/MBS ASSESSMENT ADULT - SLP PERTINENT HISTORY OF CURRENT PROBLEM
82y (1940) woman with a PMHx significant for HTN, afib on Xarelto (last dose 6/1 6pm), hepatitis C on antiviral presenting with acute onset of AMS. LKW 10pm. Around 10:30pm patient become acutely altered, slumped to right side, head turned to left, not answering questions properly or moving the right side. NIHSS 22 most notable for decreased arousal, incorrect answers, not following commands, left gaze deviation that could not be overcome, right facial droop, right sided severe weakness compared to left, slurred speech, aphasia. Patient lives at home with daughter who is at bedside. Patient is Botswanan speaking. Daughter reports at baseline she intermittently walks with a cane and needs minor help with ADLs such as getting dressed or running a shower, Daughter looks after her affairs. She denies dementia or cognitive impairment. mRS 3. Stroke in setting of afib, on xarelto, likely cardioembolic. Now intubated.

## 2023-06-13 NOTE — SWALLOW VFSS/MBS ASSESSMENT ADULT - LARYNGEAL PENETRATION DURING THE SWALLOW - SILENT
over the laryngeal surface of the epiglottis to the level of the vocal cords/Trace over the laryngeal surface of the epiglottis to the level of the vocal cords. Weak cued cough/reswallow ineffective in clearing material./Trace

## 2023-06-13 NOTE — PROGRESS NOTE ADULT - ASSESSMENT
83 y/o Palauan speaking F with PMH of HTN, afib on Xarelto (last dose 6/1 6pm), hepatitis C on antiviral.  #[id number]. Presents with acute onset of AMS. 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 Course c/b possible aspiration PNA - s/p course of Zosyn. Called to consult for tachypnea with episode of desaturation.

## 2023-06-13 NOTE — PROGRESS NOTE ADULT - ATTENDING COMMENTS
82 year old woman h/o HTN, afib on xarelto, hep C with functional deficits after left MCA CVA, s/p thrombectomy  tachypnea/episode of desaturation, s/p zosyn, pulmonary following  dysphagia, NPO with NGT, awaiting MBS   continue bedside therapy, patient would benefit from acute inpatient rehabilitation when medically stable   will continue to follow
I reviewed available diagnostic studies, and reviewed images personally. I agree with fellow's history, exam, orders placed, and plan of care. Medical issues needing to be addressed include: Cerebral infarction L MCA occlusion s/p MT w/ TICI 2c reperfusion, HTN, Afib on Xarelto (last dose 6/1), hep c on antiviral tx, AMS, aphasia, R hemiparesis. Pt appears to have been compliant with Xarelto- possible failure. Obtain CT CAP for malignancy evaluation when able. MRI pending. Pt drowsier today. Increased secretions w/ frequent suctioning. On zosyn. Hold transfer, monitoring in ICU today and re-evaluate tomorrow. Recommend f/up CTH given exam and to establish infarct burden for consideration of anticoag. Will need to transition from asa in the future.
I reviewed available diagnostic studies, and reviewed images personally. I agree with fellow's history, exam, orders placed, and plan of care. Medical issues needing to be addressed include: Cerebral infarction L MCA occlusion s/p MT w/ TICI 2c reperfusion, HTN, Afib on Xarelto (last dose 6/1), hep c on antiviral tx, AMS, aphasia, R hemiparesis. Pt appears to have been compliant with Xarelto- possible failure. Obtain CT CAP for malignancy evaluation when able. MR brain when able, but CTH to follow up stroke burden. RUE extend, RLE flex.
maintaining post-extubation.  stroke neurology saw.  CT head today and possible xfer to stroke service.

## 2023-06-13 NOTE — PROGRESS NOTE ADULT - ASSESSMENT
82y (1940) woman with a PMHx significant for HTN, afib on Xarelto (last dose 6/1 6pm), hepatitis C on antiviral presenting with acute onset of AMS. LKW 10pm. Around 10:30pm patient become acutely altered, slumped to right side, head turned to left, not answering questions properly or moving the right side. NIHSS 22 most notable for decreased arousal, incorrect answers, not following commands, left gaze deviation that could not be overcome, right sided severe weakness compared to left, slurred speech, aphasia. Patient lives at home with daughter who is at bedside. Patient is Taiwanese speaking. Daughter reports at baseline she intermittently walks with a cane and needs minor help with ADLs such as getting dressed or running a shower, Daughter looks after her affairs. She denies dementia or cognitive impairment. mRS 3. Not a tenecteplase candidate due to INR 1.79. Patient taken to IR for mechanical thrombectomy.    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 Mechanism cardioembolic from known afib. S/p thrombectomy s/p TICI 2C.      NEURO: Overall stable neurologically, repeat CT shows Subtle increased attenuation at the level of the basal ganglia on the left may indicate a component of evolving hemorrhage.  Continue close monitoring for neurologic deterioration, Normotension, atorvastatin 80mg to LDL goal less than 70, MRI Brain w/o and as above. Physical therapy/OT/Speech eval/treatment for acute rehab.     ANTITHROMBOTIC THERAPY: aspirin 81mg daily. Will change to anticoagulation with Eliquis 5mg bid, 10-12 days from inciting event and once PO access obtained.     PULMONARY: tachypnea on 06/10 RR 19-27 saturating well, Pulmonology consulted (Dr Villatoro) for further eval/recommendations. CXR (06/05)  with L basilar opacities, unchanged, likely aspiration PNA. Pt completed Zosyn for aspiration PNA for 5 days ((final dose 6/9).  NC as needed. Mucomyst 20% 4mL q6h, Duonebs q6h, chest PT for secretions.     CARDIOVASCULAR: TTE EF 60% severe (grade 3) left ventricular diastolic dysfunction. The left atrium is moderately dilated. The right atrium is dilated and Moderate aortic stenosi, cardiac monitoring. continue current cardiac meds for BOP mgt, Dr Zhu (cardio) consult appreciated                        SBP goal: 120-160mmHg    GASTROINTESTINAL:  dysphagia screen failed, s/p FEEST on 06/08: with recommendations for NPO,  s/s to  re-evaluate on 06/04/23 . Bowel regimen. Planned for MBS today.     Diet: NG tube in. Glucerna 1.2 at goal plus 2 cans of probiotic.     RENAL: BUN/Cr 40/0.75: Prerenal likely due to dehydration , s/p free water bolus 6/11, good urine output      Na Goal: Greater than 135     Virgen: none    HEMATOLOGY: H/H without change, Platelets normal      DVT ppx: LMWH     ID: afebrile, no leukocytosis. Completed Zosyn for aspiration PNA, last day Jun 9th. On entecavir daily for Hep C.      OTHER: Plan discussed with patient and family (daughter) at bedside    DISPOSITION: Acute rehab as per PT/OT eval once stable and medical work up is complete    CORE MEASURES:        Admission NIHSS: 22     Tenecteplase: [] YES [x] NO      LDL/HDL: 33/47     Statin Therapy: Yes     Dysphagia Screen: [] PASS [x] FAIL     Smoking [] YES [x] NO      Afib [x] YES [] NO     Stroke Education [x] YES [] NO    Obtain screening lower extremity venous ultrasound in patients who meet 1 or more of the following criteria as patient is high risk for DVT/PE on admission: COMPLETED - NEGATIVE  [] History of DVT/PE  []Hypercoagulable states (Factor V Leiden, Cancer, OCP, etc. )  []Prolonged immobility (hemiplegia/hemiparesis/post operative or any other extended immobilization)  [] Transferred from outside facility (Rehab or Long term care)  [] Age </= to 50   82y (1940) woman with a PMHx significant for HTN, afib on Xarelto (last dose 6/1 6pm), hepatitis C on antiviral presenting with acute onset of AMS. LKW 10pm. Around 10:30pm patient become acutely altered, slumped to right side, head turned to left, not answering questions properly or moving the right side. NIHSS 22 most notable for decreased arousal, incorrect answers, not following commands, left gaze deviation that could not be overcome, right sided severe weakness compared to left, slurred speech, aphasia. Patient lives at home with daughter who is at bedside. Patient is Chadian speaking. Daughter reports at baseline she intermittently walks with a cane and needs minor help with ADLs such as getting dressed or running a shower, Daughter looks after her affairs. She denies dementia or cognitive impairment. mRS 3. Not a tenecteplase candidate due to INR 1.79. Patient taken to IR for mechanical thrombectomy.    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 Mechanism cardioembolic from known afib. S/p thrombectomy s/p TICI 2C.      NEURO: Overall stable neurologically, repeat CT shows Subtle increased attenuation at the level of the basal ganglia on the left may indicate a component of evolving hemorrhage.  Continue close monitoring for neurologic deterioration, Normotension, atorvastatin 80mg to LDL goal less than 70, MRI Brain w/o and as above. Physical therapy/OT/Speech eval/treatment for acute rehab.     ANTITHROMBOTIC THERAPY: aspirin 81mg daily. Will change to anticoagulation with Eliquis 5mg bid, 10-12 days from inciting event and once PO access obtained.     PULMONARY: tachypnea on 06/10 RR 19-27 saturating well, Pulmonology consulted (Dr Villatoro) for further eval/recommendations. CXR (06/05)  with L basilar opacities, unchanged, likely aspiration PNA. Pt completed Zosyn for aspiration PNA for 5 days ((final dose 6/9).  NC as needed. Mucomyst 20% 4mL q6h, Duonebs q6h, chest PT for secretions.     CARDIOVASCULAR: TTE EF 60% severe (grade 3) left ventricular diastolic dysfunction. The left atrium is moderately dilated. The right atrium is dilated and Moderate aortic stenosi, cardiac monitoring. continue current cardiac meds for BOP mgt, Dr Zhu (cardio) consult appreciated                        SBP goal: 120-160mmHg    GASTROINTESTINAL:  dysphagia screen failed, s/p FEEST on 06/08: with recommendations for NPO,  s/s to  re-evaluate on 06/04/23 . Bowel regimen. Planned for MBS today, will f/u results. GI (Dr Quiroz) consulted for discussion of PEG if MBS fails.     Diet: NG tube in. Glucerna 1.2 at goal plus 2 cans of probiotic.     RENAL: BUN/Cr 40/0.75: Prerenal likely due to dehydration , s/p free water bolus 6/11, good urine output      Na Goal: Greater than 135     Virgen: none    HEMATOLOGY: H/H without change, Platelets normal      DVT ppx: LMWH     ID: afebrile, no leukocytosis. Completed Zosyn for aspiration PNA, last day Jun 9th. On entecavir daily for Hep C.      OTHER: Plan discussed with patient and family (daughter) at bedside    DISPOSITION: Acute rehab as per PT/OT eval once stable and medical work up is complete    CORE MEASURES:        Admission NIHSS: 22     Tenecteplase: [] YES [x] NO      LDL/HDL: 33/47     Statin Therapy: Yes     Dysphagia Screen: [] PASS [x] FAIL     Smoking [] YES [x] NO      Afib [x] YES [] NO     Stroke Education [x] YES [] NO    Obtain screening lower extremity venous ultrasound in patients who meet 1 or more of the following criteria as patient is high risk for DVT/PE on admission: COMPLETED - NEGATIVE  [] History of DVT/PE  []Hypercoagulable states (Factor V Leiden, Cancer, OCP, etc. )  []Prolonged immobility (hemiplegia/hemiparesis/post operative or any other extended immobilization)  [] Transferred from outside facility (Rehab or Long term care)  [] Age </= to 50

## 2023-06-13 NOTE — SWALLOW VFSS/MBS ASSESSMENT ADULT - ORAL PHASE COMMENTS
Eventual fatigue and poor endurance resulting in episode of mild silent deep laryngeal penetration before the swallow due to poor control; over the laryngeal surface of the epiglottis and AE folds. Ejected on subsequent swallow. Eventual fatigue and poor endurance resulting in episode of mild silent laryngeal penetration before the swallow due to poor control; over the laryngeal surface of the epiglottis and AE folds. Ejected on subsequent swallow.

## 2023-06-13 NOTE — SWALLOW VFSS/MBS ASSESSMENT ADULT - ORAL PREPARATORY PHASE
Prolonged bolus manipulation Prolonged bolus manipulation, eventual mild-moderate anterior spillage over the R sided labial surface d/t increasing residue

## 2023-06-13 NOTE — SWALLOW VFSS/MBS ASSESSMENT ADULT - COMMENTS
CT angiography neck 6/1: No hemodynamically significant stenosis by NASCET criteria. No vascular dissection. Nonspecific groundglass opacities in the visualized apices of the lungs, may represent infectious versus inflammatory disease. CT angiography brain 6/1: Left proximal M1 occlusion. CT perfusion 6/1: Core infarct of 6 cc with a penumbra of 162 cc. CT Brain 6/1- No acute intracranial hemorrhage or mass effect.    Extubated 6/2.    SWALLOW HISTORY: No reports in SCM or in PACS prior to this admission.  Patient seen for bedside swallow evaluations 6/3, 6/5, and 6/7 with recommendations for NPO, with non-oral nutrition/hydration/medications. CT angiography neck 6/1: No hemodynamically significant stenosis by NASCET criteria. No vascular dissection. Nonspecific groundglass opacities in the visualized apices of the lungs, may represent infectious versus inflammatory disease. CT angiography brain 6/1: Left proximal M1 occlusion. CT perfusion 6/1: Core infarct of 6 cc with a penumbra of 162 cc. CT Brain 6/1- No acute intracranial hemorrhage or mass effect.    Extubated 6/2.    SWALLOW HISTORY: No reports in SCM or in PACS prior to this admission.  Patient seen for bedside swallow evaluations 6/3, 6/5, and 6/7 with recommendations for NPO, with non-oral nutrition/hydration/medications.  6/8 FEES completed with recommendations for NPO, with non-oral nutrition/hydration/medications.

## 2023-06-13 NOTE — PROGRESS NOTE ADULT - ASSESSMENT
82y (1940) woman with a PMHx significant for HTN, afib on Xarelto (last dose 6/1 6pm), hepatitis C on antiviral presenting with acute onset of AMS. LKW 10pm. Around 10:30pm patient become acutely altered, slumped to right side, head turned to left, not answering questions properly or moving the right side. NIHSS 22 most notable for decreased arousal, incorrect answers, not following commands, left gaze deviation that could not be overcome, right facial droop, right sided severe weakness compared to left, slurred speech, aphasia. Patient lives at home with daughter who is at bedside. Patient is Palauan speaking. Daughter reports at baseline she intermittently walks with a cane and needs minor help with ADLs such as getting dressed or running a shower, Daughter looks after her affairs. She denies dementia or cognitive impairment. mRS 3.     LKW 10pm (6/1)  NIHSS 22  pre-mRS 3  Xarelto last taken at 6pm, INR=1.79   (02 Jun 2023 01:43)  has been hypertensive   family at bedside

## 2023-06-13 NOTE — SWALLOW VFSS/MBS ASSESSMENT ADULT - ROSENBEK'S PENETRATION ASPIRATION SCALE
(3) contrast remains above the vocal cords, visible residue remains (penetration) (5) contrast contacts vocal cords, visible residue remains (penetration) (8) contrast passes glottis, visible subglottic residue remains, absent patient response (aspiration) (1) no aspiration, contrast does not enter airway

## 2023-06-13 NOTE — ADVANCED PRACTICE NURSE CONSULT - ASSESSMENT
arrived on unit, patient was found lying in a low air loss pressure redistribution support surface style bed. Patient was alert and oriented and gave consent to skin consult. patient  is unable to turn independently and staff assistance x 1was provided. Once turned,  urinary incontinence was apparent and pericare was provided. nasoenteric feeding tube present.  arrived on unit, patient was found lying in a low air loss pressure redistribution support surface style bed.  patient  is unable to turn independently and staff assistance x 1was provided. Once turned,  urinary incontinence was apparent and loyda care was provided.  nasoenteric feeding tube present.    patient  was educated  on the importance  for turning  and positioning  every 2 hours. the  use of waffle cushion when out of bed  to chair  and to shift her  Weight every 2 hours while in chair. the importance a keeping skin clean and dry and  to offload left  heel/ foot, and optimal nutrition.   arrived on unit, patient was found lying in a low air loss pressure redistribution support surface style bed.  patient  is unable to turn independently and staff assistance x 1was provided. Once turned,  urinary incontinence was apparent and loyda care was provided.  nasoenteric feeding tube present.    patient  was educated  on the importance  for turning  and positioning  every 2 hours. the  use of waffle cushion when out of bed  to chair  and to shift her  Weight every 2 hours while in chair. the importance a keeping skin clean and dry and  to offload left  heel/ foot, and optimal nutrition.    bilateral sacrum and buttocks loyda rectal area red blanchable with area with Satellight  lesion consistent with inconstance dermatitis.

## 2023-06-13 NOTE — ADVANCED PRACTICE NURSE CONSULT - REASON FOR CONSULT
Consult to assess patient skin initiated by RN Suspected Deep tissue injury  on sacrum.  History of Present Illness:  Reason for Admission: stroke  History of Present Illness:   82y (1940) woman with a PMHx significant for HTN, afib on Xarelto (last dose 6/1 6pm), hepatitis C on antiviral presenting with acute onset of AMS. LKW 10pm. Around 10:30pm patient become acutely altered, slumped to right side, head turned to left, not answering questions properly or moving the right side. NIHSS 22 most notable for decreased arousal, incorrect answers, not following commands, left gaze deviation that could not be overcome, right facial droop, right sided severe weakness compared to left, slurred speech, aphasia. Patient lives at home with daughter who is at bedside. Patient is Cambodian speaking. Daughter reports at baseline she intermittently walks with a cane and needs minor help with ADLs such as getting dressed or running a shower, Daughter looks after her affairs. She denies dementia or cognitive impairment. mRS 3.     LKW 10pm (6/1)  NIHSS 22  pre-mRS 3  Xarelto last taken at 6pm, INR=1.79

## 2023-06-13 NOTE — PROGRESS NOTE ADULT - SUBJECTIVE AND OBJECTIVE BOX
Subjective: Patient seen and examined. No new events except as noted.   moved out of ICU   HR and BP stable       REVIEW OF SYSTEMS:    CONSTITUTIONAL: No weakness, fevers or chills  EYES/ENT: No visual changes;  No vertigo or throat pain   NECK: No pain or stiffness  RESPIRATORY: No cough, wheezing, hemoptysis; No shortness of breath  CARDIOVASCULAR: No chest pain or palpitations  GASTROINTESTINAL: No abdominal or epigastric pain. No nausea, vomiting, or hematemesis; No diarrhea or constipation. No melena or hematochezia.  GENITOURINARY: No dysuria, frequency or hematuria  NEUROLOGICAL: No numbness or weakness  SKIN: No itching, burning, rashes, or lesions   All other review of systems is negative unless indicated above.    MEDICATIONS:  MEDICATIONS  (STANDING):  acetylcysteine 20%  Inhalation 4 milliLiter(s) Inhalation every 6 hours  albuterol/ipratropium for Nebulization 3 milliLiter(s) Nebulizer every 6 hours  amLODIPine   Tablet 10 milliGRAM(s) Oral daily  aspirin  chewable 81 milliGRAM(s) Oral daily  atorvastatin 80 milliGRAM(s) Oral at bedtime  chlorhexidine 4% Liquid 1 Application(s) Topical daily  enoxaparin Injectable 40 milliGRAM(s) SubCutaneous <User Schedule>  entecavir Solution 0.5 milliGRAM(s) Oral <User Schedule>  labetalol 400 milliGRAM(s) Oral every 8 hours  losartan 100 milliGRAM(s) Oral daily  pantoprazole  Injectable 40 milliGRAM(s) IV Push daily  polyethylene glycol 3350 17 Gram(s) Oral daily  senna 2 Tablet(s) Oral at bedtime      PHYSICAL EXAM:  T(C): 36.7 (06-13-23 @ 05:00), Max: 36.8 (06-12-23 @ 12:00)  HR: 95 (06-13-23 @ 05:00) (84 - 96)  BP: 144/79 (06-13-23 @ 05:00) (94/50 - 144/79)  RR: 18 (06-13-23 @ 05:00) (18 - 37)  SpO2: 94% (06-13-23 @ 05:00) (91% - 100%)  Wt(kg): --  I&O's Summary    12 Jun 2023 07:01  -  13 Jun 2023 07:00  --------------------------------------------------------  IN: 1920 mL / OUT: 800 mL / NET: 1120 mL        Appearance: NAD +NGT 	  HEENT:   Dry  oral mucosa, PERRL, EOMI	  Lymphatic: No lymphadenopathy , no edema  Cardiovascular: irregular  S1 S2, No JVD, No murmurs , Peripheral pulses palpable 2+ bilaterally  Respiratory: decreased bs   Gastrointestinal:  Soft, Non-tender, + BS	  Skin: No rashes, No ecchymoses, No cyanosis, warm to touch  Musculoskeletal: Normal range of motion, normal strength  Psychiatry:  Mood & affect appropriate  Ext: No edema  Neurological (>12):  Mental status: awake and alert, attentive to examiner. oriented to self and place (hospital), does not know age or year. follows simple commands. does not follow cross commands  , speech hypophonic, no dysarthria, follows simple commands but not 2-step commands, no neglect   Cranial Nerves: Right nasolabial flattening, no BTT on right, right gaze palsy can be overcome voluntarily  Motor exam: Normal tone, 0/5 RUE, 0/5 RLE, 5/5 LUE, 5/5 LLE, normal fine finger movements. triple flexion of RLE  Sensation: Intact to light touch   Coordination/ Gait: No dysmetria, gait not tested      LABS:    CARDIAC MARKERS:                                12.1   9.47  )-----------( 309      ( 12 Jun 2023 00:34 )             37.0     06-13    145  |  110<H>  |  40<H>  ----------------------------<  152<H>  4.3   |  21<L>  |  0.75    Ca    9.3      13 Jun 2023 06:39  Phos  3.5     06-12  Mg     2.4     06-12      proBNP:   Lipid Profile:   HgA1c:   TSH:             TELEMETRY: 	AF    ECG:  	  RADIOLOGY:   DIAGNOSTIC TESTING:  [ ] Echocardiogram:  < from: TTE W or WO Ultrasound Enhancing Agent (06.02.23 @ 10:21) >    TRANSTHORACIC ECHOCARDIOGRAM REPORT  ________________________________________________________________________________                                      _______       Pt. Name:       AUDI DYKES Study Date:    6/2/2023  MRN:            [id number]      YOB: 1940  Accession #:    [id number]       Age:           82 years  Account#:       [id number]    Gender:        F  Heart Rate:                     Height:        65.00 in (165.10 cm)  Rhythm:                         Weight:      164.00 lb (74.39 kg)  Blood Pressure: 127/66 mmHg     BSA/BMI:       1.82 m² / 27.29 kg/m²  ________________________________________________________________________________________  Referring Physician:    [id number] Leslie Bob  Interpreting Physician: Gwyn Olsen M.D.  Primary Sonographer:    Traci Cavazos [facility]    CPT:                ECHO TTE WITH CON COMP W DOPP - .m; DEFINITY ECHO                      CONTRAST PER ML WASTED - .m  Indication(s):      Other cerebral infarction - I63.89  Procedure:          Transthoracic echocardiogram with 2-D, M-mode and complete                      spectral and color flow Doppler.  Ordering Location:  [facility]  Contrast Injection: Verbal consent was obtained for injection of Ultrasonic                      Enhancing Agent following a discussion of risks and                      benefits.                      Endocardial visualization enhanced with 2 ml of Definity                      Ultrasound enhancing agent (Lot#:[id number] Exp.Date:07/24                      Discarded Dose:7ml).  Study Information:  Image quality for this study is fair.    _______________________________________________________________________________________  CONCLUSIONS:      1. Normal left ventricular cavitysize. The left ventricular wall thickness is normal. The left ventricular systolic function is normal with an ejection fraction of 60 % by Bassett's method of disks.   2. There is severe (grade 3) left ventricular diastolic dysfunction.   3. Normal right ventricular cavity size, normal wall thickness and normal systolic function. The tricuspid annular plane systolic excursion (TAPSE) is 1.4 cm (normal >=1.7 cm).   4. The left atrium is moderately dilated.   5. The right atrium is dilated.   6. Moderate aortic stenosis.   7. No evidence of aortic regurgitation.   8. No prior echocardiogram is available for comparison.    ________________________________________________________________________________________  FINDINGS:     Left Ventricle:  Normal left ventricular cavity size. The left ventricular wall thickness is normal. Abnormal (paradoxical) septal motion consistent with conduction delay. The left ventricular systolic function is normal with a calculated ejection fraction of 60 % by theSimpson's biplane method of disks. There is severe (grade 3) left ventricular diastolic dysfunction.     Right Ventricle:  Normal right ventricular cavity size, normal wall thickness and normal systolic function. Tricuspid annular plane systolic excursion (TAPSE) is 1.4 cm (normal >=1.7 cm). Tricuspid annular tissue Doppler S' is 10.0 cm/s (normal >10 cm/s).     Left Atrium:  The left atrium is moderately dilated.     Right Atrium:  The right atrium is dilated with an indexed volume of 42.35 ml/m².     Interatrial Septum:  The interatrial septum appears intact. Agitated saline injection was negative for intracardiac shunt.     Aortic Valve:  The aortic valve was not well visualized. The aortic valve appears trileaflet. There is mild calcification of the aortic valve leaflets. There is moderate aortic stenosis. There is no evidence of aortic regurgitation.     Mitral Valve:  There is calcification of the mitral valve annulus. There is mild mitral regurgitation.     Tricuspid Valve:  There is mild tricuspid regurgitation.     Pulmonic Valve:  There is trace pulmonic regurgitation.     Aorta:  The aortic annulus and aortic root appear normal in size.     Pericardium:  There is a trace pericardial effusion with no evidence of hemodynamic compromise.  ____________________________________________________________________  QUANTITATIVE DATA  Left Ventricle Measurements                         Indexed BSA  IVSd (2D):   0.9 cm  LVPWd (2D):  1.1 cm  LVIDd (2D):  4.3 cm  LVIDs (2D):  3.2 cm  LV Mass:     142 g   78.4 g/m²  BiPlane LV EF%: 60 %     MV E Vmax:    1.00 m/s  MV A Vmax:    0.42 m/s  MV E/A:       2.38  e' lateral:   8.70 cm/s  e' medial:    7.94 cm/s  E/e' lateral: 11.47  E/e' medial:  12.57  E/e' Average: 12.00    Aorta Measurements     Ao Sinus:    2.7 cm  Ao Asc prox: 3.00 cm       Left Atrium Measurements     LA Diam 2D: 4.50 cm    Right Ventricle Measurements Right Atrial Measurements     TAPSE:      1.4 cm           RA Vol:       77.00 ml  TV Karla. S': 10.10 cm/s RA Vol Index: 42.35 ml/m²       LVOT / RVOT/ Qp/Qs Data:  LVOT Diameter:   1.80 cm  LVOT Vmax:       0.78 m/s  LVOT VTI:        16.40 cm  LVOT SV:         41.7 ml  LVOT SV Indexed: 22.95 ml/m²    Aortic Valve Measurements     AV Vmax:           192.0 cm/s  AV Peak Gradient:  14.7 mmHg  AV Mean Gradient:  9.0 mmHg  AV VTI:            39.9 cm  AV VTI Ratio:      0.41  AoV EOA, Contin:   1.05 cm²  AoV EOA, Contin i: 0.58 cm²/m²    Mitral Valve Measurements     MV E Vmax: 1.0 m/s  MV A Vmax: 0.4 m/s  MV E/A:    2.4       Tricuspid Valve Measurements     TR Vmax:          2.6 m/s  TR Peak Gradient: 26.8 mmHg    ________________________________________________________________________________________  Diagnosing Physician: Gwyn Olsen M.D.  Electronically signed on 6/2/2023 at 3:30:30 PM by Gwyn Olsen M.D.         *** Final ***    < end of copied text >    [ ]  Catheterization:  [ ] Stress Test:    OTHER:

## 2023-06-13 NOTE — PROGRESS NOTE ADULT - SUBJECTIVE AND OBJECTIVE BOX
patient now on [address]  awaiting [facility]     REVIEW OF SYSTEMS  Constitutional - No fever,  No fatigue  HEENT - No vertigo, No neck pain  Neurological - No headaches, No memory loss, No loss of strength, No numbness, No tremors  Skin - No rashes, No lesions   Musculoskeletal - No joint pain, No joint swelling, No muscle pain  Psychiatric - No depression, No anxiety    FUNCTIONAL PROGRESS  6/12 PT  bed mobility max assist x 2  transfers bed to chair dependent, non powered sit to stand   sit to stand max assist x 2, non powered sit to stand     6/9 OT  bed mobility max assist x 2  transfers max assist x 2, non mech lift   sit to stand mod to max assist x 2  follows commands     VITALS  T(C): 36.7 (06-13-23 @ 09:36), Max: 36.8 (06-12-23 @ 12:00)  HR: 94 (06-13-23 @ 09:50) (84 - 109)  BP: 146/84 (06-13-23 @ 09:50) (94/50 - 157/75)  RR: 18 (06-13-23 @ 09:36) (18 - 32)  SpO2: 96% (06-13-23 @ 09:36) (91% - 100%)  Wt(kg): --    MEDICATIONS   acetaminophen   Oral Liquid .. 650 milliGRAM(s) every 6 hours PRN  albuterol/ipratropium for Nebulization 3 milliLiter(s) every 6 hours PRN  amLODIPine   Tablet 10 milliGRAM(s) daily  aspirin  chewable 81 milliGRAM(s) daily  atorvastatin 80 milliGRAM(s) at bedtime  chlorhexidine 4% Liquid 1 Application(s) daily  enoxaparin Injectable 40 milliGRAM(s) <User Schedule>  entecavir Solution 0.5 milliGRAM(s) <User Schedule>  hydrALAZINE Injectable 10 milliGRAM(s) every 3 hours PRN  labetalol 400 milliGRAM(s) every 8 hours  labetalol Injectable 10 milliGRAM(s) every 4 hours PRN  losartan 100 milliGRAM(s) daily  pantoprazole  Injectable 40 milliGRAM(s) daily  polyethylene glycol 3350 17 Gram(s) daily  senna 2 Tablet(s) at bedtime      RECENT LABS - Reviewed                        12.1   9.47  )-----------( 309      ( 12 Jun 2023 00:34 )             37.0     06-13    145  |  110<H>  |  40<H>  ----------------------------<  152<H>  4.3   |  21<L>  |  0.75    Ca    9.3      13 Jun 2023 06:39  Phos  3.5     06-12  Mg     2.4     06-12      PT/INR - ( 13 Jun 2023 06:39 )   PT: 14.1 sec;   INR: 1.22 ratio         PTT - ( 13 Jun 2023 06:39 )  PTT:44.1 sec    < from: CT Head No Cont (06.11.23 @ 04:14) >    IMPRESSION:  Improved subacute moderate to large left MCA territory   infarction. Stable petechial hemorrhage. Mild chronic microvascular   changes without evidence of a new acute transcortical infarction or   hemorrhage.      < end of copied text >      ----------------------------------------------------------------------------------------  PHYSICAL EXAM  Constitutional - NAD, Comfortable, laying in bed   +NGT   Chest - Breathing comfortably  Cardiovascular - S1S2   Abdomen - Soft   Extremities - wrist restraint   Neurologic Exam -                    Cognitive - Awake, Alert, AAO to self, place, time      Communication - hypophonic, able to name     Cranial Nerves - right facial weakness      Motor - right hemiplegia      Psychiatric - Mood stable, Affect WNL  ----------------------------------------------------------------------------------------  ASSESSMENT/PLAN  82yFemale h/o HTN, afib on xarelto, hep C with functional deficits after left MCA hemorrhagic infarct, s/p thrombectomy  tachypnea/episode of desaturation, s/p zosyn, pulmonary following  dysphagia, NPO, awaiting MBS   Pain - Tylenol  DVT PPX - SCDs, lovenox   Rehab - Will continue to follow for ongoing rehab needs and recommendations.   continue bedside therapy   Recommend ACUTE inpatient rehabilitation for the functional deficits consisting of 3 hours of therapy/day & 24 hour RN/daily PMR physician for comorbid medical management. Patient will be able to tolerate 3 hours a day.

## 2023-06-13 NOTE — CONSULT NOTE ADULT - ASSESSMENT
82 year old female with acute CVA    1. Acute cva  per neurology    2. dysphagia  await MBS result    3. Asymptomatic cholelithiasis  observe for symptoms    4. ?history of hepatitis B  will obtain colateral from family        Advanced care planning forms were discussed. Code status including forceful chest compressions, defibrillation and intubation were discussed. The risks benefits and alternatives to pertinent gastrointestinal procedures and interventions were discussed in detail and all questions were answered. Duration: 15 Minutes.    [facility]  Earnest Quiroz M.D.   [address] [place] [address]  Office: [phone]  
MCA infarct in the setting of afib   - previously on Xarelto could be failure  - recommend cardiology to weigh in on anticoagulation as this is related to Afib  - will check hypercoagulable workup as well to r/o other concomitant causes  - rest of management per neuro/cardio    Right breast mass  - Need mammogram as outpatient    Will follow with you
82y (1940) woman with a PMHx significant for HTN, afib on Xarelto (last dose 6/1 6pm), hepatitis C on antiviral presenting with acute onset of AMS. LKW 10pm. Around 10:30pm patient become acutely altered, slumped to right side, head turned to left, not answering questions properly or moving the right side. NIHSS 22 most notable for decreased arousal, incorrect answers, not following commands, left gaze deviation that could not be overcome, right facial droop, right sided severe weakness compared to left, slurred speech, aphasia. Patient lives at home with daughter who is at bedside. Patient is [place] speaking. Daughter reports at baseline she intermittently walks with a cane and needs minor help with ADLs such as getting dressed or running a shower, Daughter looks after her affairs. She denies dementia or cognitive impairment. mRS 3.     LKW 10pm (6/1)  NIHSS 22  pre-mRS 3  Xarelto last taken at 6pm, INR=1.79   (02 Jun 2023 01:43)  has been hypertensive   family at bedside     
83 y/o [place] speaking F with PMH of HTN, afib on Xarelto (last dose 6/1 6pm), hepatitis C on antiviral.  #[id number]. Presents with acute onset of AMS. 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 Course c/b possible aspiration PNA - s/p course of Zosyn. Called to consult for tachypnea overnight with episode of desaturation.

## 2023-06-13 NOTE — SWALLOW VFSS/MBS ASSESSMENT ADULT - DIAGNOSTIC IMPRESSIONS
Pt ia an 81 y/o female with s/p L M1 MT w TICI 2c flow. Patient presents on repeat instrumental assessment with a severe oral and mild pharyngeal phase dysphagia. The oral phase is remarkable for prolonged bolus manipulation and transport resulting in severe R sided lateral sulci residue for thick puree consistencies which requires oral suctioning given pt's inability to clear. Mild-moderate R sided lateral sulci residue across thin puree and moderately thick liquids via tsp. However, oral residue in conjunction with poor endurance/fatigue results in penetration before and after the swallow across thin puree trials. There is silent laryngeal penetration with mildly thick liquids and thin liquids via tsp without complete retrieval, and eventual silent aspiration with thin liquids. Cough is weak and material is not retrieved despite subsequent swallows. Pt not a candidate for compensatory strategies given reduced cognitive status. Would consider long term means nutrition/hydration and behavioral swallow rehabilitation at next level of care.     Disorders: reduced tongue-palate seal, reduced lingual strength/control, delayed initiation of pharyngeal trigger, reduced base of tongue retraction, reduced epiglottis inversion, reduced anterior hyo-laryngeal excursion, pharyngeal contractibility, reduced supraglottic sensation, reduced subglottic sensation. Pt ia an 81 y/o female with s/p L M1 MT w TICI 2c flow. Patient presents on repeat instrumental assessment with a severe oral and moderate pharyngeal phase dysphagia. The oral phase is remarkable for prolonged bolus manipulation and transport for all consistencies. Over the course of thick puree trials, there is R sided anterior spillage over the labial surface due to severe R sided lateral sulci residue which requires oral suctioning given pt's inability to clear. There is mild-moderate R sided lateral sulci residue across thin puree and moderately thick liquids via tsp. Oral residue in conjunction with poor endurance/fatigue results in penetration before and after the swallow across thin puree trials due to oral and pharyngeal residue. There is silent laryngeal penetration with mildly thick liquids and thin liquids via tsp without complete retrieval, and eventual silent aspiration with thin liquids. Cough is weak and material is not retrieved despite subsequent swallows. Pt not a candidate for compensatory strategies given reduced cognitive status. Would consider long term means nutrition/hydration and behavioral swallow rehabilitation at next level of care.     Disorders: reduced tongue-palate seal, reduced labial strength, reduced lingual strength/control, delayed initiation of pharyngeal trigger, reduced base of tongue retraction, reduced epiglottis inversion, reduced anterior hyo-laryngeal excursion, pharyngeal contractibility, reduced supraglottic sensation, reduced subglottic sensation. Pt ia an 83 y/o female with s/p L M1 MT w TICI 2c flow. Patient presents on repeat instrumental assessment with a severe oral and moderate pharyngeal phase dysphagia. The oral phase is remarkable for prolonged bolus manipulation and transport for all consistencies. Over the course of thick puree trials, there is R sided anterior spillage over the labial surface due to severe R sided lateral sulci residue which requires oral suctioning given pt's inability to clear. There is mild-moderate R sided lateral sulci residue across thin puree and moderately thick liquids via tsp. Oral residue in conjunction with poor endurance/fatigue results in penetration before and after the swallow across thin puree and moderately thick liquids via tsp trials due to oral and pharyngeal residue. However, penetrated material with moderately thick liquids via tsp is shallow and does not descend. There is silent laryngeal penetration with mildly thick liquids and thin liquids via tsp without complete retrieval, and eventual silent aspiration with thin liquids. Cough is weak and material is not retrieved despite subsequent swallows. Pt not a candidate for compensatory strategies given reduced cognitive status. Would consider long term means nutrition/hydration and behavioral swallow rehabilitation at next level of care.     Disorders: reduced tongue-palate seal, reduced labial strength, reduced lingual strength/control, delayed initiation of pharyngeal trigger, reduced base of tongue retraction, reduced epiglottis inversion, reduced anterior hyo-laryngeal excursion, pharyngeal contractibility, reduced supraglottic sensation, reduced subglottic sensation. Pt ia an 81 y/o female with s/p L M1 MT w TICI 2c flow. Patient presents on repeat instrumental assessment with a severe oral and moderate pharyngeal phase dysphagia. The oral phase is remarkable for prolonged bolus manipulation and transport for all consistencies. Over the course of thick puree trials, there is R sided anterior spillage over the labial surface due to severe R sided lateral sulci residue which requires oral suctioning given pt's inability to clear. There is mild-moderate R sided lateral sulci residue across thin puree and moderately thick liquids via tsp. Oral residue in conjunction with poor endurance/fatigue results in penetration before and after the swallow across thin puree and moderately thick liquids via tsp. However, penetrated material with moderately thick liquids via tsp is shallow and does not descend. There is silent laryngeal penetration with mildly thick liquids and thin liquids via tsp without complete retrieval, and eventual silent aspiration with thin liquids. Cough is weak and material is not retrieved despite subsequent swallows. Pt not a candidate for compensatory strategies given reduced cognitive status. Would consider long term means nutrition/hydration and behavioral swallow rehabilitation at next level of care.     Disorders: reduced tongue-palate seal, reduced labial strength, reduced lingual strength/control, delayed initiation of pharyngeal trigger, reduced base of tongue retraction, reduced epiglottis inversion, reduced anterior hyo-laryngeal excursion, pharyngeal contractibility, reduced supraglottic sensation, reduced subglottic sensation.

## 2023-06-13 NOTE — PROGRESS NOTE ADULT - SUBJECTIVE AND OBJECTIVE BOX
Follow-up Pulm Progress Note    No new respiratory events overnight.  Denies SOB/CP.       Medications:  MEDICATIONS  (STANDING):  acetylcysteine 20%  Inhalation 4 milliLiter(s) Inhalation every 6 hours  albuterol/ipratropium for Nebulization 3 milliLiter(s) Nebulizer every 6 hours  amLODIPine   Tablet 10 milliGRAM(s) Oral daily  aspirin  chewable 81 milliGRAM(s) Oral daily  atorvastatin 80 milliGRAM(s) Oral at bedtime  chlorhexidine 4% Liquid 1 Application(s) Topical daily  enoxaparin Injectable 40 milliGRAM(s) SubCutaneous <User Schedule>  entecavir Solution 0.5 milliGRAM(s) Oral <User Schedule>  labetalol 400 milliGRAM(s) Oral every 8 hours  losartan 100 milliGRAM(s) Oral daily  pantoprazole  Injectable 40 milliGRAM(s) IV Push daily  polyethylene glycol 3350 17 Gram(s) Oral daily  senna 2 Tablet(s) Oral at bedtime    MEDICATIONS  (PRN):  acetaminophen   Oral Liquid .. 650 milliGRAM(s) Oral every 6 hours PRN Temp greater or equal to 38C (100.4F), Mild Pain (1 - 3)  hydrALAZINE Injectable 10 milliGRAM(s) IV Push every 3 hours PRN SBP > 160  labetalol Injectable 10 milliGRAM(s) IV Push every 4 hours PRN Systolic blood pressure > 160          Vital Signs Last 24 Hrs  T(C): 36.7 (13 Jun 2023 09:36), Max: 36.8 (12 Jun 2023 12:00)  T(F): 98.1 (13 Jun 2023 09:36), Max: 98.2 (12 Jun 2023 12:00)  HR: 109 (13 Jun 2023 09:36) (84 - 109)  BP: 157/75 (13 Jun 2023 09:36) (94/50 - 157/75)  BP(mean): 83 (12 Jun 2023 20:00) (66 - 108)  RR: 18 (13 Jun 2023 09:36) (18 - 37)  SpO2: 96% (13 Jun 2023 09:36) (91% - 100%)    Parameters below as of 13 Jun 2023 09:36  Patient On (Oxygen Delivery Method): room air              06-12 @ 07:01  -  06-13 @ 07:00  --------------------------------------------------------  IN: 1920 mL / OUT: 800 mL / NET: 1120 mL          LABS:                        12.1   9.47  )-----------( 309      ( 12 Jun 2023 00:34 )             37.0     06-13    145  |  110<H>  |  40<H>  ----------------------------<  152<H>  4.3   |  21<L>  |  0.75    Ca    9.3      13 Jun 2023 06:39  Phos  3.5     06-12  Mg     2.4     06-12              PT/INR - ( 13 Jun 2023 06:39 )   PT: 14.1 sec;   INR: 1.22 ratio         PTT - ( 13 Jun 2023 06:39 )  PTT:44.1 sec            Physical Examination:  PULM: Clear to auscultation bilaterally, no significant sputum production  CVS: S1, S2 heard        RADIOLOGY REVIEWED  CXR 6/12: grossly clear

## 2023-06-13 NOTE — SWALLOW VFSS/MBS ASSESSMENT ADULT - ADDITIONAL RECOMMENDATIONS
1. Pt will complete dysphagia exercises to improve swallow function. 1. Pt will complete dysphagia exercises to improve swallow function.    Suggest therapuetic 1. Pt will complete dysphagia exercises to improve swallow function.    Suggest conservative therapeutic trials of moderately thick liquids via tsp with cued cough and reswallow. 1. Pt will complete dysphagia exercises to improve swallow function.    Suggest conservative therapeutic trials of moderately thick liquids via tsp with cued cough and reswallow with SLP only.

## 2023-06-13 NOTE — PROGRESS NOTE ADULT - SUBJECTIVE AND OBJECTIVE BOX
Chief Complaint:  Patient is a 82y old  Female who presents with a chief complaint of stroke (13 Jun 2023 11:24)      Date of service 06-13-23 @ 15:34      Interval Events:   no events    Hospital Medications:  acetaminophen   Oral Liquid .. 650 milliGRAM(s) Oral every 6 hours PRN  albuterol/ipratropium for Nebulization 3 milliLiter(s) Nebulizer every 6 hours PRN  amLODIPine   Tablet 10 milliGRAM(s) Oral daily  aspirin  chewable 81 milliGRAM(s) Oral daily  atorvastatin 80 milliGRAM(s) Oral at bedtime  chlorhexidine 4% Liquid 1 Application(s) Topical daily  enoxaparin Injectable 40 milliGRAM(s) SubCutaneous <User Schedule>  entecavir Solution 0.5 milliGRAM(s) Oral <User Schedule>  hydrALAZINE Injectable 10 milliGRAM(s) IV Push every 3 hours PRN  labetalol 400 milliGRAM(s) Oral every 8 hours  labetalol Injectable 10 milliGRAM(s) IV Push every 4 hours PRN  losartan 100 milliGRAM(s) Oral daily  pantoprazole  Injectable 40 milliGRAM(s) IV Push daily  polyethylene glycol 3350 17 Gram(s) Oral daily  senna 2 Tablet(s) Oral at bedtime              PHYSICAL EXAM:   Vital Signs:  Vital Signs Last 24 Hrs  T(C): 36.7 (13 Jun 2023 09:36), Max: 36.7 (12 Jun 2023 20:50)  T(F): 98.1 (13 Jun 2023 09:36), Max: 98.1 (13 Jun 2023 09:36)  HR: 88 (13 Jun 2023 12:12) (86 - 109)  BP: 150/67 (13 Jun 2023 12:12) (105/59 - 157/75)  BP(mean): 83 (12 Jun 2023 20:00) (77 - 83)  RR: 18 (13 Jun 2023 12:12) (18 - 29)  SpO2: 96% (13 Jun 2023 12:12) (92% - 100%)    Parameters below as of 13 Jun 2023 12:12  Patient On (Oxygen Delivery Method): room air      Daily     Daily       PHYSICAL EXAM:     GENERAL:  Appears stated age, well-groomed, well-nourished, no distress  HEENT:  NC/AT,  conjunctivae anicteric, clear and pink,   NECK: supple, trachea midline  CHEST:  Full & symmetric excursion, no increased effort, breath sounds clear  HEART:  Regular rhythm, no JVD  ABDOMEN:  Soft, non-tender, non-distended, normoactive bowel sounds,  no masses , no hepatosplenomegaly  EXTREMITIES:  no cyanosis,clubbing or edema  SKIN:  No rash, erythema, or, ecchymoses, no jaundice    RECTAL: Deferred      LABS Personally reviewed by me:                        11.6   10.05 )-----------( 317      ( 13 Jun 2023 14:47 )             35.7     Mean Cell Volume: 94.4 fl (06-13-23 @ 14:47)    06-13    145  |  110<H>  |  40<H>  ----------------------------<  152<H>  4.3   |  21<L>  |  0.75    Ca    9.3      13 Jun 2023 06:39  Phos  3.5     06-12  Mg     2.4     06-12        PT/INR - ( 13 Jun 2023 06:39 )   PT: 14.1 sec;   INR: 1.22 ratio         PTT - ( 13 Jun 2023 06:39 )  PTT:44.1 sec                            11.6   10.05 )-----------( 317      ( 13 Jun 2023 14:47 )             35.7                         12.1   9.47  )-----------( 309      ( 12 Jun 2023 00:34 )             37.0                         12.3   9.22  )-----------( 325      ( 11 Jun 2023 02:33 )             37.4       Imaging personally reviewed by me:

## 2023-06-13 NOTE — CONSULT NOTE ADULT - SUBJECTIVE AND OBJECTIVE BOX
Reason for consult: stroke    HPI:  82y (1940) woman with a PMHx significant for HTN, afib on Xarelto (last dose 6/1 6pm), hepatitis C on antiviral presenting with acute onset of AMS. LKW 10pm. Around 10:30pm patient become acutely altered, slumped to right side, head turned to left, not answering questions properly or moving the right side. NIHSS 22 most notable for decreased arousal, incorrect answers, not following commands, left gaze deviation that could not be overcome, right facial droop, right sided severe weakness compared to left, slurred speech, aphasia. HE  Hematology are consulted due to patient having a stroke while on xarelto.  Further workup showed Left MCA territory infarct with left MCA occlusion. Likely Mechanism cardioembolic from known afib. S/p thrombectomy s/p TICI 2C.   Also patient had a CT C/A/P that showed A 3.6 cm well-circumscribed right breast mass with associated calcification ,Small inferior splenic infarct, Several regions of cortical hypoenhancement in the right kidney may be   related to pyelonephritis or infarct.        PAST MEDICAL & SURGICAL HISTORY:      FAMILY HISTORY:      Alochol: Denied  Smoking: Nonsmoker  Drug Use: Denied  Marital Status:         Allergies    No Known Allergies    Intolerances        MEDICATIONS  (STANDING):  amLODIPine   Tablet 10 milliGRAM(s) Oral daily  aspirin  chewable 81 milliGRAM(s) Oral daily  atorvastatin 80 milliGRAM(s) Oral at bedtime  chlorhexidine 4% Liquid 1 Application(s) Topical daily  enoxaparin Injectable 40 milliGRAM(s) SubCutaneous <User Schedule>  entecavir Solution 0.5 milliGRAM(s) Oral <User Schedule>  labetalol 400 milliGRAM(s) Oral every 8 hours  losartan 100 milliGRAM(s) Oral daily  pantoprazole  Injectable 40 milliGRAM(s) IV Push daily  polyethylene glycol 3350 17 Gram(s) Oral daily  senna 2 Tablet(s) Oral at bedtime    MEDICATIONS  (PRN):  acetaminophen   Oral Liquid .. 650 milliGRAM(s) Oral every 6 hours PRN Temp greater or equal to 38C (100.4F), Mild Pain (1 - 3)  albuterol/ipratropium for Nebulization 3 milliLiter(s) Nebulizer every 6 hours PRN Shortness of Breath and/or Wheezing  hydrALAZINE Injectable 10 milliGRAM(s) IV Push every 3 hours PRN SBP > 160  labetalol Injectable 10 milliGRAM(s) IV Push every 4 hours PRN Systolic blood pressure > 160      ROS  unable to obtain due to CONFUSION    T(C): 36.7 (06-13-23 @ 09:36), Max: 36.7 (06-12-23 @ 20:50)  HR: 88 (06-13-23 @ 12:12) (86 - 109)  BP: 150/67 (06-13-23 @ 12:12) (105/59 - 157/75)  RR: 18 (06-13-23 @ 12:12) (18 - 29)  SpO2: 96% (06-13-23 @ 12:12) (92% - 100%)  Wt(kg): --    PE  CONFUSED  Anicteric, MMM  RRR  CTAB  Abd soft, NT, ND  No c/c/e  No rash grossly                          11.6   10.05 )-----------( 317      ( 13 Jun 2023 14:47 )             35.7       06-13    145  |  110<H>  |  40<H>  ----------------------------<  152<H>  4.3   |  21<L>  |  0.75    Ca    9.3      13 Jun 2023 06:39  Phos  3.5     06-12  Mg     2.4     06-12     n/a

## 2023-06-13 NOTE — SWALLOW VFSS/MBS ASSESSMENT ADULT - ADDITIONAL INFORMATION
+ KFT   + multiple anterior cervical osteophytes at level of C4-C6  + suspected secretions at arytenoids

## 2023-06-13 NOTE — SWALLOW VFSS/MBS ASSESSMENT ADULT - ASPIRATION AFTER SWALLOW - SILENT
Upon reinitiation of fluoroscopy, material noted on anterior trachea. Pt insensate, weak cued cough ineffective in ejecting material/Mild

## 2023-06-14 DIAGNOSIS — R13.10 DYSPHAGIA, UNSPECIFIED: ICD-10-CM

## 2023-06-14 LAB
ANION GAP SERPL CALC-SCNC: 14 MMOL/L — SIGNIFICANT CHANGE UP (ref 5–17)
AT III ACT/NOR PPP CHRO: 99 % — SIGNIFICANT CHANGE UP (ref 85–135)
B2 GLYCOPROT1 AB SER QL: POSITIVE
B2 GLYCOPROT1 IGA SER QL: 9.1 SAU — SIGNIFICANT CHANGE UP
B2 GLYCOPROT1 IGG SER-ACNC: <5 SGU — SIGNIFICANT CHANGE UP
B2 GLYCOPROT1 IGM SER-ACNC: <5 SMU — SIGNIFICANT CHANGE UP
BUN SERPL-MCNC: 35 MG/DL — HIGH (ref 7–23)
CALCIUM SERPL-MCNC: 9 MG/DL — SIGNIFICANT CHANGE UP (ref 8.4–10.5)
CARDIOLIPIN AB SER-ACNC: POSITIVE
CARDIOLIPIN IGM SER-MCNC: 10.6 MPL — SIGNIFICANT CHANGE UP (ref 0–12.5)
CARDIOLIPIN IGM SER-MCNC: <5 GPL — SIGNIFICANT CHANGE UP (ref 0–12.5)
CHLORIDE SERPL-SCNC: 108 MMOL/L — SIGNIFICANT CHANGE UP (ref 96–108)
CO2 SERPL-SCNC: 20 MMOL/L — LOW (ref 22–31)
CREAT SERPL-MCNC: 0.7 MG/DL — SIGNIFICANT CHANGE UP (ref 0.5–1.3)
DEPRECATED CARDIOLIPIN IGA SER: <5 APL — SIGNIFICANT CHANGE UP (ref 0–12.5)
DRVVT RATIO: 1.04 RATIO — SIGNIFICANT CHANGE UP (ref 0–1.21)
DRVVT SCREEN TO CONFIRM RATIO: SIGNIFICANT CHANGE UP
EGFR: 86 ML/MIN/1.73M2 — SIGNIFICANT CHANGE UP
GLUCOSE SERPL-MCNC: 117 MG/DL — HIGH (ref 70–99)
HCT VFR BLD CALC: 37.1 % — SIGNIFICANT CHANGE UP (ref 34.5–45)
HCYS SERPL-MCNC: 9.6 UMOL/L — SIGNIFICANT CHANGE UP
HGB BLD-MCNC: 11.9 G/DL — SIGNIFICANT CHANGE UP (ref 11.5–15.5)
MCHC RBC-ENTMCNC: 30.1 PG — SIGNIFICANT CHANGE UP (ref 27–34)
MCHC RBC-ENTMCNC: 32.1 GM/DL — SIGNIFICANT CHANGE UP (ref 32–36)
MCV RBC AUTO: 93.7 FL — SIGNIFICANT CHANGE UP (ref 80–100)
NRBC # BLD: 0 /100 WBCS — SIGNIFICANT CHANGE UP (ref 0–0)
PLATELET # BLD AUTO: 249 K/UL — SIGNIFICANT CHANGE UP (ref 150–400)
POTASSIUM SERPL-MCNC: 4.7 MMOL/L — SIGNIFICANT CHANGE UP (ref 3.5–5.3)
POTASSIUM SERPL-SCNC: 4.7 MMOL/L — SIGNIFICANT CHANGE UP (ref 3.5–5.3)
PROT C ACT/NOR PPP: 81 % — SIGNIFICANT CHANGE UP (ref 74–150)
RBC # BLD: 3.96 M/UL — SIGNIFICANT CHANGE UP (ref 3.8–5.2)
RBC # FLD: 13.2 % — SIGNIFICANT CHANGE UP (ref 10.3–14.5)
SODIUM SERPL-SCNC: 142 MMOL/L — SIGNIFICANT CHANGE UP (ref 135–145)
WBC # BLD: 11.7 K/UL — HIGH (ref 3.8–10.5)
WBC # FLD AUTO: 11.7 K/UL — HIGH (ref 3.8–10.5)

## 2023-06-14 PROCEDURE — 99233 SBSQ HOSP IP/OBS HIGH 50: CPT

## 2023-06-14 DEVICE — PEG KT SAFETY 20FR: Type: IMPLANTABLE DEVICE | Status: FUNCTIONAL

## 2023-06-14 RX ORDER — ATORVASTATIN CALCIUM 80 MG/1
80 TABLET, FILM COATED ORAL AT BEDTIME
Refills: 0 | Status: DISCONTINUED | OUTPATIENT
Start: 2023-06-14 | End: 2023-06-16

## 2023-06-14 RX ORDER — POLYETHYLENE GLYCOL 3350 17 G/17G
17 POWDER, FOR SOLUTION ORAL DAILY
Refills: 0 | Status: DISCONTINUED | OUTPATIENT
Start: 2023-06-14 | End: 2023-06-16

## 2023-06-14 RX ORDER — LOSARTAN POTASSIUM 100 MG/1
100 TABLET, FILM COATED ORAL DAILY
Refills: 0 | Status: DISCONTINUED | OUTPATIENT
Start: 2023-06-14 | End: 2023-06-16

## 2023-06-14 RX ORDER — ASPIRIN/CALCIUM CARB/MAGNESIUM 324 MG
81 TABLET ORAL DAILY
Refills: 0 | Status: DISCONTINUED | OUTPATIENT
Start: 2023-06-14 | End: 2023-06-15

## 2023-06-14 RX ORDER — ACETAMINOPHEN 500 MG
1000 TABLET ORAL ONCE
Refills: 0 | Status: COMPLETED | OUTPATIENT
Start: 2023-06-14 | End: 2023-06-14

## 2023-06-14 RX ORDER — SENNA PLUS 8.6 MG/1
2 TABLET ORAL AT BEDTIME
Refills: 0 | Status: DISCONTINUED | OUTPATIENT
Start: 2023-06-14 | End: 2023-06-16

## 2023-06-14 RX ORDER — AMLODIPINE BESYLATE 2.5 MG/1
10 TABLET ORAL DAILY
Refills: 0 | Status: DISCONTINUED | OUTPATIENT
Start: 2023-06-15 | End: 2023-06-16

## 2023-06-14 RX ORDER — NYSTATIN 500MM UNIT
500000 POWDER (EA) MISCELLANEOUS EVERY 6 HOURS
Refills: 0 | Status: DISCONTINUED | OUTPATIENT
Start: 2023-06-14 | End: 2023-06-14

## 2023-06-14 RX ORDER — NYSTATIN 500MM UNIT
500000 POWDER (EA) MISCELLANEOUS EVERY 6 HOURS
Refills: 0 | Status: DISCONTINUED | OUTPATIENT
Start: 2023-06-14 | End: 2023-06-16

## 2023-06-14 RX ORDER — LABETALOL HCL 100 MG
400 TABLET ORAL EVERY 8 HOURS
Refills: 0 | Status: DISCONTINUED | OUTPATIENT
Start: 2023-06-14 | End: 2023-06-16

## 2023-06-14 RX ORDER — ENTECAVIR 0.5 MG/1
0.5 TABLET ORAL
Refills: 0 | Status: DISCONTINUED | OUTPATIENT
Start: 2023-06-15 | End: 2023-06-16

## 2023-06-14 RX ADMIN — POLYETHYLENE GLYCOL 3350 17 GRAM(S): 17 POWDER, FOR SOLUTION ORAL at 22:10

## 2023-06-14 RX ADMIN — Medication 81 MILLIGRAM(S): at 15:45

## 2023-06-14 RX ADMIN — Medication 400 MILLIGRAM(S): at 02:45

## 2023-06-14 RX ADMIN — Medication 1000 MILLIGRAM(S): at 15:57

## 2023-06-14 RX ADMIN — PANTOPRAZOLE SODIUM 40 MILLIGRAM(S): 20 TABLET, DELAYED RELEASE ORAL at 15:45

## 2023-06-14 RX ADMIN — Medication 400 MILLIGRAM(S): at 15:45

## 2023-06-14 RX ADMIN — Medication 400 MILLIGRAM(S): at 22:09

## 2023-06-14 RX ADMIN — Medication 500000 UNIT(S): at 19:20

## 2023-06-14 RX ADMIN — ENOXAPARIN SODIUM 40 MILLIGRAM(S): 100 INJECTION SUBCUTANEOUS at 19:47

## 2023-06-14 RX ADMIN — LOSARTAN POTASSIUM 100 MILLIGRAM(S): 100 TABLET, FILM COATED ORAL at 19:48

## 2023-06-14 RX ADMIN — ATORVASTATIN CALCIUM 80 MILLIGRAM(S): 80 TABLET, FILM COATED ORAL at 22:09

## 2023-06-14 RX ADMIN — ENTECAVIR 0.5 MILLIGRAM(S): 0.5 TABLET ORAL at 05:40

## 2023-06-14 RX ADMIN — SENNA PLUS 2 TABLET(S): 8.6 TABLET ORAL at 22:09

## 2023-06-14 RX ADMIN — Medication 400 MILLIGRAM(S): at 15:57

## 2023-06-14 NOTE — PROGRESS NOTE ADULT - ASSESSMENT
82y (1940) woman with a PMHx significant for HTN, afib on Xarelto (last dose 6/1 6pm), hepatitis C on antiviral presenting with acute onset of AMS. LKW 10pm. Around 10:30pm patient become acutely altered, slumped to right side, head turned to left, not answering questions properly or moving the right side. NIHSS 22 most notable for decreased arousal, incorrect answers, not following commands, left gaze deviation that could not be overcome, right facial droop, right sided severe weakness compared to left, slurred speech, aphasia. Patient lives at home with daughter who is at bedside. Patient is Cam[place] speaking. Daughter reports at baseline she intermittently walks with a cane and needs minor help with ADLs such as getting dressed or running a shower, Daughter looks after her affairs. She denies dementia or cognitive impairment. mRS 3.     LKW 10pm (6/1)  NIHSS 22  pre-mRS 3  Xarelto last taken at 6pm, INR=1.79   (02 Jun 2023 01:43)  has been hypertensive   family at bedside

## 2023-06-14 NOTE — PROGRESS NOTE ADULT - SUBJECTIVE AND OBJECTIVE BOX
Chief Complaint:  Patient is a 82y old  Female who presents with a chief complaint of stroke (14 Jun 2023 10:33)      Date of service 06-14-23 @ 10:38      Interval Events:   no acute events     Hospital Medications:  acetaminophen   Oral Liquid .. 650 milliGRAM(s) Oral every 6 hours PRN  albuterol/ipratropium for Nebulization 3 milliLiter(s) Nebulizer every 6 hours PRN  amLODIPine   Tablet 10 milliGRAM(s) Oral daily  aspirin  chewable 81 milliGRAM(s) Oral daily  atorvastatin 80 milliGRAM(s) Oral at bedtime  chlorhexidine 4% Liquid 1 Application(s) Topical daily  enoxaparin Injectable 40 milliGRAM(s) SubCutaneous <User Schedule>  entecavir Solution 0.5 milliGRAM(s) Oral <User Schedule>  hydrALAZINE Injectable 10 milliGRAM(s) IV Push every 3 hours PRN  labetalol 400 milliGRAM(s) Oral every 8 hours  labetalol Injectable 10 milliGRAM(s) IV Push every 4 hours PRN  losartan 100 milliGRAM(s) Oral daily  nystatin    Suspension 601095 Unit(s) Oral every 6 hours PRN  pantoprazole  Injectable 40 milliGRAM(s) IV Push daily  polyethylene glycol 3350 17 Gram(s) Oral daily  senna 2 Tablet(s) Oral at bedtime        Review of Systems:  General:  No wt loss, fevers, chills, night sweats, fatigue,   Eyes:  Good vision, no reported pain  ENT:  No sore throat, pain, runny nose, dysphagia  CV:  No pain, palpitations, hypo/hypertension  Resp:  No dyspnea, cough, tachypnea, wheezing  GI:  See HPI  :  No pain, bleeding, incontinence, nocturia  Muscle:  No pain, weakness  Neuro:  No weakness, tingling, memory problems  Psych:  No fatigue, insomnia, mood problems, depression  Endocrine:  No polyuria, polydipsia, cold/heat intolerance  Heme:  No petechiae, ecchymosis, easy bruisability  Integumentary:  No rash, edema    PHYSICAL EXAM:   Vital Signs:  Vital Signs Last 24 Hrs  T(C): 36.9 (14 Jun 2023 09:35), Max: 36.9 (14 Jun 2023 09:35)  T(F): 98.5 (14 Jun 2023 09:35), Max: 98.5 (14 Jun 2023 09:35)  HR: 87 (14 Jun 2023 09:35) (77 - 88)  BP: 135/78 (14 Jun 2023 09:35) (109/67 - 150/67)  BP(mean): --  RR: 18 (14 Jun 2023 09:35) (18 - 18)  SpO2: 95% (14 Jun 2023 09:35) (94% - 96%)    Parameters below as of 14 Jun 2023 09:35  Patient On (Oxygen Delivery Method): room air      Daily     Daily       PHYSICAL EXAM:     GENERAL:  Appears stated age, well-groomed, well-nourished, no distress  HEENT:  NC/AT,  conjunctivae anicteric, clear and pink,   NECK: supple, trachea midline  CHEST:  Full & symmetric excursion, no increased effort, breath sounds clear  HEART:  Regular rhythm, no JVD  ABDOMEN:  Soft, non-tender, non-distended, normoactive bowel sounds,  no masses , no hepatosplenomegaly  EXTREMITIES:  no cyanosis,clubbing or edema  SKIN:  No rash, erythema, or, ecchymoses, no jaundice  NEURO:  Alert, non-focal, no asterixis  PSYCH: Appropriate affect, oriented to place and time  RECTAL: Deferred      LABS Personally reviewed by me:                        11.9   11.70 )-----------( 249      ( 14 Jun 2023 06:06 )             37.1     Mean Cell Volume: 93.7 fl (06-14-23 @ 06:06)    06-14    142  |  108  |  35<H>  ----------------------------<  117<H>  4.7   |  20<L>  |  0.70    Ca    9.0      14 Jun 2023 06:06        PT/INR - ( 13 Jun 2023 06:39 )   PT: 14.1 sec;   INR: 1.22 ratio         PTT - ( 13 Jun 2023 06:39 )  PTT:44.1 sec                            11.9   11.70 )-----------( 249      ( 14 Jun 2023 06:06 )             37.1                         11.6   10.05 )-----------( 317      ( 13 Jun 2023 14:47 )             35.7                         12.1   9.47  )-----------( 309      ( 12 Jun 2023 00:34 )             37.0       Imaging personally reviewed by me:

## 2023-06-14 NOTE — PROGRESS NOTE ADULT - PROBLEM SELECTOR PLAN 1
likely embolic as she was not taking xarelto WITH food   will switch to eliquis   monitor on tele  plan for PEG. Acceptable cardiac and medical risk to proceed

## 2023-06-14 NOTE — PROGRESS NOTE ADULT - ASSESSMENT
83 y/o [place] speaking F with PMH of HTN, afib on Xarelto (last dose 6/1 6pm), hepatitis C on antiviral.  #[id number]. Presents with acute onset of AMS. 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 Course c/b possible aspiration PNA - s/p course of Zosyn. Called to consult for tachypnea with episode of desaturation.

## 2023-06-14 NOTE — PRE PROCEDURE NOTE - PRE PROCEDURE EVALUATION
Attending Physician:           Dr Madden                  Procedure: EGD/PEG    Indication for Procedure: FTT, ysphagia   ________________________________________________________  PAST MEDICAL & SURGICAL HISTORY:    ALLERGIES:  No Known Allergies    HOME MEDICATIONS:  atenolol 100 mg oral tablet: 1 tab(s) orally once a day  entecavir 0.5 mg oral tablet: 1 tab(s) orally once a day Administer on an empty stomach (2 hours before or after a meal).  x Hepatitis B?  ergocalciferol 1.25 mg (50,000 intl units) oral capsule: 1 cap(s) orally every 7 days  Lipitor 40 mg oral tablet: 1 tab(s) orally once a day  losartan 50 mg oral tablet: 1 tab(s) orally once a day  Xarelto 20 mg oral tablet: 1 tab(s) orally once a day    AICD/PPM: [ ] yes   [x ] no    PERTINENT LAB DATA:                        11.9   11.70 )-----------( 249      ( 14 Jun 2023 06:06 )             37.1     06-14    142  |  108  |  35<H>  ----------------------------<  117<H>  4.7   |  20<L>  |  0.70    Ca    9.0      14 Jun 2023 06:06      PT/INR - ( 13 Jun 2023 06:39 )   PT: 14.1 sec;   INR: 1.22 ratio         PTT - ( 13 Jun 2023 06:39 )  PTT:44.1 sec            PHYSICAL EXAMINATION:    T(C): 36.7  HR: 94  BP: 142/84  RR: 19  SpO2: 96%    Constitutional: NAD    Neck:  No JVD  Respiratory: No respiratory  distress   Cardiovascular: RRR  Extremities: No peripheral edema  Neurological: A/O x 2-3 no focal deficits        COMMENTS:    The patient is a suitable candidate for the planned procedure unless box checked [ ]  No, explain:

## 2023-06-14 NOTE — ASU PREOP CHECKLIST - ASSESSMENT, HISTORY & PHYSICAL COMPLETED AND ON MEDICAL RECORD
Cardiac EP:   full note dictated.   Best Practice Guidelines:    Systolic Heart Failure  LVEF (Left ventricular ejection fraction) documented :Yes  ACE/ARB ordered for LVEF<40%: Yes - ACEI and/or ARB ordered  Beta Blocker ordered: Yes - Bisoprolol, Coreg or Metroprolol XL for LVEF < 40%  Aldosterone blocking agent prescribed for LVEF <40% and HF (heart failure): Not Indicated  EF < 35%: Yes,  AICD for EF less than or equal to 35%  Hydralazine and Nitrate ordered for an  patient with LVEF <40% :Not Indicated  · Heart Failure Education Received: Yes    History or Current Atrial Fibrillation/Atrial Flutter: no history of atrial fibrillation/atrial flutter and [id number] Score: N/A   done

## 2023-06-14 NOTE — PROGRESS NOTE ADULT - ASSESSMENT
82y (1940) woman with a PMHx significant for HTN, afib on Xarelto (last dose 6/1 6pm), hepatitis C on antiviral presenting with acute onset of AMS. LKW 10pm. Around 10:30pm patient become acutely altered, slumped to right side, head turned to left, not answering questions properly or moving the right side. NIHSS 22 most notable for decreased arousal, incorrect answers, not following commands, left gaze deviation that could not be overcome, right sided severe weakness compared to left, slurred speech, aphasia. Patient lives at home with daughter who is at bedside. Patient is [place] speaking. Daughter reports at baseline she intermittently walks with a cane and needs minor help with ADLs such as getting dressed or running a shower, Daughter looks after her affairs. She denies dementia or cognitive impairment. mRS 3. Not a tenecteplase candidate due to INR 1.79. Patient taken to IR for mechanical thrombectomy.    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 Mechanism cardioembolic from known afib. S/p thrombectomy s/p TICI 2C.      NEURO: Overall stable neurologically, repeat CT shows Subtle increased attenuation at the level of the basal ganglia on the left may indicate a component of evolving hemorrhage.  Continue close monitoring for neurologic deterioration, Normotension, atorvastatin 80mg to LDL goal less than 70, MRI Brain w/o and as above. Physical therapy/OT/Speech eval/treatment for acute rehab.     ANTITHROMBOTIC THERAPY: aspirin 81mg daily. Will change to anticoagulation with Eliquis 5mg bid, 10-12 days from inciting event and once PO access obtained.     PULMONARY: tachypnea on 06/10 RR 19-27 saturating well, Pulmonology consulted (Dr Villatoro) for further eval/recommendations. CXR (06/05)  with L basilar opacities, unchanged, likely aspiration PNA. Pt completed Zosyn for aspiration PNA for 5 days ((final dose 6/9).  NC as needed. Mucomyst 20% 4mL q6h, Duonebs q6h, chest PT for secretions.     CARDIOVASCULAR: TTE EF 60% severe (grade 3) left ventricular diastolic dysfunction. The left atrium is moderately dilated. The right atrium is dilated and Moderate aortic stenosi, cardiac monitoring. continue current cardiac meds for BOP mgt, Dr Zhu (cardio) consult appreciated                        SBP goal: 120-160mmHg    GASTROINTESTINAL:  dysphagia screen failed, s/p FEEST on 06/08: with recommendations for NPO,  s/s to  re-evaluate on 06/04/23 . Bowel regimen. s/p MBS 6/13: NPO. plan for PEG on 6/14.      Diet: NG tube in. Glucerna 1.2 at goal plus 2 cans of probiotic.     RENAL: BUN/Cr 35/0.70: Prerenal likely due to dehydration , s/p free water bolus 6/11, good urine output      Na Goal: Greater than 135     Virgen: none    HEMATOLOGY: H/H without change, Platelets 249     DVT ppx: LMWH     ID: afebrile, mild leukocytosis. Completed Zosyn for aspiration PNA, last day Jun 9th. On entecavir daily for Hep C.      OTHER: Plan discussed with patient and family (daughter) at bedside    DISPOSITION: Acute rehab as per PT/OT eval once stable and medical work up is complete    CORE MEASURES:        Admission NIHSS: 22     Tenecteplase: [] YES [x] NO      LDL/HDL: 33/47     Statin Therapy: Yes     Dysphagia Screen: [] PASS [x] FAIL     Smoking [] YES [x] NO      Afib [x] YES [] NO     Stroke Education [x] YES [] NO    Obtain screening lower extremity venous ultrasound in patients who meet 1 or more of the following criteria as patient is high risk for DVT/PE on admission: COMPLETED - NEGATIVE  [] History of DVT/PE  []Hypercoagulable states (Factor V Leiden, Cancer, OCP, etc. )  []Prolonged immobility (hemiplegia/hemiparesis/post operative or any other extended immobilization)  [] Transferred from outside facility (Rehab or Long term care)  [] Age </= to 50     82y (1940) woman with a PMHx significant for HTN, afib on Xarelto (last dose 6/1 6pm), hepatitis C on antiviral presenting with acute onset of AMS. LKW 10pm. Around 10:30pm patient become acutely altered, slumped to right side, head turned to left, not answering questions properly or moving the right side. NIHSS 22 most notable for decreased arousal, incorrect answers, not following commands, left gaze deviation that could not be overcome, right sided severe weakness compared to left, slurred speech, aphasia. Patient lives at home with daughter who is at bedside. Patient is [place] speaking. Daughter reports at baseline she intermittently walks with a cane and needs minor help with ADLs such as getting dressed or running a shower, Daughter looks after her affairs. She denies dementia or cognitive impairment. mRS 3. Not a tenecteplase candidate due to INR 1.79. Patient taken to IR for mechanical thrombectomy.    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 Mechanism cardioembolic from known afib. S/p thrombectomy s/p TICI 2C.      NEURO: Overall stable neurologically, repeat CT shows Subtle increased attenuation at the level of the basal ganglia on the left may indicate a component of evolving hemorrhage.  Continue monitoring for neurologic deterioration, Normotension, atorvastatin 80mg to LDL goal less than 70, MRI Brain w/o and as above. Physical therapy/OT/Speech eval/treatment for acute rehab.     ANTITHROMBOTIC THERAPY: aspirin 81mg daily. Will change to anticoagulation with Eliquis 5mg bid, 10-12 days from inciting event or once PO access obtained.     PULMONARY: tachypnea on 06/10 RR 19-27 saturating well, Pulmonology consulted (Dr Villatoro) for further eval/recommendations. CXR (06/05)  with L basilar opacities, unchanged, likely aspiration PNA. Pt completed Zosyn for aspiration PNA for 5 days ((final dose 6/9).  NC as needed. Mucomyst 20% 4mL q6h, Duonebs q6h, chest PT for secretions.     CARDIOVASCULAR: TTE EF 60% severe (grade 3) left ventricular diastolic dysfunction. The left atrium is moderately dilated. The right atrium is dilated and Moderate aortic stenosi, cardiac monitoring. continue current cardiac meds for BOP mgt, Dr Zhu (cardio) consult appreciated                        SBP goal: 120-160mmHg    GASTROINTESTINAL:  dysphagia screen failed, s/p FEEST on 06/08: with recommendations for NPO,  s/s to  re-evaluate on 06/04/23 . Bowel regimen. s/p MBS 6/13: NPO. plan for PEG on 6/14.      Diet: NG tube in. Glucerna 1.2 at goal plus 2 cans of probiotic. NPO after midnight for angiogram     RENAL: BUN/Cr 35/0.70: Prerenal likely due to dehydration , s/p free water bolus 6/11, good urine output      Na Goal: Greater than 135     Virgen: none    HEMATOLOGY: H/H without change, Platelets 249     DVT ppx: LMWH     ID: afebrile, mild leukocytosis. Completed Zosyn for aspiration PNA, last day Jun 9th. On entecavir daily for Hep C.      OTHER: Plan discussed with patient and family (daughter) at bedside    DISPOSITION: Acute rehab as per PT/OT eval once stable and medical work up is complete    CORE MEASURES:        Admission NIHSS: 22     Tenecteplase: [] YES [x] NO      LDL/HDL: 33/47     Statin Therapy: Yes     Dysphagia Screen: [] PASS [x] FAIL     Smoking [] YES [x] NO      Afib [x] YES [] NO     Stroke Education [x] YES [] NO    Obtain screening lower extremity venous ultrasound in patients who meet 1 or more of the following criteria as patient is high risk for DVT/PE on admission: COMPLETED - NEGATIVE  [] History of DVT/PE  []Hypercoagulable states (Factor V Leiden, Cancer, OCP, etc. )  []Prolonged immobility (hemiplegia/hemiparesis/post operative or any other extended immobilization)  [] Transferred from outside facility (Rehab or Long term care)  [] Age </= to 50

## 2023-06-14 NOTE — PROGRESS NOTE ADULT - SUBJECTIVE AND OBJECTIVE BOX
Subjective: Patient seen and examined. No new events except as noted.   plan for PEG   HR and BP controlled     REVIEW OF SYSTEMS:    CONSTITUTIONAL: =weakness, fevers or chills  EYES/ENT: No visual changes;  No vertigo or throat pain   NECK: No pain or stiffness  RESPIRATORY: No cough, wheezing, hemoptysis; No shortness of breath  CARDIOVASCULAR: No chest pain or palpitations  GASTROINTESTINAL: No abdominal or epigastric pain. No nausea, vomiting, or hematemesis; No diarrhea or constipation. No melena or hematochezia.  GENITOURINARY: No dysuria, frequency or hematuria  NEUROLOGICAL: No numbness or weakness  SKIN: No itching, burning, rashes, or lesions   All other review of systems is negative unless indicated above.    MEDICATIONS:  MEDICATIONS  (STANDING):  amLODIPine   Tablet 10 milliGRAM(s) Oral daily  aspirin  chewable 81 milliGRAM(s) Oral daily  atorvastatin 80 milliGRAM(s) Oral at bedtime  chlorhexidine 4% Liquid 1 Application(s) Topical daily  enoxaparin Injectable 40 milliGRAM(s) SubCutaneous <User Schedule>  entecavir Solution 0.5 milliGRAM(s) Oral <User Schedule>  labetalol 400 milliGRAM(s) Oral every 8 hours  losartan 100 milliGRAM(s) Oral daily  pantoprazole  Injectable 40 milliGRAM(s) IV Push daily  polyethylene glycol 3350 17 Gram(s) Oral daily  senna 2 Tablet(s) Oral at bedtime      PHYSICAL EXAM:  T(C): 36.9 (06-14-23 @ 09:35), Max: 36.9 (06-14-23 @ 09:35)  HR: 87 (06-14-23 @ 09:35) (77 - 88)  BP: 135/78 (06-14-23 @ 09:35) (109/67 - 150/67)  RR: 18 (06-14-23 @ 09:35) (18 - 18)  SpO2: 95% (06-14-23 @ 09:35) (94% - 96%)  Wt(kg): --  I&O's Summary    13 Jun 2023 07:01  -  14 Jun 2023 07:00  --------------------------------------------------------  IN: 0 mL / OUT: 1050 mL / NET: -1050 mL            Appearance: NAD +NGT 	  HEENT:   Dry  oral mucosa, PERRL, EOMI	  Lymphatic: No lymphadenopathy , no edema  Cardiovascular: irregular  S1 S2, No JVD, No murmurs , Peripheral pulses palpable 2+ bilaterally  Respiratory: decreased bs   Gastrointestinal:  Soft, Non-tender, + BS	  Skin: No rashes, No ecchymoses, No cyanosis, warm to touch  Musculoskeletal: Normal range of motion, normal strength  Psychiatry:  Mood & affect appropriate  Ext: No edema  Neurological (>12):  Mental status: awake and alert, attentive to examiner. oriented to self and place (hospital), does not know age or year. follows simple commands. does not follow cross commands  , speech hypophonic, no dysarthria, follows simple commands but not 2-step commands, no neglect   Cranial Nerves: Right nasolabial flattening, no BTT on right, right gaze palsy can be overcome voluntarily  Motor exam: Normal tone, 0/5 RUE, 0/5 RLE, 5/5 LUE, 5/5 LLE, normal fine finger movements. triple flexion of RLE  Sensation: Intact to light touch   Coordination/ Gait: No dysmetria, gait not tested        LABS:    CARDIAC MARKERS:                                11.9   11.70 )-----------( 249      ( 14 Jun 2023 06:06 )             37.1     06-14    142  |  108  |  35<H>  ----------------------------<  117<H>  4.7   |  20<L>  |  0.70    Ca    9.0      14 Jun 2023 06:06      proBNP:   Lipid Profile:   HgA1c:   TSH:             TELEMETRY: 	  AF  ECG:  	  RADIOLOGY:   DIAGNOSTIC TESTING:  [ ] Echocardiogram:  [ ]  Catheterization:  [ ] Stress Test:    OTHER:

## 2023-06-14 NOTE — PROGRESS NOTE ADULT - SUBJECTIVE AND OBJECTIVE BOX
Follow-up Pulm Progress Note    No new respiratory events overnight.  Denies SOB/CP.     Medications:  MEDICATIONS  (STANDING):  amLODIPine   Tablet 10 milliGRAM(s) Oral daily  aspirin  chewable 81 milliGRAM(s) Oral daily  atorvastatin 80 milliGRAM(s) Oral at bedtime  chlorhexidine 4% Liquid 1 Application(s) Topical daily  enoxaparin Injectable 40 milliGRAM(s) SubCutaneous <User Schedule>  entecavir Solution 0.5 milliGRAM(s) Oral <User Schedule>  labetalol 400 milliGRAM(s) Oral every 8 hours  losartan 100 milliGRAM(s) Oral daily  pantoprazole  Injectable 40 milliGRAM(s) IV Push daily  polyethylene glycol 3350 17 Gram(s) Oral daily  senna 2 Tablet(s) Oral at bedtime    MEDICATIONS  (PRN):  acetaminophen   Oral Liquid .. 650 milliGRAM(s) Oral every 6 hours PRN Temp greater or equal to 38C (100.4F), Mild Pain (1 - 3)  albuterol/ipratropium for Nebulization 3 milliLiter(s) Nebulizer every 6 hours PRN Shortness of Breath and/or Wheezing  hydrALAZINE Injectable 10 milliGRAM(s) IV Push every 3 hours PRN SBP > 160  labetalol Injectable 10 milliGRAM(s) IV Push every 4 hours PRN Systolic blood pressure > 160  nystatin    Suspension 064494 Unit(s) Oral every 6 hours PRN oral thrush          Vital Signs Last 24 Hrs  T(C): 36.9 (14 Jun 2023 09:35), Max: 36.9 (14 Jun 2023 09:35)  T(F): 98.5 (14 Jun 2023 09:35), Max: 98.5 (14 Jun 2023 09:35)  HR: 87 (14 Jun 2023 09:35) (77 - 88)  BP: 135/78 (14 Jun 2023 09:35) (109/67 - 150/67)  BP(mean): --  RR: 18 (14 Jun 2023 09:35) (18 - 18)  SpO2: 95% (14 Jun 2023 09:35) (94% - 96%)    Parameters below as of 14 Jun 2023 09:35  Patient On (Oxygen Delivery Method): room air              06-13 @ 07:01  -  06-14 @ 07:00  --------------------------------------------------------  IN: 0 mL / OUT: 1050 mL / NET: -1050 mL          LABS:                        11.9   11.70 )-----------( 249      ( 14 Jun 2023 06:06 )             37.1     06-14    142  |  108  |  35<H>  ----------------------------<  117<H>  4.7   |  20<L>  |  0.70    Ca    9.0      14 Jun 2023 06:06            CAPILLARY BLOOD GLUCOSE      POCT Blood Glucose.: 152 mg/dL (13 Jun 2023 17:37)    PT/INR - ( 13 Jun 2023 06:39 )   PT: 14.1 sec;   INR: 1.22 ratio         PTT - ( 13 Jun 2023 06:39 )  PTT:44.1 sec    Procalcitonin, Serum: 0.08 ng/mL (06-13-23 @ 14:47)        Physical Examination:  PULM: Clear to auscultation bilaterally, no significant sputum production  CVS: S1, S2 heard        RADIOLOGY REVIEWED  CXR 6/12: grossly clear

## 2023-06-14 NOTE — PROGRESS NOTE ADULT - ASSESSMENT
1. Stroke  per neurology    2. dysphagia  for PEG today     3. Asymptomatic cholelithiasis  observe for symptoms    4. ?history of hepatitis C. elevated ALP, otherwise normal LFTs. no cirrhosis on imaging.   outpt GI follow up     5. Afib   per Cardiology

## 2023-06-14 NOTE — PROGRESS NOTE ADULT - PROBLEM SELECTOR PLAN 1
reportedly tachypnea with episode of desaturation overnight   -S/p course of Zosyn for ?PNA (completed 6/9). CXR grossly clear, CT chest with small L effusion/atelectasis seen on CT chest from 6/4.  -Hypoxia resolved. Suspect 2nd to atelectasis as sats increase with deep breathing from low 90s to high 90s.  -Incentive spirometry   -Keep sats >90% with o2 PRN.   -Pt nonlabored, no longer tachypneic   -Afebrile  -CXR 6/12 grossly clear   -S/p Mucomyst (d/c'd 6/13)  -Duoneb q6h PRN   -Duoneb changed to q6h PRN  -If tachypnea reoccurs, consider CTA chest to r/o PE. Appears to be breathing comfortably at this time. reportedly tachypnea with episode of desaturation  -S/p course of Zosyn for ?PNA (completed 6/9). CXR grossly clear, CT chest with small L effusion/atelectasis seen on CT chest from 6/4.  -Hypoxia resolved. Suspect 2nd to atelectasis as sats increase with deep breathing from low 90s to high 90s.  -Incentive spirometry   -Keep sats >90% with o2 PRN.   -Pt nonlabored, no longer tachypneic   -Afebrile  -CXR 6/12 grossly clear   -S/p Mucomyst (d/c'd 6/13)  -Duoneb q6h PRN

## 2023-06-14 NOTE — PRE-ANESTHESIA EVALUATION ADULT - O2 DELIVERY METHOD
84 y/o F with CHF, severe MR (s/p mitral valve clip ~2 weeks ago), CAD, HTN, obesity with persistent hypercapnic respiratory failure, contraction alkalosis.    > Acute blood loss anemia due to GI Bleed - holding anticoagulation.  s/p transfusion.  Continue PPI.  advance diet.    > STEMI - conservative management per cardiology.  Continue BBlocker.  Stopped antiplatelets and anticoagulation due to bleeding.    > Acute on chronic systolic and diastolic CHF with severe MR and mod to severe TR - no further surgical plans per CT Surgery.  Continue diuretics.    > Chronic Afib - rate control with Digoxin.  Not on anticoagulation due to bleeding risk.   > Hypernatremia - resolved.    > Acute on chronic respiratory failure with hypercapnia - continue Nocturnal BIPAP.    > Metabolic Acidosis - renal followup appreciated.  careful diuresis, Diamox.  > CHRISTOPHER - Cr stable.  Avoid nephrotoxic agents.   > CAD - continue Plavix, BBlocker.  > HLD - continue Statin  > Glaucoma - continue eyedrops.  > DVT Prophylaxis - Lower extremity intermittent compression devices. aerosol mask

## 2023-06-14 NOTE — PROGRESS NOTE ADULT - SUBJECTIVE AND OBJECTIVE BOX
THE PATIENT WAS SEEN AND EXAMINED BY ME WITH THE HOUSESTAFF AND STROKE TEAM DURING MORNING ROUNDS.   HPI:  82y woman with a PMHx significant for HTN, afib on Xarelto (last dose 6/1 6pm), hepatitis C on antiviral presenting with acute onset of AMS. LKW 10pm. Around 10:30pm patient become acutely altered, slumped to right side, head turned to left, not answering questions properly or moving the right side. NIHSS 22 most notable for decreased arousal, incorrect answers, not following commands, left gaze deviation that could not be overcome, right facial droop, right sided severe weakness compared to left, slurred speech, aphasia. Patient lives at home with daughter who is at bedside. Patient is Lithuanian speaking. Daughter reports at baseline she intermittently walks with a cane and needs minor help with ADLs such as getting dressed or running a shower, Daughter looks after her affairs. She denies dementia or cognitive impairment. mRS 3. S/p thrombectomy s/p TICI 2C mechanism cardioembolic from known afib., LKW 10pm (6/1), NIHSS 22, pre-mRS 3,     SUBJECTIVE: No events overnight.  No new neurologic complaints, ROS reported negative unless otherwise noted.     acetaminophen   Oral Liquid .. 650 milliGRAM(s) Oral every 6 hours PRN  albuterol/ipratropium for Nebulization 3 milliLiter(s) Nebulizer every 6 hours PRN  amLODIPine   Tablet 10 milliGRAM(s) Oral daily  aspirin  chewable 81 milliGRAM(s) Oral daily  atorvastatin 80 milliGRAM(s) Oral at bedtime  chlorhexidine 4% Liquid 1 Application(s) Topical daily  enoxaparin Injectable 40 milliGRAM(s) SubCutaneous <User Schedule>  entecavir Solution 0.5 milliGRAM(s) Oral <User Schedule>  hydrALAZINE Injectable 10 milliGRAM(s) IV Push every 3 hours PRN  labetalol 400 milliGRAM(s) Oral every 8 hours  labetalol Injectable 10 milliGRAM(s) IV Push every 4 hours PRN  losartan 100 milliGRAM(s) Oral daily  nystatin    Suspension 488587 Unit(s) Oral every 6 hours PRN  pantoprazole  Injectable 40 milliGRAM(s) IV Push daily  polyethylene glycol 3350 17 Gram(s) Oral daily  senna 2 Tablet(s) Oral at bedtime    PHYSICAL EXAM:   Vital Signs Last 24 Hrs  T(C): 36.4 (14 Jun 2023 04:20), Max: 36.7 (13 Jun 2023 09:36)  T(F): 97.5 (14 Jun 2023 04:20), Max: 98.1 (13 Jun 2023 09:36)  HR: 83 (14 Jun 2023 04:20) (77 - 109)  BP: 136/75 (14 Jun 2023 04:20) (109/67 - 157/75)  RR: 18 (14 Jun 2023 04:20) (18 - 18)  SpO2: 94% (14 Jun 2023 04:20) (94% - 96%)    Parameters below as of 14 Jun 2023 04:20  Patient On (Oxygen Delivery Method): room air    General: No acute distress  HEENT: EOM intact, visual fields full  Abdomen: Soft, nontender, nondistended   Extremities: No edema    NEUROLOGICAL EXAM:  Mental status: eyes open,  awake, alert, oriented x3, hypophonia, minimal verbal output, requires prompting to generate fluent speech,   Cranial Nerves:  Right nasolabial flattening, no BTT on right, right gaze palsy can be overcome voluntarily  Motor exam: Normal tone, right hemiplegia: RUE 0/5, RLE 0/5, triple flexion, LUE 5/5, LLE 5/5,   Sensation: Intact to light touch   Coordination/ Gait: unable to participate with exam       LABS:                        11.9   11.70 )-----------( 249      ( 14 Jun 2023 06:06 )             37.1    06-14    142  |  108  |  35<H>  ----------------------------<  117<H>  4.7   |  20<L>  |  0.70    Ca    9.0      14 Jun 2023 06:06    PT/INR - ( 13 Jun 2023 06:39 )   PT: 14.1 sec;   INR: 1.22 ratio         PTT - ( 13 Jun 2023 06:39 )  PTT:44.1 sec      IMAGING: Reviewed by me.     [facility] (06/11):  Improved subacute moderate to large left MCA territory infarction. Stable petechial hemorrhage. Mild chronic microvascular changes without evidence of a new acute transcortical infarction or hemorrhage.    MR Head No Cont 6.06.23 Parenchymal volume loss and chronic microvascular ischemic changes identified Abnormal T2 prolongation with restricted diffusion is seen involving the posterior left insula, left temporal frontal and parietal cortical subcortical region is identified. These finding does demonstrate abnormal susceptibility and is compatible with a hemorrhagic left MCA infarct. There is localized mass effect seen consisting sulcal effacement. Mass effect on the left lateral ventricle seen. No significant shift or herniation is seen. The large vessels demonstrate normal flow-voids Minimal mucosal thickening is seen involving both ethmoid sinuses. Both mastoid and middle ear regions appear clear. 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    CT Head No Cont (06.05.23 @ 08:27) Evolving left MCA distribution infarct. More well-defined lucency and slight increased mass effect on the left lateral ventricle. Subtle increased attenuation at the level of the basal ganglia on the left may indicate a component of evolving hemorrhage.    CT Head No Cont (06.02.23 @ 09:59): An evolving left MCA distribution infarct .    CT Brain Stroke Protocol 06.01.23: No acute intracranial hemorrhage or mass effect.    CT angiography neck 06/01/23: No hemodynamically significant stenosis by NASCET criteria. No vascular dissection. Nonspecific ground glass opacities in the visualized apices of the lungs, may represent infectious versus inflammatory disease.    CT angiography brain 06/01/23: Left proximal M1 occlusion.    CT perfusion 06/01/23:: Core infarct of 6 cc with a penumbra of 162 cc.       THE PATIENT WAS SEEN AND EXAMINED BY ME WITH THE HOUSESTAFF AND STROKE TEAM DURING MORNING ROUNDS.   HPI:  82y woman with a PMHx significant for HTN, afib on Xarelto (last dose 6/1 6pm), hepatitis C on antiviral presenting with acute onset of AMS. LKW 10pm. Around 10:30pm patient become acutely altered, slumped to right side, head turned to left, not answering questions properly or moving the right side. NIHSS 22 most notable for decreased arousal, incorrect answers, not following commands, left gaze deviation that could not be overcome, right facial droop, right sided severe weakness compared to left, slurred speech, aphasia. Patient lives at home with daughter who is at bedside. Patient is Portuguese speaking. Daughter reports at baseline she intermittently walks with a cane and needs minor help with ADLs such as getting dressed or running a shower, Daughter looks after her affairs. She denies dementia or cognitive impairment. mRS 3. S/p thrombectomy s/p TICI 2C mechanism cardioembolic from known afib., LKW 10pm (6/1), NIHSS 22, pre-mRS 3,     SUBJECTIVE: No events overnight.  No new neurologic complaints, ROS reported negative unless otherwise noted.     acetaminophen   Oral Liquid .. 650 milliGRAM(s) Oral every 6 hours PRN  albuterol/ipratropium for Nebulization 3 milliLiter(s) Nebulizer every 6 hours PRN  amLODIPine   Tablet 10 milliGRAM(s) Oral daily  aspirin  chewable 81 milliGRAM(s) Oral daily  atorvastatin 80 milliGRAM(s) Oral at bedtime  chlorhexidine 4% Liquid 1 Application(s) Topical daily  enoxaparin Injectable 40 milliGRAM(s) SubCutaneous <User Schedule>  entecavir Solution 0.5 milliGRAM(s) Oral <User Schedule>  hydrALAZINE Injectable 10 milliGRAM(s) IV Push every 3 hours PRN  labetalol 400 milliGRAM(s) Oral every 8 hours  labetalol Injectable 10 milliGRAM(s) IV Push every 4 hours PRN  losartan 100 milliGRAM(s) Oral daily  nystatin    Suspension 469925 Unit(s) Oral every 6 hours PRN  pantoprazole  Injectable 40 milliGRAM(s) IV Push daily  polyethylene glycol 3350 17 Gram(s) Oral daily  senna 2 Tablet(s) Oral at bedtime    PHYSICAL EXAM:   Vital Signs Last 24 Hrs  T(C): 36.4 (14 Jun 2023 04:20), Max: 36.7 (13 Jun 2023 09:36)  T(F): 97.5 (14 Jun 2023 04:20), Max: 98.1 (13 Jun 2023 09:36)  HR: 83 (14 Jun 2023 04:20) (77 - 109)  BP: 136/75 (14 Jun 2023 04:20) (109/67 - 157/75)  RR: 18 (14 Jun 2023 04:20) (18 - 18)  SpO2: 94% (14 Jun 2023 04:20) (94% - 96%)    Parameters below as of 14 Jun 2023 04:20  Patient On (Oxygen Delivery Method): room air     ID [id number]    General: No acute distress  HEENT: EOM intact, visual fields full  Abdomen: Soft, nontender, nondistended   Extremities: No edema    NEUROLOGICAL EXAM:  Mental status: eyes open,  awake, alert, oriented x3, hypophonia, minimal verbal output, requires prompting to generate fluent speech,   Cranial Nerves:  Right nasolabial flattening, no BTT on right, right gaze palsy can be overcome voluntarily  Motor exam: Normal tone, right hemiplegia: RUE 0/5, RLE 0/5, triple flexion, LUE 5/5, LLE 5/5,   Sensation: Intact to light touch   Coordination/ Gait: unable to participate with exam       LABS:                        11.9   11.70 )-----------( 249      ( 14 Jun 2023 06:06 )             37.1    06-14    142  |  108  |  35<H>  ----------------------------<  117<H>  4.7   |  20<L>  |  0.70    Ca    9.0      14 Jun 2023 06:06    PT/INR - ( 13 Jun 2023 06:39 )   PT: 14.1 sec;   INR: 1.22 ratio         PTT - ( 13 Jun 2023 06:39 )  PTT:44.1 sec      IMAGING: Reviewed by me.     [facility] (06/11):  Improved subacute moderate to large left MCA territory infarction. Stable petechial hemorrhage. Mild chronic microvascular changes without evidence of a new acute transcortical infarction or hemorrhage.    MR Head No Cont 6.06.23 Parenchymal volume loss and chronic microvascular ischemic changes identified Abnormal T2 prolongation with restricted diffusion is seen involving the posterior left insula, left temporal frontal and parietal cortical subcortical region is identified. These finding does demonstrate abnormal susceptibility and is compatible with a hemorrhagic left MCA infarct. There is localized mass effect seen consisting sulcal effacement. Mass effect on the left lateral ventricle seen. No significant shift or herniation is seen. The large vessels demonstrate normal flow-voids Minimal mucosal thickening is seen involving both ethmoid sinuses. Both mastoid and middle ear regions appear clear. 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    CT Head No Cont (06.05.23 @ 08:27) Evolving left MCA distribution infarct. More well-defined lucency and slight increased mass effect on the left lateral ventricle. Subtle increased attenuation at the level of the basal ganglia on the left may indicate a component of evolving hemorrhage.    CT Head No Cont (06.02.23 @ 09:59): An evolving left MCA distribution infarct .    CT Brain Stroke Protocol 06.01.23: No acute intracranial hemorrhage or mass effect.    CT angiography neck 06/01/23: No hemodynamically significant stenosis by NASCET criteria. No vascular dissection. Nonspecific ground glass opacities in the visualized apices of the lungs, may represent infectious versus inflammatory disease.    CT angiography brain 06/01/23: Left proximal M1 occlusion.    CT perfusion 06/01/23:: Core infarct of 6 cc with a penumbra of 162 cc.

## 2023-06-15 ENCOUNTER — TRANSCRIPTION ENCOUNTER (OUTPATIENT)
Age: 83
End: 2023-06-15

## 2023-06-15 LAB
ANION GAP SERPL CALC-SCNC: 13 MMOL/L — SIGNIFICANT CHANGE UP (ref 5–17)
BUN SERPL-MCNC: 24 MG/DL — HIGH (ref 7–23)
CALCIUM SERPL-MCNC: 9 MG/DL — SIGNIFICANT CHANGE UP (ref 8.4–10.5)
CHLORIDE SERPL-SCNC: 103 MMOL/L — SIGNIFICANT CHANGE UP (ref 96–108)
CO2 SERPL-SCNC: 21 MMOL/L — LOW (ref 22–31)
CREAT SERPL-MCNC: 0.71 MG/DL — SIGNIFICANT CHANGE UP (ref 0.5–1.3)
EGFR: 84 ML/MIN/1.73M2 — SIGNIFICANT CHANGE UP
GLUCOSE SERPL-MCNC: 108 MG/DL — HIGH (ref 70–99)
HCT VFR BLD CALC: 37.2 % — SIGNIFICANT CHANGE UP (ref 34.5–45)
HGB BLD-MCNC: 12.5 G/DL — SIGNIFICANT CHANGE UP (ref 11.5–15.5)
MCHC RBC-ENTMCNC: 30.6 PG — SIGNIFICANT CHANGE UP (ref 27–34)
MCHC RBC-ENTMCNC: 33.6 GM/DL — SIGNIFICANT CHANGE UP (ref 32–36)
MCV RBC AUTO: 91.2 FL — SIGNIFICANT CHANGE UP (ref 80–100)
NRBC # BLD: 0 /100 WBCS — SIGNIFICANT CHANGE UP (ref 0–0)
PLATELET # BLD AUTO: 268 K/UL — SIGNIFICANT CHANGE UP (ref 150–400)
POTASSIUM SERPL-MCNC: 4.4 MMOL/L — SIGNIFICANT CHANGE UP (ref 3.5–5.3)
POTASSIUM SERPL-SCNC: 4.4 MMOL/L — SIGNIFICANT CHANGE UP (ref 3.5–5.3)
RBC # BLD: 4.08 M/UL — SIGNIFICANT CHANGE UP (ref 3.8–5.2)
RBC # FLD: 13 % — SIGNIFICANT CHANGE UP (ref 10.3–14.5)
SODIUM SERPL-SCNC: 137 MMOL/L — SIGNIFICANT CHANGE UP (ref 135–145)
WBC # BLD: 11.78 K/UL — HIGH (ref 3.8–10.5)
WBC # FLD AUTO: 11.78 K/UL — HIGH (ref 3.8–10.5)

## 2023-06-15 PROCEDURE — 99233 SBSQ HOSP IP/OBS HIGH 50: CPT

## 2023-06-15 RX ORDER — BACITRACIN AND POLYMYXIN B SULFATE 500; 10000 [USP'U]/G; [USP'U]/G
1 OINTMENT TOPICAL THREE TIMES A DAY
Refills: 0 | Status: DISCONTINUED | OUTPATIENT
Start: 2023-06-15 | End: 2023-06-16

## 2023-06-15 RX ORDER — APIXABAN 2.5 MG/1
5 TABLET, FILM COATED ORAL EVERY 12 HOURS
Refills: 0 | Status: DISCONTINUED | OUTPATIENT
Start: 2023-06-15 | End: 2023-06-16

## 2023-06-15 RX ADMIN — Medication 500000 UNIT(S): at 17:38

## 2023-06-15 RX ADMIN — SENNA PLUS 2 TABLET(S): 8.6 TABLET ORAL at 21:22

## 2023-06-15 RX ADMIN — BACITRACIN AND POLYMYXIN B SULFATE 1 APPLICATION(S): 500; 10000 OINTMENT TOPICAL at 06:16

## 2023-06-15 RX ADMIN — Medication 500000 UNIT(S): at 14:11

## 2023-06-15 RX ADMIN — ENTECAVIR 0.5 MILLIGRAM(S): 0.5 TABLET ORAL at 06:15

## 2023-06-15 RX ADMIN — ATORVASTATIN CALCIUM 80 MILLIGRAM(S): 80 TABLET, FILM COATED ORAL at 21:22

## 2023-06-15 RX ADMIN — PANTOPRAZOLE SODIUM 40 MILLIGRAM(S): 20 TABLET, DELAYED RELEASE ORAL at 14:10

## 2023-06-15 RX ADMIN — Medication 400 MILLIGRAM(S): at 06:16

## 2023-06-15 RX ADMIN — Medication 500000 UNIT(S): at 06:15

## 2023-06-15 RX ADMIN — APIXABAN 5 MILLIGRAM(S): 2.5 TABLET, FILM COATED ORAL at 17:38

## 2023-06-15 RX ADMIN — POLYETHYLENE GLYCOL 3350 17 GRAM(S): 17 POWDER, FOR SOLUTION ORAL at 14:11

## 2023-06-15 RX ADMIN — Medication 400 MILLIGRAM(S): at 14:34

## 2023-06-15 RX ADMIN — CHLORHEXIDINE GLUCONATE 1 APPLICATION(S): 213 SOLUTION TOPICAL at 06:16

## 2023-06-15 RX ADMIN — LOSARTAN POTASSIUM 100 MILLIGRAM(S): 100 TABLET, FILM COATED ORAL at 06:16

## 2023-06-15 RX ADMIN — Medication 500000 UNIT(S): at 23:32

## 2023-06-15 RX ADMIN — Medication 400 MILLIGRAM(S): at 21:22

## 2023-06-15 RX ADMIN — Medication 500000 UNIT(S): at 00:51

## 2023-06-15 RX ADMIN — CHLORHEXIDINE GLUCONATE 1 APPLICATION(S): 213 SOLUTION TOPICAL at 21:22

## 2023-06-15 NOTE — PROGRESS NOTE ADULT - PROBLEM SELECTOR PLAN 1
reportedly tachypnea with episode of desaturation  -S/p course of Zosyn for ?PNA (completed 6/9). CXR grossly clear, CT chest with small L effusion/atelectasis seen on CT chest from 6/4.  -Hypoxia resolved. Suspect 2nd to atelectasis as sats increase with deep breathing from low 90s to high 90s.  -Incentive spirometry   -Keep sats >90% with o2 PRN.   -Pt nonlabored, no longer tachypneic   -Afebrile  -CXR 6/12 grossly clear   -S/p Mucomyst (d/c'd 6/13)  -Duoneb q6h PRN

## 2023-06-15 NOTE — PROGRESS NOTE ADULT - ASSESSMENT
MCA infarct in the setting of afib   - previously on Xarelto could be failure  - recommend cardiology to weigh in on anticoagulation as this is related to Afib  - hypercoagulable workup so far unremarkable, protein S and Prothrombin gene mutation still pending  - pt currently on Eliquis, needs close  monitoring d/t possible treatment failure while on doac    Right breast mass  - Need mammogram as outpatient    Will follow     Gwen Beauchamp NP  Hematology/ Oncology  [facility]  [phone] (office)  [phone] (alt office)  Evenings and weekends please call MD on call or office

## 2023-06-15 NOTE — DISCHARGE NOTE PROVIDER - NSDCCPCAREPLAN_GEN_ALL_CORE_FT
PRINCIPAL DISCHARGE DIAGNOSIS  Diagnosis: Stroke  Assessment and Plan of Treatment: Please follow up with neurologist after being discharged from rehab The office will call you to schedule an appointment, if you do not hear from them please call [phone]. Continue taking medications as prescribed. If you were prescribed a statin for your cholesterol please make sure you have your liver enzymes checked with your primary care physician. Monitor your blood pressure. Reduce fat, cholesterol and salt in your diet. Increase intake of fruits and vegetables. Limit alcohol to minimum and do not smoke. You may be at risk for falling, make changes to your home to help you walk easier. Keep up to date on vaccinations.  If you experience any symptoms of facial drooping, slurred speech, arm or leg weakness, severe headache, vision changes or any worsening symptoms, notify provider immediately and return to ER.

## 2023-06-15 NOTE — PROGRESS NOTE ADULT - SUBJECTIVE AND OBJECTIVE BOX
Chief Complaint:  Patient is a 82y old  Female who presents with a chief complaint of stroke (15 David 2023 11:18)      Date of service 06-15-23 @ 12:44      Interval Events:   no acute events     Hospital Medications:  albuterol/ipratropium for Nebulization 3 milliLiter(s) Nebulizer every 6 hours PRN  amLODIPine   Tablet 10 milliGRAM(s) Oral daily  aspirin  chewable 81 milliGRAM(s) Oral daily  atorvastatin 80 milliGRAM(s) Oral at bedtime  bacitracin/polymyxin B Ointment 1 Application(s) Topical three times a day PRN  chlorhexidine 4% Liquid 1 Application(s) Topical daily  enoxaparin Injectable 40 milliGRAM(s) SubCutaneous <User Schedule>  entecavir Solution 0.5 milliGRAM(s) Oral <User Schedule>  labetalol 400 milliGRAM(s) Oral every 8 hours  losartan 100 milliGRAM(s) Oral daily  nystatin    Suspension 172617 Unit(s) Oral every 6 hours  pantoprazole  Injectable 40 milliGRAM(s) IV Push daily  polyethylene glycol 3350 17 Gram(s) Oral daily  senna 2 Tablet(s) Oral at bedtime        Review of Systems:  General:  No wt loss, fevers, chills, night sweats, fatigue,   Eyes:  Good vision, no reported pain  ENT:  No sore throat, pain, runny nose, dysphagia  CV:  No pain, palpitations, hypo/hypertension  Resp:  No dyspnea, cough, tachypnea, wheezing  GI:  See HPI  :  No pain, bleeding, incontinence, nocturia  Muscle:  No pain, weakness  Neuro:  No weakness, tingling, memory problems  Psych:  No fatigue, insomnia, mood problems, depression  Endocrine:  No polyuria, polydipsia, cold/heat intolerance  Heme:  No petechiae, ecchymosis, easy bruisability  Integumentary:  No rash, edema    PHYSICAL EXAM:   Vital Signs:  Vital Signs Last 24 Hrs  T(C): 36.8 (15 David 2023 09:21), Max: 37.7 (14 Jun 2023 15:58)  T(F): 98.2 (15 David 2023 09:21), Max: 99.8 (14 Jun 2023 15:58)  HR: 81 (15 David 2023 09:21) (81 - 100)  BP: 112/71 (15 David 2023 09:21) (103/64 - 184/74)  BP(mean): --  RR: 18 (15 David 2023 09:21) (18 - 22)  SpO2: 98% (15 David 2023 09:21) (93% - 98%)    Parameters below as of 15 David 2023 09:21  Patient On (Oxygen Delivery Method): room air      Daily     Daily       PHYSICAL EXAM:     GENERAL:  Appears stated age, well-groomed, well-nourished, no distress  HEENT:  NC/AT,  conjunctivae anicteric, clear and pink,   NECK: supple, trachea midline  CHEST:  Full & symmetric excursion, no increased effort, breath sounds clear  HEART:  Regular rhythm, no JVD  ABDOMEN:  Soft, non-tender, non-distended, normoactive bowel sounds,  no masses , no hepatosplenomegaly  EXTREMITIES:  no cyanosis,clubbing or edema  SKIN:  No rash, erythema, or, ecchymoses, no jaundice  NEURO:  Alert, non-focal, no asterixis  PSYCH: Appropriate affect, oriented to place and time  RECTAL: Deferred      LABS Personally reviewed by me:                        11.9   11.70 )-----------( 249      ( 14 Jun 2023 06:06 )             37.1       06-14    142  |  108  |  35<H>  ----------------------------<  117<H>  4.7   |  20<L>  |  0.70    Ca    9.0      14 Jun 2023 06:06                                    11.9   11.70 )-----------( 249      ( 14 Jun 2023 06:06 )             37.1                         11.6   10.05 )-----------( 317      ( 13 Jun 2023 14:47 )             35.7       Imaging personally reviewed by me:

## 2023-06-15 NOTE — DISCHARGE NOTE PROVIDER - NSDCMRMEDTOKEN_GEN_ALL_CORE_FT
atenolol 100 mg oral tablet: 1 tab(s) orally once a day  entecavir 0.5 mg oral tablet: 1 tab(s) orally once a day Administer on an empty stomach (2 hours before or after a meal).  x Hepatitis B?  ergocalciferol 1.25 mg (50,000 intl units) oral capsule: 1 cap(s) orally every 7 days  Lipitor 40 mg oral tablet: 1 tab(s) orally once a day  losartan 50 mg oral tablet: 1 tab(s) orally once a day  Xarelto 20 mg oral tablet: 1 tab(s) orally once a day   amLODIPine 10 mg oral tablet: 1 tab(s) orally once a day  apixaban 5 mg oral tablet: 1 tab(s) orally every 12 hours  atorvastatin 40 mg oral tablet: 1 tab(s) orally once a day (at bedtime)  bacitracin-polymyxin B 500 units-10,000 units/g topical ointment: 1 Apply topically to affected area 3 times a day As needed skin wound  entecavir 0.05 mg/mL oral solution: 10 milliliter(s) orally once a day hold tube feeds 2 hours before and 2 hours after medication admin  labetalol 200 mg oral tablet: 2 tab(s) orally every 8 hours hold for SBP &lt; 110  losartan 100 mg oral tablet: 1 tab(s) orally once a day  nystatin 100,000 units/mL oral suspension: 5 milliliter(s) orally every 6 hours  pantoprazole 40 mg oral granule, delayed release: 40 milligram(s) orally once a day  polyethylene glycol 3350 oral powder for reconstitution: 17 gram(s) orally once a day  senna leaf extract oral tablet: 2 tab(s) orally once a day (at bedtime)

## 2023-06-15 NOTE — PROGRESS NOTE ADULT - SUBJECTIVE AND OBJECTIVE BOX
Subjective: Patient seen and examined. No new events except as noted.   s/p PEG   REVIEW OF SYSTEMS:    CONSTITUTIONAL: +weakness, fevers or chills  EYES/ENT: No visual changes;  No vertigo or throat pain   NECK: No pain or stiffness  RESPIRATORY: No cough, wheezing, hemoptysis; No shortness of breath  CARDIOVASCULAR: No chest pain or palpitations  GASTROINTESTINAL: No abdominal or epigastric pain. No nausea, vomiting, or hematemesis; No diarrhea or constipation. No melena or hematochezia.  GENITOURINARY: No dysuria, frequency or hematuria  NEUROLOGICAL: No numbness or weakness  SKIN: No itching, burning, rashes, or lesions   All other review of systems is negative unless indicated above.    MEDICATIONS:  MEDICATIONS  (STANDING):  amLODIPine   Tablet 10 milliGRAM(s) Oral daily  apixaban 5 milliGRAM(s) Oral every 12 hours  atorvastatin 80 milliGRAM(s) Oral at bedtime  chlorhexidine 4% Liquid 1 Application(s) Topical daily  entecavir Solution 0.5 milliGRAM(s) Oral <User Schedule>  labetalol 400 milliGRAM(s) Oral every 8 hours  losartan 100 milliGRAM(s) Oral daily  nystatin    Suspension 077161 Unit(s) Oral every 6 hours  pantoprazole  Injectable 40 milliGRAM(s) IV Push daily  polyethylene glycol 3350 17 Gram(s) Oral daily  senna 2 Tablet(s) Oral at bedtime      PHYSICAL EXAM:  T(C): 36.4 (06-15-23 @ 17:00), Max: 37.1 (06-14-23 @ 20:52)  HR: 75 (06-15-23 @ 17:00) (75 - 90)  BP: 102/65 (06-15-23 @ 17:00) (102/65 - 120/65)  RR: 18 (06-15-23 @ 17:00) (18 - 20)  SpO2: 95% (06-15-23 @ 17:00) (93% - 98%)  Wt(kg): --  I&O's Summary    14 Jun 2023 07:01  -  15 David 2023 07:00  --------------------------------------------------------  IN: 750 mL / OUT: 300 mL / NET: 450 mL    15 David 2023 07:01  -  15 David 2023 17:58  --------------------------------------------------------  IN: 0 mL / OUT: 300 mL / NET: -300 mL            Appearance: NAD	  HEENT:   Dry  oral mucosa, PERRL, EOMI	  Lymphatic: No lymphadenopathy , no edema  Cardiovascular: irregular  S1 S2, No JVD, No murmurs , Peripheral pulses palpable 2+ bilaterally  Respiratory: decreased bs   Gastrointestinal:  Soft, Non-tender, +PEG  Skin: No rashes, No ecchymoses, No cyanosis, warm to touch  Musculoskeletal: Normal range of motion, normal strength  Psychiatry:  Mood & affect appropriate  Ext: No edema  Neurological (>12):  Mental status: awake and alert, attentive to examiner. oriented to self and place (hospital), does not know age or year. follows simple commands. does not follow cross commands  , speech hypophonic, no dysarthria, follows simple commands but not 2-step commands, no neglect   Cranial Nerves: Right nasolabial flattening, no BTT on right, right gaze palsy can be overcome voluntarily  Motor exam: Normal tone, 0/5 RUE, 0/5 RLE, 5/5 LUE, 5/5 LLE, normal fine finger movements. triple flexion of RLE  Sensation: Intact to light touch   Coordination/ Gait: No dysmetria, gait not tested      LABS:    CARDIAC MARKERS:                                12.5   11.78 )-----------( 268      ( 15 David 2023 13:28 )             37.2     06-15    137  |  103  |  24<H>  ----------------------------<  108<H>  4.4   |  21<L>  |  0.71    Ca    9.0      15 David 2023 13:28      proBNP:   Lipid Profile:   HgA1c:   TSH:             TELEMETRY: 	 AF   ECG:  	  RADIOLOGY:   DIAGNOSTIC TESTING:  [ ] Echocardiogram:  [ ]  Catheterization:  [ ] Stress Test:    OTHER: 	  endos< from: Upper Endoscopy (06.14.23 @ 10:46) [facility]  ____________________________________________________________________________________________________  Patient Name: Andres Falcon                    MRN: [id number]  Account Number: [id number]                     YOB: 1940  Room: Endoscopy Room 4                           Gender: Female  Attending MD: Coby Madden ,                    Procedure Date No Time: 6/14/2023  ____________________________________________________________________________________________________     Procedure:           Upper GI endoscopy  Indications:         Dysphagia  Providers:           Shiloh Ott (Fellow)  Medicines:           Monitored Anesthesia Care  Complications:       No immediate complications.  ____________________________________________________________________________________________________  Procedure:           Pre-Anesthesia Assessment:                       - Prior to the procedure, a History and Physical was performed, andpatient                        medications, allergies and sensitivities were reviewed. The patient's                        tolerance of previous anesthesia was reviewed.                       - The patient is unable to give consent secondary to the patient's altered                        mental status. The alternatives, risks and benefits of the procedure were                        discussed at length with the patient's daughter. The patient's proxy                        verbalized understanding of the risks as well as the alternatives and wished                        to proceed with the procedure.                       - ASA Grade Assessment: III - A patient with severe systemic disease.                       After obtaining informed consent, the endoscope was passed under direct                        vision. Throughout the procedure, the patient's blood pressure, pulse, and                        oxygen saturations were monitored continuously. The was introduced through               the mouth, and advanced to the second part of duodenum. The upper GI                        endoscopy was accomplished without difficulty. The patient tolerated the                        procedure well.                                                                      Findings:       The upper third of the esophagus and middle third of the esophagus were normal.       LA Grade A (one or more mucosal breaks less than 5 mm, not extending between tops of 2        mucosal folds) esophagitis with no bleeding was found in the lower third of the esophagus.       A small sliding hiatal hernia was present.       Striped erythema was found in the gastric body and in the gastric antrum. The exam of the        stomach was otherwise without abnormality. The patient was placed in the supine position for        PEG placement. The stomach was insufflated to appose gastric and abdominal walls. A site was        located in the body of the stomach. The abdominal wall was marked and prepped in a sterile        manner. The area was anesthetized with lidocaine. The trocar needle was introduced through        the abdominal wall and into the stomach. A snare was introduced through the endoscope and        opened in the gastric lumen. The guide wire was passed through the trocar and into the open        snare. The snare was closed around the guide wire. The endoscope and snare were removed,        pulling the wire out through the mouth. A skin incision wasmade at the site of needle        insertion. The externally removable 20 Fr EndoVive Safety gastrostomy tube was lubricated.        The G-tube was tied to the guide wire and pulled through the mouth and into the stomach. The        trocar needle was removed, and the gastrostomy tube was pulled out from the stomach through        the skin. The external bumper was attached to the gastrostomy tube, and the tube was cut to        remove the guide wire. The final position of the gastrostomy tube was confirmed by skin        marking noted to be 7 cm at the external bumper. The final tension and compression of the        abdominal wall by the PEG tube and external bumper were checked and revealed that the PEG        balloon was loose and lightly touching the stomach. The feeding tube was capped, and the tube        site cleaned and dressed.       The duodenal bulb was normal. Mild inflammation and villous blunting were seen in the second        portion of the duodenum.                                                                              Impression:          - LA Grade A reflux esophagitis.                       - Small hiatal hernia.                       - Moderate gastritis.                       - Duodenitis.                       - An externally removable PEG placement was successfully completed.  Recommendation:      - Return patient to [facility] for ongoing care.                       - Use Protonix (pantoprazole) 40 mg via PEG daily.              - Please follow the post-PEG recommendations including: Nutrition consult for                        formula and volume and change dressing once per day. May use PEG today at                        1400 for meds and water and may use PEG tomorrow for feedings after                        evaluation by GI team.                                                                                                        Attending Participation:       I was present and participated during the entire procedure, including non-key portions.                                                                                                          _______________  Coby Madden,   6/14/2023 11:58:34 AM  Number of Addenda: 0    Note Initiated On: 6/14/2023 10:46 AM    < end of copied text >

## 2023-06-15 NOTE — PROGRESS NOTE ADULT - SUBJECTIVE AND OBJECTIVE BOX
Follow-up Pulm Progress Note    S/p PEG placement yesterday   No new respiratory events overnight.  Denies SOB/CP.     Medications:  MEDICATIONS  (STANDING):  amLODIPine   Tablet 10 milliGRAM(s) Oral daily  aspirin  chewable 81 milliGRAM(s) Oral daily  atorvastatin 80 milliGRAM(s) Oral at bedtime  chlorhexidine 4% Liquid 1 Application(s) Topical daily  enoxaparin Injectable 40 milliGRAM(s) SubCutaneous <User Schedule>  entecavir Solution 0.5 milliGRAM(s) Oral <User Schedule>  labetalol 400 milliGRAM(s) Oral every 8 hours  losartan 100 milliGRAM(s) Oral daily  nystatin    Suspension 298651 Unit(s) Oral every 6 hours  pantoprazole  Injectable 40 milliGRAM(s) IV Push daily  polyethylene glycol 3350 17 Gram(s) Oral daily  senna 2 Tablet(s) Oral at bedtime    MEDICATIONS  (PRN):  albuterol/ipratropium for Nebulization 3 milliLiter(s) Nebulizer every 6 hours PRN Shortness of Breath and/or Wheezing  bacitracin/polymyxin B Ointment 1 Application(s) Topical three times a day PRN skin wound          Vital Signs Last 24 Hrs  T(C): 36.8 (15 David 2023 09:21), Max: 37.7 (14 Jun 2023 15:58)  T(F): 98.2 (15 David 2023 09:21), Max: 99.8 (14 Jun 2023 15:58)  HR: 81 (15 David 2023 09:21) (81 - 100)  BP: 112/71 (15 David 2023 09:21) (103/64 - 184/74)  BP(mean): 83 (14 Jun 2023 11:40) (83 - 83)  RR: 18 (15 David 2023 09:21) (18 - 22)  SpO2: 98% (15 David 2023 09:21) (93% - 98%)    Parameters below as of 15 David 2023 09:21  Patient On (Oxygen Delivery Method): room air              06-14 @ 07:01  -  06-15 @ 07:00  --------------------------------------------------------  IN: 750 mL / OUT: 300 mL / NET: 450 mL          LABS:                        11.9   11.70 )-----------( 249      ( 14 Jun 2023 06:06 )             37.1     06-14    142  |  108  |  35<H>  ----------------------------<  117<H>  4.7   |  20<L>  |  0.70    Ca    9.0      14 Jun 2023 06:06            CAPILLARY BLOOD GLUCOSE      POCT Blood Glucose.: 152 mg/dL (13 Jun 2023 17:37)        Procalcitonin, Serum: 0.08 ng/mL (06-13-23 @ 14:47)            Physical Examination:  PULM: Clear to auscultation bilaterally, no significant sputum production  CVS: S1, S2 heard        RADIOLOGY REVIEWED  CXR 6/12: grossly clear

## 2023-06-15 NOTE — DISCHARGE NOTE PROVIDER - CARE PROVIDER_API CALL
Sarah Boyd  NP in Family Health  [address], [address]  [place] [address]  Phone: [phone]  Fax: [phone]  Follow Up Time: 1 month    Elissa Mcdaniel  [address] [address]  Phone: [phone]  Fax: [phone]  Follow Up Time: 1 month    Earnest Quiroz  Gastroenterology  [address] [place] [address]  Phone: [phone]  Fax: [phone]  Follow Up Time: 1 month

## 2023-06-15 NOTE — PROGRESS NOTE ADULT - ASSESSMENT
1. Stroke  per neurology    2. dysphagia  s/p PEG, tube site evaluated, OK to initiate feeds  OK to resume anticoagulation  esophagitis, gastritis seen on EGD, continue daily PPI     3. Asymptomatic cholelithiasis  observe for symptoms    4. ?history of hepatitis C. elevated ALP, otherwise normal LFTs. no cirrhosis on imaging.   outpt GI follow up     5. Afib   per Cardiology

## 2023-06-15 NOTE — PROGRESS NOTE ADULT - SUBJECTIVE AND OBJECTIVE BOX
Patient is a 82y old  Female who presents with a chief complaint of stroke (15 David 2023 12:44)    Patient seen and examined at bedside this morrning. Patient appears comfortable. No acute overnight events reported.    MEDICATIONS  (STANDING):  amLODIPine   Tablet 10 milliGRAM(s) Oral daily  apixaban 5 milliGRAM(s) Oral every 12 hours  atorvastatin 80 milliGRAM(s) Oral at bedtime  chlorhexidine 4% Liquid 1 Application(s) Topical daily  entecavir Solution 0.5 milliGRAM(s) Oral <User Schedule>  labetalol 400 milliGRAM(s) Oral every 8 hours  losartan 100 milliGRAM(s) Oral daily  nystatin    Suspension 692304 Unit(s) Oral every 6 hours  pantoprazole  Injectable 40 milliGRAM(s) IV Push daily  polyethylene glycol 3350 17 Gram(s) Oral daily  senna 2 Tablet(s) Oral at bedtime    MEDICATIONS  (PRN):  albuterol/ipratropium for Nebulization 3 milliLiter(s) Nebulizer every 6 hours PRN Shortness of Breath and/or Wheezing  bacitracin/polymyxin B Ointment 1 Application(s) Topical three times a day PRN skin wound      Vital Signs Last 24 Hrs  T(C): 36.9 (15 David 2023 14:30), Max: 37.1 (14 Jun 2023 20:52)  T(F): 98.4 (15 David 2023 14:30), Max: 98.7 (14 Jun 2023 20:52)  HR: 86 (15 David 2023 14:30) (81 - 90)  BP: 119/78 (15 David 2023 14:30) (103/64 - 120/65)  BP(mean): --  RR: 18 (15 David 2023 14:30) (18 - 20)  SpO2: 97% (15 David 2023 14:30) (93% - 98%)    Parameters below as of 15 David 2023 14:30  Patient On (Oxygen Delivery Method): room air        PE  Awake,  Anicteric, MMM  RRR  CTAB  Abd soft, NT, ND  No c/c/e  No rash grossly  R sided weakness                           12.5   11.78 )-----------( 268      ( 15 David 2023 13:28 )             37.2       06-15    137  |  103  |  24<H>  ----------------------------<  108<H>  4.4   |  21<L>  |  0.71    Ca    9.0      15 David 2023 13:28

## 2023-06-15 NOTE — PROGRESS NOTE ADULT - SUBJECTIVE AND OBJECTIVE BOX
THE PATIENT WAS SEEN AND EXAMINED BY ME WITH THE HOUSESTAFF AND STROKE TEAM DURING MORNING ROUNDS.   HPI:  82y woman with a PMHx significant for HTN, afib on Xarelto (last dose 6/1 6pm), hepatitis C on antiviral presenting with acute onset of AMS. LKW 10pm. Around 10:30pm patient become acutely altered, slumped to right side, head turned to left, not answering questions properly or moving the right side. NIHSS 22 most notable for decreased arousal, incorrect answers, not following commands, left gaze deviation that could not be overcome, right facial droop, right sided severe weakness compared to left, slurred speech, aphasia. Patient lives at home with daughter who is at bedside. Patient is Vietnamese speaking. Daughter reports at baseline she intermittently walks with a cane and needs minor help with ADLs such as getting dressed or running a shower, Daughter looks after her affairs. She denies dementia or cognitive impairment. mRS 3. S/p thrombectomy s/p TICI 2C mechanism cardioembolic from known afib., LKW 10pm (6/1), NIHSS 22, pre-mRS 3,     SUBJECTIVE: No events overnight.  No new neurologic complaints, ROS reported negative unless otherwise noted.     albuterol/ipratropium for Nebulization 3 milliLiter(s) Nebulizer every 6 hours PRN  amLODIPine   Tablet 10 milliGRAM(s) Oral daily  aspirin  chewable 81 milliGRAM(s) Oral daily  atorvastatin 80 milliGRAM(s) Oral at bedtime  bacitracin/polymyxin B Ointment 1 Application(s) Topical three times a day PRN  chlorhexidine 4% Liquid 1 Application(s) Topical daily  enoxaparin Injectable 40 milliGRAM(s) SubCutaneous <User Schedule>  entecavir Solution 0.5 milliGRAM(s) Oral <User Schedule>  labetalol 400 milliGRAM(s) Oral every 8 hours  losartan 100 milliGRAM(s) Oral daily  nystatin    Suspension 753392 Unit(s) Oral every 6 hours  pantoprazole  Injectable 40 milliGRAM(s) IV Push daily  polyethylene glycol 3350 17 Gram(s) Oral daily  senna 2 Tablet(s) Oral at bedtime      PHYSICAL EXAM:   Vital Signs Last 24 Hrs  T(C): 36.9 (15 David 2023 05:00), Max: 37.7 (14 Jun 2023 15:58)  T(F): 98.4 (15 David 2023 05:00), Max: 99.8 (14 Jun 2023 15:58)  HR: 89 (15 David 2023 05:00) (87 - 100)  BP: 118/76 (15 David 2023 05:00) (103/64 - 184/74)  BP(mean): 83 (14 Jun 2023 11:40) (83 - 83)  RR: 18 (15 David 2023 05:00) (18 - 22)  SpO2: 94% (15 David 2023 05:00) (93% - 97%)    Parameters below as of 15 David 2023 05:00  Patient On (Oxygen Delivery Method): room air        General: No acute distress  HEENT: EOM intact, visual fields full  Abdomen: Soft, nontender, nondistended   Extremities: No edema    NEUROLOGICAL EXAM:  Mental status: eyes open,  awake, alert, oriented x3, hypophonia, minimal verbal output, requires prompting to generate fluent speech,   Cranial Nerves:  Right nasolabial flattening, no BTT on right, right gaze palsy can be overcome voluntarily  Motor exam: Normal tone, right hemiplegia: RUE 0/5, RLE 0/5, triple flexion, LUE 5/5, LLE 5/5,   Sensation: Intact to light touch   Coordination/ Gait: unable to participate with exam   LABS:                        11.9   11.70 )-----------( 249      ( 14 Jun 2023 06:06 )             37.1    06-14    142  |  108  |  35<H>  ----------------------------<  117<H>  4.7   |  20<L>  |  0.70    Ca    9.0      14 Jun 2023 06:06          IMAGING: Reviewed by me.       [facility] (06/11):  Improved subacute moderate to large left MCA territory infarction. Stable petechial hemorrhage. Mild chronic microvascular changes without evidence of a new acute transcortical infarction or hemorrhage.    MR Head No Cont 6.06.23 Parenchymal volume loss and chronic microvascular ischemic changes identified Abnormal T2 prolongation with restricted diffusion is seen involving the posterior left insula, left temporal frontal and parietal cortical subcortical region is identified. These finding does demonstrate abnormal susceptibility and is compatible with a hemorrhagic left MCA infarct. There is localized mass effect seen consisting sulcal effacement. Mass effect on the left lateral ventricle seen. No significant shift or herniation is seen. The large vessels demonstrate normal flow-voids Minimal mucosal thickening is seen involving both ethmoid sinuses. Both mastoid and middle ear regions appear clear. 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    CT Head No Cont (06.05.23 @ 08:27) Evolving left MCA distribution infarct. More well-defined lucency and slight increased mass effect on the left lateral ventricle. Subtle increased attenuation at the level of the basal ganglia on the left may indicate a component of evolving hemorrhage.    CT Head No Cont (06.02.23 @ 09:59): An evolving left MCA distribution infarct .    CT Brain Stroke Protocol 06.01.23: No acute intracranial hemorrhage or mass effect.    CT angiography neck 06/01/23: No hemodynamically significant stenosis by NASCET criteria. No vascular dissection. Nonspecific ground glass opacities in the visualized apices of the lungs, may represent infectious versus inflammatory disease.    CT angiography brain 06/01/23: Left proximal M1 occlusion.    CT perfusion 06/01/23:: Core infarct of 6 cc with a penumbra of 162 cc.       THE PATIENT WAS SEEN AND EXAMINED BY ME WITH THE HOUSESTAFF AND STROKE TEAM DURING MORNING ROUNDS.   HPI:  82y woman with a PMHx significant for HTN, afib on Xarelto (last dose 6/1 6pm), hepatitis C on antiviral presenting with acute onset of AMS. LKW 10pm. Around 10:30pm patient become acutely altered, slumped to right side, head turned to left, not answering questions properly or moving the right side. NIHSS 22 most notable for decreased arousal, incorrect answers, not following commands, left gaze deviation that could not be overcome, right facial droop, right sided severe weakness compared to left, slurred speech, aphasia. Patient lives at home with daughter who is at bedside. Patient is Urdu speaking. Daughter reports at baseline she intermittently walks with a cane and needs minor help with ADLs such as getting dressed or running a shower, Daughter looks after her affairs. She denies dementia or cognitive impairment. mRS 3. S/p thrombectomy s/p TICI 2C mechanism cardioembolic from known afib., LKW 10pm (6/1), NIHSS 22, pre-mRS 3,     SUBJECTIVE: No events overnight.  No new neurologic complaints, ROS reported negative unless otherwise noted.      ID# [id number]    albuterol/ipratropium for Nebulization 3 milliLiter(s) Nebulizer every 6 hours PRN  amLODIPine   Tablet 10 milliGRAM(s) Oral daily  aspirin  chewable 81 milliGRAM(s) Oral daily  atorvastatin 80 milliGRAM(s) Oral at bedtime  bacitracin/polymyxin B Ointment 1 Application(s) Topical three times a day PRN  chlorhexidine 4% Liquid 1 Application(s) Topical daily  enoxaparin Injectable 40 milliGRAM(s) SubCutaneous <User Schedule>  entecavir Solution 0.5 milliGRAM(s) Oral <User Schedule>  labetalol 400 milliGRAM(s) Oral every 8 hours  losartan 100 milliGRAM(s) Oral daily  nystatin    Suspension 650785 Unit(s) Oral every 6 hours  pantoprazole  Injectable 40 milliGRAM(s) IV Push daily  polyethylene glycol 3350 17 Gram(s) Oral daily  senna 2 Tablet(s) Oral at bedtime      PHYSICAL EXAM:   Vital Signs Last 24 Hrs  T(C): 36.9 (15 David 2023 05:00), Max: 37.7 (14 Jun 2023 15:58)  T(F): 98.4 (15 David 2023 05:00), Max: 99.8 (14 Jun 2023 15:58)  HR: 89 (15 David 2023 05:00) (87 - 100)  BP: 118/76 (15 David 2023 05:00) (103/64 - 184/74)  BP(mean): 83 (14 Jun 2023 11:40) (83 - 83)  RR: 18 (15 David 2023 05:00) (18 - 22)  SpO2: 94% (15 David 2023 05:00) (93% - 97%)    Parameters below as of 15 David 2023 05:00  Patient On (Oxygen Delivery Method): room air        General: No acute distress  HEENT: EOM intact, visual fields full  Abdomen: Soft, nontender, nondistended   Extremities: No edema    NEUROLOGICAL EXAM:  Mental status: eyes open,  awake, alert, oriented x3, hypophonia, minimal verbal output, requires prompting to generate fluent speech,   Cranial Nerves:  Right nasolabial flattening, no BTT on right, right gaze palsy can be overcome voluntarily  Motor exam: Normal tone, right hemiplegia: RUE 0/5, RLE 0/5, triple flexion, LUE 5/5, LLE 5/5,   Sensation: Intact to light touch   Coordination/ Gait: unable to participate with exam   LABS:                        11.9   11.70 )-----------( 249      ( 14 Jun 2023 06:06 )             37.1    06-14    142  |  108  |  35<H>  ----------------------------<  117<H>  4.7   |  20<L>  |  0.70    Ca    9.0      14 Jun 2023 06:06          IMAGING: Reviewed by me.       CT (06/11):  Improved subacute moderate to large left MCA territory infarction. Stable petechial hemorrhage. Mild chronic microvascular changes without evidence of a new acute transcortical infarction or hemorrhage.    MR Head No Cont 6.06.23 Parenchymal volume loss and chronic microvascular ischemic changes identified Abnormal T2 prolongation with restricted diffusion is seen involving the posterior left insula, left temporal frontal and parietal cortical subcortical region is identified. These finding does demonstrate abnormal susceptibility and is compatible with a hemorrhagic left MCA infarct. There is localized mass effect seen consisting sulcal effacement. Mass effect on the left lateral ventricle seen. No significant shift or herniation is seen. The large vessels demonstrate normal flow-voids Minimal mucosal thickening is seen involving both ethmoid sinuses. Both mastoid and middle ear regions appear clear. 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    CT Head No Cont (06.05.23 @ 08:27) Evolving left MCA distribution infarct. More well-defined lucency and slight increased mass effect on the left lateral ventricle. Subtle increased attenuation at the level of the basal ganglia on the left may indicate a component of evolving hemorrhage.    CT Head No Cont (06.02.23 @ 09:59): An evolving left MCA distribution infarct .    CT Brain Stroke Protocol 06.01.23: No acute intracranial hemorrhage or mass effect.    CT angiography neck 06/01/23: No hemodynamically significant stenosis by NASCET criteria. No vascular dissection. Nonspecific ground glass opacities in the visualized apices of the lungs, may represent infectious versus inflammatory disease.    CT angiography brain 06/01/23: Left proximal M1 occlusion.    CT perfusion 06/01/23:: Core infarct of 6 cc with a penumbra of 162 cc.

## 2023-06-15 NOTE — PROGRESS NOTE ADULT - ASSESSMENT
82y (1940) woman with a PMHx significant for HTN, afib on Xarelto (last dose 6/1 6pm), hepatitis C on antiviral presenting with acute onset of AMS. LKW 10pm. Around 10:30pm patient become acutely altered, slumped to right side, head turned to left, not answering questions properly or moving the right side. NIHSS 22 most notable for decreased arousal, incorrect answers, not following commands, left gaze deviation that could not be overcome, right sided severe weakness compared to left, slurred speech, aphasia. Patient lives at home with daughter who is at bedside. Patient is Cameroonian speaking. Daughter reports at baseline she intermittently walks with a cane and needs minor help with ADLs such as getting dressed or running a shower, Daughter looks after her affairs. She denies dementia or cognitive impairment. mRS 3. Not a tenecteplase candidate due to INR 1.79. Patient taken to IR for mechanical thrombectomy.    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 Mechanism cardioembolic from known afib. S/p thrombectomy s/p TICI 2C.      NEURO: Overall stable neurologically, repeat CT shows Subtle increased attenuation at the level of the basal ganglia on the left may indicate a component of evolving hemorrhage.  Continue monitoring for neurologic deterioration, Normotension, atorvastatin 80mg to LDL goal less than 70, MRI Brain w/o and as above. Physical therapy/OT/Speech eval/treatment for acute rehab.     ANTITHROMBOTIC THERAPY:    PULMONARY: Pulmonology consulted (Dr Villatoro) for further eval/recommendations. CXR (06/05)  with L basilar opacities, unchanged, likely aspiration PNA. Pt completed Zosyn for aspiration PNA for 5 days ((final dose 6/9).  NC as needed. Mucomyst 20% 4mL q6h, Duonebs q6h, chest PT for secretions.     CARDIOVASCULAR: TTE EF 60% severe (grade 3) left ventricular diastolic dysfunction. The left atrium is moderately dilated. The right atrium is dilated and Moderate aortic stenosi, cardiac monitoring. continue current cardiac meds for BOP mgt, Dr Zhu (cardio) consult appreciated                        SBP goal: 100-160mmHg    GASTROINTESTINAL:  dysphagia screen failed, s/p FEEST on 06/08: with recommendations for NPO,  s/s to  re-evaluate on 06/04/23 . Bowel regimen. s/p MBS 6/13: NPO. plan for PEG on 6/14.      Diet: NG tube in. Glucerna 1.2 at goal plus 2 cans of probiotic. NPO after midnight for angiogram     RENAL: BUN/Cr 35/0.70: Prerenal likely due to dehydration , s/p free water bolus 6/11, good urine output      Na Goal: Greater than 135     Virgen: none    HEMATOLOGY: no signs of bleeding     DVT ppx: LMWH     ID: [email or web address], Completed Zosyn for aspiration PNA, last day Jun 9th. On entecavir daily for Hep C.      OTHER: Plan discussed with patient and family (daughter) at bedside    DISPOSITION: Acute rehab as per PT/OT eval once stable and medical work up is complete    CORE MEASURES:        Admission NIHSS: 22     Tenecteplase: [] YES [x] NO      LDL/HDL: 33/47     Statin Therapy: Yes     Dysphagia Screen: [] PASS [x] FAIL     Smoking [] YES [x] NO      Afib [x] YES [] NO     Stroke Education [x] YES [] NO    Obtain screening lower extremity venous ultrasound in patients who meet 1 or more of the following criteria as patient is high risk for DVT/PE on admission: COMPLETED - NEGATIVE  [] History of DVT/PE  []Hypercoagulable states (Factor V Leiden, Cancer, OCP, etc. )  []Prolonged immobility (hemiplegia/hemiparesis/post operative or any other extended immobilization)  [] Transferred from outside facility (Rehab or Long term care)  [] Age </= to 50 82y (1940) woman with a PMHx significant for HTN, afib on Xarelto (last dose 6/1 6pm), hepatitis C on antiviral presenting with acute onset of AMS. LKW 10pm. Around 10:30pm patient become acutely altered, slumped to right side, head turned to left, not answering questions properly or moving the right side. NIHSS 22 most notable for decreased arousal, incorrect answers, not following commands, left gaze deviation that could not be overcome, right sided severe weakness compared to left, slurred speech, aphasia. Patient lives at home with daughter who is at bedside. Patient is Guyanese speaking. Daughter reports at baseline she intermittently walks with a cane and needs minor help with ADLs such as getting dressed or running a shower, Daughter looks after her affairs. She denies dementia or cognitive impairment. mRS 3. Not a tenecteplase candidate due to INR 1.79. Patient taken to IR for mechanical thrombectomy.    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 Mechanism cardioembolic from known afib. S/p thrombectomy s/p TICI 2C.      NEURO: Overall stable neurologically, repeat CT shows Subtle increased attenuation at the level of the basal ganglia on the left may indicate a component of evolving hemorrhage.  Continue monitoring for neurologic deterioration, Normotension, atorvastatin 80mg to LDL goal less than 70, MRI Brain w/o and as above. Physical therapy/OT/Speech eval/treatment for acute rehab.     ANTITHROMBOTIC THERAPY:    PULMONARY: Pulmonology consulted (Dr Villatoro) for further eval/recommendations. CXR (06/05)  with L basilar opacities, unchanged, likely aspiration PNA. Pt completed Zosyn for aspiration PNA for 5 days ((final dose 6/9).  NC as needed. Mucomyst 20% 4mL q6h, Duonebs q6h, chest PT for secretions.     CARDIOVASCULAR: TTE EF 60% severe (grade 3) left ventricular diastolic dysfunction. The left atrium is moderately dilated. The right atrium is dilated and Moderate aortic stenosi, cardiac monitoring. continue current cardiac meds for BOP mgt, Dr Zhu (cardio) consult appreciated                        SBP goal: 100-160mmHg    GASTROINTESTINAL:  dysphagia screen failed, s/p FEEST on 06/08: with recommendations for NPO,  s/s to  re-evaluate on 06/04/23 . Bowel regimen. s/p MBS 6/13: NPO. plan for PEG on 6/14.      Diet: NG tube in. Glucerna 1.2 at goal plus 2 cans of probiotic. NPO after midnight for angiogram     RENAL: BUN/Cr 35/0.70: Prerenal likely due to dehydration , s/p free water bolus 6/11, good urine output      Na Goal: Greater than 135     Virgen: none    HEMATOLOGY: no signs of bleeding; Oncology consulted: Right breast mass- Need mammogram as outpatient, hypercoag panel: anticardiolipin positive, betaglycoprotein positive- followup with hematology outpatient for repeat labs in 12 weeks.     DVT ppx: LMWH     ID: [email or web address], Completed Zosyn for aspiration PNA, last day Jun 9th. On entecavir daily for Hep C.      OTHER: Plan discussed with patient and family (daughter) at bedside    DISPOSITION: Acute rehab as per PT/OT eval once stable and medical work up is complete    CORE MEASURES:        Admission NIHSS: 22     Tenecteplase: [] YES [x] NO      LDL/HDL: 33/47     Statin Therapy: Yes     Dysphagia Screen: [] PASS [x] FAIL     Smoking [] YES [x] NO      Afib [x] YES [] NO     Stroke Education [x] YES [] NO    Obtain screening lower extremity venous ultrasound in patients who meet 1 or more of the following criteria as patient is high risk for DVT/PE on admission: COMPLETED - NEGATIVE  [] History of DVT/PE  []Hypercoagulable states (Factor V Leiden, Cancer, OCP, etc. )  []Prolonged immobility (hemiplegia/hemiparesis/post operative or any other extended immobilization)  [] Transferred from outside facility (Rehab or Long term care)  [] Age </= to 50 82y (1940) woman with a PMHx significant for HTN, afib on Xarelto (last dose 6/1 6pm), hepatitis C on antiviral presenting with acute onset of AMS. LKW 10pm. Around 10:30pm patient become acutely altered, slumped to right side, head turned to left, not answering questions properly or moving the right side. NIHSS 22 most notable for decreased arousal, incorrect answers, not following commands, left gaze deviation that could not be overcome, right sided severe weakness compared to left, slurred speech, aphasia. Patient lives at home with daughter who is at bedside. Patient is Panamanian speaking. Daughter reports at baseline she intermittently walks with a cane and needs minor help with ADLs such as getting dressed or running a shower, Daughter looks after her affairs. She denies dementia or cognitive impairment. mRS 3. Not a tenecteplase candidate due to INR 1.79. Patient taken to IR for mechanical thrombectomy.    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 Mechanism cardioembolic from known afib. S/p thrombectomy s/p TICI 2C.      NEURO: Overall stable neurologically, repeat CT shows Subtle increased attenuation at the level of the basal ganglia on the left may indicate a component of evolving hemorrhage.  Continue monitoring for neurologic deterioration, Normotension, atorvastatin 80mg to LDL goal less than 70, MRI Brain w/o and as above. Physical therapy/OT/Speech eval/treatment for acute rehab.     ANTITHROMBOTIC THERAPY: apixaban initiated for secondary stroke prevention     PULMONARY: Pulmonology consulted (Dr Villatoro) for further eval/recommendations. CXR (06/05)  with L basilar opacities, unchanged, likely aspiration PNA. Pt completed Zosyn for aspiration PNA for 5 days ((final dose 6/9).  NC as needed. Mucomyst 20% 4mL q6h, Duonebs q6h, chest PT for secretions.     CARDIOVASCULAR: TTE EF 60% severe (grade 3) left ventricular diastolic dysfunction. The left atrium is moderately dilated. The right atrium is dilated and Moderate aortic stenosi, cardiac monitoring. continue current cardiac meds for BOP mgt, Dr Zhu (cardio) consult appreciated                        SBP goal: 100-160mmHg    GASTROINTESTINAL:  dysphagia screen failed, s/p FEEST on 06/08: with recommendations for NPO,  s/s to  re-evaluate on 06/04/23 . Bowel regimen. s/p MBS 6/13: NPO. plan for PEG on 6/14.      Diet: NG tube in. Glucerna 1.2 at goal plus 2 cans of probiotic. NPO after midnight for angiogram     RENAL: BUN/Cr 35/0.70: Prerenal likely due to dehydration , s/p free water bolus 6/11, good urine output      Na Goal: Greater than 135     Virgen: none    HEMATOLOGY: no signs of bleeding; Oncology consulted: Right breast mass- Need mammogram as outpatient, hypercoag panel: anticardiolipin positive, betaglycoprotein positive- followup with hematology outpatient for repeat labs in 12 weeks.     DVT ppx: apixaban    ID: [email or web address], Completed Zosyn for aspiration PNA, last day Jun 9th. On entecavir daily for Hep C.      OTHER: Plan discussed with patient and family (daughter) at bedside    DISPOSITION: Acute rehab as per PT/OT eval once stable and medical work up is complete    CORE MEASURES:        Admission NIHSS: 22     Tenecteplase: [] YES [x] NO      LDL/HDL: 33/47     Statin Therapy: Yes     Dysphagia Screen: [] PASS [x] FAIL     Smoking [] YES [x] NO      Afib [x] YES [] NO     Stroke Education [x] YES [] NO    Obtain screening lower extremity venous ultrasound in patients who meet 1 or more of the following criteria as patient is high risk for DVT/PE on admission: COMPLETED - NEGATIVE  [] History of DVT/PE  []Hypercoagulable states (Factor V Leiden, Cancer, OCP, etc. )  []Prolonged immobility (hemiplegia/hemiparesis/post operative or any other extended immobilization)  [] Transferred from outside facility (Rehab or Long term care)  [] Age </= to 50

## 2023-06-15 NOTE — DISCHARGE NOTE PROVIDER - DETAILS OF MALNUTRITION DIAGNOSIS/DIAGNOSES
This patient has been assessed with a concern for Malnutrition and was treated during this hospitalization for the following Nutrition diagnosis/diagnoses:     -  06/06/2023: Moderate protein-calorie malnutrition

## 2023-06-15 NOTE — DISCHARGE NOTE PROVIDER - PROVIDER TOKENS
PROVIDER:[TOKEN:[[id number]],FOLLOWUP:[1 month]],PROVIDER:[TOKEN:[[id number]],FOLLOWUP:[1 month]],PROVIDER:[TOKEN:[[id number]],FOLLOWUP:[1 month]]

## 2023-06-15 NOTE — PROGRESS NOTE ADULT - ASSESSMENT
81 y/o Japanese speaking F with PMH of HTN, afib on Xarelto (last dose 6/1 6pm), hepatitis C on antiviral.  #[id number]. Presents with acute onset of AMS. 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 Course c/b possible aspiration PNA - s/p course of Zosyn. Called to consult for tachypnea with episode of desaturation.

## 2023-06-15 NOTE — DISCHARGE NOTE PROVIDER - HOSPITAL COURSE
82y (1940) woman with a PMHx significant for HTN, afib on Xarelto (last dose 6/1 6pm), hepatitis C on antiviral presenting with acute onset of AMS. LKW 10pm. Around 10:30pm patient become acutely altered, slumped to right side, head turned to left, not answering questions properly or moving the right side. NIHSS 22 most notable for decreased arousal, incorrect answers, not following commands, left gaze deviation that could not be overcome, right sided severe weakness compared to left, slurred speech, aphasia. Patient lives at home with daughter who is at bedside. Patient is [place] speaking. Daughter reports at baseline she intermittently walks with a cane and needs minor help with ADLs such as getting dressed or running a shower, Daughter looks after her affairs. She denies dementia or cognitive impairment. mRS 3. Not a tenecteplase candidate due to INR 1.79. Patient taken to IR for mechanical thrombectomy.      IMAGING/STUDIES    CTH (06/11):  Improved subacute moderate to large left MCA territory infarction. Stable petechial hemorrhage. Mild chronic microvascular changes without evidence of a new acute transcortical infarction or hemorrhage.    MR Head No Cont (6.06.23): Parenchymal volume loss and chronic microvascular ischemic changes identified Abnormal T2 prolongation with restricted diffusion is seen involving the posterior left insula, left temporal frontal and parietal cortical subcortical region is identified. These finding does demonstrate abnormal susceptibility and is compatible with a hemorrhagic left MCA infarct. There is localized mass effect seen consisting sulcal effacement. Mass effect on the left lateral ventricle seen. No significant shift or herniation is seen. The large vessels demonstrate normal flow-voids Minimal mucosal thickening is seen involving both ethmoid sinuses. Both mastoid and middle ear regions appear clear. Hemorrhagic infarct involving the left middle cerebral artery territory.    CT Head No Cont (06.05.23 @ 08:27) Evolving left MCA distribution infarct. More well-defined lucency and slight increased mass effect on the left lateral ventricle. Subtle increased attenuation at the level of the basal ganglia on the left may indicate a component of evolving hemorrhage.    CT Head No Cont (06.02.23 @ 09:59): An evolving left MCA distribution infarct .    CT Brain Stroke Protocol 06.01.23: No acute intracranial hemorrhage or mass effect.    CT angiography neck 06/01/23: No hemodynamically significant stenosis by NASCET criteria. No vascular dissection. Nonspecific ground glass opacities in the visualized apices of the lungs, may represent infectious versus inflammatory disease.    CT angiography brain 06/01/23: Left proximal M1 occlusion.    CT perfusion 06/01/23:: Core infarct of 6 cc with a penumbra of 162 cc.  --    Impression: Left MCA territory infarct with left MCA occlusion. Mechanism cardioembolic from known afib. S/p thrombectomy s/p TICI 2C.     82y (1940) woman with a PMHx significant for HTN, afib on Xarelto (last dose 6/1 6pm), hepatitis C on antiviral presenting with acute onset of AMS. LKW 10pm. Around 10:30pm patient become acutely altered, slumped to right side, head turned to left, not answering questions properly or moving the right side. NIHSS 22 most notable for decreased arousal, incorrect answers, not following commands, left gaze deviation that could not be overcome, right sided severe weakness compared to left, slurred speech, aphasia. Patient lives at home with daughter who is at bedside. Patient is [place] speaking. Daughter reports at baseline she intermittently walks with a cane and needs minor help with ADLs such as getting dressed or running a shower, Daughter looks after her affairs. She denies dementia or cognitive impairment. mRS 3. Not a tenecteplase candidate due to INR 1.79. Patient taken to IR for mechanical thrombectomy.      IMAGING/STUDIES    CTH (06/11):  Improved subacute moderate to large left MCA territory infarction. Stable petechial hemorrhage. Mild chronic microvascular changes without evidence of a new acute transcortical infarction or hemorrhage.    MR Head No Cont (6.06.23): Parenchymal volume loss and chronic microvascular ischemic changes identified Abnormal T2 prolongation with restricted diffusion is seen involving the posterior left insula, left temporal frontal and parietal cortical subcortical region is identified. These finding does demonstrate abnormal susceptibility and is compatible with a hemorrhagic left MCA infarct. There is localized mass effect seen consisting sulcal effacement. Mass effect on the left lateral ventricle seen. No significant shift or herniation is seen. The large vessels demonstrate normal flow-voids Minimal mucosal thickening is seen involving both ethmoid sinuses. Both mastoid and middle ear regions appear clear. Hemorrhagic infarct involving the left middle cerebral artery territory.    CT Head No Cont (06.05.23 @ 08:27) Evolving left MCA distribution infarct. More well-defined lucency and slight increased mass effect on the left lateral ventricle. Subtle increased attenuation at the level of the basal ganglia on the left may indicate a component of evolving hemorrhage.    CT Head No Cont (06.02.23 @ 09:59): An evolving left MCA distribution infarct .    CT Brain Stroke Protocol 06.01.23: No acute intracranial hemorrhage or mass effect.    CT angiography neck 06/01/23: No hemodynamically significant stenosis by NASCET criteria. No vascular dissection. Nonspecific ground glass opacities in the visualized apices of the lungs, may represent infectious versus inflammatory disease.    CT angiography brain 06/01/23: Left proximal M1 occlusion.    CT perfusion 06/01/23:: Core infarct of 6 cc with a penumbra of 162 cc.  --    Impression: Left MCA territory infarct with left MCA occlusion. Mechanism cardioembolic from known afib. S/p thrombectomy s/p TICI 2C.      ANTITHROMBOTIC THERAPY: apixaban initiated for secondary stroke prevention   TTE EF 60% severe (grade 3) left ventricular diastolic dysfunction. The left atrium is moderately dilated. The right atrium is dilated and Moderate aortic stenosis   s/p FEEST on 06/08: with recommendations for NPO,  s/s to  re-evaluate on 06/04/23 .  s/p MBS 6/13: NPO. S/p PEG on 6/14.   Oncology consulted: Right breast mass- Need mammogram as outpatient, hypercoag panel: anticardiolipin positive, betaglycoprotein positive- followup with hematology outpatient for repeat labs in 12 weeks.  Evaluated by PT/OT and was recommended AR. Patient stable for discharge.    82y (1940) woman with a PMHx significant for HTN, afib on Xarelto (last dose 6/1 6pm), hepatitis C on antiviral presenting with acute onset of AMS. LKW 10pm. Around 10:30pm patient become acutely altered, slumped to right side, head turned to left, not answering questions properly or moving the right side. NIHSS 22 most notable for decreased arousal, incorrect answers, not following commands, left gaze deviation that could not be overcome, right sided severe weakness compared to left, slurred speech, aphasia. Patient lives at home with daughter who is at bedside. Patient is Maltese speaking. Daughter reports at baseline she intermittently walks with a cane and needs minor help with ADLs such as getting dressed or running a shower, Daughter looks after her affairs. She denies dementia or cognitive impairment. mRS 3. Not a tenecteplase candidate due to INR 1.79. Patient taken to IR for mechanical thrombectomy.      IMAGING/STUDIES    CTH (06/11):  Improved subacute moderate to large left MCA territory infarction. Stable petechial hemorrhage. Mild chronic microvascular changes without evidence of a new acute transcortical infarction or hemorrhage.    MR Head No Cont (6.06.23): Parenchymal volume loss and chronic microvascular ischemic changes identified Abnormal T2 prolongation with restricted diffusion is seen involving the posterior left insula, left temporal frontal and parietal cortical subcortical region is identified. These finding does demonstrate abnormal susceptibility and is compatible with a hemorrhagic left MCA infarct. There is localized mass effect seen consisting sulcal effacement. Mass effect on the left lateral ventricle seen. No significant shift or herniation is seen. The large vessels demonstrate normal flow-voids Minimal mucosal thickening is seen involving both ethmoid sinuses. Both mastoid and middle ear regions appear clear. Hemorrhagic infarct involving the left middle cerebral artery territory.    CT Head No Cont (06.05.23 @ 08:27) Evolving left MCA distribution infarct. More well-defined lucency and slight increased mass effect on the left lateral ventricle. Subtle increased attenuation at the level of the basal ganglia on the left may indicate a component of evolving hemorrhage.    CT Head No Cont (06.02.23 @ 09:59): An evolving left MCA distribution infarct .    CT Brain Stroke Protocol 06.01.23: No acute intracranial hemorrhage or mass effect.    CT angiography neck 06/01/23: No hemodynamically significant stenosis by NASCET criteria. No vascular dissection. Nonspecific ground glass opacities in the visualized apices of the lungs, may represent infectious versus inflammatory disease.    CT angiography brain 06/01/23: Left proximal M1 occlusion.    CT perfusion 06/01/23:: Core infarct of 6 cc with a penumbra of 162 cc.      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 Mechanism cardioembolic from known afib. S/p thrombectomy s/p TICI 2C.      ANTITHROMBOTIC THERAPY: apixaban initiated for secondary stroke prevention   TTE EF 60% severe (grade 3) left ventricular diastolic dysfunction. The left atrium is moderately dilated. The right atrium is dilated and Moderate aortic stenosis   s/p FEEST on 06/08: with recommendations for NPO,  s/s to  re-evaluate on 06/04/23 .  s/p MBS 6/13: NPO. S/p PEG on 6/14.   Oncology consulted: Right breast mass- Need mammogram as outpatient, hypercoag panel: anticardiolipin positive, betaglycoprotein positive- followup with hematology outpatient for repeat labs in 12 weeks.  Evaluated by PT/OT and was recommended AR. Patient stable for discharge.

## 2023-06-15 NOTE — PROGRESS NOTE ADULT - ASSESSMENT
82y (1940) woman with a PMHx significant for HTN, afib on Xarelto (last dose 6/1 6pm), hepatitis C on antiviral presenting with acute onset of AMS. LKW 10pm. Around 10:30pm patient become acutely altered, slumped to right side, head turned to left, not answering questions properly or moving the right side. NIHSS 22 most notable for decreased arousal, incorrect answers, not following commands, left gaze deviation that could not be overcome, right facial droop, right sided severe weakness compared to left, slurred speech, aphasia. Patient lives at home with daughter who is at bedside. Patient is Croatian speaking. Daughter reports at baseline she intermittently walks with a cane and needs minor help with ADLs such as getting dressed or running a shower, Daughter looks after her affairs. She denies dementia or cognitive impairment. mRS 3.     LKW 10pm (6/1)  NIHSS 22  pre-mRS 3  Xarelto last taken at 6pm, INR=1.79   (02 Jun 2023 01:43)  has been hypertensive   family at bedside

## 2023-06-16 ENCOUNTER — INPATIENT (INPATIENT)
Facility: HOSPITAL | Age: 83
LOS: 20 days | Discharge: NURSING, REHAB OR LONG-TERM CARE | DRG: 949 | End: 2023-07-07
Attending: PHYSICAL MEDICINE & REHABILITATION | Admitting: PHYSICAL MEDICINE & REHABILITATION
Payer: MEDICAID

## 2023-06-16 VITALS
RESPIRATION RATE: 16 BRPM | DIASTOLIC BLOOD PRESSURE: 81 MMHG | WEIGHT: 174.17 LBS | TEMPERATURE: 98 F | OXYGEN SATURATION: 95 % | SYSTOLIC BLOOD PRESSURE: 119 MMHG | HEIGHT: 63 IN | HEART RATE: 89 BPM

## 2023-06-16 VITALS
RESPIRATION RATE: 20 BRPM | HEART RATE: 85 BPM | SYSTOLIC BLOOD PRESSURE: 103 MMHG | TEMPERATURE: 98 F | OXYGEN SATURATION: 95 % | DIASTOLIC BLOOD PRESSURE: 60 MMHG

## 2023-06-16 DIAGNOSIS — R41.4 NEUROLOGIC NEGLECT SYNDROME: ICD-10-CM

## 2023-06-16 DIAGNOSIS — Z43.1 ENCOUNTER FOR ATTENTION TO GASTROSTOMY: ICD-10-CM

## 2023-06-16 DIAGNOSIS — R13.12 DYSPHAGIA, OROPHARYNGEAL PHASE: ICD-10-CM

## 2023-06-16 DIAGNOSIS — R33.9 RETENTION OF URINE, UNSPECIFIED: ICD-10-CM

## 2023-06-16 DIAGNOSIS — G47.00 INSOMNIA, UNSPECIFIED: ICD-10-CM

## 2023-06-16 DIAGNOSIS — E87.1 HYPO-OSMOLALITY AND HYPONATREMIA: ICD-10-CM

## 2023-06-16 DIAGNOSIS — B37.0 CANDIDAL STOMATITIS: ICD-10-CM

## 2023-06-16 DIAGNOSIS — K59.00 CONSTIPATION, UNSPECIFIED: ICD-10-CM

## 2023-06-16 DIAGNOSIS — I95.1 ORTHOSTATIC HYPOTENSION: ICD-10-CM

## 2023-06-16 DIAGNOSIS — I69.392 FACIAL WEAKNESS FOLLOWING CEREBRAL INFARCTION: ICD-10-CM

## 2023-06-16 DIAGNOSIS — I10 ESSENTIAL (PRIMARY) HYPERTENSION: ICD-10-CM

## 2023-06-16 DIAGNOSIS — I69.318 OTHER SYMPTOMS AND SIGNS INVOLVING COGNITIVE FUNCTIONS FOLLOWING CEREBRAL INFARCTION: ICD-10-CM

## 2023-06-16 DIAGNOSIS — R26.9 UNSPECIFIED ABNORMALITIES OF GAIT AND MOBILITY: ICD-10-CM

## 2023-06-16 DIAGNOSIS — D72.829 ELEVATED WHITE BLOOD CELL COUNT, UNSPECIFIED: ICD-10-CM

## 2023-06-16 DIAGNOSIS — R07.9 CHEST PAIN, UNSPECIFIED: ICD-10-CM

## 2023-06-16 DIAGNOSIS — R15.9 FULL INCONTINENCE OF FECES: ICD-10-CM

## 2023-06-16 DIAGNOSIS — R32 UNSPECIFIED URINARY INCONTINENCE: ICD-10-CM

## 2023-06-16 DIAGNOSIS — I69.391 DYSPHAGIA FOLLOWING CEREBRAL INFARCTION: ICD-10-CM

## 2023-06-16 DIAGNOSIS — I48.91 UNSPECIFIED ATRIAL FIBRILLATION: ICD-10-CM

## 2023-06-16 DIAGNOSIS — R73.03 PREDIABETES: ICD-10-CM

## 2023-06-16 DIAGNOSIS — N63.10 UNSPECIFIED LUMP IN THE RIGHT BREAST, UNSPECIFIED QUADRANT: ICD-10-CM

## 2023-06-16 DIAGNOSIS — E44.0 MODERATE PROTEIN-CALORIE MALNUTRITION: ICD-10-CM

## 2023-06-16 DIAGNOSIS — N39.0 URINARY TRACT INFECTION, SITE NOT SPECIFIED: ICD-10-CM

## 2023-06-16 DIAGNOSIS — I69.320 APHASIA FOLLOWING CEREBRAL INFARCTION: ICD-10-CM

## 2023-06-16 DIAGNOSIS — I69.398 OTHER SEQUELAE OF CEREBRAL INFARCTION: ICD-10-CM

## 2023-06-16 DIAGNOSIS — R00.0 TACHYCARDIA, UNSPECIFIED: ICD-10-CM

## 2023-06-16 DIAGNOSIS — Z79.01 LONG TERM (CURRENT) USE OF ANTICOAGULANTS: ICD-10-CM

## 2023-06-16 DIAGNOSIS — B19.20 UNSPECIFIED VIRAL HEPATITIS C WITHOUT HEPATIC COMA: ICD-10-CM

## 2023-06-16 DIAGNOSIS — I69.310 ATTENTION AND CONCENTRATION DEFICIT FOLLOWING CEREBRAL INFARCTION: ICD-10-CM

## 2023-06-16 DIAGNOSIS — R49.0 DYSPHONIA: ICD-10-CM

## 2023-06-16 DIAGNOSIS — I63.9 CEREBRAL INFARCTION, UNSPECIFIED: ICD-10-CM

## 2023-06-16 DIAGNOSIS — I63.512 CEREBRAL INFARCTION DUE TO UNSPECIFIED OCCLUSION OR STENOSIS OF LEFT MIDDLE CEREBRAL ARTERY: ICD-10-CM

## 2023-06-16 DIAGNOSIS — I69.311 MEMORY DEFICIT FOLLOWING CEREBRAL INFARCTION: ICD-10-CM

## 2023-06-16 DIAGNOSIS — Z51.89 ENCOUNTER FOR OTHER SPECIFIED AFTERCARE: ICD-10-CM

## 2023-06-16 DIAGNOSIS — R60.0 LOCALIZED EDEMA: ICD-10-CM

## 2023-06-16 DIAGNOSIS — I69.351 HEMIPLEGIA AND HEMIPARESIS FOLLOWING CEREBRAL INFARCTION AFFECTING RIGHT DOMINANT SIDE: ICD-10-CM

## 2023-06-16 LAB
APCR PPP: 1.48 RATIO — LOW
DNA PLOIDY SPEC FC-IMP: SIGNIFICANT CHANGE UP
GLUCOSE BLDC GLUCOMTR-MCNC: 103 MG/DL — HIGH (ref 70–99)
PROT S FREE PPP-ACNC: 31 % — LOW (ref 63–140)
PTR INTERPRETATION: SIGNIFICANT CHANGE UP
SARS-COV-2 RNA SPEC QL NAA+PROBE: SIGNIFICANT CHANGE UP

## 2023-06-16 PROCEDURE — 97162 PT EVAL MOD COMPLEX 30 MIN: CPT

## 2023-06-16 PROCEDURE — 70496 CT ANGIOGRAPHY HEAD: CPT | Mod: MA

## 2023-06-16 PROCEDURE — 82565 ASSAY OF CREATININE: CPT

## 2023-06-16 PROCEDURE — 82435 ASSAY OF BLOOD CHLORIDE: CPT

## 2023-06-16 PROCEDURE — 93970 EXTREMITY STUDY: CPT

## 2023-06-16 PROCEDURE — 85730 THROMBOPLASTIN TIME PARTIAL: CPT

## 2023-06-16 PROCEDURE — 86146 BETA-2 GLYCOPROTEIN ANTIBODY: CPT

## 2023-06-16 PROCEDURE — 0042T: CPT | Mod: MA

## 2023-06-16 PROCEDURE — 84480 ASSAY TRIIODOTHYRONINE (T3): CPT

## 2023-06-16 PROCEDURE — C1887: CPT

## 2023-06-16 PROCEDURE — 82947 ASSAY GLUCOSE BLOOD QUANT: CPT

## 2023-06-16 PROCEDURE — 87640 STAPH A DNA AMP PROBE: CPT

## 2023-06-16 PROCEDURE — 84484 ASSAY OF TROPONIN QUANT: CPT

## 2023-06-16 PROCEDURE — 84436 ASSAY OF TOTAL THYROXINE: CPT

## 2023-06-16 PROCEDURE — 85613 RUSSELL VIPER VENOM DILUTED: CPT

## 2023-06-16 PROCEDURE — 92612 ENDOSCOPY SWALLOW (FEES) VID: CPT

## 2023-06-16 PROCEDURE — 84443 ASSAY THYROID STIM HORMONE: CPT

## 2023-06-16 PROCEDURE — 70498 CT ANGIOGRAPHY NECK: CPT | Mod: MA

## 2023-06-16 PROCEDURE — 84295 ASSAY OF SERUM SODIUM: CPT

## 2023-06-16 PROCEDURE — 92526 ORAL FUNCTION THERAPY: CPT

## 2023-06-16 PROCEDURE — 97530 THERAPEUTIC ACTIVITIES: CPT

## 2023-06-16 PROCEDURE — 85018 HEMOGLOBIN: CPT

## 2023-06-16 PROCEDURE — 80061 LIPID PANEL: CPT

## 2023-06-16 PROCEDURE — 81241 F5 GENE: CPT

## 2023-06-16 PROCEDURE — C1757: CPT

## 2023-06-16 PROCEDURE — 61645 PERQ ART M-THROMBECT &/NFS: CPT

## 2023-06-16 PROCEDURE — 82330 ASSAY OF CALCIUM: CPT

## 2023-06-16 PROCEDURE — 74230 X-RAY XM SWLNG FUNCJ C+: CPT

## 2023-06-16 PROCEDURE — 97110 THERAPEUTIC EXERCISES: CPT

## 2023-06-16 PROCEDURE — 86147 CARDIOLIPIN ANTIBODY EA IG: CPT

## 2023-06-16 PROCEDURE — C1894: CPT

## 2023-06-16 PROCEDURE — 85025 COMPLETE CBC W/AUTO DIFF WBC: CPT

## 2023-06-16 PROCEDURE — 87641 MR-STAPH DNA AMP PROBE: CPT

## 2023-06-16 PROCEDURE — 84439 ASSAY OF FREE THYROXINE: CPT

## 2023-06-16 PROCEDURE — 92610 EVALUATE SWALLOWING FUNCTION: CPT

## 2023-06-16 PROCEDURE — 85303 CLOT INHIBIT PROT C ACTIVITY: CPT

## 2023-06-16 PROCEDURE — 83036 HEMOGLOBIN GLYCOSYLATED A1C: CPT

## 2023-06-16 PROCEDURE — 36415 COLL VENOUS BLD VENIPUNCTURE: CPT

## 2023-06-16 PROCEDURE — 82962 GLUCOSE BLOOD TEST: CPT

## 2023-06-16 PROCEDURE — 97165 OT EVAL LOW COMPLEX 30 MIN: CPT

## 2023-06-16 PROCEDURE — 81001 URINALYSIS AUTO W/SCOPE: CPT

## 2023-06-16 PROCEDURE — 70450 CT HEAD/BRAIN W/O DYE: CPT

## 2023-06-16 PROCEDURE — 85306 CLOT INHIBIT PROT S FREE: CPT

## 2023-06-16 PROCEDURE — 85027 COMPLETE CBC AUTOMATED: CPT

## 2023-06-16 PROCEDURE — 80053 COMPREHEN METABOLIC PANEL: CPT

## 2023-06-16 PROCEDURE — 82803 BLOOD GASES ANY COMBINATION: CPT

## 2023-06-16 PROCEDURE — 84100 ASSAY OF PHOSPHORUS: CPT

## 2023-06-16 PROCEDURE — 85307 ASSAY ACTIVATED PROTEIN C: CPT

## 2023-06-16 PROCEDURE — 74177 CT ABD & PELVIS W/CONTRAST: CPT

## 2023-06-16 PROCEDURE — 97535 SELF CARE MNGMENT TRAINING: CPT

## 2023-06-16 PROCEDURE — 99285 EMERGENCY DEPT VISIT HI MDM: CPT

## 2023-06-16 PROCEDURE — 80048 BASIC METABOLIC PNL TOTAL CA: CPT

## 2023-06-16 PROCEDURE — 99239 HOSP IP/OBS DSCHRG MGMT >30: CPT

## 2023-06-16 PROCEDURE — L8699: CPT

## 2023-06-16 PROCEDURE — 83735 ASSAY OF MAGNESIUM: CPT

## 2023-06-16 PROCEDURE — 94002 VENT MGMT INPAT INIT DAY: CPT

## 2023-06-16 PROCEDURE — 83605 ASSAY OF LACTIC ACID: CPT

## 2023-06-16 PROCEDURE — 84132 ASSAY OF SERUM POTASSIUM: CPT

## 2023-06-16 PROCEDURE — 85014 HEMATOCRIT: CPT

## 2023-06-16 PROCEDURE — 87040 BLOOD CULTURE FOR BACTERIA: CPT

## 2023-06-16 PROCEDURE — 85300 ANTITHROMBIN III ACTIVITY: CPT

## 2023-06-16 PROCEDURE — 71045 X-RAY EXAM CHEST 1 VIEW: CPT

## 2023-06-16 PROCEDURE — 92523 SPEECH SOUND LANG COMPREHEN: CPT

## 2023-06-16 PROCEDURE — C8929: CPT

## 2023-06-16 PROCEDURE — 84145 PROCALCITONIN (PCT): CPT

## 2023-06-16 PROCEDURE — 71260 CT THORAX DX C+: CPT

## 2023-06-16 PROCEDURE — 85610 PROTHROMBIN TIME: CPT

## 2023-06-16 PROCEDURE — 92611 MOTION FLUOROSCOPY/SWALLOW: CPT

## 2023-06-16 PROCEDURE — C1769: CPT

## 2023-06-16 PROCEDURE — 81240 F2 GENE: CPT

## 2023-06-16 PROCEDURE — C1760: CPT

## 2023-06-16 PROCEDURE — 83090 ASSAY OF HOMOCYSTEINE: CPT

## 2023-06-16 PROCEDURE — 70551 MRI BRAIN STEM W/O DYE: CPT

## 2023-06-16 PROCEDURE — 94640 AIRWAY INHALATION TREATMENT: CPT

## 2023-06-16 RX ORDER — ENTECAVIR 0.5 MG/1
10 TABLET ORAL
Qty: 0 | Refills: 0 | DISCHARGE
Start: 2023-06-16

## 2023-06-16 RX ORDER — SENNA PLUS 8.6 MG/1
10 TABLET ORAL AT BEDTIME
Refills: 0 | Status: DISCONTINUED | OUTPATIENT
Start: 2023-06-16 | End: 2023-06-17

## 2023-06-16 RX ORDER — NYSTATIN 500MM UNIT
5 POWDER (EA) MISCELLANEOUS
Qty: 0 | Refills: 0 | DISCHARGE
Start: 2023-06-16

## 2023-06-16 RX ORDER — ATORVASTATIN CALCIUM 80 MG/1
40 TABLET, FILM COATED ORAL AT BEDTIME
Refills: 0 | Status: DISCONTINUED | OUTPATIENT
Start: 2023-06-16 | End: 2023-06-16

## 2023-06-16 RX ORDER — SENNA PLUS 8.6 MG/1
2 TABLET ORAL
Qty: 0 | Refills: 0 | DISCHARGE
Start: 2023-06-16

## 2023-06-16 RX ORDER — AMLODIPINE BESYLATE 2.5 MG/1
1 TABLET ORAL
Qty: 0 | Refills: 0 | DISCHARGE
Start: 2023-06-16

## 2023-06-16 RX ORDER — LOSARTAN POTASSIUM 100 MG/1
1 TABLET, FILM COATED ORAL
Qty: 0 | Refills: 0 | DISCHARGE
Start: 2023-06-16

## 2023-06-16 RX ORDER — ATORVASTATIN CALCIUM 80 MG/1
1 TABLET, FILM COATED ORAL
Refills: 0 | DISCHARGE

## 2023-06-16 RX ORDER — AMLODIPINE BESYLATE 2.5 MG/1
10 TABLET ORAL DAILY
Refills: 0 | Status: DISCONTINUED | OUTPATIENT
Start: 2023-06-17 | End: 2023-06-19

## 2023-06-16 RX ORDER — APIXABAN 2.5 MG/1
1 TABLET, FILM COATED ORAL
Qty: 0 | Refills: 0 | DISCHARGE
Start: 2023-06-16

## 2023-06-16 RX ORDER — BACITRACIN AND POLYMYXIN B SULFATE 500; 10000 [USP'U]/G; [USP'U]/G
1 OINTMENT TOPICAL
Qty: 0 | Refills: 0 | DISCHARGE
Start: 2023-06-16

## 2023-06-16 RX ORDER — ATENOLOL 25 MG/1
1 TABLET ORAL
Refills: 0 | DISCHARGE

## 2023-06-16 RX ORDER — POLYETHYLENE GLYCOL 3350 17 G/17G
17 POWDER, FOR SOLUTION ORAL DAILY
Refills: 0 | Status: DISCONTINUED | OUTPATIENT
Start: 2023-06-16 | End: 2023-06-26

## 2023-06-16 RX ORDER — LABETALOL HCL 100 MG
400 TABLET ORAL EVERY 8 HOURS
Refills: 0 | Status: DISCONTINUED | OUTPATIENT
Start: 2023-06-16 | End: 2023-06-22

## 2023-06-16 RX ORDER — BACITRACIN AND POLYMYXIN B SULFATE 500; 10000 [USP'U]/G; [USP'U]/G
1 OINTMENT TOPICAL THREE TIMES A DAY
Refills: 0 | Status: DISCONTINUED | OUTPATIENT
Start: 2023-06-16 | End: 2023-06-16

## 2023-06-16 RX ORDER — POLYETHYLENE GLYCOL 3350 17 G/17G
17 POWDER, FOR SOLUTION ORAL
Qty: 0 | Refills: 0 | DISCHARGE
Start: 2023-06-16

## 2023-06-16 RX ORDER — LABETALOL HCL 100 MG
2 TABLET ORAL
Qty: 0 | Refills: 0 | DISCHARGE
Start: 2023-06-16

## 2023-06-16 RX ORDER — ATORVASTATIN CALCIUM 80 MG/1
40 TABLET, FILM COATED ORAL AT BEDTIME
Refills: 0 | Status: DISCONTINUED | OUTPATIENT
Start: 2023-06-16 | End: 2023-07-07

## 2023-06-16 RX ORDER — ENTECAVIR 0.5 MG/1
0.5 TABLET ORAL DAILY
Refills: 0 | Status: DISCONTINUED | OUTPATIENT
Start: 2023-06-17 | End: 2023-06-18

## 2023-06-16 RX ORDER — ENTECAVIR 0.5 MG/1
1 TABLET ORAL
Refills: 0 | DISCHARGE

## 2023-06-16 RX ORDER — PANTOPRAZOLE SODIUM 20 MG/1
40 TABLET, DELAYED RELEASE ORAL DAILY
Refills: 0 | Status: DISCONTINUED | OUTPATIENT
Start: 2023-06-17 | End: 2023-06-16

## 2023-06-16 RX ORDER — NYSTATIN 500MM UNIT
500000 POWDER (EA) MISCELLANEOUS EVERY 6 HOURS
Refills: 0 | Status: DISCONTINUED | OUTPATIENT
Start: 2023-06-16 | End: 2023-07-07

## 2023-06-16 RX ORDER — ATORVASTATIN CALCIUM 80 MG/1
1 TABLET, FILM COATED ORAL
Qty: 0 | Refills: 0 | DISCHARGE
Start: 2023-06-16

## 2023-06-16 RX ORDER — APIXABAN 2.5 MG/1
5 TABLET, FILM COATED ORAL
Refills: 0 | Status: DISCONTINUED | OUTPATIENT
Start: 2023-06-16 | End: 2023-07-07

## 2023-06-16 RX ORDER — BACITRACIN ZINC 500 UNIT/G
1 OINTMENT IN PACKET (EA) TOPICAL THREE TIMES A DAY
Refills: 0 | Status: DISCONTINUED | OUTPATIENT
Start: 2023-06-16 | End: 2023-07-07

## 2023-06-16 RX ORDER — ERGOCALCIFEROL 1.25 MG/1
1 CAPSULE ORAL
Refills: 0 | DISCHARGE

## 2023-06-16 RX ORDER — LOSARTAN POTASSIUM 100 MG/1
100 TABLET, FILM COATED ORAL DAILY
Refills: 0 | Status: DISCONTINUED | OUTPATIENT
Start: 2023-06-16 | End: 2023-06-19

## 2023-06-16 RX ORDER — PANTOPRAZOLE SODIUM 20 MG/1
40 TABLET, DELAYED RELEASE ORAL DAILY
Refills: 0 | Status: DISCONTINUED | OUTPATIENT
Start: 2023-06-17 | End: 2023-07-07

## 2023-06-16 RX ORDER — PANTOPRAZOLE SODIUM 20 MG/1
40 TABLET, DELAYED RELEASE ORAL
Qty: 0 | Refills: 0 | DISCHARGE
Start: 2023-06-16

## 2023-06-16 RX ORDER — SENNA PLUS 8.6 MG/1
2 TABLET ORAL AT BEDTIME
Refills: 0 | Status: DISCONTINUED | OUTPATIENT
Start: 2023-06-16 | End: 2023-06-16

## 2023-06-16 RX ORDER — RIVAROXABAN 15 MG-20MG
1 KIT ORAL
Refills: 0 | DISCHARGE

## 2023-06-16 RX ORDER — ENTECAVIR 0.5 MG/1
0.5 TABLET ORAL DAILY
Refills: 0 | Status: DISCONTINUED | OUTPATIENT
Start: 2023-06-16 | End: 2023-06-16

## 2023-06-16 RX ORDER — LOSARTAN POTASSIUM 100 MG/1
1 TABLET, FILM COATED ORAL
Refills: 0 | DISCHARGE

## 2023-06-16 RX ADMIN — Medication 400 MILLIGRAM(S): at 11:20

## 2023-06-16 RX ADMIN — ATORVASTATIN CALCIUM 40 MILLIGRAM(S): 80 TABLET, FILM COATED ORAL at 22:15

## 2023-06-16 RX ADMIN — Medication 500000 UNIT(S): at 22:17

## 2023-06-16 RX ADMIN — SENNA PLUS 10 MILLILITER(S): 8.6 TABLET ORAL at 22:15

## 2023-06-16 RX ADMIN — Medication 400 MILLIGRAM(S): at 22:15

## 2023-06-16 RX ADMIN — PANTOPRAZOLE SODIUM 40 MILLIGRAM(S): 20 TABLET, DELAYED RELEASE ORAL at 11:21

## 2023-06-16 RX ADMIN — APIXABAN 5 MILLIGRAM(S): 2.5 TABLET, FILM COATED ORAL at 06:23

## 2023-06-16 RX ADMIN — Medication 500000 UNIT(S): at 11:21

## 2023-06-16 RX ADMIN — Medication 500000 UNIT(S): at 18:09

## 2023-06-16 RX ADMIN — LOSARTAN POTASSIUM 100 MILLIGRAM(S): 100 TABLET, FILM COATED ORAL at 11:21

## 2023-06-16 RX ADMIN — AMLODIPINE BESYLATE 10 MILLIGRAM(S): 2.5 TABLET ORAL at 11:21

## 2023-06-16 RX ADMIN — Medication 500000 UNIT(S): at 06:23

## 2023-06-16 RX ADMIN — APIXABAN 5 MILLIGRAM(S): 2.5 TABLET, FILM COATED ORAL at 18:09

## 2023-06-16 RX ADMIN — ENTECAVIR 0.5 MILLIGRAM(S): 0.5 TABLET ORAL at 06:24

## 2023-06-16 NOTE — PROGRESS NOTE ADULT - PROBLEM SELECTOR PLAN 3
rate controlled   orders as above.
-Rate control per cards.
rate controlled   orders as above.
-Rate control per cards.
rate controlled   orders as above.

## 2023-06-16 NOTE — PROGRESS NOTE ADULT - PROBLEM SELECTOR PROBLEM 1
CVA (cerebrovascular accident)
Tachypnea
CVA (cerebrovascular accident)
Tachypnea
CVA (cerebrovascular accident)

## 2023-06-16 NOTE — H&P ADULT - NSHPLABSRESULTS_GEN_ALL_CORE
LABS:                        12.5   11.78 )-----------( 268      ( 15 David 2023 13:28 )             37.2     06-15    137  |  103  |  24<H>  ----------------------------<  108<H>  4.4   |  21<L>  |  0.71    Ca    9.0      15 David 2023 13:28        IMAGING/RADIOLOGY:  CTH (06/11):  Improved subacute moderate to large left MCA territory infarction. Stable petechial hemorrhage. Mild chronic microvascular changes without evidence of a new acute transcortical infarction or hemorrhage.    MR Head No Cont (6.06.23): Parenchymal volume loss and chronic microvascular ischemic changes identified Abnormal T2 prolongation with restricted diffusion is seen involving the posterior left insula, left temporal frontal and parietal cortical subcortical region is identified. These finding does demonstrate abnormal susceptibility and is compatible with a hemorrhagic left MCA infarct. There is localized mass effect seen consisting sulcal effacement. Mass effect on the left lateral ventricle seen. No significant shift or herniation is seen. The large vessels demonstrate normal flow-voids Minimal mucosal thickening is seen involving both ethmoid sinuses. Both mastoid and middle ear regions appear clear. Hemorrhagic infarct involving the left middle cerebral artery territory.    CT Head No Cont (06.05.23 @ 08:27) Evolving left MCA distribution infarct. More well-defined lucency and slight increased mass effect on the left lateral ventricle. Subtle increased attenuation at the level of the basal ganglia on the left may indicate a component of evolving hemorrhage.    CT Head No Cont (06.02.23 @ 09:59): An evolving left MCA distribution infarct .    CT Brain Stroke Protocol 06.01.23: No acute intracranial hemorrhage or mass effect.    CT angiography neck 06/01/23: No hemodynamically significant stenosis by NASCET criteria. No vascular dissection. Nonspecific ground glass opacities in the visualized apices of the lungs, may represent infectious versus inflammatory disease.    CT angiography brain 06/01/23: Left proximal M1 occlusion.    CT perfusion 06/01/23:: Core infarct of 6 cc with a penumbra of 162 cc.      CARDIOLOGY:   TTE (6/2)   CONCLUSIONS:   1. Normal left ventricular cavitysize. The left ventricular wall thickness is normal. The left ventricular systolic function is normal with an ejection fraction of 60 % by Bassett's method of disks.   2. There is severe (grade 3) left ventricular diastolic dysfunction.   3. Normal right ventricular cavity size, normal wall thickness and normal systolic function. The tricuspid annular plane systolic excursion (TAPSE) is 1.4 cm (normal >=1.7 cm).   4. The left atrium is moderately dilated.   5. The right atrium is dilated.   6. Moderate aortic stenosis.   7. No evidence of aortic regurgitation.   8. No prior echocardiogram is available for comparison.

## 2023-06-16 NOTE — H&P ADULT - ASSESSMENT
Assessment/Plan:  AUDI DYKES is a 82y with a history of *** who presented to (name of hospital) on (date of acute admission) with (presenting sx) found to have ****.  Hospital Course complicated by ***. Patient now admitted for a multidisciplinary rehab program. 06-16-23 @ 14:23    -------------    Rehab Management/MEDICAL MANAGEMENT     #  - Gait Instability, ADL impairments and Functional impairments: start Comprehensive Rehab Program of PT/OT/SLP  - 3 hours a day, 5 days a week  - P&O as needed     #    #Sleep  - melatonin PRN     #Skin  - Skin on admission  - Pressure injury/Skin: OOB to Chair, PT/OT     #Pain Mgmt   - Tylenol PRN, Oxycodone PRN    #GI/Bowel Mgmt   - Continent c/w Senna, Miralax     #/Bladder Mgmt   -  PVR q8h, CIC>400cc    #FEN   - Diet - Regular + Thins  [CCHO, DASH/TLC]    - Dysphagia  SLP - evaluation and treatment    #Precautions / PROPHYLAXIS:   - Falls, Cardiac, Sternal, Spinal, Seizure   - ortho: Weight bearing status: WBAT   - Lungs: Aspiration, Incentive Spirometer   - DVT: Lovenox  ------------------------------------------  FOLLOW UP/OUTPATIENT:     Sarah Boyd  NP in Family Health  [address], [address]  [place] [address]  Phone: [phone]  Fax: [phone]  Follow Up Time: 1 month    Elissa Mcdaniel  Hematology  [address] [place] [address]  Phone: [phone]  Fax: [phone]  Follow Up Time: 1 month    Earnest Quiroz  Gastroenterology  [address] [place] [address]  Phone: [phone]  Fax: [phone]  Follow Up Time: 1 month  ------------------------------------------  MEDICAL PROGNOSIS: GOOD                                   REHAB POTENTIAL: GOOD  ESTIMATED DISPOSITION: HOME                             ELOS: 10-14 Days   EXPECTED THERAPY:     P.T. 1hr/day       O.T. 1hr/day      S.L.P. 1hr/day      EXP FREQUENCY: 5 days per 7 day period     PRESCREEN COMPARISON: I have reviewed the prescreen information and I have found no relevant changes between the preadmission screening and my post admission evaluation     RATIONALE FOR INPATIENT ADMISSION - Patient demonstrates the following: (check all that apply)  [X] Medically appropriate for rehabilitation admission  [X] Has attainable rehab goals with an appropriate initial discharge plan  [X] Has rehabilitation potential (expected to make a significant improvement within a reasonable period of time)   [X] Requires close medical managment by a rehab physician, rehab nursing care, Hospitalist and comprehensive interdisciplinary team (including PT, OT, & or SLP, Prosthetics and Orthotics)     Assessment/Plan:  AUDI DYKES is a 82 year old, Persian speaking, woman with PMH of HTN, AFIB (on Xarelto), Hepatitis C (on Entecavir); who presented to [facility] ED on 6/1 with acute onset AMS. LKN was 10pm, at 10:30pm she became acutely altered and with poor posture. Upon arrival, she was noted to have decreased arousal, severe R sided weakness, slurred speech and aphasia requiring intubation. 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 She was not an appropriate tenecteplase candidate due to INR of 1.79 and was taken for mechanical thrombectomy. SLP recommended NPO status on 6/8, a repeat MBS was performed on 6/13 without improvement and a PEG tube was placed on 6/14.  Patient now admitted for a multidisciplinary rehab program. 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 Mcdaniel (Heme)     #AFIB  - Eliquis     #HTN  - Amlodipine  - Labetalol   - Losartan     #Hepatitis C  - Entecavir daily   - Hold feeds 2 hours before and 2 hours after medication administration     #R breast mass  - Mammogram outpatient  - Oncology follow up outpatient    #Sleep  - melatonin PRN     #Skin  - Skin on admission: *****************   - Pressure injury/Skin: OOB to Chair, PT/OT   - Bacitracin    #Pain Mgmt   - Tylenol PRN, Oxycodone PRN    #GI/Bowel Mgmt   - Pantoprazole  - Continent c/w Senna, Miralax     #/Bladder Mgmt   -  PVR q8h, CIC>400cc    #FEN   - Diet - NPO with tube feeds  - Glucerna  - Dysphagia  SLP - evaluation and treatment    #Precautions / PROPHYLAXIS:   - Falls  - ortho: Weight bearing status: WBAT   - Lungs: Aspiration, Incentive Spirometer   - DVT: Eliquis  ------------------------------------------  FOLLOW UP/OUTPATIENT:     Sarah Boyd  NP in [facility]  [address], [address]  [place] [address]  Phone: [phone]  Fax: [phone]  Follow Up Time: 1 month    Elissa Mcdaniel  Hematology  [address] [place] [address]  Phone: [phone]  Fax: [phone]  Follow Up Time: 1 month    Earnest Quiroz  Gastroenterology  [address] [place] [address]  Phone: [phone]  Fax: [phone]  Follow Up Time: 1 month  ------------------------------------------  MEDICAL PROGNOSIS: GOOD                                   REHAB POTENTIAL: GOOD  ESTIMATED DISPOSITION: HOME                             ELOS: 10-14 Days   EXPECTED THERAPY:     P.T. 1hr/day       O.T. 1hr/day      S.L.P. 1hr/day      EXP FREQUENCY: 5 days per 7 day period     PRESCREEN COMPARISON: I have reviewed the prescreen information and I have found no relevant changes between the preadmission screening and my post admission evaluation     RATIONALE FOR INPATIENT ADMISSION - Patient demonstrates the following: (check all that apply)  [X] Medically appropriate for rehabilitation admission  [X] Has attainable rehab goals with an appropriate initial discharge plan  [X] Has rehabilitation potential (expected to make a significant improvement within a reasonable period of time)   [X] Requires close medical managment by a rehab physician, rehab nursing care, Hospitalist and comprehensive interdisciplinary team (including PT, OT, & or SLP, Prosthetics and Orthotics)     Assessment/Plan:  AUDI DYKES is a 82 year old, French speaking, woman with PMH of HTN, AFIB (on Xarelto), Hepatitis C (on Entecavir); who presented to [facility] ED on 6/1 with acute onset AMS. LKN was 10pm, at 10:30pm she became acutely altered and with poor posture. Upon arrival, she was noted to have decreased arousal, severe R sided weakness, slurred speech and aphasia requiring intubation. 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 She was not an appropriate tenecteplase candidate due to INR of 1.79 and was taken for mechanical thrombectomy. SLP recommended NPO status on 6/8, a repeat MBS was performed on 6/13 without improvement and a PEG tube was placed on 6/14.  Patient now admitted for a multidisciplinary rehab program. 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 Mcdaniel (Heme)     #AFIB  - Eliquis     #HTN  - Amlodipine  - Labetalol   - Losartan     #Hepatitis C  - Entecavir daily   - Hold feeds 2 hours before and 2 hours after medication administration     #R breast mass  - Mammogram outpatient  - Oncology follow up outpatient    #Oral Candida  - Nystatin  - Swish and suction     #Sleep  - melatonin PRN     #Skin  - Skin on admission: *****************   - Pressure injury/Skin: OOB to Chair, PT/OT   - Bacitracin    #Pain Mgmt   - Tylenol PRN, Oxycodone PRN    #GI/Bowel Mgmt   - Pantoprazole  - Continent c/w Senna, Miralax     #/Bladder Mgmt   -  PVR q8h, CIC>400cc    #FEN   - Diet - NPO with tube feeds  - Glucerna  - Dysphagia  SLP - evaluation and treatment    #Precautions / PROPHYLAXIS:   - Falls  - ortho: Weight bearing status: WBAT   - Lungs: Aspiration, Incentive Spirometer   - DVT: Eliquis  ------------------------------------------  FOLLOW UP/OUTPATIENT:     Sarah Boyd  NP in Family Health  [address], [address]  [place] [address]  Phone: [phone]  Fax: [phone]  Follow Up Time: 1 month    Elissa Mcdaniel  Hematology  [address] [place] [address]  Phone: [phone]  Fax: [phone]  Follow Up Time: 1 month    Earnest Quiroz  Gastroenterology  [address] [place] [address]  Phone: [phone]  Fax: [phone]  Follow Up Time: 1 month  ------------------------------------------  MEDICAL PROGNOSIS: GOOD                                   REHAB POTENTIAL: GOOD  ESTIMATED DISPOSITION: HOME                             ELOS: 10-14 Days   EXPECTED THERAPY:     P.T. 1hr/day       O.T. 1hr/day      S.L.P. 1hr/day      EXP FREQUENCY: 5 days per 7 day period     PRESCREEN COMPARISON: I have reviewed the prescreen information and I have found no relevant changes between the preadmission screening and my post admission evaluation     RATIONALE FOR INPATIENT ADMISSION - Patient demonstrates the following: (check all that apply)  [X] Medically appropriate for rehabilitation admission  [X] Has attainable rehab goals with an appropriate initial discharge plan  [X] Has rehabilitation potential (expected to make a significant improvement within a reasonable period of time)   [X] Requires close medical managment by a rehab physician, rehab nursing care, Hospitalist and comprehensive interdisciplinary team (including PT, OT, & or SLP, Prosthetics and Orthotics)     Assessment/Plan:  AUDI DYKES is a 82 year old, Frisian speaking, woman with PMH of HTN, AFIB (on Xarelto), Hepatitis C (on Entecavir); who presented to [facility] ED on 6/1 with acute onset AMS. LKN was 10pm, at 10:30pm she became acutely altered and with poor posture. Upon arrival, she was noted to have decreased arousal, severe R sided weakness, slurred speech and aphasia requiring intubation. 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 She was not an appropriate tenecteplase candidate due to INR of 1.79 and was taken for mechanical thrombectomy. SLP recommended NPO status on 6/8, a repeat MBS was performed on 6/13 without improvement and a PEG tube was placed on 6/14.  Patient now admitted for a multidisciplinary rehab program.   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 Mcdaniel (Heme)     #AFIB  - Eliquis     #HTN  - Amlodipine  - Labetalol 400mg q 8 hrs  - Losartan 100mg daily    #Hepatitis C  - Entecavir daily   - Hold feeds 2 hours before and 2 hours after medication administration     #R breast mass  - Mammogram outpatient  - Oncology follow up outpatient    #Oral Candida  - Nystatin  - Swish and suction     #Sleep  - melatonin PRN     #Skin  - Skin on admission: skin tear to mid and left chest wall  - Pressure injury/Skin: OOB to Chair, PT/OT   - Bacitracin    #Pain Mgmt   - Tylenol PRN, Oxycodone PRN    #GI/Bowel Mgmt   - Pantoprazole  - Continent c/w Senna, Miralax     #/Bladder Mgmt   -  PVR q8h, CIC>400cc    #FEN   - Diet - NPO with tube feeds  - Glucerna  - Dysphagia  SLP - evaluation and treatment    #Precautions / PROPHYLAXIS:   - Falls  - ortho: Weight bearing status: WBAT   - Lungs: Aspiration, Incentive Spirometer   - DVT: Eliquis  ------------------------------------------  FOLLOW UP/OUTPATIENT:     Sarah Boyd  NP in Family Health  [address], [address]  [place] [address]  Phone: [phone]  Fax: [phone]  Follow Up Time: 1 month    Elissa Mcdaniel  Hematology  [address] [place] [address]  Phone: [phone]  Fax: [phone]  Follow Up Time: 1 month    Earnest Quiroz  Gastroenterology  [address] [place] [address]  Phone: [phone]  Fax: [phone]  Follow Up Time: 1 month  ------------------------------------------  MEDICAL PROGNOSIS: GOOD                                   REHAB POTENTIAL: GOOD  ESTIMATED DISPOSITION: HOME                             ELOS: 10-14 Days   EXPECTED THERAPY:     P.T. 1hr/day       O.T. 1hr/day      S.L.P. 1hr/day      EXP FREQUENCY: 5 days per 7 day period     PRESCREEN COMPARISON: I have reviewed the prescreen information and I have found no relevant changes between the preadmission screening and my post admission evaluation     RATIONALE FOR INPATIENT ADMISSION - Patient demonstrates the following: (check all that apply)  [X] Medically appropriate for rehabilitation admission  [X] Has attainable rehab goals with an appropriate initial discharge plan  [X] Has rehabilitation potential (expected to make a significant improvement within a reasonable period of time)   [X] Requires close medical management by a rehab physician, rehab nursing care, Hospitalist and comprehensive interdisciplinary team (including PT, OT, & or SLP, Prosthetics and Orthotics)

## 2023-06-16 NOTE — H&P ADULT - NSHPPHYSICALEXAM_GEN_ALL_CORE
PHYSICAL EXAM- LIMITED 2/2 hypophonia,  unable to hear and understand patient, patient following command.    VITALS  T(C): 36.4 (06-16-23 @ 17:52), Max: 37.1 (06-15-23 @ 21:19)  HR: 89 (06-16-23 @ 17:52) (73 - 94)  BP: 119/81 (06-16-23 @ 17:52) (96/63 - 150/84)  RR: 16 (06-16-23 @ 17:52) (16 - 20)  SpO2: 95% (06-16-23 @ 17:52) (95% - 98%)    Gen - NAD, Comfortable  HEENT - NCAT, EOMI, MMM  Neck - Supple, No limited ROM  Pulm - CTAB, No wheeze  Cardiovascular - RRR, S1S2  Chest - good chest expansion, good respiratory effort  Abdomen - Soft, NT/ND, +BS, + PEG  Extremities - No Cyanosis, no clubbing, no edema, no calf tenderness  Neuro-     Cognitive - awake, alert, oriented to person. Difficulty  with following command     Communication - hypophonic, non -fluent, difficulty with prompting     Judgement: unable to test     Memory: unable to test     Cranial Nerves - right facial weakness, decreased right  shoulder shrug      Motor -  Right  hemiparesis                    LEFT    UE - 5/5                    RIGHT UE - 0/5                    LEFT    LE - 5/5                    RIGHT LE - 0/5        Sensory - unable to test    Coordination - unable to test     Tone - normal  Psychiatric - Mood stable, Affect WNL  Skin:  skin tear to mid and left chest wall PHYSICAL EXAM- LIMITED 2/2 hypophonia,  unable to hear and understand patient, patient following command. Patient speaks Wolof    VITALS  T(C): 36.4 (06-16-23 @ 17:52), Max: 37.1 (06-15-23 @ 21:19)  HR: 89 (06-16-23 @ 17:52) (73 - 94)  BP: 119/81 (06-16-23 @ 17:52) (96/63 - 150/84)  RR: 16 (06-16-23 @ 17:52) (16 - 20)  SpO2: 95% (06-16-23 @ 17:52) (95% - 98%)    Gen - NAD, Comfortable  HEENT - NCAT, EOMI, MMM  Neck - Supple, No limited ROM  Pulm - CTAB, No wheeze  Cardiovascular - RRR, S1S2  Chest - good chest expansion, good respiratory effort  Abdomen - Soft, NT/ND, +BS, + PEG  Extremities - No Cyanosis, no clubbing, no edema, no calf tenderness  Neuro-     Cognitive - awake, alert, oriented to person. Difficulty  with following command     Communication - hypophonic, non -fluent, difficulty with prompting     Judgement: unable to test     Memory: unable to test     Cranial Nerves - right facial weakness, decreased right  shoulder shrug      Motor -  Right  hemiparesis neglect                    LEFT    UE - 5/5                    RIGHT UE - 0/5                    LEFT    LE - 5/5                    RIGHT LE - 0/5        Sensory - unable to test    Coordination - unable to test     Tone - normal  Psychiatric - Mood stable, Affect WNL  Skin:  skin tear to mid and left chest wall

## 2023-06-16 NOTE — PROGRESS NOTE ADULT - ASSESSMENT
82y (1940) woman with a PMHx significant for HTN, afib on Xarelto (last dose 6/1 6pm), hepatitis C on antiviral presenting with acute onset of AMS. LKW 10pm. Around 10:30pm patient become acutely altered, slumped to right side, head turned to left, not answering questions properly or moving the right side. NIHSS 22 most notable for decreased arousal, incorrect answers, not following commands, left gaze deviation that could not be overcome, right facial droop, right sided severe weakness compared to left, slurred speech, aphasia. Patient lives at home with daughter who is at bedside. Patient is Jordanian speaking. Daughter reports at baseline she intermittently walks with a cane and needs minor help with ADLs such as getting dressed or running a shower, Daughter looks after her affairs. She denies dementia or cognitive impairment. mRS 3.     LKW 10pm (6/1)  NIHSS 22  pre-mRS 3  Xarelto last taken at 6pm, INR=1.79   (02 Jun 2023 01:43)  has been hypertensive   family at bedside

## 2023-06-16 NOTE — PROGRESS NOTE ADULT - SUBJECTIVE AND OBJECTIVE BOX
Follow-up Pulm Progress Note    No new respiratory events overnight.  Denies SOB/CP.     Medications:  MEDICATIONS  (STANDING):  amLODIPine   Tablet 10 milliGRAM(s) Oral daily  apixaban 5 milliGRAM(s) Oral every 12 hours  atorvastatin 40 milliGRAM(s) Oral at bedtime  chlorhexidine 4% Liquid 1 Application(s) Topical daily  entecavir Solution 0.5 milliGRAM(s) Oral <User Schedule>  labetalol 400 milliGRAM(s) Oral every 8 hours  losartan 100 milliGRAM(s) Oral daily  nystatin    Suspension 499566 Unit(s) Oral every 6 hours  polyethylene glycol 3350 17 Gram(s) Oral daily  senna 2 Tablet(s) Oral at bedtime    MEDICATIONS  (PRN):  albuterol/ipratropium for Nebulization 3 milliLiter(s) Nebulizer every 6 hours PRN Shortness of Breath and/or Wheezing  bacitracin/polymyxin B Ointment 1 Application(s) Topical three times a day PRN skin wound          Vital Signs Last 24 Hrs  T(C): 36.8 (16 Jun 2023 11:12), Max: 37.1 (15 David 2023 21:19)  T(F): 98.2 (16 Jun 2023 11:12), Max: 98.7 (15 David 2023 21:19)  HR: 92 (16 Jun 2023 11:12) (73 - 94)  BP: 150/84 (16 Jun 2023 11:12) (96/63 - 150/84)  BP(mean): --  RR: 18 (16 Jun 2023 11:12) (18 - 20)  SpO2: 98% (16 Jun 2023 11:12) (95% - 98%)    Parameters below as of 16 Jun 2023 11:12  Patient On (Oxygen Delivery Method): room air              06-15 @ 07:01  -  06-16 @ 07:00  --------------------------------------------------------  IN: 970 mL / OUT: 850 mL / NET: 120 mL          LABS:                        12.5   11.78 )-----------( 268      ( 15 David 2023 13:28 )             37.2     06-15    137  |  103  |  24<H>  ----------------------------<  108<H>  4.4   |  21<L>  |  0.71    Ca    9.0      15 David 2023 13:28            CAPILLARY BLOOD GLUCOSE            Procalcitonin, Serum: 0.08 ng/mL (06-13-23 @ 14:47)                Physical Examination:  PULM: Clear to auscultation bilaterally, no significant sputum production  CVS: S1, S2 heard        RADIOLOGY REVIEWED  CXR 6/12: grossly clear

## 2023-06-16 NOTE — PROGRESS NOTE ADULT - PROBLEM SELECTOR PROBLEM 4
HLD (hyperlipidemia)
Dysphagia
HLD (hyperlipidemia)
HLD (hyperlipidemia)

## 2023-06-16 NOTE — H&P ADULT - HISTORY OF PRESENT ILLNESS
Andres Falcon is a  Andres Falcon is an 82 year old, Slovak speaking, woman with PMH of HTN, AFIB (on Xarelto), Hepatitis C (on Entecavir); who presented to [facility] ED on 6/1 with acute onset AMS. LKN was 10pm, at 10:30pm she became acutely altered and with poor posture. Upon arrival, she was noted to have decreased arousal, severe R sided weakness, slurred speech and aphasia requiring intubation. 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 She was not an appropriate tenecteplase candidate due to INR of 1.79 and was taken for mechanical thrombectomy. SLP recommended NPO status on 6/8, a repeat MBS was performed on 6/13 without improvement and a PEG tube was placed on 6/14. Further imaging was significant for R breast mass and she was recommended for outpatient follow up and imaging. PM&R was consulted and deemed her an appropriate candidate for IRF. She was medically stabilized and cleared for discharge to [facility].

## 2023-06-16 NOTE — PROGRESS NOTE ADULT - REASON FOR ADMISSION
stroke
L M1 MT
stroke

## 2023-06-16 NOTE — PROGRESS NOTE ADULT - ASSESSMENT
82y (1940) woman with a PMHx significant for HTN, afib on Xarelto (last dose 6/1 6pm), hepatitis C on antiviral presenting with acute onset of AMS. LKW 10pm. Around 10:30pm patient become acutely altered, slumped to right side, head turned to left, not answering questions properly or moving the right side. NIHSS 22 most notable for decreased arousal, incorrect answers, not following commands, left gaze deviation that could not be overcome, right sided severe weakness compared to left, slurred speech, aphasia. Patient lives at home with daughter who is at bedside. Patient is Welsh speaking. Daughter reports at baseline she intermittently walks with a cane and needs minor help with ADLs such as getting dressed or running a shower, Daughter looks after her affairs. She denies dementia or cognitive impairment. mRS 3. Not a tenecteplase candidate due to INR 1.79. Patient taken to IR for mechanical thrombectomy.    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 Mechanism cardioembolic from known afib. S/p thrombectomy s/p TICI 2C.      NEURO: Overall stable neurologically, repeat CTH (6/05) shows subtle increased attenuation at the level of the basal ganglia on the left may indicate a component of evolving hemorrhage, repeat CTH (6/11) is stable.  Continue monitoring for neurologic deterioration, Normotension, atorvastatin 80mg to LDL goal less than 70, MRI Brain w/o and as above. Physical therapy/OT/Speech eval/treatment for acute rehab.     ANTITHROMBOTIC THERAPY: apixaban initiated for secondary stroke prevention     PULMONARY: Pulmonology consulted (Dr Villatoro) for further eval/recommendations. CXR (06/05) with L basilar opacities, unchanged, likely aspiration PNA. Pt completed Zosyn for aspiration PNA for 5 days (final dose 6/9).  NC as needed, Duonebs q6h prn, chest PT for secretions. S/p Mucomyst (d/c'd 6/13)    CARDIOVASCULAR: TTE EF 60% severe (grade 3) left ventricular diastolic dysfunction. The left atrium is moderately dilated. The right atrium is dilated and Moderate aortic stenosi, cardiac monitoring. continue current cardiac meds for BOP mgt, Dr Zhu (cardio) consult appreciated                        SBP goal: 100-160mmHg    GASTROINTESTINAL:  dysphagia screen failed, s/p FEEST on 06/08: with recommendations for NPO,  s/s to  re-evaluate on 06/04/23 . Bowel regimen. s/p MBS 6/13: NPO. S/p PEG on 6/14.      Diet: NPO with tube feeds via PEG    RENAL: BUN/Cr 24/0.71: Prerenal likely due to dehydration , s/p free water bolus 6/11, good urine output      Na Goal: Greater than 135     Virgen: none    HEMATOLOGY: no signs of bleeding; Oncology consulted: Right breast mass- Need mammogram as outpatient, hypercoag panel: anticardiolipin positive, betaglycoprotein positive- followup with hematology outpatient for repeat labs in 12 weeks.     DVT ppx: apixaban    ID: afebrile, leukocytosis stable (WBC 11.78). Completed Zosyn for aspiration PNA, last day Jun 9th. On entecavir daily for Hep C.      OTHER: Plan discussed with patient and family (daughter) at bedside    DISPOSITION: Acute rehab as per PT/OT eval once stable and medical work up is complete    CORE MEASURES:        Admission NIHSS: 22     Tenecteplase: [] YES [x] NO      LDL/HDL: 33/47     Statin Therapy: Yes     Dysphagia Screen: [] PASS [x] FAIL     Smoking [] YES [x] NO      Afib [x] YES [] NO     Stroke Education [x] YES [] NO    Obtain screening lower extremity venous ultrasound in patients who meet 1 or more of the following criteria as patient is high risk for DVT/PE on admission: COMPLETED - NEGATIVE  [] History of DVT/PE  []Hypercoagulable states (Factor V Leiden, Cancer, OCP, etc. )  []Prolonged immobility (hemiplegia/hemiparesis/post operative or any other extended immobilization)  [] Transferred from outside facility (Rehab or Long term care)  [] Age </= to 50 82y (1940) woman with a PMHx significant for HTN, afib on Xarelto (last dose 6/1 6pm), hepatitis C on antiviral presenting with acute onset of AMS. LKW 10pm. Around 10:30pm patient become acutely altered, slumped to right side, head turned to left, not answering questions properly or moving the right side. NIHSS 22 most notable for decreased arousal, incorrect answers, not following commands, left gaze deviation that could not be overcome, right sided severe weakness compared to left, slurred speech, aphasia. Patient lives at home with daughter who is at bedside. Patient is [place] speaking. Daughter reports at baseline she intermittently walks with a cane and needs minor help with ADLs such as getting dressed or running a shower, Daughter looks after her affairs. She denies dementia or cognitive impairment. mRS 3. Not a tenecteplase candidate due to INR 1.79. Patient taken to IR for mechanical thrombectomy.    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 Mechanism cardioembolic from known afib. S/p thrombectomy s/p TICI 2C.      NEURO: Overall stable neurologically, repeat CTH (6/05) shows subtle increased attenuation at the level of the basal ganglia on the left may indicate a component of evolving hemorrhage, repeat CTH (6/11) is stable.  Continue monitoring for neurologic deterioration. Normotension. atorvastatin 80mg to LDL goal less than 70, MRI Brain w/o and as above. Physical therapy/OT/Speech eval/treatment for acute rehab.     ANTITHROMBOTIC THERAPY: apixaban initiated for secondary stroke prevention     PULMONARY: Pulmonology consulted (Dr Villatoro) for further eval/recommendations. CXR (06/05) with L basilar opacities, unchanged, likely aspiration PNA. Pt completed Zosyn for aspiration PNA for 5 days (final dose 6/9).  NC as needed, Duonebs q6h prn, chest PT for secretions. S/p Mucomyst (d/c'd 6/13)    CARDIOVASCULAR: TTE EF 60% severe (grade 3) left ventricular diastolic dysfunction. The left atrium is moderately dilated. The right atrium is dilated and Moderate aortic stenosi, cardiac monitoring. continue current cardiac meds for BOP mgt, Dr Zhu (cardio) consult appreciated                        SBP goal: 100-160mmHg    GASTROINTESTINAL:  dysphagia screen failed, s/p FEEST on 06/08: with recommendations for NPO,  s/s to  re-evaluate on 06/04/23 . Bowel regimen. s/p MBS 6/13: NPO. S/p PEG on 6/14.      Diet: NPO with tube feeds via PEG    RENAL: BUN/Cr 24/0.71: Prerenal likely due to dehydration , s/p free water bolus 6/11, good urine output      Na Goal: Greater than 135     Virgen: none    HEMATOLOGY: no signs of bleeding; Oncology consulted: Right breast mass- Need mammogram as outpatient, hypercoag panel: anticardiolipin positive, betaglycoprotein positive- followup with hematology outpatient for repeat labs in 12 weeks and to determine need for lovenox i/s/o failed doac.      DVT ppx: apixaban    ID: afebrile, leukocytosis stable (WBC 11.78). Completed Zosyn for aspiration PNA, last day Jun 9th. On entecavir daily for Hep C.      OTHER: Plan discussed with patient and family (daughter) at bedside    DISPOSITION: Acute rehab today per PT/OT eval     CORE MEASURES:        Admission NIHSS: 22     Tenecteplase: [] YES [x] NO      LDL/HDL: 33/47     Statin Therapy: Yes     Dysphagia Screen: [] PASS [x] FAIL     Smoking [] YES [x] NO      Afib [x] YES [] NO     Stroke Education [x] YES [] NO    Obtain screening lower extremity venous ultrasound in patients who meet 1 or more of the following criteria as patient is high risk for DVT/PE on admission: COMPLETED - NEGATIVE  [] History of DVT/PE  []Hypercoagulable states (Factor V Leiden, Cancer, OCP, etc. )  []Prolonged immobility (hemiplegia/hemiparesis/post operative or any other extended immobilization)  [] Transferred from outside facility (Rehab or Long term care)  [] Age </= to 50

## 2023-06-16 NOTE — PROGRESS NOTE ADULT - PROBLEM SELECTOR PROBLEM 2
HTN (hypertension)
CVA (cerebrovascular accident)
CVA (cerebrovascular accident)
HTN (hypertension)
CVA (cerebrovascular accident)
HTN (hypertension)
CVA (cerebrovascular accident)
HTN (hypertension)

## 2023-06-16 NOTE — PROGRESS NOTE ADULT - NS ATTEND AMEND GEN_ALL_CORE FT
I saw and examined the patient, reviewed diagnostic studies, and reviewed images personally. I agree with PA’s history, exam, orders placed, and plan of care. Medical issues needing to be addressed include:  Cerebral infarction L MCA occlusion s/p MT w/ TICI 2c reperfusion, HTN, Afib on Xarelto (last dose 6/1), hep c on antiviral tx, AMS, aphasia, R hemiparesis. Pt compliant with Xarelto. CT CAP w/ breast mass / renal infarct - obtain medicine/onc rec. At this time, pt has failed Xarelto.
I saw and examined the patient, reviewed diagnostic studies, and reviewed images personally. I agree with PA’s history, exam, orders placed, and plan of care. Medical issues needing to be addressed include:  Cerebral infarction L MCA occlusion s/p MT w/ TICI 2c reperfusion, HTN, Afib on Xarelto (last dose 6/1), hep c on antiviral tx, AMS, aphasia, R hemiparesis. Pt compliant with Xarelto. CT CAP w/ breast mass / renal infarct - obtain medicine/onc rec. At this time, pt has failed Xarelto. Family/pt agreeable for PEG.
No objection to changing anticoagulation to eliquis as she was taking the xarelto without food.  Absorption of 20 mg tablets was optimal when taken with high-fat, high-calorie meal.   Other anticoagulation options include lovenox/warfarin, would monitor her closely for now.   Antiphospholipid panel negative, so she does not need to be on warfarin.  Discussed with the neurology team.
I saw and examined the patient, reviewed diagnostic studies, and reviewed images personally. I agree with PA’s history, exam, orders placed, and plan of care. Medical issues needing to be addressed include:  Cerebral infarction L MCA occlusion s/p MT w/ TICI 2c reperfusion, HTN, Afib on Xarelto (last dose 6/1), hep c on antiviral tx, AMS, aphasia, R hemiparesis. Pt compliant with Xarelto. At this time, pt has failed Xarelto. Family/pt agreeable for PEG- placement today. Will plan for Eliquis soon.  CT CAP w/ breast mass, should follow up outpt w/ mammogram regarding CT findings.
I saw and examined the patient, reviewed diagnostic studies, and reviewed images personally. I agree with PA’s history, exam, orders placed, and plan of care. Per heme f up outpatient for further work up. Start Eliquis, pt to discuss with onc outpatient for consideration of alternate anticoag.
Thirty two minutes of discharge time was spent on patient exam and discussion including test results, pathophysiology, natural history, stroke risk factors, medication changes and secondary prophylaxis and importance of medication compliance. We also discussed follow up plan. This was discussed with patient/family, who expresses understanding.

## 2023-06-16 NOTE — H&P ADULT - NSHPSOCIALHISTORY_GEN_ALL_CORE
SOCIAL HISTORY  Smoking - Denied  EtOH - Denied   Drugs - Denied    FUNCTIONAL HISTORY  Per son at bedside, pt lives in an apartment with her daughter with +15STE. Pt was independent with all functional mobility and ADLs prior to admission, ambulated without an AD.    CURRENT FUNCTIONAL STATUS  6/7 SLP  oropharyngeal dysphagia   NPO    6/6 PT/OT  following 75% simple commands  bed mobility max assist x2  sitting EOB x 10 min, min assist   tachycardic SOCIAL HISTORY  Smoking - Denied  EtOH - Denied   Drugs - Denied    FUNCTIONAL HISTORY  Per son at bedside, pt lives in an apartment with her daughter with +15 ERIC. Pt was independent with all functional mobility and ADLs prior to admission, ambulated without an AD.    CURRENT FUNCTIONAL STATUS  6/7 SLP  oropharyngeal dysphagia   NPO    6/6 PT/OT  following 75% simple commands  bed mobility max assist x2  sitting EOB x 10 min, min assist   tachycardic SOCIAL HISTORY  Smoking - Denied  EtOH - Denied   Drugs - Denied    FUNCTIONAL HISTORY  Per son at bedside, pt lives in an apartment with her daughter with +15 ERIC. 3rd floor walk up. Pt was independent with all functional mobility and ADLs prior to admission, ambulated without an AD.    CURRENT FUNCTIONAL STATUS  6/7 SLP  oropharyngeal dysphagia   NPO    6/6 PT/OT  following 75% simple commands  bed mobility max assist x2  sitting EOB x 10 min, min assist   tachycardic

## 2023-06-16 NOTE — H&P ADULT - ATTENDING COMMENTS
Audi Dykes is an 82 year old, [place]an speaking, woman with PMH of HTN, AFIB (on Xarelto), Hepatitis C (on Entecavir); who presented to [facility] ED on 6/1 with acute onset AMS. LKN was 10pm, at 10:30pm she became acutely altered and with poor posture. Upon arrival, she was noted to have decreased arousal, severe R sided weakness, slurred speech and aphasia requiring intubation. 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 She was not an appropriate tenecteplase candidate due to INR of 1.79 and was taken for mechanical thrombectomy. SLP recommended NPO status on 6/8, a repeat MBS was performed on 6/13 without improvement and a PEG tube was placed on 6/14. Further imaging was significant for R breast mass and she was recommended for outpatient follow up and imaging. PM&R was consulted and deemed her an appropriate candidate for IRF. She was medically stabilized and cleared for discharge to [facility].     Exam per resident  Right dense HP with neglect.    Assessment/Plan:  AUDI DYKES is a 82 year old, [place]an speaking, woman with PMH of HTN, AFIB (on Xarelto), Hepatitis C (on Entecavir); who presented to [facility] ED on 6/1 with acute onset AMS. LKN was 10pm, at 10:30pm she became acutely altered and with poor posture. Upon arrival, she was noted to have decreased arousal, severe R sided weakness, slurred speech and aphasia requiring intubation. 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 She was not an appropriate tenecteplase candidate due to INR of 1.79 and was taken for mechanical thrombectomy. SLP recommended NPO status on 6/8, a repeat MBS was performed on 6/13 without improvement and a PEG tube was placed on 6/14.  Patient now admitted for a multidisciplinary rehab program.   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 Mcdaniel (Heme)     #AFIB  - Eliquis     #HTN  - Amlodipine  - Labetalol 400mg q 8 hrs  - Losartan 100mg daily    #Hepatitis C  - Entecavir daily   - Hold feeds 2 hours before and 2 hours after medication administration     #R breast mass  - Mammogram outpatient  - Oncology follow up outpatient    #Oral Candida  - Nystatin  - Swish and suction     #Sleep  - melatonin PRN     #Skin  - Skin on admission: skin tear to mid and left chest wall  - Pressure injury/Skin: OOB to Chair, PT/OT   - Bacitracin    #Pain Mgmt   - Tylenol PRN, Oxycodone PRN    #GI/Bowel Mgmt   - Pantoprazole  - Continent c/w Senna, Miralax     #/Bladder Mgmt   -  PVR q8h, CIC>400cc    #FEN   - Diet - NPO with tube feeds  - Glucerna  - Dysphagia  SLP - evaluation and treatment    #Precautions / PROPHYLAXIS:   - Falls  - ortho: Weight bearing status: WBAT   - Lungs: Aspiration, Incentive Spirometer   - DVT: Eliquis

## 2023-06-16 NOTE — PROGRESS NOTE ADULT - NUTRITIONAL ASSESSMENT
This patient has been assessed with a concern for Malnutrition and has been determined to have a diagnosis/diagnoses of Moderate protein-calorie malnutrition.  
This patient has been assessed with a concern for Malnutrition and has been determined to have a diagnosis/diagnoses of Moderate protein-calorie malnutrition.    This patient is being managed with:   Diet NPO with Tube Feed-  Tube Feeding Modality: Gastrostomy  Glucerna 1.5 Jude (GLUCERNA1.5RTH)  Total Volume for 24 Hours (mL): 1440  Continuous  Starting Tube Feed Rate {mL per Hour}: 10  Increase Tube Feed Rate by (mL): 10     Every 4 hours  Until Goal Tube Feed Rate (mL per Hour): 60  Tube Feed Duration (in Hours): 24  Tube Feed Start Time: 13:00  Supplement Feeding Modality:  Nasogastric  Probiotic Yogurt/Smoothie Cans or Servings Per Day:  2       Frequency:  Daily  Entered: David 15 2023 12:53PM  
This patient has been assessed with a concern for Malnutrition and has been determined to have a diagnosis/diagnoses of Moderate protein-calorie malnutrition.  
This patient has been assessed with a concern for Malnutrition and has been determined to have a diagnosis/diagnoses of Moderate protein-calorie malnutrition.    This patient is being managed with:   Diet NPO with Tube Feed-  Tube Feeding Modality: Nasogastric  Glucerna 1.2 Jude (GLUCERNARTH)  Total Volume for 24 Hours (mL): 1440  Continuous  Starting Tube Feed Rate {mL per Hour}: 20  Increase Tube Feed Rate by (mL): 10     Every 4 hours  Until Goal Tube Feed Rate (mL per Hour): 60  Tube Feed Duration (in Hours): 24  Tube Feed Start Time: 16:00  Supplement Feeding Modality:  Nasogastric  Probiotic Yogurt/Smoothie Cans or Servings Per Day:  2       Frequency:  Daily  Entered: Jun 2 2023  3:23PM  
This patient has been assessed with a concern for Malnutrition and has been determined to have a diagnosis/diagnoses of Moderate protein-calorie malnutrition.  
This patient has been assessed with a concern for Malnutrition and has been determined to have a diagnosis/diagnoses of Moderate protein-calorie malnutrition.    This patient is being managed with:   Diet NPO after Midnight-     NPO Start Date: 13-Jun-2023   NPO Start Time: 23:59  Entered: Jun 13 2023  5:20PM    Diet NPO with Tube Feed-  Tube Feeding Modality: Nasogastric  Glucerna 1.5 Jude (GLUCERNA1.5RTH)  Total Volume for 24 Hours (mL): 1170  Continuous  Starting Tube Feed Rate {mL per Hour}: 65  Until Goal Tube Feed Rate (mL per Hour): 65  Tube Feed Duration (in Hours): 18  Tube Feed Start Time: 09:00  Supplement Feeding Modality:  Nasogastric  Probiotic Yogurt/Smoothie Cans or Servings Per Day:  2       Frequency:  Daily  Entered: Jun 9 2023  2:48PM  
This patient has been assessed with a concern for Malnutrition and has been determined to have a diagnosis/diagnoses of Moderate protein-calorie malnutrition.    This patient is being managed with:   Diet NPO after Midnight-     NPO Start Date: 13-Jun-2023   NPO Start Time: 23:59  Entered: Jun 13 2023  5:20PM    Diet NPO with Tube Feed-  Tube Feeding Modality: Nasogastric  Glucerna 1.5 Jude (GLUCERNA1.5RTH)  Total Volume for 24 Hours (mL): 1170  Continuous  Starting Tube Feed Rate {mL per Hour}: 65  Until Goal Tube Feed Rate (mL per Hour): 65  Tube Feed Duration (in Hours): 18  Tube Feed Start Time: 09:00  Supplement Feeding Modality:  Nasogastric  Probiotic Yogurt/Smoothie Cans or Servings Per Day:  2       Frequency:  Daily  Entered: Jun 9 2023  2:48PM  
This patient has been assessed with a concern for Malnutrition and has been determined to have a diagnosis/diagnoses of Moderate protein-calorie malnutrition.    This patient is being managed with:   Diet NPO with Tube Feed-  Tube Feeding Modality: Nasogastric  Glucerna 1.5 Jude (GLUCERNA1.5RTH)  Total Volume for 24 Hours (mL): 1170  Continuous  Starting Tube Feed Rate {mL per Hour}: 65  Until Goal Tube Feed Rate (mL per Hour): 65  Tube Feed Duration (in Hours): 18  Tube Feed Start Time: 09:00  Supplement Feeding Modality:  Nasogastric  Probiotic Yogurt/Smoothie Cans or Servings Per Day:  2       Frequency:  Daily  Entered: Jun 9 2023  2:48PM

## 2023-06-16 NOTE — PROGRESS NOTE ADULT - SUBJECTIVE AND OBJECTIVE BOX
THE PATIENT WAS SEEN AND EXAMINED BY ME WITH THE HOUSESTAFF AND STROKE TEAM DURING MORNING ROUNDS.   HPI:  82y woman with a PMHx significant for HTN, afib on Xarelto (last dose 6/1 6pm), hepatitis C on antiviral presenting with acute onset of AMS. LKW 10pm. Around 10:30pm patient become acutely altered, slumped to right side, head turned to left, not answering questions properly or moving the right side. NIHSS 22 most notable for decreased arousal, incorrect answers, not following commands, left gaze deviation that could not be overcome, right facial droop, right sided severe weakness compared to left, slurred speech, aphasia. Patient lives at home with daughter who is at bedside. Patient is Telugu speaking. Daughter reports at baseline she intermittently walks with a cane and needs minor help with ADLs such as getting dressed or running a shower, Daughter looks after her affairs. She denies dementia or cognitive impairment. mRS 3. S/p thrombectomy s/p TICI 2C mechanism cardioembolic from known afib., LKW 10pm (6/1), NIHSS 22, pre-mRS 3,     SUBJECTIVE: No events overnight.  No new neurologic complaints, ROS reported negative unless otherwise noted.     albuterol/ipratropium for Nebulization 3 milliLiter(s) Nebulizer every 6 hours PRN  amLODIPine   Tablet 10 milliGRAM(s) Oral daily  apixaban 5 milliGRAM(s) Oral every 12 hours  atorvastatin 80 milliGRAM(s) Oral at bedtime  bacitracin/polymyxin B Ointment 1 Application(s) Topical three times a day PRN  chlorhexidine 4% Liquid 1 Application(s) Topical daily  entecavir Solution 0.5 milliGRAM(s) Oral <User Schedule>  labetalol 400 milliGRAM(s) Oral every 8 hours  losartan 100 milliGRAM(s) Oral daily  nystatin    Suspension 580369 Unit(s) Oral every 6 hours  pantoprazole  Injectable 40 milliGRAM(s) IV Push daily  polyethylene glycol 3350 17 Gram(s) Oral daily  senna 2 Tablet(s) Oral at bedtime      PHYSICAL EXAM:   Vital Signs Last 24 Hrs  T(C): 36.6 (16 Jun 2023 04:25), Max: 37.1 (15 David 2023 21:19)  T(F): 97.9 (16 Jun 2023 04:25), Max: 98.7 (15 David 2023 21:19)  HR: 73 (16 Jun 2023 04:51) (73 - 94)  BP: 107/72 (16 Jun 2023 04:51) (96/63 - 120/65)  BP(mean): --  RR: 18 (16 Jun 2023 04:25) (18 - 20)  SpO2: 97% (16 Jun 2023 04:51) (95% - 98%)    Parameters below as of 16 Jun 2023 04:25  Patient On (Oxygen Delivery Method): room air      General: No acute distress  HEENT: EOM intact, visual fields full  Abdomen: Soft, nontender, nondistended   Extremities: No edema    NEUROLOGICAL EXAM:  Mental status: eyes open,  awake, alert, oriented x3, hypophonia, minimal verbal output, requires prompting to generate fluent speech,   Cranial Nerves:  Right nasolabial flattening, no BTT on right, right gaze palsy can be overcome voluntarily  Motor exam: Normal tone, right hemiplegia: RUE 0/5, RLE 0/5, triple flexion, LUE 5/5, LLE 5/5,   Sensation: Intact to light touch   Coordination/ Gait: unable to participate with exam     LABS:                        12.5   11.78 )-----------( 268      ( 15 David 2023 13:28 )             37.2    06-15    137  |  103  |  24<H>  ----------------------------<  108<H>  4.4   |  21<L>  |  0.71    Ca    9.0      15 David 2023 13:28      IMAGING: Reviewed by me.     [facility] (06/11):  Improved subacute moderate to large left MCA territory infarction. Stable petechial hemorrhage. Mild chronic microvascular changes without evidence of a new acute transcortical infarction or hemorrhage.    MR Head No Cont 6.06.23: Parenchymal volume loss and chronic microvascular ischemic changes identified Abnormal T2 prolongation with restricted diffusion is seen involving the posterior left insula, left temporal frontal and parietal cortical subcortical region is identified. These finding does demonstrate abnormal susceptibility and is compatible with a hemorrhagic left MCA infarct. There is localized mass effect seen consisting sulcal effacement. Mass effect on the left lateral ventricle seen. No significant shift or herniation is seen. The large vessels demonstrate normal flow-voids Minimal mucosal thickening is seen involving both ethmoid sinuses. Both mastoid and middle ear regions appear clear. 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    CT Head No Cont (06.05.23 @ 08:27) Evolving left MCA distribution infarct. More well-defined lucency and slight increased mass effect on the left lateral ventricle. Subtle increased attenuation at the level of the basal ganglia on the left may indicate a component of evolving hemorrhage.    CT Head No Cont (06.02.23 @ 09:59): An evolving left MCA distribution infarct .    CT Brain Stroke Protocol 06.01.23: No acute intracranial hemorrhage or mass effect.    CT angiography neck 06/01/23: No hemodynamically significant stenosis by NASCET criteria. No vascular dissection. Nonspecific ground glass opacities in the visualized apices of the lungs, may represent infectious versus inflammatory disease.    CT angiography brain 06/01/23: Left proximal M1 occlusion.    CT perfusion 06/01/23:: Core infarct of 6 cc with a penumbra of 162 cc. THE PATIENT WAS SEEN AND EXAMINED BY ME WITH THE HOUSESTAFF AND STROKE TEAM DURING MORNING ROUNDS.   HPI:  82y woman with a PMHx significant for HTN, afib on Xarelto (last dose 6/1 6pm), hepatitis C on antiviral presenting with acute onset of AMS. LKW 10pm. Around 10:30pm patient become acutely altered, slumped to right side, head turned to left, not answering questions properly or moving the right side. NIHSS 22 most notable for decreased arousal, incorrect answers, not following commands, left gaze deviation that could not be overcome, right facial droop, right sided severe weakness compared to left, slurred speech, aphasia. Patient lives at home with daughter who is at bedside. Patient is Mongolian speaking. Daughter reports at baseline she intermittently walks with a cane and needs minor help with ADLs such as getting dressed or running a shower, Daughter looks after her affairs. She denies dementia or cognitive impairment. mRS 3. S/p thrombectomy s/p TICI 2C mechanism cardioembolic from known afib., LKW 10pm (6/1), NIHSS 22, pre-mRS 3,     SUBJECTIVE: No events overnight.  No new neurologic complaints, ROS reported negative unless otherwise noted.     albuterol/ipratropium for Nebulization 3 milliLiter(s) Nebulizer every 6 hours PRN  amLODIPine   Tablet 10 milliGRAM(s) Oral daily  apixaban 5 milliGRAM(s) Oral every 12 hours  atorvastatin 80 milliGRAM(s) Oral at bedtime  bacitracin/polymyxin B Ointment 1 Application(s) Topical three times a day PRN  chlorhexidine 4% Liquid 1 Application(s) Topical daily  entecavir Solution 0.5 milliGRAM(s) Oral <User Schedule>  labetalol 400 milliGRAM(s) Oral every 8 hours  losartan 100 milliGRAM(s) Oral daily  nystatin    Suspension 782940 Unit(s) Oral every 6 hours  pantoprazole  Injectable 40 milliGRAM(s) IV Push daily  polyethylene glycol 3350 17 Gram(s) Oral daily  senna 2 Tablet(s) Oral at bedtime      PHYSICAL EXAM:   Vital Signs Last 24 Hrs  T(C): 36.6 (16 Jun 2023 04:25), Max: 37.1 (15 David 2023 21:19)  T(F): 97.9 (16 Jun 2023 04:25), Max: 98.7 (15 David 2023 21:19)  HR: 73 (16 Jun 2023 04:51) (73 - 94)  BP: 107/72 (16 Jun 2023 04:51) (96/63 - 120/65)  BP(mean): --  RR: 18 (16 Jun 2023 04:25) (18 - 20)  SpO2: 97% (16 Jun 2023 04:51) (95% - 98%)    Parameters below as of 16 Jun 2023 04:25  Patient On (Oxygen Delivery Method): room air      General: No acute distress  HEENT: EOM intact, visual fields full  Abdomen: Soft, nontender, nondistended   Extremities: No edema    NEUROLOGICAL EXAM:  Mental status: eyes open,  awake, alert, oriented x3, hypophonia, minimal verbal output, requires prompting to generate fluent speech  Cranial Nerves:  Right nasolabial flattening, no BTT on right, right gaze palsy can be overcome voluntarily  Motor exam: Normal tone, right hemiplegia: RUE 0/5, RLE 0/5, triple flexion, LUE 5/5, LLE 5/5,   Sensation: Intact to light touch   Coordination/ Gait: unable to participate with exam     LABS:                        12.5   11.78 )-----------( 268      ( 15 David 2023 13:28 )             37.2    06-15    137  |  103  |  24<H>  ----------------------------<  108<H>  4.4   |  21<L>  |  0.71    Ca    9.0      15 David 2023 13:28      IMAGING: Reviewed by me.     [facility] (06/11):  Improved subacute moderate to large left MCA territory infarction. Stable petechial hemorrhage. Mild chronic microvascular changes without evidence of a new acute transcortical infarction or hemorrhage.    MR Head No Cont 6.06.23: Parenchymal volume loss and chronic microvascular ischemic changes identified Abnormal T2 prolongation with restricted diffusion is seen involving the posterior left insula, left temporal frontal and parietal cortical subcortical region is identified. These finding does demonstrate abnormal susceptibility and is compatible with a hemorrhagic left MCA infarct. There is localized mass effect seen consisting sulcal effacement. Mass effect on the left lateral ventricle seen. No significant shift or herniation is seen. The large vessels demonstrate normal flow-voids Minimal mucosal thickening is seen involving both ethmoid sinuses. Both mastoid and middle ear regions appear clear. 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    CT Head No Cont (06.05.23 @ 08:27) Evolving left MCA distribution infarct. More well-defined lucency and slight increased mass effect on the left lateral ventricle. Subtle increased attenuation at the level of the basal ganglia on the left may indicate a component of evolving hemorrhage.    CT Head No Cont (06.02.23 @ 09:59): An evolving left MCA distribution infarct .    CT Brain Stroke Protocol 06.01.23: No acute intracranial hemorrhage or mass effect.    CT angiography neck 06/01/23: No hemodynamically significant stenosis by NASCET criteria. No vascular dissection. Nonspecific ground glass opacities in the visualized apices of the lungs, may represent infectious versus inflammatory disease.    CT angiography brain 06/01/23: Left proximal M1 occlusion.    CT perfusion 06/01/23:: Core infarct of 6 cc with a penumbra of 162 cc.

## 2023-06-16 NOTE — PROGRESS NOTE ADULT - TIME BILLING
Advanced care planning was discussed with patient and family.  Advanced care planning forms were reviewed and discussed as appropriate.  Differential diagnosis and plan of care discussed with patient after the evaluation.   Pain assessed and judicious use of narcotics when appropriate was discussed.  Importance of Fall prevention discussed.  Counseling on Smoking and Alcohol cessation was offered when appropriate.  Counseling on Diet, exercise, and medication compliance was done.
I was present with the Resident during the key portions of the history and exam. I discussed the case with the Resident and agree with the findings and plan as documented in the Resident's note, unless noted below.   ROS otherwise negative
stroke
Advanced care planning was discussed with patient and family.  Advanced care planning forms were reviewed and discussed as appropriate.  Differential diagnosis and plan of care discussed with patient after the evaluation.   Pain assessed and judicious use of narcotics when appropriate was discussed.  Importance of Fall prevention discussed.  Counseling on Smoking and Alcohol cessation was offered when appropriate.  Counseling on Diet, exercise, and medication compliance was done.
stroke
Advanced care planning was discussed with patient and family.  Advanced care planning forms were reviewed and discussed as appropriate.  Differential diagnosis and plan of care discussed with patient after the evaluation.   Pain assessed and judicious use of narcotics when appropriate was discussed.  Importance of Fall prevention discussed.  Counseling on Smoking and Alcohol cessation was offered when appropriate.  Counseling on Diet, exercise, and medication compliance was done.
I was present with the Resident during the key portions of the history and exam. I discussed the case with the Resident and agree with the findings and plan as documented in the Resident's note, unless noted below.   ROS otherwise negative
I was present with the Resident during the key portions of the history and exam. I discussed the case with the Resident and agree with the findings and plan as documented in the Resident's note, unless noted below.   ROS otherwise negative
stroke
Advanced care planning was discussed with patient and family.  Advanced care planning forms were reviewed and discussed as appropriate.  Differential diagnosis and plan of care discussed with patient after the evaluation.   Pain assessed and judicious use of narcotics when appropriate was discussed.  Importance of Fall prevention discussed.  Counseling on Smoking and Alcohol cessation was offered when appropriate.  Counseling on Diet, exercise, and medication compliance was done.

## 2023-06-16 NOTE — PROGRESS NOTE ADULT - SUBJECTIVE AND OBJECTIVE BOX
Chief Complaint:  Patient is a 82y old  Female who presents with a chief complaint of stroke (15 David 2023 11:18)      Date of service 06-15-23 @ 12:44      Interval Events:   no acute events     Hospital Medications:  albuterol/ipratropium for Nebulization 3 milliLiter(s) Nebulizer every 6 hours PRN  amLODIPine   Tablet 10 milliGRAM(s) Oral daily  aspirin  chewable 81 milliGRAM(s) Oral daily  atorvastatin 80 milliGRAM(s) Oral at bedtime  bacitracin/polymyxin B Ointment 1 Application(s) Topical three times a day PRN  chlorhexidine 4% Liquid 1 Application(s) Topical daily  enoxaparin Injectable 40 milliGRAM(s) SubCutaneous <User Schedule>  entecavir Solution 0.5 milliGRAM(s) Oral <User Schedule>  labetalol 400 milliGRAM(s) Oral every 8 hours  losartan 100 milliGRAM(s) Oral daily  nystatin    Suspension 526024 Unit(s) Oral every 6 hours  pantoprazole  Injectable 40 milliGRAM(s) IV Push daily  polyethylene glycol 3350 17 Gram(s) Oral daily  senna 2 Tablet(s) Oral at bedtime        Review of Systems:  General:  No wt loss, fevers, chills, night sweats, fatigue,   Eyes:  Good vision, no reported pain  ENT:  No sore throat, pain, runny nose, dysphagia  CV:  No pain, palpitations, hypo/hypertension  Resp:  No dyspnea, cough, tachypnea, wheezing  GI:  See HPI  :  No pain, bleeding, incontinence, nocturia  Muscle:  No pain, weakness  Neuro:  No weakness, tingling, memory problems  Psych:  No fatigue, insomnia, mood problems, depression  Endocrine:  No polyuria, polydipsia, cold/heat intolerance  Heme:  No petechiae, ecchymosis, easy bruisability  Integumentary:  No rash, edema    PHYSICAL EXAM:   Vital Signs:  Vital Signs Last 24 Hrs  T(C): 36.8 (15 David 2023 09:21), Max: 37.7 (14 Jun 2023 15:58)  T(F): 98.2 (15 David 2023 09:21), Max: 99.8 (14 Jun 2023 15:58)  HR: 81 (15 David 2023 09:21) (81 - 100)  BP: 112/71 (15 David 2023 09:21) (103/64 - 184/74)  BP(mean): --  RR: 18 (15 David 2023 09:21) (18 - 22)  SpO2: 98% (15 David 2023 09:21) (93% - 98%)    Parameters below as of 15 David 2023 09:21  Patient On (Oxygen Delivery Method): room air      Daily     Daily       PHYSICAL EXAM:     GENERAL:  Appears stated age, well-groomed, well-nourished, no distress  HEENT:  NC/AT,  conjunctivae anicteric, clear and pink,   NECK: supple, trachea midline  CHEST:  Full & symmetric excursion, no increased effort, breath sounds clear  HEART:  Regular rhythm, no JVD  ABDOMEN:  Soft, non-tender, non-distended, normoactive bowel sounds,  no masses , no hepatosplenomegaly  EXTREMITIES:  no cyanosis,clubbing or edema  SKIN:  No rash, erythema, or, ecchymoses, no jaundice  NEURO:  Alert, non-focal, no asterixis  PSYCH: Appropriate affect, oriented to place and time  RECTAL: Deferred      LABS Personally reviewed by me:                        11.9   11.70 )-----------( 249      ( 14 Jun 2023 06:06 )             37.1       06-14    142  |  108  |  35<H>  ----------------------------<  117<H>  4.7   |  20<L>  |  0.70    Ca    9.0      14 Jun 2023 06:06                                    11.9   11.70 )-----------( 249      ( 14 Jun 2023 06:06 )             37.1                         11.6   10.05 )-----------( 317      ( 13 Jun 2023 14:47 )             35.7       Imaging personally reviewed by me:

## 2023-06-16 NOTE — PROGRESS NOTE ADULT - ASSESSMENT
81 y/o Burundian speaking F with PMH of HTN, afib on Xarelto (last dose 6/1 6pm), hepatitis C on antiviral.  #[id number]. Presents with acute onset of AMS. 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 Course c/b possible aspiration PNA - s/p course of Zosyn. Called to consult for tachypnea with episode of desaturation.

## 2023-06-16 NOTE — PROGRESS NOTE ADULT - PROVIDER SPECIALTY LIST ADULT
NSICU
Neurology
Neurosurgery
Physiatry
NSICU
NSICU
Neurology
Neurology
Neurosurgery
Gastroenterology
Heme/Onc
NSICU
Neurology
Pulmonology
Rehab Medicine
Cardiology
Gastroenterology
Gastroenterology
NSICU
NSICU
Neurology
Cardiology
Gastroenterology
NSICU
NSICU
Neurology
Neurology
Cardiology
NSICU
Cardiology
Pulmonology
Cardiology
Pulmonology
Cardiology
Cardiology
Pulmonology
Cardiology

## 2023-06-16 NOTE — PROGRESS NOTE ADULT - NS ATTEND OPT1 GEN_ALL_CORE
I independently performed the documented:
I attest my time as attending is greater than 50% of the total combined time spent on qualifying patient care activities by the PA/NP and attending.

## 2023-06-16 NOTE — PROGRESS NOTE ADULT - PROBLEM SELECTOR PROBLEM 3
Atrial fibrillation
Other persistent atrial fibrillation
Atrial fibrillation
Other persistent atrial fibrillation
Atrial fibrillation
Atrial fibrillation
Other persistent atrial fibrillation

## 2023-06-16 NOTE — PROGRESS NOTE ADULT - SUBJECTIVE AND OBJECTIVE BOX
Subjective: Patient seen and examined. No new events except as noted.     REVIEW OF SYSTEMS:    CONSTITUTIONAL:+weakness, fevers or chills  EYES/ENT: No visual changes;  No vertigo or throat pain   NECK: No pain or stiffness  RESPIRATORY: No cough, wheezing, hemoptysis; No shortness of breath  CARDIOVASCULAR: No chest pain or palpitations  GASTROINTESTINAL: No abdominal or epigastric pain. No nausea, vomiting, or hematemesis; No diarrhea or constipation. No melena or hematochezia.  GENITOURINARY: No dysuria, frequency or hematuria  NEUROLOGICAL: No numbness or weakness  SKIN: No itching, burning, rashes, or lesions   All other review of systems is negative unless indicated above.    MEDICATIONS:  MEDICATIONS  (STANDING):  amLODIPine   Tablet 10 milliGRAM(s) Oral daily  apixaban 5 milliGRAM(s) Oral every 12 hours  atorvastatin 80 milliGRAM(s) Oral at bedtime  chlorhexidine 4% Liquid 1 Application(s) Topical daily  entecavir Solution 0.5 milliGRAM(s) Oral <User Schedule>  labetalol 400 milliGRAM(s) Oral every 8 hours  losartan 100 milliGRAM(s) Oral daily  nystatin    Suspension 814228 Unit(s) Oral every 6 hours  pantoprazole  Injectable 40 milliGRAM(s) IV Push daily  polyethylene glycol 3350 17 Gram(s) Oral daily  senna 2 Tablet(s) Oral at bedtime      PHYSICAL EXAM:  T(C): 36.4 (06-16-23 @ 09:32), Max: 37.1 (06-15-23 @ 21:19)  HR: 84 (06-16-23 @ 09:32) (73 - 94)  BP: 120/82 (06-16-23 @ 09:32) (96/63 - 120/82)  RR: 20 (06-16-23 @ 09:32) (18 - 20)  SpO2: 98% (06-16-23 @ 09:32) (95% - 98%)  Wt(kg): --  I&O's Summary    15 David 2023 07:01  -  16 Jun 2023 07:00  --------------------------------------------------------  IN: 970 mL / OUT: 850 mL / NET: 120 mL    16 Jun 2023 07:01  -  16 Jun 2023 11:05  --------------------------------------------------------  IN: 0 mL / OUT: 1 mL / NET: -1 mL          Appearance: NAD	  HEENT:   Dry  oral mucosa, PERRL, EOMI	  Lymphatic: No lymphadenopathy , no edema  Cardiovascular: irregular  S1 S2, No JVD, No murmurs , Peripheral pulses palpable 2+ bilaterally  Respiratory: decreased bs   Gastrointestinal:  Soft, Non-tender, +PEG  Skin: No rashes, No ecchymoses, No cyanosis, warm to touch  Musculoskeletal: Normal range of motion, normal strength  Psychiatry:  Mood & affect appropriate  Ext: No edema  Neurological (>12):  Mental status: awake and alert, attentive to examiner. oriented to self and place (hospital), does not know age or year. follows simple commands. does not follow cross commands  , speech hypophonic, no dysarthria, follows simple commands but not 2-step commands, no neglect   Cranial Nerves: Right nasolabial flattening, no BTT on right, right gaze palsy can be overcome voluntarily  Motor exam: Normal tone, 0/5 RUE, 0/5 RLE, 5/5 LUE, 5/5 LLE, normal fine finger movements. triple flexion of RLE  Sensation: Intact to light touch   Coordination/ Gait: No dysmetria, gait not tested      LABS:    CARDIAC MARKERS:                                12.5   11.78 )-----------( 268      ( 15 David 2023 13:28 )             37.2     06-15    137  |  103  |  24<H>  ----------------------------<  108<H>  4.4   |  21<L>  |  0.71    Ca    9.0      15 David 2023 13:28      proBNP:   Lipid Profile:   HgA1c:   TSH:             TELEMETRY: 	    ECG:  	  RADIOLOGY:   DIAGNOSTIC TESTING:  [ ] Echocardiogram:  [ ]  Catheterization:  [ ] Stress Test:    OTHER:

## 2023-06-16 NOTE — PROGRESS NOTE ADULT - PROBLEM SELECTOR PLAN 2
BP stable  Cont labetolol 10 mg IV q6h prn SBP > 160  hydralazine 10 mg IV [id number] PRN SBP > 160   NORVASC 10 daily and losartan 100
remains hypertensive  Cont labetolol 10 mg IV q6h prn SBP > 160  hydralazine 10 mg IV x 1 now and [id number] PRN SBP > 160  Add NORVASC 10 once able to start PO.
remains hypertensive  Cont labetolol 10 mg IV q6h prn SBP > 160  hydralazine 10 mg IV [id number] PRN SBP > 160   NORVASC 10 daily and losartan 100
L MCA territory infarct with L MCA occlusion  -S/p thrombectomy s/p TICI 2C  -Management per stroke team.
remains hypertensive  Cont labetolol 10 mg IV q6h prn SBP > 160  hydralazine 10 mg IV [id number] PRN SBP > 160   NORVASC 10 daily and losartan 100
BP stable  Cont labetolol 10 mg IV q6h prn SBP > 160  hydralazine 10 mg IV [id number] PRN SBP > 160   NORVASC 10 daily and losartan 100
remains hypertensive  Cont labetolol 10 mg IV q6h prn SBP > 160  hydralazine 10 mg IV [id number] PRN SBP > 160   NORVASC 10 daily and losartan 100
L MCA territory infarct with L MCA occlusion  -S/p thrombectomy s/p TICI 2C  -Management per stroke team.
BP stable  Cont labetolol 10 mg IV q6h prn SBP > 160  hydralazine 10 mg IV [id number] PRN SBP > 160   NORVASC 10 daily and losartan 100
BP stable  Cont labetolol 10 mg IV q6h prn SBP > 160  hydralazine 10 mg IV [id number] PRN SBP > 160   NORVASC 10 daily and losartan 100

## 2023-06-16 NOTE — PROGRESS NOTE ADULT - PROBLEM SELECTOR PLAN 4
statin.
statin.
-S/p PEG placement 6/14
statin.
statin.
-S/p PEG placement 6/14
statin.
-Plans for PEG placement. No pulmonary objections to planned procedure.
statin.

## 2023-06-17 LAB
ALBUMIN SERPL ELPH-MCNC: 2.3 G/DL — LOW (ref 3.3–5)
ALP SERPL-CCNC: 141 U/L — HIGH (ref 40–120)
ALT FLD-CCNC: 29 U/L — SIGNIFICANT CHANGE UP (ref 10–45)
ANION GAP SERPL CALC-SCNC: 9 MMOL/L — SIGNIFICANT CHANGE UP (ref 5–17)
AST SERPL-CCNC: 51 U/L — HIGH (ref 10–40)
BILIRUB SERPL-MCNC: 0.9 MG/DL — SIGNIFICANT CHANGE UP (ref 0.2–1.2)
BUN SERPL-MCNC: 32 MG/DL — HIGH (ref 7–23)
CALCIUM SERPL-MCNC: 8.6 MG/DL — SIGNIFICANT CHANGE UP (ref 8.4–10.5)
CHLORIDE SERPL-SCNC: 101 MMOL/L — SIGNIFICANT CHANGE UP (ref 96–108)
CO2 SERPL-SCNC: 25 MMOL/L — SIGNIFICANT CHANGE UP (ref 22–31)
CREAT SERPL-MCNC: 0.89 MG/DL — SIGNIFICANT CHANGE UP (ref 0.5–1.3)
EGFR: 65 ML/MIN/1.73M2 — SIGNIFICANT CHANGE UP
GLUCOSE BLDC GLUCOMTR-MCNC: 128 MG/DL — HIGH (ref 70–99)
GLUCOSE BLDC GLUCOMTR-MCNC: 132 MG/DL — HIGH (ref 70–99)
GLUCOSE BLDC GLUCOMTR-MCNC: 145 MG/DL — HIGH (ref 70–99)
GLUCOSE BLDC GLUCOMTR-MCNC: 225 MG/DL — HIGH (ref 70–99)
GLUCOSE SERPL-MCNC: 119 MG/DL — HIGH (ref 70–99)
HCT VFR BLD CALC: 35.1 % — SIGNIFICANT CHANGE UP (ref 34.5–45)
HGB BLD-MCNC: 11.7 G/DL — SIGNIFICANT CHANGE UP (ref 11.5–15.5)
MCHC RBC-ENTMCNC: 31 PG — SIGNIFICANT CHANGE UP (ref 27–34)
MCHC RBC-ENTMCNC: 33.3 GM/DL — SIGNIFICANT CHANGE UP (ref 32–36)
MCV RBC AUTO: 93.1 FL — SIGNIFICANT CHANGE UP (ref 80–100)
NRBC # BLD: 0 /100 WBCS — SIGNIFICANT CHANGE UP (ref 0–0)
PLATELET # BLD AUTO: 302 K/UL — SIGNIFICANT CHANGE UP (ref 150–400)
POTASSIUM SERPL-MCNC: 4 MMOL/L — SIGNIFICANT CHANGE UP (ref 3.5–5.3)
POTASSIUM SERPL-SCNC: 4 MMOL/L — SIGNIFICANT CHANGE UP (ref 3.5–5.3)
PROT SERPL-MCNC: 5.9 G/DL — LOW (ref 6–8.3)
RBC # BLD: 3.77 M/UL — LOW (ref 3.8–5.2)
RBC # FLD: 13 % — SIGNIFICANT CHANGE UP (ref 10.3–14.5)
SODIUM SERPL-SCNC: 135 MMOL/L — SIGNIFICANT CHANGE UP (ref 135–145)
WBC # BLD: 11.5 K/UL — HIGH (ref 3.8–10.5)
WBC # FLD AUTO: 11.5 K/UL — HIGH (ref 3.8–10.5)

## 2023-06-17 PROCEDURE — 99222 1ST HOSP IP/OBS MODERATE 55: CPT

## 2023-06-17 PROCEDURE — 99255 IP/OBS CONSLTJ NEW/EST HI 80: CPT

## 2023-06-17 RX ADMIN — AMLODIPINE BESYLATE 10 MILLIGRAM(S): 2.5 TABLET ORAL at 06:26

## 2023-06-17 RX ADMIN — ENTECAVIR 0.5 MILLIGRAM(S): 0.5 TABLET ORAL at 12:53

## 2023-06-17 RX ADMIN — Medication 500000 UNIT(S): at 06:27

## 2023-06-17 RX ADMIN — PANTOPRAZOLE SODIUM 40 MILLIGRAM(S): 20 TABLET, DELAYED RELEASE ORAL at 12:53

## 2023-06-17 RX ADMIN — Medication 500000 UNIT(S): at 12:53

## 2023-06-17 RX ADMIN — ATORVASTATIN CALCIUM 40 MILLIGRAM(S): 80 TABLET, FILM COATED ORAL at 22:28

## 2023-06-17 RX ADMIN — LOSARTAN POTASSIUM 100 MILLIGRAM(S): 100 TABLET, FILM COATED ORAL at 06:26

## 2023-06-17 RX ADMIN — Medication 400 MILLIGRAM(S): at 14:27

## 2023-06-17 RX ADMIN — Medication 500000 UNIT(S): at 18:04

## 2023-06-17 RX ADMIN — APIXABAN 5 MILLIGRAM(S): 2.5 TABLET, FILM COATED ORAL at 18:04

## 2023-06-17 RX ADMIN — Medication 500000 UNIT(S): at 22:41

## 2023-06-17 RX ADMIN — APIXABAN 5 MILLIGRAM(S): 2.5 TABLET, FILM COATED ORAL at 06:26

## 2023-06-17 NOTE — DIETITIAN INITIAL EVALUATION ADULT - PERTINENT LABORATORY DATA
06-17    135  |  101  |  32<H>  ----------------------------<  119<H>  4.0   |  25  |  0.89    Ca    8.6      17 Jun 2023 05:50    TPro  5.9<L>  /  Alb  2.3<L>  /  TBili  0.9  /  DBili  x   /  AST  51<H>  /  ALT  29  /  AlkPhos  141<H>  06-17  POCT Blood Glucose.: 128 mg/dL (06-17-23 @ 11:51)  A1C with Estimated Average Glucose Result: 5.9 % (06-02-23 @ 03:48)

## 2023-06-17 NOTE — DIETITIAN NUTRITION RISK NOTIFICATION - TREATMENT: THE FOLLOWING DIET HAS BEEN RECOMMENDED
Diet, NPO with Tube Feed:   Tube Feeding Modality: Gastrostomy  Glucerna 1.5 Jude (GLUCERNA1.5RTH)  Total Volume for 24 Hours (mL): 1440  Continuous  Starting Tube Feed Rate {mL per Hour}: 10  Increase Tube Feed Rate by (mL): 10     Every 4 hours  Until Goal Tube Feed Rate (mL per Hour): 60  Tube Feed Duration (in Hours): 24  Tube Feed Start Time: 13:00  Supplement Feeding Modality:  Nasogastric  Probiotic Yogurt/Smoothie Cans or Servings Per Day:  2       Frequency:  Daily (06-16-23 @ 15:09) [Active]

## 2023-06-17 NOTE — DIETITIAN INITIAL EVALUATION ADULT - NS FNS DIET ORDER
Diet, NPO with Tube Feed:   Tube Feeding Modality: Gastrostomy  Glucerna 1.5 Jude (GLUCERNA1.5RTH)  Total Volume for 24 Hours (mL): 1440  Continuous  Starting Tube Feed Rate {mL per Hour}: 10  Increase Tube Feed Rate by (mL): 10     Every 4 hours  Until Goal Tube Feed Rate (mL per Hour): 60  Tube Feed Duration (in Hours): 24  Tube Feed Start Time: 13:00  Supplement Feeding Modality:  Nasogastric  Probiotic Yogurt/Smoothie Cans or Servings Per Day:  2       Frequency:  Daily (06-16-23 @ 15:09)

## 2023-06-17 NOTE — DIETITIAN INITIAL EVALUATION ADULT - ADD RECOMMEND
1) Recommend initiating Jevity 1.5 via bolus tube feed, tube feed will provide 1422kcal, 60g protein and 720mL free water + Banatrol TID, diet advance diet PRN.   2) Maintain aspiration precautions (elevated HOB>30-45 degrees during feeds and for at least 30 minutes before/after feeds), hold feeds for s/s TF intolerance (n/v/abdominal distention)  3) Monitor GI tolerance, skin integrity, labs, weight, and bowel movement regularity.   4) Follow SLP recommendations  5) Provide ongoing diet education as needed  6) RD remains available upon request and will follow-up per protocol

## 2023-06-17 NOTE — CONSULT NOTE ADULT - ASSESSMENT
Assessment/Plan:  82 year old, Romansh speaking, woman with hx of HTN, AF (xarelto), HCV (entecavir) that presented to [facility] ED on 6/1 with AMS and underwent imaging and noted to have L MCA infarct with L MCA occlusion but not a candidate for tPa given elevated INR and thus underwent mechanical thrombectomy. Felt to be due to cardioembolic as patient was not actively taking xarelto. She was started on eliquis. Unable to pass SLP testing and underwent PEG placement. She is now admitted to [facility] for acute rehabilitation.          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 Mcdaniel (Heme)       Cardiovascular  Atrial Fibrillation  - Eliquis   Hypertension  - Amlodipine 10mg daily  - Labetalol 400mg q 8 hrs  - Losartan 100mg daily    Gastroenterology  Hepatitis C  - Entecavir daily   PEG  - Hold feeds 2 hours before and 2 hours after medication administration, consult nutrition for consideration of bolus feeding  - continue to check finger sticks, no sliding insulin correction for now as BG <140, goal 140-180, monitor for 24hrs and consider starting ISS if above goal  - maintain nursing care for routine dressing changes    Oncology  R breast mass  - Mammogram outpatient  - Oncology follow up outpatient    Infectious Disease  Oral Candida  - Nystatin  - Swish and suction     Hematology  Leukocytosis  - monitor fever curve  - check u/a    Psych  Insomnia  - melatonin PRN

## 2023-06-17 NOTE — DIETITIAN INITIAL EVALUATION ADULT - ORAL INTAKE PTA/DIET HISTORY
SLP recommended NPO status on 6/8, a repeat MBS was performed on 6/13 without improvement and a PEG tube was placed on 6/14.

## 2023-06-17 NOTE — DIETITIAN INITIAL EVALUATION ADULT - PERTINENT MEDS FT
MEDICATIONS  (STANDING):  amLODIPine   Tablet 10 milliGRAM(s) Oral daily  apixaban 5 milliGRAM(s) Oral two times a day  atorvastatin 40 milliGRAM(s) Oral at bedtime  entecavir Solution 0.5 milliGRAM(s) Oral daily  labetalol 400 milliGRAM(s) Oral every 8 hours  losartan 100 milliGRAM(s) Oral daily  nystatin    Suspension 893906 Unit(s) Oral every 6 hours  pantoprazole   Suspension 40 milliGRAM(s) Oral daily  polyethylene glycol 3350 17 Gram(s) Oral daily  senna Syrup 10 milliLiter(s) Oral at bedtime    MEDICATIONS  (PRN):  bacitracin   Ointment 1 Application(s) Topical three times a day PRN to skin wound

## 2023-06-17 NOTE — DIETITIAN INITIAL EVALUATION ADULT - ENTERAL
Recommend initiating Jevity 1.5 via bolus tube feed, tube feed will provide 1422kcal, 60g protein and 720mL free water + Banatrol TID

## 2023-06-17 NOTE — DIETITIAN INITIAL EVALUATION ADULT - OTHER INFO
Reason for Admission: Functional deficits s/p CVA  History of Present Illness:   Andres Falcon is an 82 year old, Faroese speaking, woman with PMH of HTN, AFIB (on Xarelto), Hepatitis C (on Entecavir); who presented to [facility] ED on 6/1 with acute onset AMS. LKN was 10pm, at 10:30pm she became acutely altered and with poor posture. Upon arrival, she was noted to have decreased arousal, severe R sided weakness, slurred speech and aphasia requiring intubation. 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 She was not an appropriate tenecteplase candidate due to INR of 1.79 and was taken for mechanical thrombectomy. SLP recommended NPO status on 6/8, a repeat MBS was performed on 6/13 without improvement and a PEG tube was placed on 6/14. Further imaging was significant for R breast mass and she was recommended for outpatient follow up and imaging. PM&R was consulted and deemed her an appropriate candidate for IRF. She was medically stabilized and cleared for discharge to [facility].     Per RN patient tolerating current continuous tube feeding regimen. Patient w/ no prior Hx of DM, A1c 5.9% and POCT 103-132mg/dL which indicates good glycemic control, Recommend initiating Jevity 1.5 via bolus tube feed, tube feed will provide 1422kcal, 60g protein and 720mL free water + Banatrol TID. No N/V/C reported, however patient w/ loose stools, Last BM 6/16 Per nursing flowsheets.

## 2023-06-18 LAB
GLUCOSE BLDC GLUCOMTR-MCNC: 128 MG/DL — HIGH (ref 70–99)
GLUCOSE BLDC GLUCOMTR-MCNC: 140 MG/DL — HIGH (ref 70–99)
GLUCOSE BLDC GLUCOMTR-MCNC: 180 MG/DL — HIGH (ref 70–99)
GLUCOSE BLDC GLUCOMTR-MCNC: 189 MG/DL — HIGH (ref 70–99)

## 2023-06-18 PROCEDURE — 99233 SBSQ HOSP IP/OBS HIGH 50: CPT

## 2023-06-18 PROCEDURE — 99231 SBSQ HOSP IP/OBS SF/LOW 25: CPT

## 2023-06-18 RX ORDER — INSULIN LISPRO 100/ML
2 VIAL (ML) SUBCUTANEOUS ONCE
Refills: 0 | Status: COMPLETED | OUTPATIENT
Start: 2023-06-18 | End: 2023-06-18

## 2023-06-18 RX ORDER — DEXTROSE 50 % IN WATER 50 %
15 SYRINGE (ML) INTRAVENOUS ONCE
Refills: 0 | Status: DISCONTINUED | OUTPATIENT
Start: 2023-06-18 | End: 2023-07-07

## 2023-06-18 RX ORDER — INSULIN LISPRO 100/ML
VIAL (ML) SUBCUTANEOUS
Refills: 0 | Status: DISCONTINUED | OUTPATIENT
Start: 2023-06-18 | End: 2023-06-26

## 2023-06-18 RX ORDER — GLUCAGON INJECTION, SOLUTION 0.5 MG/.1ML
1 INJECTION, SOLUTION SUBCUTANEOUS ONCE
Refills: 0 | Status: DISCONTINUED | OUTPATIENT
Start: 2023-06-18 | End: 2023-07-07

## 2023-06-18 RX ORDER — DEXTROSE 50 % IN WATER 50 %
25 SYRINGE (ML) INTRAVENOUS ONCE
Refills: 0 | Status: DISCONTINUED | OUTPATIENT
Start: 2023-06-18 | End: 2023-07-07

## 2023-06-18 RX ORDER — SODIUM CHLORIDE 9 MG/ML
1000 INJECTION, SOLUTION INTRAVENOUS
Refills: 0 | Status: DISCONTINUED | OUTPATIENT
Start: 2023-06-18 | End: 2023-07-07

## 2023-06-18 RX ORDER — INSULIN LISPRO 100/ML
VIAL (ML) SUBCUTANEOUS AT BEDTIME
Refills: 0 | Status: DISCONTINUED | OUTPATIENT
Start: 2023-06-18 | End: 2023-06-26

## 2023-06-18 RX ORDER — DEXTROSE 50 % IN WATER 50 %
12.5 SYRINGE (ML) INTRAVENOUS ONCE
Refills: 0 | Status: DISCONTINUED | OUTPATIENT
Start: 2023-06-18 | End: 2023-07-07

## 2023-06-18 RX ADMIN — PANTOPRAZOLE SODIUM 40 MILLIGRAM(S): 20 TABLET, DELAYED RELEASE ORAL at 12:16

## 2023-06-18 RX ADMIN — ATORVASTATIN CALCIUM 40 MILLIGRAM(S): 80 TABLET, FILM COATED ORAL at 21:08

## 2023-06-18 RX ADMIN — AMLODIPINE BESYLATE 10 MILLIGRAM(S): 2.5 TABLET ORAL at 07:08

## 2023-06-18 RX ADMIN — Medication 400 MILLIGRAM(S): at 07:07

## 2023-06-18 RX ADMIN — Medication 500000 UNIT(S): at 07:10

## 2023-06-18 RX ADMIN — APIXABAN 5 MILLIGRAM(S): 2.5 TABLET, FILM COATED ORAL at 07:08

## 2023-06-18 RX ADMIN — Medication 2 UNIT(S): at 12:28

## 2023-06-18 RX ADMIN — Medication 500000 UNIT(S): at 17:31

## 2023-06-18 RX ADMIN — Medication 500000 UNIT(S): at 12:16

## 2023-06-18 RX ADMIN — Medication 400 MILLIGRAM(S): at 21:08

## 2023-06-18 RX ADMIN — LOSARTAN POTASSIUM 100 MILLIGRAM(S): 100 TABLET, FILM COATED ORAL at 07:08

## 2023-06-18 RX ADMIN — APIXABAN 5 MILLIGRAM(S): 2.5 TABLET, FILM COATED ORAL at 17:32

## 2023-06-18 NOTE — PROGRESS NOTE ADULT - ASSESSMENT
Assessment/Plan:  AUDI DYKES is a 82 year old, Macedonian speaking, woman with PMH of HTN, AFIB (on Xarelto), Hepatitis C (on Entecavir); who presented to [facility] ED on 6/1 with acute onset AMS. LKN was 10pm, at 10:30pm she became acutely altered and with poor posture. Upon arrival, she was noted to have decreased arousal, severe R sided weakness, slurred speech and aphasia requiring intubation. 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 She was not an appropriate tenecteplase candidate due to INR of 1.79 and was taken for mechanical thrombectomy. SLP recommended NPO status on 6/8, a repeat MBS was performed on 6/13 without improvement and a PEG tube was placed on 6/14.  Patient now admitted for a multidisciplinary rehab program.   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 Mcdaniel (Heme)      #Elevated BUN  -may need more fluids via peg    #AFIB  - Eliquis     #HTN  - Amlodipine  - Labetalol 400mg q 8 hrs  - Losartan 100mg daily  -BP: 101/79 (18 Jun 2023 14:32) (101/79 - 129/55)    #Hepatitis C  - Entecavir daily   - Hold feeds 2 hours before and 2 hours after medication administration     #R breast mass  - Mammogram outpatient  - Oncology follow up outpatient    #Oral Candida  - Nystatin  - Swish and suction     #Sleep  - melatonin PRN     #Skin  - Skin on admission: skin tear to mid and left chest wall  - Pressure injury/Skin: OOB to Chair, PT/OT   - Bacitracin    #Pain Mgmt   - Tylenol PRN, Oxycodone PRN    #GI/Bowel Mgmt   - Pantoprazole  - Continent c/w Senna, Miralax     #/Bladder Mgmt   -  PVR q8h, CIC>400cc    #FEN   - Diet - NPO with tube feeds  - Glucerna  - Dysphagia  SLP - evaluation and treatment    #Precautions / PROPHYLAXIS:   - Falls  - ortho: Weight bearing status: WBAT   - Lungs: Aspiration, Incentive Spirometer   - DVT: Eliquis

## 2023-06-18 NOTE — PROGRESS NOTE ADULT - SUBJECTIVE AND OBJECTIVE BOX
Medicine Progress Note    Patient is a 82y old  Female who presents with a chief complaint of Functional deficits s/p CVA (17 Jun 2023 13:45)      SUBJECTIVE / OVERNIGHT EVENTS:  denied complaints  did not answer further    ADDITIONAL REVIEW OF SYSTEMS:  unable to obtain    MEDICATIONS  (STANDING):  amLODIPine   Tablet 10 milliGRAM(s) Oral daily  apixaban 5 milliGRAM(s) Oral two times a day  atorvastatin 40 milliGRAM(s) Oral at bedtime  dextrose 5%. 1000 milliLiter(s) (50 mL/Hr) IV Continuous <Continuous>  dextrose 5%. 1000 milliLiter(s) (100 mL/Hr) IV Continuous <Continuous>  dextrose 50% Injectable 25 Gram(s) IV Push once  dextrose 50% Injectable 12.5 Gram(s) IV Push once  dextrose 50% Injectable 25 Gram(s) IV Push once  glucagon  Injectable 1 milliGRAM(s) IntraMuscular once  insulin lispro (ADMELOG) corrective regimen sliding scale   SubCutaneous three times a day before meals  insulin lispro (ADMELOG) corrective regimen sliding scale   SubCutaneous at bedtime  labetalol 400 milliGRAM(s) Oral every 8 hours  losartan 100 milliGRAM(s) Oral daily  nystatin    Suspension 082660 Unit(s) Oral every 6 hours  pantoprazole   Suspension 40 milliGRAM(s) Oral daily  polyethylene glycol 3350 17 Gram(s) Oral daily    MEDICATIONS  (PRN):  bacitracin   Ointment 1 Application(s) Topical three times a day PRN to skin wound  dextrose Oral Gel 15 Gram(s) Oral once PRN Blood Glucose LESS THAN 70 milliGRAM(s)/deciliter    CAPILLARY BLOOD GLUCOSE      POCT Blood Glucose.: 189 mg/dL (18 Jun 2023 11:38)  POCT Blood Glucose.: 128 mg/dL (18 Jun 2023 07:57)  POCT Blood Glucose.: 225 mg/dL (17 Jun 2023 22:37)  POCT Blood Glucose.: 145 mg/dL (17 Jun 2023 17:10)    I&O's Summary    17 Jun 2023 07:01  -  18 Jun 2023 07:00  --------------------------------------------------------  IN: 737 mL / OUT: 500 mL / NET: 237 mL        PHYSICAL EXAM:  Vital Signs Last 24 Hrs  T(C): 36.6 (18 Jun 2023 08:34), Max: 36.6 (18 Jun 2023 08:34)  T(F): 97.9 (18 Jun 2023 08:34), Max: 97.9 (18 Jun 2023 08:34)  HR: 77 (18 Jun 2023 08:34) (77 - 91)  BP: 115/59 (18 Jun 2023 08:34) (105/63 - 137/69)  BP(mean): --  RR: 17 (18 Jun 2023 08:34) (16 - 17)  SpO2: 96% (18 Jun 2023 08:34) (95% - 96%)    Parameters below as of 18 Jun 2023 08:34  Patient On (Oxygen Delivery Method): room air    GENERAL: Not in distress. Alert    HEENT: clear conjuctiva, MMM. no pallor or icterus  CARDIOVASCULAR: RRR S1, S2. No murmur/rubs/gallop  LUNGS: BLAE+, no rales, no wheezing, no rhonchi.    ABDOMEN: ND. Soft,  NT, no guarding / rebound / rigidity. BS normoactive  BACK: No spine tenderness.  EXTREMITIES: no edema. no leg or calf TP.  SKIN: warm and dry  PSYCHIATRIC: Calm.  No agitation.  CNS:AAO.  hypophonia, minimally verbal, Right nasolabial flattening, right sided weakness    LABS:                        11.7   11.50 )-----------( 302      ( 17 Jun 2023 05:50 )             35.1     06-17    135  |  101  |  32<H>  ----------------------------<  119<H>  4.0   |  25  |  0.89    Ca    8.6      17 Jun 2023 05:50    TPro  5.9<L>  /  Alb  2.3<L>  /  TBili  0.9  /  DBili  x   /  AST  51<H>  /  ALT  29  /  AlkPhos  141<H>  06-17              COVID-19 PCR: NotDetec (16 Jun 2023 20:00)      RADIOLOGY & ADDITIONAL TESTS:  Imaging from Last 24 Hours:    Electrocardiogram/QTc Interval:    COORDINATION OF CARE:  Care Discussed with Consultants/Other Providers:

## 2023-06-18 NOTE — PROGRESS NOTE ADULT - ASSESSMENT
Assessment/Plan:  82 year old, Hungarian speaking, woman with hx of HTN, AF (xarelto), HCV (entecavir) that presented to [facility] ED on 6/1 with AMS and underwent imaging and noted to have L MCA infarct with L MCA occlusion but not a candidate for tPa given elevated INR and thus underwent mechanical thrombectomy. Felt to be due to cardioembolic as patient was not actively taking xarelto. She was started on eliquis. Unable to pass SLP testing and underwent PEG placement. She is now admitted to [facility] for acute rehabilitation.          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 Mcdaniel (Heme)       Atrial Fibrillation  - Eliquis     Hypertension  - Amlodipine 10mg daily  - Labetalol 400mg q 8 hrs  - Losartan 100mg daily  - check OH periodically and adjust meds    Hyperglycemia  Pre-DM  - a1c 5.9  - started on ISS, FSBS  - nutrition follow to adjust PEG feeding      Hepatitis C  - Entecavir daily .     Dysphagia s/p PEG  - Hold feeds 2 hours before and 2 hours after medication administration, consult nutrition for consideration of bolus feeding  - maintain nursing care for routine dressing changes  - nutrition eval ongoing      R breast mass  - Mammogram outpatient  - Oncology follow up outpatient    Oral Candida  - Nystatin  - Swish and suction   - oral hygene    Leukocytosis  - monitor fever curve  - check u/a      Insomnia  - melatonin PRN     d/w rehab team at [facility]

## 2023-06-18 NOTE — PROGRESS NOTE ADULT - SUBJECTIVE AND OBJECTIVE BOX
Reason for Admission: Functional deficits s/p CVA  CC: No complaints overnight.     History of Present Illness:   Andres Falcon is an 82 year old, German speaking, woman with PMH of HTN, AFIB (on Xarelto), Hepatitis C (on Entecavir); who presented to [facility] ED on 6/1 with acute onset AMS. LKN was 10pm, at 10:30pm she became acutely altered and with poor posture. Upon arrival, she was noted to have decreased arousal, severe R sided weakness, slurred speech and aphasia requiring intubation. 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 She was not an appropriate tenecteplase candidate due to INR of 1.79 and was taken for mechanical thrombectomy. SLP recommended NPO status on 6/8, a repeat MBS was performed on 6/13 without improvement and a PEG tube was placed on 6/14. Further imaging was significant for R breast mass and she was recommended for outpatient follow up and imaging. PM&R was consulted and deemed her an appropriate candidate for IRF. She was medically stabilized and cleared for discharge to [facility].       Review of Systems:  Unable to obtain due to: Altered mentation      Allergies and Intolerances:        Allergies:  	No Known Allergies:     Home Medications:   * Patient Currently Takes Medications as of 16-Jun-2023 12:15 documented in Structured Notes  · 	pantoprazole 40 mg oral granule, delayed release: 40 milligram(s) orally once a day  · 	senna leaf extract oral tablet: 2 tab(s) orally once a day (at bedtime)  · 	polyethylene glycol 3350 oral powder for reconstitution: 17 gram(s) orally once a day  · 	bacitracin-polymyxin B 500 units-10,000 units/g topical ointment: 1 Apply topically to affected area 3 times a day As needed skin wound  · 	amLODIPine 10 mg oral tablet: 1 tab(s) orally once a day  · 	labetalol 200 mg oral tablet: 2 tab(s) orally every 8 hours hold for SBP < 110  · 	entecavir 0.05 mg/mL oral solution: 10 milliliter(s) orally once a day hold tube feeds 2 hours before and 2 hours after medication admin  · 	nystatin 100,000 units/mL oral suspension: 5 milliliter(s) orally every 6 hours  · 	atorvastatin 40 mg oral tablet: 1 tab(s) orally once a day (at bedtime)  · 	apixaban 5 mg oral tablet: 1 tab(s) orally every 12 hours  · 	losartan 100 mg oral tablet: 1 tab(s) orally once a day    .    Patient History:    Past Medical, Past Surgical, and Family History:  PAST MEDICAL HISTORY:  Afib     Hepatitis C     HTN (hypertension).     Social History:  · Substance use	No  · Social History (marital status, living situation, occupation, and sexual history)	SOCIAL HISTORY  Smoking - Denied  EtOH - Denied   Drugs - Denied    Physical Exam:   Physical Exam: PHYSICAL EXAM- LIMITED 2/2 hypophonia,  unable to hear and understand patient, patient following command. Patient speaks Bengali    VITALS  T(C): 36.6 (18 Jun 2023 08:34), Max: 36.6 (18 Jun 2023 08:34)  T(F): 97.9 (18 Jun 2023 08:34), Max: 97.9 (18 Jun 2023 08:34)  HR: 82 (18 Jun 2023 14:32) (77 - 91)  BP: 101/79 (18 Jun 2023 14:32) (101/79 - 129/55)  RR: 17 (18 Jun 2023 08:34) (16 - 17)  SpO2: 96% (18 Jun 2023 08:34) (95% - 96%)    Gen - NAD, Comfortable  HEENT - NCAT, EOMI, MMM  Neck - Supple, No limited ROM  Pulm - CTAB, No wheeze  Cardiovascular - RRR, S1S2  Chest - good chest expansion, good respiratory effort  Abdomen - Soft, NT/ND, +BS, + PEG  Extremities - No Cyanosis, no clubbing, no edema, no calf tenderness  Neuro-     Cognitive - awake, alert, oriented to person. Difficulty  with following command     Communication - hypophonic, non -fluent, difficulty with prompting     Judgement: unable to test     Memory: unable to test     Cranial Nerves - right facial weakness, decreased right  shoulder shrug      Motor -  Right  hemiparesis neglect                    LEFT    UE - 5/5                    RIGHT UE - 0/5                    LEFT    LE - 5/5                    RIGHT LE - 0/5        Sensory - unable to test    Coordination - unable to test     Tone - normal  Psychiatric - Mood stable, Affect WNL  Skin:  skin tear to mid and left chest wall         Labs and Results:  RECENT LABS/IMAGING                        11.7   11.50 )-----------( 302      ( 17 Jun 2023 05:50 )             35.1     06-17    135  |  101  |  32<H>  ----------------------------<  119<H>  4.0   |  25  |  0.89    Ca    8.6      17 Jun 2023 05:50    TPro  5.9<L>  /  Alb  2.3<L>  /  TBili  0.9  /  DBili  x   /  AST  51<H>  /  ALT  29  /  AlkPhos  141<H>  06-17                      12.5   11.78 )-----------( 268      ( 15 David 2023 13:28 )             37.2     06-15    137  |  103  |  24<H>  ----------------------------<  108<H>  4.4   |  21<L>  |  0.71    Ca    9.0      15 David 2023 13:28      IMAGING/RADIOLOGY:  CTH (06/11):  Improved subacute moderate to large left MCA territory infarction. Stable petechial hemorrhage. Mild chronic microvascular changes without evidence of a new acute transcortical infarction or hemorrhage.    MR Head No Cont (6.06.23): Parenchymal volume loss and chronic microvascular ischemic changes identified Abnormal T2 prolongation with restricted diffusion is seen involving the posterior left insula, left temporal frontal and parietal cortical subcortical region is identified. These finding does demonstrate abnormal susceptibility and is compatible with a hemorrhagic left MCA infarct. There is localized mass effect seen consisting sulcal effacement. Mass effect on the left lateral ventricle seen. No significant shift or herniation is seen. The large vessels demonstrate normal flow-voids Minimal mucosal thickening is seen involving both ethmoid sinuses. Both mastoid and middle ear regions appear clear. Hemorrhagic infarct involving the left middle cerebral artery territory.    CT Head No Cont (06.05.23 @ 08:27) Evolving left MCA distribution infarct. More well-defined lucency and slight increased mass effect on the left lateral ventricle. Subtle increased attenuation at the level of the basal ganglia on the left may indicate a component of evolving hemorrhage.    CT Head No Cont (06.02.23 @ 09:59): An evolving left MCA distribution infarct .    CT Brain Stroke Protocol 06.01.23: No acute intracranial hemorrhage or mass effect.    CT angiography neck 06/01/23: No hemodynamically significant stenosis by NASCET criteria. No vascular dissection. Nonspecific ground glass opacities in the visualized apices of the lungs, may represent infectious versus inflammatory disease.    CT angiography brain 06/01/23: Left proximal M1 occlusion.    CT perfusion 06/01/23:: Core infarct of 6 cc with a penumbra of 162 cc.      CARDIOLOGY:   TTE (6/2)   CONCLUSIONS:   1. Normal left ventricular cavitysize. The left ventricular wall thickness is normal. The left ventricular systolic function is normal with an ejection fraction of 60 % by Bassett's method of disks.   2. There is severe (grade 3) left ventricular diastolic dysfunction.   3. Normal right ventricular cavity size, normal wall thickness and normal systolic function. The tricuspid annular plane systolic excursion (TAPSE) is 1.4 cm (normal >=1.7 cm).   4. The left atrium is moderately dilated.   5. The right atrium is dilated.   6. Moderate aortic stenosis.   7. No evidence of aortic regurgitation.   8. No prior echocardiogram is available for comparison.    Assessment and Plan:   · VTE Risk Assessment	VTE Assessment already completed for this visit  · Completed VTE Risk Assessment(s)	Refer to the Assessment tab to view/cancel completed assessment.

## 2023-06-19 LAB
ALBUMIN SERPL ELPH-MCNC: 2.2 G/DL — LOW (ref 3.3–5)
ALP SERPL-CCNC: 149 U/L — HIGH (ref 40–120)
ALT FLD-CCNC: 38 U/L — SIGNIFICANT CHANGE UP (ref 10–45)
ANION GAP SERPL CALC-SCNC: 7 MMOL/L — SIGNIFICANT CHANGE UP (ref 5–17)
APPEARANCE UR: ABNORMAL
AST SERPL-CCNC: 46 U/L — HIGH (ref 10–40)
BACTERIA # UR AUTO: ABNORMAL /HPF
BASOPHILS # BLD AUTO: 0.07 K/UL — SIGNIFICANT CHANGE UP (ref 0–0.2)
BASOPHILS NFR BLD AUTO: 0.6 % — SIGNIFICANT CHANGE UP (ref 0–2)
BILIRUB SERPL-MCNC: 0.8 MG/DL — SIGNIFICANT CHANGE UP (ref 0.2–1.2)
BILIRUB UR-MCNC: NEGATIVE — SIGNIFICANT CHANGE UP
BUN SERPL-MCNC: 37 MG/DL — HIGH (ref 7–23)
CALCIUM SERPL-MCNC: 8.6 MG/DL — SIGNIFICANT CHANGE UP (ref 8.4–10.5)
CHLORIDE SERPL-SCNC: 99 MMOL/L — SIGNIFICANT CHANGE UP (ref 96–108)
CO2 SERPL-SCNC: 28 MMOL/L — SIGNIFICANT CHANGE UP (ref 22–31)
COLOR SPEC: SIGNIFICANT CHANGE UP
CREAT SERPL-MCNC: 0.89 MG/DL — SIGNIFICANT CHANGE UP (ref 0.5–1.3)
DIFF PNL FLD: NEGATIVE — SIGNIFICANT CHANGE UP
EGFR: 65 ML/MIN/1.73M2 — SIGNIFICANT CHANGE UP
EOSINOPHIL # BLD AUTO: 0.14 K/UL — SIGNIFICANT CHANGE UP (ref 0–0.5)
EOSINOPHIL NFR BLD AUTO: 1.2 % — SIGNIFICANT CHANGE UP (ref 0–6)
EPI CELLS # UR: 2 — SIGNIFICANT CHANGE UP
GLUCOSE BLDC GLUCOMTR-MCNC: 127 MG/DL — HIGH (ref 70–99)
GLUCOSE BLDC GLUCOMTR-MCNC: 152 MG/DL — HIGH (ref 70–99)
GLUCOSE BLDC GLUCOMTR-MCNC: 158 MG/DL — HIGH (ref 70–99)
GLUCOSE BLDC GLUCOMTR-MCNC: 200 MG/DL — HIGH (ref 70–99)
GLUCOSE SERPL-MCNC: 149 MG/DL — HIGH (ref 70–99)
GLUCOSE UR QL: NEGATIVE MG/DL — SIGNIFICANT CHANGE UP
HCT VFR BLD CALC: 32.7 % — LOW (ref 34.5–45)
HGB BLD-MCNC: 10.8 G/DL — LOW (ref 11.5–15.5)
IMM GRANULOCYTES NFR BLD AUTO: 0.8 % — SIGNIFICANT CHANGE UP (ref 0–0.9)
KETONES UR-MCNC: NEGATIVE MG/DL — SIGNIFICANT CHANGE UP
LEUKOCYTE ESTERASE UR-ACNC: ABNORMAL
LYMPHOCYTES # BLD AUTO: 0.96 K/UL — LOW (ref 1–3.3)
LYMPHOCYTES # BLD AUTO: 8.5 % — LOW (ref 13–44)
MCHC RBC-ENTMCNC: 30.4 PG — SIGNIFICANT CHANGE UP (ref 27–34)
MCHC RBC-ENTMCNC: 33 GM/DL — SIGNIFICANT CHANGE UP (ref 32–36)
MCV RBC AUTO: 92.1 FL — SIGNIFICANT CHANGE UP (ref 80–100)
MONOCYTES # BLD AUTO: 1.06 K/UL — HIGH (ref 0–0.9)
MONOCYTES NFR BLD AUTO: 9.4 % — SIGNIFICANT CHANGE UP (ref 2–14)
NEUTROPHILS # BLD AUTO: 8.98 K/UL — HIGH (ref 1.8–7.4)
NEUTROPHILS NFR BLD AUTO: 79.5 % — HIGH (ref 43–77)
NITRITE UR-MCNC: POSITIVE
NRBC # BLD: 0 /100 WBCS — SIGNIFICANT CHANGE UP (ref 0–0)
PH UR: 7 — SIGNIFICANT CHANGE UP (ref 5–8)
PLATELET # BLD AUTO: 342 K/UL — SIGNIFICANT CHANGE UP (ref 150–400)
POTASSIUM SERPL-MCNC: 4.4 MMOL/L — SIGNIFICANT CHANGE UP (ref 3.5–5.3)
POTASSIUM SERPL-SCNC: 4.4 MMOL/L — SIGNIFICANT CHANGE UP (ref 3.5–5.3)
PROT SERPL-MCNC: 5.9 G/DL — LOW (ref 6–8.3)
PROT UR-MCNC: SIGNIFICANT CHANGE UP MG/DL
RBC # BLD: 3.55 M/UL — LOW (ref 3.8–5.2)
RBC # FLD: 13 % — SIGNIFICANT CHANGE UP (ref 10.3–14.5)
RBC CASTS # UR COMP ASSIST: 0 /HPF — SIGNIFICANT CHANGE UP (ref 0–4)
SODIUM SERPL-SCNC: 134 MMOL/L — LOW (ref 135–145)
SP GR SPEC: 1.02 — SIGNIFICANT CHANGE UP (ref 1–1.03)
UROBILINOGEN FLD QL: 2 MG/DL (ref 0.2–1)
WBC # BLD: 11.3 K/UL — HIGH (ref 3.8–10.5)
WBC # FLD AUTO: 11.3 K/UL — HIGH (ref 3.8–10.5)
WBC UR QL: 15 /HPF — HIGH (ref 0–5)

## 2023-06-19 PROCEDURE — 99232 SBSQ HOSP IP/OBS MODERATE 35: CPT | Mod: GC

## 2023-06-19 RX ORDER — LOSARTAN POTASSIUM 100 MG/1
100 TABLET, FILM COATED ORAL DAILY
Refills: 0 | Status: DISCONTINUED | OUTPATIENT
Start: 2023-06-20 | End: 2023-06-21

## 2023-06-19 RX ORDER — SODIUM CHLORIDE 9 MG/ML
1000 INJECTION INTRAMUSCULAR; INTRAVENOUS; SUBCUTANEOUS ONCE
Refills: 0 | Status: COMPLETED | OUTPATIENT
Start: 2023-06-19 | End: 2023-06-19

## 2023-06-19 RX ADMIN — Medication 500000 UNIT(S): at 00:39

## 2023-06-19 RX ADMIN — POLYETHYLENE GLYCOL 3350 17 GRAM(S): 17 POWDER, FOR SOLUTION ORAL at 11:18

## 2023-06-19 RX ADMIN — Medication 500000 UNIT(S): at 05:27

## 2023-06-19 RX ADMIN — Medication 400 MILLIGRAM(S): at 05:27

## 2023-06-19 RX ADMIN — Medication 2: at 11:27

## 2023-06-19 RX ADMIN — APIXABAN 5 MILLIGRAM(S): 2.5 TABLET, FILM COATED ORAL at 05:27

## 2023-06-19 RX ADMIN — APIXABAN 5 MILLIGRAM(S): 2.5 TABLET, FILM COATED ORAL at 17:22

## 2023-06-19 RX ADMIN — Medication 500000 UNIT(S): at 11:17

## 2023-06-19 RX ADMIN — SODIUM CHLORIDE 1000 MILLILITER(S): 9 INJECTION INTRAMUSCULAR; INTRAVENOUS; SUBCUTANEOUS at 17:22

## 2023-06-19 RX ADMIN — ATORVASTATIN CALCIUM 40 MILLIGRAM(S): 80 TABLET, FILM COATED ORAL at 22:18

## 2023-06-19 RX ADMIN — Medication 400 MILLIGRAM(S): at 22:18

## 2023-06-19 RX ADMIN — Medication 500000 UNIT(S): at 23:05

## 2023-06-19 RX ADMIN — AMLODIPINE BESYLATE 10 MILLIGRAM(S): 2.5 TABLET ORAL at 05:27

## 2023-06-19 RX ADMIN — PANTOPRAZOLE SODIUM 40 MILLIGRAM(S): 20 TABLET, DELAYED RELEASE ORAL at 11:18

## 2023-06-19 RX ADMIN — Medication 2: at 08:11

## 2023-06-19 RX ADMIN — LOSARTAN POTASSIUM 100 MILLIGRAM(S): 100 TABLET, FILM COATED ORAL at 05:27

## 2023-06-19 RX ADMIN — Medication 500000 UNIT(S): at 17:22

## 2023-06-19 NOTE — PROGRESS NOTE ADULT - ASSESSMENT
AUDI DYKES is a 82 year old, Georgian speaking, woman with PMH of HTN, AFIB (on Xarelto), Hepatitis C (on Entecavir); who presented to [facility] ED on 6/1 with acute onset AMS. LKN was 10pm, at 10:30pm she became acutely altered and with poor posture. Upon arrival, she was noted to have decreased arousal, severe R sided weakness, slurred speech and aphasia requiring intubation. 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 She was not an appropriate tenecteplase candidate due to INR of 1.79 and was taken for mechanical thrombectomy. SLP recommended NPO status on 6/8, a repeat MBS was performed on 6/13 without improvement and a PEG tube was placed on 6/14.  Patient now admitted for a multidisciplinary rehab program.   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 Mcdaniel (Heme)     #AFIB  - Eliquis     #HTN  - Amlodipine  - Labetalol 400mg q 8 hrs  - Losartan 100mg daily  BPs low this AM- hospitalist DCd amlodipine- parameters for losartan placed  Syst BP in PT- 86- Will Bolus with NS    #Hepatitis C  - Entecavir daily   - Hold feeds 2 hours before and 2 hours after medication administration     #R breast mass  - Mammogram outpatient  - Oncology follow up outpatient    #Oral Candida  - Nystatin  - Swish and suction     #Sleep  - melatonin PRN     #Skin  - Skin on admission: skin tear to mid and left chest wall  - Pressure injury/Skin: OOB to Chair, PT/OT   - Bacitracin    #Pain Mgmt   - Tylenol PRN, Oxycodone PRN    #GI/Bowel Mgmt   - Pantoprazole  - Continent c/w Senna, Miralax     #/Bladder Mgmt   -  PVR q8h, CIC>400cc  no spont void-on CIC  Check UA    Leukocytosis WBC 11k  Check UA    #FEN   - Diet - NPO with tube feeds  - Glucerna  - Dysphagia  SLP - evaluation and treatment    #Precautions / PROPHYLAXIS:   - Falls  - ortho: Weight bearing status: WBAT   - Lungs: Aspiration, Incentive Spirometer   - DVT: Eliquis

## 2023-06-19 NOTE — PROGRESS NOTE ADULT - SUBJECTIVE AND OBJECTIVE BOX
Chief complaint- SP CVA    Andres Falcon is an 82 year old, Sami speaking, woman with PMH of HTN, AFIB (on Xarelto), Hepatitis C (on Entecavir); who presented to [facility] ED on 6/1 with acute onset AMS. LKN was 10pm, at 10:30pm she became acutely altered and with poor posture. Upon arrival, she was noted to have decreased arousal, severe R sided weakness, slurred speech and aphasia requiring intubation. 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 She was not an appropriate tenecteplase candidate due to INR of 1.79 and was taken for mechanical thrombectomy. SLP recommended NPO status on 6/8, a repeat MBS was performed on 6/13 without improvement and a PEG tube was placed on 6/14. Further imaging was significant for R breast mass and she was recommended for outpatient follow up and imaging. PM&R was consulted and deemed her an appropriate candidate for IRF. She was medically stabilized and cleared for discharge to [facility].     ROS/subjective:  patient seen and examined bedside  Awake alert but hypophonic, aphasic-  ineffective  no spont void- st caths 600, 800cc  last BM 6/18  no apparent pain   ROS not obtainable secondary to aphasia    MEDICATIONS  (STANDING):  apixaban 5 milliGRAM(s) Oral two times a day  atorvastatin 40 milliGRAM(s) Oral at bedtime  dextrose 5%. 1000 milliLiter(s) (50 mL/Hr) IV Continuous <Continuous>  dextrose 5%. 1000 milliLiter(s) (100 mL/Hr) IV Continuous <Continuous>  dextrose 50% Injectable 12.5 Gram(s) IV Push once  dextrose 50% Injectable 25 Gram(s) IV Push once  dextrose 50% Injectable 25 Gram(s) IV Push once  glucagon  Injectable 1 milliGRAM(s) IntraMuscular once  insulin lispro (ADMELOG) corrective regimen sliding scale   SubCutaneous three times a day before meals  insulin lispro (ADMELOG) corrective regimen sliding scale   SubCutaneous at bedtime  labetalol 400 milliGRAM(s) Oral every 8 hours  losartan 100 milliGRAM(s) Oral daily  nystatin    Suspension 937453 Unit(s) Oral every 6 hours  pantoprazole   Suspension 40 milliGRAM(s) Oral daily  polyethylene glycol 3350 17 Gram(s) Oral daily    MEDICATIONS  (PRN):  bacitracin   Ointment 1 Application(s) Topical three times a day PRN to skin wound  dextrose Oral Gel 15 Gram(s) Oral once PRN Blood Glucose LESS THAN 70 milliGRAM(s)/deciliter                            10.8   11.30 )-----------( 342      ( 19 Jun 2023 06:26 )             32.7     06-19    134<L>  |  99  |  37<H>  ----------------------------<  149<H>  4.4   |  28  |  0.89    Ca    8.6      19 Jun 2023 06:26    TPro  5.9<L>  /  Alb  2.2<L>  /  TBili  0.8  /  DBili  x   /  AST  46<H>  /  ALT  38  /  AlkPhos  149<H>  06-19        CAPILLARY BLOOD GLUCOSE      POCT Blood Glucose.: 158 mg/dL (19 Jun 2023 11:26)  POCT Blood Glucose.: 152 mg/dL (19 Jun 2023 07:50)  POCT Blood Glucose.: 180 mg/dL (18 Jun 2023 21:05)  POCT Blood Glucose.: 140 mg/dL (18 Jun 2023 16:44)      Vital Signs Last 24 Hrs  T(C): 36.3 (19 Jun 2023 08:41), Max: 36.4 (18 Jun 2023 21:09)  T(F): 97.3 (19 Jun 2023 08:41), Max: 97.5 (18 Jun 2023 21:09)  HR: 79 (19 Jun 2023 13:10) (77 - 91)  BP: 112/68 (19 Jun 2023 13:10) (94/61 - 136/75)  BP(mean): --  RR: 16 (19 Jun 2023 13:10) (16 - 16)  SpO2: 97% (19 Jun 2023 13:10) (96% - 97%)    Parameters below as of 19 Jun 2023 13:10  Patient On (Oxygen Delivery Method): room air      Gen - NAD, Comfortable, nonverbal  HEENT - NCAT, EOMI, MMM  Neck - Supple, No limited ROM  Pulm - CTAB, No wheeze  Cardiovascular - RRR, S1S2  Chest - good chest expansion, good respiratory effort  Abdomen - Soft, NT/ND, +BS, + PEG no suprapubic tenderness  Extremities - No Cyanosis, no clubbing, no edema, no calf tenderness  Neuro-     Cognitive - awake, alert aphasic follows visual commands     Communication - hypophonic, non -fluent, difficulty with prompting     Cranial Nerves - right facial weakness, decreased right  shoulder shrug      Motor -  Right  hemiparesis neglect low tone                    LEFT    UE - 5/5                    RIGHT UE - 0/5                    LEFT    LE - 5/5                    RIGHT LE - 0/5       Tone - normal  Psychiatric - Mood stable, Affect WNL  Skin:  skin tear to mid and left chest wall- diffuse ecchymoses chest wall/ extremities    Rehab eval in Progress

## 2023-06-20 LAB
GLUCOSE BLDC GLUCOMTR-MCNC: 110 MG/DL — HIGH (ref 70–99)
GLUCOSE BLDC GLUCOMTR-MCNC: 126 MG/DL — HIGH (ref 70–99)
GLUCOSE BLDC GLUCOMTR-MCNC: 163 MG/DL — HIGH (ref 70–99)
GLUCOSE BLDC GLUCOMTR-MCNC: 190 MG/DL — HIGH (ref 70–99)

## 2023-06-20 PROCEDURE — 99233 SBSQ HOSP IP/OBS HIGH 50: CPT

## 2023-06-20 PROCEDURE — 99233 SBSQ HOSP IP/OBS HIGH 50: CPT | Mod: GC

## 2023-06-20 RX ORDER — CEFUROXIME AXETIL 250 MG
250 TABLET ORAL EVERY 12 HOURS
Refills: 0 | Status: DISCONTINUED | OUTPATIENT
Start: 2023-06-20 | End: 2023-06-22

## 2023-06-20 RX ADMIN — PANTOPRAZOLE SODIUM 40 MILLIGRAM(S): 20 TABLET, DELAYED RELEASE ORAL at 12:38

## 2023-06-20 RX ADMIN — Medication 500000 UNIT(S): at 22:35

## 2023-06-20 RX ADMIN — Medication 500000 UNIT(S): at 12:38

## 2023-06-20 RX ADMIN — Medication 400 MILLIGRAM(S): at 07:55

## 2023-06-20 RX ADMIN — Medication 500000 UNIT(S): at 17:39

## 2023-06-20 RX ADMIN — POLYETHYLENE GLYCOL 3350 17 GRAM(S): 17 POWDER, FOR SOLUTION ORAL at 12:38

## 2023-06-20 RX ADMIN — LOSARTAN POTASSIUM 100 MILLIGRAM(S): 100 TABLET, FILM COATED ORAL at 07:55

## 2023-06-20 RX ADMIN — Medication 250 MILLIGRAM(S): at 17:38

## 2023-06-20 RX ADMIN — Medication 500000 UNIT(S): at 05:45

## 2023-06-20 RX ADMIN — APIXABAN 5 MILLIGRAM(S): 2.5 TABLET, FILM COATED ORAL at 17:39

## 2023-06-20 RX ADMIN — ATORVASTATIN CALCIUM 40 MILLIGRAM(S): 80 TABLET, FILM COATED ORAL at 22:35

## 2023-06-20 RX ADMIN — Medication 2: at 07:55

## 2023-06-20 RX ADMIN — Medication 400 MILLIGRAM(S): at 22:36

## 2023-06-20 RX ADMIN — Medication 2: at 12:37

## 2023-06-20 RX ADMIN — APIXABAN 5 MILLIGRAM(S): 2.5 TABLET, FILM COATED ORAL at 05:44

## 2023-06-20 NOTE — PROGRESS NOTE ADULT - SUBJECTIVE AND OBJECTIVE BOX
Patient is a 82y old  Female who presents with a chief complaint of Functional deficits s/p CVA (2023 13:24)      INTERVAL History of Present Illness/OVERNIGHT EVENTS: Rosetta # [id number] Manuel Lebron does not report any complaints     MEDICATIONS  (STANDING):  apixaban 5 milliGRAM(s) Oral two times a day  atorvastatin 40 milliGRAM(s) Oral at bedtime  dextrose 5%. 1000 milliLiter(s) (100 mL/Hr) IV Continuous <Continuous>  dextrose 5%. 1000 milliLiter(s) (50 mL/Hr) IV Continuous <Continuous>  dextrose 50% Injectable 25 Gram(s) IV Push once  dextrose 50% Injectable 12.5 Gram(s) IV Push once  dextrose 50% Injectable 25 Gram(s) IV Push once  glucagon  Injectable 1 milliGRAM(s) IntraMuscular once  insulin lispro (ADMELOG) corrective regimen sliding scale   SubCutaneous three times a day before meals  insulin lispro (ADMELOG) corrective regimen sliding scale   SubCutaneous at bedtime  labetalol 400 milliGRAM(s) Oral every 8 hours  losartan 100 milliGRAM(s) Oral daily  nystatin    Suspension 382436 Unit(s) Oral every 6 hours  pantoprazole   Suspension 40 milliGRAM(s) Oral daily  polyethylene glycol 3350 17 Gram(s) Oral daily    MEDICATIONS  (PRN):  bacitracin   Ointment 1 Application(s) Topical three times a day PRN to skin wound  dextrose Oral Gel 15 Gram(s) Oral once PRN Blood Glucose LESS THAN 70 milliGRAM(s)/deciliter      Allergies    No Known Allergies    Intolerances        REVIEW OF SYSTEMS:  Other systems currently negative unless otherwise specified above.    Vital Signs Last 24 Hrs  T(C): 36.6 (2023 07:38), Max: 37.3 (2023 20:06)  T(F): 97.8 (2023 07:38), Max: 99.2 (2023 20:06)  HR: 83 (2023 07:38) (77 - 83)  BP: 147/87 (2023 07:38) (94/61 - 147/87)  BP(mean): --  RR: 15 (2023 07:38) (15 - 16)  SpO2: 93% (2023 07:38) (93% - 97%)    Parameters below as of 2023 07:38  Patient On (Oxygen Delivery Method): room air            PHYSICAL EXAM:  GENERAL: No apparent distress, appears stated age  EYES: Conjunctiva and sclera clear, no discharge  ENMT: Moist mucous membranes, no nasal discharge  CHEST/LUNG: Clear to auscultation bilaterally, no wheeze or rales  HEART: Regular rhythm, no rubs or gallops  ABDOMEN: Soft, Nontender, +PEG  EXTREMITIES:  No clubbing, cyanosis or edema  Right hemiparesis        LABS:      Ca    8.6        2023 06:26        Urinalysis Basic - ( 2023 14:25 )    Color: Dark Yellow / Appearance: Cloudy / S.016 / pH: x  Gluc: x / Ketone: Negative mg/dL  / Bili: Negative / Urobili: 2.0 mg/dL   Blood: x / Protein: Trace mg/dL / Nitrite: Positive   Leuk Esterase: Large / RBC: 0 /HPF / WBC 15 /HPF   Sq Epi: x / Non Sq Epi: x / Bacteria: Many /HPF      CAPILLARY BLOOD GLUCOSE      POCT Blood Glucose.: 163 mg/dL (2023 07:53)  POCT Blood Glucose.: 200 mg/dL (2023 22:15)  POCT Blood Glucose.: 127 mg/dL (2023 16:20)  POCT Blood Glucose.: 158 mg/dL (2023 11:26)        RADIOLOGY & ADDITIONAL TESTS:      Images reviewed personally    Consultant Notes Reviewed and Care Discussed with relevant Consultants.

## 2023-06-20 NOTE — PROGRESS NOTE ADULT - SUBJECTIVE AND OBJECTIVE BOX
Chief complaint- SP CVA    Andres Falcon is an 82 year old, Malay speaking, woman with PMH of HTN, AFIB (on Xarelto), Hepatitis C (on Entecavir); who presented to [facility] ED on 6/1 with acute onset AMS. LKN was 10pm, at 10:30pm she became acutely altered and with poor posture. Upon arrival, she was noted to have decreased arousal, severe R sided weakness, slurred speech and aphasia requiring intubation. 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 She was not an appropriate tenecteplase candidate due to INR of 1.79 and was taken for mechanical thrombectomy. SLP recommended NPO status on 6/8, a repeat MBS was performed on 6/13 without improvement and a PEG tube was placed on 6/14. Further imaging was significant for R breast mass and she was recommended for outpatient follow up and imaging. PM&R was consulted and deemed her an appropriate candidate for IRF. She was medically stabilized and cleared for discharge to [facility].     ROS/subjective:  patient seen and examined bedside  Awake alert but hypophonic, aphasic-  ineffective  no spont void- st caths 600, 800cc  last BM 6/18  no apparent pain   ROS not obtainable secondary to aphasia  Will Get ENT for the hypophonia.       MEDICATIONS  (STANDING):  apixaban 5 milliGRAM(s) Oral two times a day  atorvastatin 40 milliGRAM(s) Oral at bedtime  cefuroxime   Tablet 250 milliGRAM(s) Oral every 12 hours  dextrose 5%. 1000 milliLiter(s) (50 mL/Hr) IV Continuous <Continuous>  dextrose 5%. 1000 milliLiter(s) (100 mL/Hr) IV Continuous <Continuous>  dextrose 50% Injectable 12.5 Gram(s) IV Push once  dextrose 50% Injectable 25 Gram(s) IV Push once  dextrose 50% Injectable 25 Gram(s) IV Push once  glucagon  Injectable 1 milliGRAM(s) IntraMuscular once  insulin lispro (ADMELOG) corrective regimen sliding scale   SubCutaneous three times a day before meals  insulin lispro (ADMELOG) corrective regimen sliding scale   SubCutaneous at bedtime  labetalol 400 milliGRAM(s) Oral every 8 hours  losartan 100 milliGRAM(s) Oral daily  nystatin    Suspension 425919 Unit(s) Oral every 6 hours  pantoprazole   Suspension 40 milliGRAM(s) Oral daily  polyethylene glycol 3350 17 Gram(s) Oral daily    MEDICATIONS  (PRN):  bacitracin   Ointment 1 Application(s) Topical three times a day PRN to skin wound  dextrose Oral Gel 15 Gram(s) Oral once PRN Blood Glucose LESS THAN 70 milliGRAM(s)/deciliter    LABS                        10.8   11.30 )-----------( 342      ( 19 Jun 2023 06:26 )             32.7     06-19    134<L>  |  99  |  37<H>  ----------------------------<  149<H>  4.4   |  28  |  0.89    Ca    8.6      19 Jun 2023 06:26    TPro  5.9<L>  /  Alb  2.2<L>  /  TBili  0.8  /  DBili  x   /  AST  46<H>  /  ALT  38  /  AlkPhos  149<H>  06-19        CAPILLARY BLOOD GLUCOSE      POCT Blood Glucose.: 163 mg/dL (20 Jun 2023 07:53)  POCT Blood Glucose.: 200 mg/dL (19 Jun 2023 22:15)  POCT Blood Glucose.: 127 mg/dL (19 Jun 2023 16:20)  POCT Blood Glucose.: 158 mg/dL (19 Jun 2023 11:26)        ICU Vital Signs Last 24 Hrs  T(C): 36.6 (20 Jun 2023 07:38), Max: 37.3 (19 Jun 2023 20:06)  T(F): 97.8 (20 Jun 2023 07:38), Max: 99.2 (19 Jun 2023 20:06)  HR: 83 (20 Jun 2023 07:38) (79 - 83)  BP: 147/87 (20 Jun 2023 07:38) (94/62 - 147/87)  BP(mean): --  ABP: --  ABP(mean): --  RR: 15 (20 Jun 2023 07:38) (15 - 16)  SpO2: 93% (20 Jun 2023 07:38) (93% - 97%)    O2 Parameters below as of 20 Jun 2023 07:38  Patient On (Oxygen Delivery Method): room air    PHSYSICAL       Gen - NAD, Comfortable, nonverbal  HEENT - NCAT, EOMI, MMM  Neck - Supple, No limited ROM  Pulm - CTAB, No wheeze  Cardiovascular - RRR, S1S2  Chest - good chest expansion, good respiratory effort  Abdomen - Soft, NT/ND, +BS, + PEG no suprapubic tenderness  Extremities - No Cyanosis, no clubbing, no edema, no calf tenderness  Neuro-     Cognitive - awake, alert aphasic follows visual commands     Communication - hypophonic, non -fluent, difficulty with prompting     Cranial Nerves - right facial weakness, decreased right  shoulder shrug      Motor -  Right  hemiparesis neglect low tone                    LEFT    UE - 5/5                    RIGHT UE - 0/5                    LEFT    LE - 5/5                    RIGHT LE - 0/5       Tone - normal  Psychiatric - Mood stable, Affect WNL  Skin:  skin tear to mid and left chest wall- diffuse ecchymoses chest wall/ extremities    Rehab eval in Progress     Chief complaint- SP CVA    Andres Falcon is an 82 year old, Macedonian speaking, woman with PMH of HTN, AFIB (on Xarelto), Hepatitis C (on Entecavir); who presented to [facility] ED on 6/1 with acute onset AMS. LKN was 10pm, at 10:30pm she became acutely altered and with poor posture. Upon arrival, she was noted to have decreased arousal, severe R sided weakness, slurred speech and aphasia requiring intubation. 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 She was not an appropriate tenecteplase candidate due to INR of 1.79 and was taken for mechanical thrombectomy. SLP recommended NPO status on 6/8, a repeat MBS was performed on 6/13 without improvement and a PEG tube was placed on 6/14. Further imaging was significant for R breast mass and she was recommended for outpatient follow up and imaging. PM&R was consulted and deemed her an appropriate candidate for IRF. She was medically stabilized and cleared for discharge to [facility].     ROS/subjective:  patient seen and examined bedside  Awake alert but hypophonic, aphasic-  ineffective  no spont void- st caths 600, 800cc  last BM 6/18  no apparent pain   ROS not obtainable secondary to aphasia  Will Get ENT for the hypophonia.       MEDICATIONS  (STANDING):  apixaban 5 milliGRAM(s) Oral two times a day  atorvastatin 40 milliGRAM(s) Oral at bedtime  cefuroxime   Tablet 250 milliGRAM(s) Oral every 12 hours  dextrose 5%. 1000 milliLiter(s) (50 mL/Hr) IV Continuous <Continuous>  dextrose 5%. 1000 milliLiter(s) (100 mL/Hr) IV Continuous <Continuous>  dextrose 50% Injectable 12.5 Gram(s) IV Push once  dextrose 50% Injectable 25 Gram(s) IV Push once  dextrose 50% Injectable 25 Gram(s) IV Push once  glucagon  Injectable 1 milliGRAM(s) IntraMuscular once  insulin lispro (ADMELOG) corrective regimen sliding scale   SubCutaneous three times a day before meals  insulin lispro (ADMELOG) corrective regimen sliding scale   SubCutaneous at bedtime  labetalol 400 milliGRAM(s) Oral every 8 hours  losartan 100 milliGRAM(s) Oral daily  nystatin    Suspension 992157 Unit(s) Oral every 6 hours  pantoprazole   Suspension 40 milliGRAM(s) Oral daily  polyethylene glycol 3350 17 Gram(s) Oral daily    MEDICATIONS  (PRN):  bacitracin   Ointment 1 Application(s) Topical three times a day PRN to skin wound  dextrose Oral Gel 15 Gram(s) Oral once PRN Blood Glucose LESS THAN 70 milliGRAM(s)/deciliter    LABS                        10.8   11.30 )-----------( 342      ( 19 Jun 2023 06:26 )             32.7     06-19    134<L>  |  99  |  37<H>  ----------------------------<  149<H>  4.4   |  28  |  0.89    Ca    8.6      19 Jun 2023 06:26    TPro  5.9<L>  /  Alb  2.2<L>  /  TBili  0.8  /  DBili  x   /  AST  46<H>  /  ALT  38  /  AlkPhos  149<H>  06-19        CAPILLARY BLOOD GLUCOSE      POCT Blood Glucose.: 163 mg/dL (20 Jun 2023 07:53)  POCT Blood Glucose.: 200 mg/dL (19 Jun 2023 22:15)  POCT Blood Glucose.: 127 mg/dL (19 Jun 2023 16:20)  POCT Blood Glucose.: 158 mg/dL (19 Jun 2023 11:26)        ICU Vital Signs Last 24 Hrs  T(C): 36.6 (20 Jun 2023 07:38), Max: 37.3 (19 Jun 2023 20:06)  T(F): 97.8 (20 Jun 2023 07:38), Max: 99.2 (19 Jun 2023 20:06)  HR: 83 (20 Jun 2023 07:38) (79 - 83)  BP: 147/87 (20 Jun 2023 07:38) (94/62 - 147/87)  BP(mean): --  ABP: --  ABP(mean): --  RR: 15 (20 Jun 2023 07:38) (15 - 16)  SpO2: 93% (20 Jun 2023 07:38) (93% - 97%)    O2 Parameters below as of 20 Jun 2023 07:38  Patient On (Oxygen Delivery Method): room air    PHSYSICAL       Gen - NAD, Comfortable, nonverbal  HEENT - NCAT, EOMI, MMM  Neck - Supple, No limited ROM  Pulm - CTAB, No wheeze  Cardiovascular - RRR, S1S2  Chest - good chest expansion, good respiratory effort  Abdomen - Soft, NT/ND, +BS, + PEG no suprapubic tenderness  Extremities - No Cyanosis, no clubbing, no edema, no calf tenderness  Neuro-     Cognitive - awake, alert aphasic follows visual commands     Communication - hypophonic, non -fluent, difficulty with prompting     Cranial Nerves - right facial weakness, decreased right  shoulder shrug      Motor -  Right  hemiparesis neglect low tone                    LEFT    UE - 5/5                    RIGHT UE - 0/5                    LEFT    LE - 5/5                    RIGHT LE - 0/5       Tone - normal  Psychiatric - Mood stable, Affect WNL  Skin:  skin tear to mid and left chest wall- diffuse ecchymoses chest wall/ extremities    IDT - 6/20    SW- 7 steps to get in. 15 steps inside. Daugther was helping prior.   SP - NPO. With PEG. Mild to mod cognitive. MBS thursday.   OT- Total assist for most ADL's. She has good trunk control.   PT-  Transfer - Max assist x2.   Goal -  Mod assist.   TDD- 7/7  to MEMO      Chief complaint- SP CVA    Andres Falcon is an 82 year old, Indonesian speaking, woman with PMH of HTN, AFIB (on Xarelto), Hepatitis C (on Entecavir); who presented to [facility] ED on 6/1 with acute onset AMS. LKN was 10pm, at 10:30pm she became acutely altered and with poor posture. Upon arrival, she was noted to have decreased arousal, severe R sided weakness, slurred speech and aphasia requiring intubation. 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 She was not an appropriate tenecteplase candidate due to INR of 1.79 and was taken for mechanical thrombectomy. SLP recommended NPO status on 6/8, a repeat MBS was performed on 6/13 without improvement and a PEG tube was placed on 6/14. Further imaging was significant for R breast mass and she was recommended for outpatient follow up and imaging. PM&R was consulted and deemed her an appropriate candidate for IRF. She was medically stabilized and cleared for discharge to [facility].     ROS/subjective:  patient seen and examined bedside  Awake alert but hypophonic, aphasic-  ineffective  no spont void-PVRs 225cc then 157cc then 341cc?  last BM 6/18  no apparent pain   ROS not obtainable secondary to aphasia  Will Get ENT for the hypophonia.       MEDICATIONS  (STANDING):  apixaban 5 milliGRAM(s) Oral two times a day  atorvastatin 40 milliGRAM(s) Oral at bedtime  cefuroxime   Tablet 250 milliGRAM(s) Oral every 12 hours  dextrose 5%. 1000 milliLiter(s) (50 mL/Hr) IV Continuous <Continuous>  dextrose 5%. 1000 milliLiter(s) (100 mL/Hr) IV Continuous <Continuous>  dextrose 50% Injectable 12.5 Gram(s) IV Push once  dextrose 50% Injectable 25 Gram(s) IV Push once  dextrose 50% Injectable 25 Gram(s) IV Push once  glucagon  Injectable 1 milliGRAM(s) IntraMuscular once  insulin lispro (ADMELOG) corrective regimen sliding scale   SubCutaneous three times a day before meals  insulin lispro (ADMELOG) corrective regimen sliding scale   SubCutaneous at bedtime  labetalol 400 milliGRAM(s) Oral every 8 hours  losartan 100 milliGRAM(s) Oral daily  nystatin    Suspension 521762 Unit(s) Oral every 6 hours  pantoprazole   Suspension 40 milliGRAM(s) Oral daily  polyethylene glycol 3350 17 Gram(s) Oral daily    MEDICATIONS  (PRN):  bacitracin   Ointment 1 Application(s) Topical three times a day PRN to skin wound  dextrose Oral Gel 15 Gram(s) Oral once PRN Blood Glucose LESS THAN 70 milliGRAM(s)/deciliter    LABS                        10.8   11.30 )-----------( 342      ( 19 Jun 2023 06:26 )             32.7     06-19    134<L>  |  99  |  37<H>  ----------------------------<  149<H>  4.4   |  28  |  0.89    Ca    8.6      19 Jun 2023 06:26    TPro  5.9<L>  /  Alb  2.2<L>  /  TBili  0.8  /  DBili  x   /  AST  46<H>  /  ALT  38  /  AlkPhos  149<H>  06-19        CAPILLARY BLOOD GLUCOSE      POCT Blood Glucose.: 163 mg/dL (20 Jun 2023 07:53)  POCT Blood Glucose.: 200 mg/dL (19 Jun 2023 22:15)  POCT Blood Glucose.: 127 mg/dL (19 Jun 2023 16:20)  POCT Blood Glucose.: 158 mg/dL (19 Jun 2023 11:26)        ICU Vital Signs Last 24 Hrs  T(C): 36.6 (20 Jun 2023 07:38), Max: 37.3 (19 Jun 2023 20:06)  T(F): 97.8 (20 Jun 2023 07:38), Max: 99.2 (19 Jun 2023 20:06)  HR: 83 (20 Jun 2023 07:38) (79 - 83)  BP: 147/87 (20 Jun 2023 07:38) (94/62 - 147/87)  BP(mean): --  ABP: --  ABP(mean): --  RR: 15 (20 Jun 2023 07:38) (15 - 16)  SpO2: 93% (20 Jun 2023 07:38) (93% - 97%)    O2 Parameters below as of 20 Jun 2023 07:38  Patient On (Oxygen Delivery Method): room air    PHSYSICAL       Gen - NAD, Comfortable, nonverbal  HEENT - NCAT, EOMI, MMM  Neck - Supple, No limited ROM  Pulm - CTAB, No wheeze  Cardiovascular - RRR, S1S2  Chest - good chest expansion, good respiratory effort  Abdomen - Soft, NT/ND, +BS, + PEG no suprapubic tenderness  Extremities - No Cyanosis, no clubbing, no edema, no calf tenderness  Neuro-     Cognitive - awake, alert follows visual commands     Communication - hypophonic, non -fluent, difficulty with prompting     Cranial Nerves - right facial weakness, decreased right  shoulder shrug      Motor -  Right  hemiparesis neglect low tone                    LEFT    UE - 5/5                    RIGHT UE - 0/5                    LEFT    LE - 5/5                    RIGHT LE - 0/5       Tone - normal  Psychiatric - Mood stable, Affect WNL  Skin:  skin tear to mid and left chest wall- diffuse ecchymoses chest wall/ extremities    IDT - 6/20    SW- 7 steps to get in. 15 steps inside. Daugther was helping prior.   SP - NPO. With PEG. Mild to mod cognitive. MBS thursday.   OT- Total assist for most ADL's. She has good trunk control.   PT-  Transfer - Max assist x2.   Goal -  Mod assist.   TDD- 7/7  to MEMO

## 2023-06-20 NOTE — PROGRESS NOTE ADULT - ASSESSMENT
Assessment/Plan:  82 year old, Syriac speaking, woman with hx of HTN, AF (xarelto), HCV (entecavir) that presented to [facility] ED on 6/1 with AMS and underwent imaging and noted to have L MCA infarct with L MCA occlusion but not a candidate for tPa given elevated INR and thus underwent mechanical thrombectomy. Felt to be due to cardioembolic as patient was not actively taking xarelto. She was started on eliquis. Unable to pass SLP testing and underwent PEG placement. She is now admitted to [facility] for acute rehabilitation.          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 Mcdaniel (Heme)   - Eliquis      Atrial Fibrillation  - Eliquis   - Labetalol 400mg q 8 hrs    Hypertension  Blood pressure meds with hold parameters     Pre-DM  - a1c 5.9  - started on ISS  - nutrition follow to adjust PEG feeding    Hepatitis C  - Entecavir daily .     R breast mass  - Mammogram outpatient  - Oncology follow up outpatient    Oral Candida  - Nystatin  - Swish and suction   - oral hygiene    Leukocytosis  Possible acute cystitis  - +UA  - consider ceftin 250q12 once urine culture sent    Insomnia  - melatonin PRN     d/w Dr. Pollock

## 2023-06-20 NOTE — PROGRESS NOTE ADULT - ASSESSMENT
AUDI DYKES is a 82 year old, Slovenian speaking, woman with PMH of HTN, AFIB (on Xarelto), Hepatitis C (on Entecavir); who presented to [facility] ED on 6/1 with acute onset AMS. LKN was 10pm, at 10:30pm she became acutely altered and with poor posture. Upon arrival, she was noted to have decreased arousal, severe R sided weakness, slurred speech and aphasia requiring intubation. 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 She was not an appropriate tenecteplase candidate due to INR of 1.79 and was taken for mechanical thrombectomy. SLP recommended NPO status on 6/8, a repeat MBS was performed on 6/13 without improvement and a PEG tube was placed on 6/14.  Patient now admitted for a multidisciplinary rehab program.   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 Mcdaniel (Heme)     #AFIB  - Eliquis     #HTN  - Amlodipine  - Labetalol 400mg q 8 hrs  - Losartan 100mg daily  BPs low- hospitalist DCd amlodipine- parameters for losartan placed    #Hepatitis C  - Entecavir daily   - Hold feeds 2 hours before and 2 hours after medication administration     #R breast mass  - Mammogram outpatient  - Oncology follow up outpatient    #Oral Candida  - Nystatin  - Swish and suction     #Sleep  - melatonin PRN     #Skin  - Skin on admission: skin tear to mid and left chest wall  - Pressure injury/Skin: OOB to Chair, PT/OT   - Bacitracin    #Pain Mgmt   - Tylenol PRN, Oxycodone PRN    #GI/Bowel Mgmt   - Pantoprazole  - Continent c/w Senna, Miralax     #/Bladder Mgmt   -  PVR q8h, CIC>400cc  -  + UA  - Started on cefitin for UTI.       #FEN   - Diet - NPO with tube feeds  - Glucerna  - Dysphagia  SLP - evaluation and treatment    #Precautions / PROPHYLAXIS:   - Falls  - ortho: Weight bearing status: WBAT   - Lungs: Aspiration, Incentive Spirometer   - DVT: Eliquis AUDI DYKES is a 82 year old, Wolof speaking, woman with PMH of HTN, AFIB (on Xarelto), Hepatitis C (on Entecavir); who presented to [facility] ED on 6/1 with acute onset AMS. LKN was 10pm, at 10:30pm she became acutely altered and with poor posture. Upon arrival, she was noted to have decreased arousal, severe R sided weakness, slurred speech and aphasia requiring intubation. 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 She was not an appropriate tenecteplase candidate due to INR of 1.79 and was taken for mechanical thrombectomy. SLP recommended NPO status on 6/8, a repeat MBS was performed on 6/13 without improvement and a PEG tube was placed on 6/14.  Patient now admitted for a multidisciplinary rehab program.   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 Mcdaniel (Heme)     #AFIB  - Eliquis     #HTN  - Amlodipine  - Labetalol 400mg q 8 hrs  - Losartan 100mg daily  BPs low- hospitalist DCd amlodipine- parameters for losartan placed    #Hepatitis C  - Entecavir daily   - Hold feeds 2 hours before and 2 hours after medication administration     #R breast mass  - Mammogram outpatient  - Oncology follow up outpatient    #Oral Candida  - Nystatin  - Swish and suction     #Sleep  - melatonin PRN     #Skin  - Skin on admission: skin tear to mid and left chest wall  - Pressure injury/Skin: OOB to Chair, PT/OT   - Bacitracin    #Pain Mgmt   - Tylenol PRN, Oxycodone PRN    #GI/Bowel Mgmt   - Pantoprazole  - Continent c/w Senna, Miralax     #/Bladder Mgmt   -  PVR q8h, CIC>400cc  -  + UA  - Started on cefitin for UTI.       #FEN   - Diet - NPO with tube feeds  - Glucerna  - Dysphagia  SLP - evaluation and treatment    #Precautions / PROPHYLAXIS:   - Falls  - ortho: Weight bearing status: WBAT   - Lungs: Aspiration, Incentive Spirometer     TDD- 7/7  to MEMO     - DVT: Eliquis AUDI DYKES is a 82 year old, Greek speaking, woman with PMH of HTN, AFIB (on Xarelto), Hepatitis C (on Entecavir); who presented to [facility] ED on 6/1 with acute onset AMS. LKN was 10pm, at 10:30pm she became acutely altered and with poor posture. Upon arrival, she was noted to have decreased arousal, severe R sided weakness, slurred speech and aphasia requiring intubation. 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 She was not an appropriate tenecteplase candidate due to INR of 1.79 and was taken for mechanical thrombectomy. SLP recommended NPO status on 6/8, a repeat MBS was performed on 6/13 without improvement and a PEG tube was placed on 6/14.  Patient now admitted for a multidisciplinary rehab program.   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 Mcdaniel (Heme)     #AFIB  - Eliquis     #HTN  - Amlodipine  - Labetalol 400mg q 8 hrs  - Losartan 100mg daily  BPs low- hospitalist DCd amlodipine- parameters for losartan placed    #Hepatitis C  - Entecavir daily   - Hold feeds 2 hours before and 2 hours after medication administration     #R breast mass  - Mammogram outpatient  - Oncology follow up outpatient    #Oral Candida  - Nystatin  - Swish and suction   ?switch to diflucan- can discuss with hospitalist    #Sleep  - melatonin PRN     #Skin  - Skin on admission: skin tear to mid and left chest wall  - Pressure injury/Skin: OOB to Chair, PT/OT   - Bacitracin    #Pain Mgmt   - Tylenol PRN, Oxycodone PRN    #GI/Bowel Mgmt   - Pantoprazole  - Continent c/w Senna, Miralax     #/Bladder Mgmt   -  PVR q6h, CIC>400cc  -  + UA  - Started on ceftin for UTI.   follow C&S    #FEN   - Diet - NPO with tube feeds  - Glucerna  - Dysphagia  SLP - evaluation and treatment    #Precautions / PROPHYLAXIS:   - Falls  - ortho: Weight bearing status: WBAT   - Lungs: Aspiration, Incentive Spirometer     TDD- 7/7  to MEMO     - DVT: Eliquis

## 2023-06-21 LAB
CULTURE RESULTS: SIGNIFICANT CHANGE UP
GLUCOSE BLDC GLUCOMTR-MCNC: 121 MG/DL — HIGH (ref 70–99)
GLUCOSE BLDC GLUCOMTR-MCNC: 136 MG/DL — HIGH (ref 70–99)
GLUCOSE BLDC GLUCOMTR-MCNC: 143 MG/DL — HIGH (ref 70–99)
GLUCOSE BLDC GLUCOMTR-MCNC: 209 MG/DL — HIGH (ref 70–99)
SPECIMEN SOURCE: SIGNIFICANT CHANGE UP

## 2023-06-21 PROCEDURE — 99232 SBSQ HOSP IP/OBS MODERATE 35: CPT

## 2023-06-21 PROCEDURE — 99232 SBSQ HOSP IP/OBS MODERATE 35: CPT | Mod: GC

## 2023-06-21 RX ORDER — LOSARTAN POTASSIUM 100 MG/1
25 TABLET, FILM COATED ORAL DAILY
Refills: 0 | Status: DISCONTINUED | OUTPATIENT
Start: 2023-06-22 | End: 2023-06-23

## 2023-06-21 RX ADMIN — APIXABAN 5 MILLIGRAM(S): 2.5 TABLET, FILM COATED ORAL at 05:31

## 2023-06-21 RX ADMIN — LOSARTAN POTASSIUM 100 MILLIGRAM(S): 100 TABLET, FILM COATED ORAL at 05:31

## 2023-06-21 RX ADMIN — ATORVASTATIN CALCIUM 40 MILLIGRAM(S): 80 TABLET, FILM COATED ORAL at 23:20

## 2023-06-21 RX ADMIN — Medication 500000 UNIT(S): at 18:29

## 2023-06-21 RX ADMIN — Medication 500000 UNIT(S): at 05:33

## 2023-06-21 RX ADMIN — POLYETHYLENE GLYCOL 3350 17 GRAM(S): 17 POWDER, FOR SOLUTION ORAL at 12:19

## 2023-06-21 RX ADMIN — APIXABAN 5 MILLIGRAM(S): 2.5 TABLET, FILM COATED ORAL at 23:20

## 2023-06-21 RX ADMIN — Medication 500000 UNIT(S): at 23:29

## 2023-06-21 RX ADMIN — Medication 250 MILLIGRAM(S): at 18:26

## 2023-06-21 RX ADMIN — Medication 400 MILLIGRAM(S): at 23:21

## 2023-06-21 RX ADMIN — Medication 250 MILLIGRAM(S): at 05:31

## 2023-06-21 RX ADMIN — Medication 400 MILLIGRAM(S): at 05:31

## 2023-06-21 RX ADMIN — PANTOPRAZOLE SODIUM 40 MILLIGRAM(S): 20 TABLET, DELAYED RELEASE ORAL at 12:21

## 2023-06-21 RX ADMIN — Medication 500000 UNIT(S): at 12:19

## 2023-06-21 NOTE — PROGRESS NOTE ADULT - ASSESSMENT
AUDI DYKES is a 82 year old, Thai speaking, woman with PMH of HTN, AFIB (on Xarelto), Hepatitis C (on Entecavir); who presented to [facility] ED on 6/1 with acute onset AMS. LKN was 10pm, at 10:30pm she became acutely altered and with poor posture. Upon arrival, she was noted to have decreased arousal, severe R sided weakness, slurred speech and aphasia requiring intubation. 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 She was not an appropriate tenecteplase candidate due to INR of 1.79 and was taken for mechanical thrombectomy. SLP recommended NPO status on 6/8, a repeat MBS was performed on 6/13 without improvement and a PEG tube was placed on 6/14.  Patient now admitted for a multidisciplinary rehab program.   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 Mcdaniel (Heme)     #AFIB  - Eliquis     #HTN  - Amlodipine  - Labetalol 400mg q 8 hrs  - Losartan 100mg daily  BPs low- hospitalist DCd amlodipine- parameters for losartan placed  BPs still low- Losartan decreased to 25mg daily    #Hepatitis C  - Entecavir daily   - Hold feeds 2 hours before and 2 hours after medication administration     #R breast mass  - Mammogram outpatient  - Oncology follow up outpatient    #Oral Candida  - Nystatin  - Swish and suction   ?switch to diflucan- can discuss with hospitalist    #Sleep  - melatonin PRN     #Skin  - Skin on admission: skin tear to mid and left chest wall  - Pressure injury/Skin: OOB to Chair, PT/OT   - Bacitracin    #Pain Mgmt   - Tylenol PRN, Oxycodone PRN    #GI/Bowel Mgmt   - Pantoprazole  - Continent c/w Senna, Miralax     #/Bladder Mgmt   -  PVR q6h, CIC>400cc  -  + UA  - Started on ceftin for UTI.    C&S three organisms- contaminant    #FEN   - Diet - NPO with tube feeds  - Glucerna  - Dysphagia  SLP - evaluation and treatment    #Precautions / PROPHYLAXIS:   - Falls  - ortho: Weight bearing status: WBAT   - Lungs: Aspiration, Incentive Spirometer     TDD- 7/7  to MEMO     - DVT: Eliquis AUDI DYKES is a 82 year old, Azeri speaking, woman with PMH of HTN, AFIB (on Xarelto), Hepatitis C (on Entecavir); who presented to [facility] ED on 6/1 with acute onset AMS. LKN was 10pm, at 10:30pm she became acutely altered and with poor posture. Upon arrival, she was noted to have decreased arousal, severe R sided weakness, slurred speech and aphasia requiring intubation. 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 She was not an appropriate tenecteplase candidate due to INR of 1.79 and was taken for mechanical thrombectomy. SLP recommended NPO status on 6/8, a repeat MBS was performed on 6/13 without improvement and a PEG tube was placed on 6/14.  Patient now admitted for a multidisciplinary rehab program.   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 Mcdaniel (Heme)     #AFIB  - Eliquis     #HTN  - Amlodipine  - Labetalol 400mg q 8 hrs  - Losartan 100mg daily  BPs low- hospitalist DCd amlodipine- parameters for losartan placed  BPs still low- Losartan decreased to 25mg daily    #Hepatitis C  - Entecavir daily   - Hold feeds 2 hours before and 2 hours after medication administration     #R breast mass  - Mammogram outpatient  - Oncology follow up outpatient    #Oral Candida  - Nystatin  - Swish and suction   ?switch to diflucan- can discuss with hospitalist    #Sleep  - melatonin PRN     #Skin  - Skin on admission: skin tear to mid and left chest wall  - Pressure injury/Skin: OOB to Chair, PT/OT   - Bacitracin    #Pain Mgmt   - Tylenol PRN, Oxycodone PRN    #GI/Bowel Mgmt   - Pantoprazole  - Continent c/w Senna, Miralax     #/Bladder Mgmt   -  PVR q6h, CIC>400cc- now spont voiding in diaper last PVR 36cc- will change PVRs to daily  -  + UA  - Started on ceftin for UTI.    C&S three organisms- contaminant    #FEN   - Diet - NPO with tube feeds  - Glucerna  - Dysphagia  SLP - evaluation and treatment    #Precautions / PROPHYLAXIS:   - Falls  - ortho: Weight bearing status: WBAT   - Lungs: Aspiration, Incentive Spirometer     TDD- 7/7  to MEMO     - DVT: Eliquis

## 2023-06-21 NOTE — PROGRESS NOTE ADULT - ASSESSMENT
Assessment/Plan:  82 year old, Setswana speaking, woman with hx of HTN, AF (xarelto), HCV (entecavir) that presented to [facility] ED on 6/1 with AMS and underwent imaging and noted to have L MCA infarct with L MCA occlusion but not a candidate for tPa given elevated INR and thus underwent mechanical thrombectomy. Felt to be due to cardioembolic as patient was not actively taking xarelto. She was started on eliquis. Unable to pass SLP testing and underwent PEG placement. She is now admitted to [facility] for acute rehabilitation.          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 Mcdaniel (Heme)   - Eliquis  - PEG feeds      Atrial Fibrillation  - Eliquis   - Labetalol 400mg q 8 hrs    Hypertension  Blood pressure meds with hold parameters     Pre-DM  - a1c 5.9  - monitor glucose    Hepatitis C  - Entecavir daily .     R breast mass  - Mammogram outpatient  - Oncology follow up outpatient    Oral Candida  - Nystatin  - Swish and suction   - oral hygiene    Leukocytosis  Possible acute cystitis  - +UA, f/up culture  - ceftin 250q12 for 3 days    Insomnia  - melatonin PRN

## 2023-06-21 NOTE — PROGRESS NOTE ADULT - SUBJECTIVE AND OBJECTIVE BOX
Chief complaint- SP LACEYA    Andres Falcon is an 82 year old, Anguillan speaking, woman with PMH of HTN, AFIB (on Xarelto), Hepatitis C (on Entecavir); who presented to [facility] ED on 6/1 with acute onset AMS. LKN was 10pm, at 10:30pm she became acutely altered and with poor posture. Upon arrival, she was noted to have decreased arousal, severe R sided weakness, slurred speech and aphasia requiring intubation. 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 She was not an appropriate tenecteplase candidate due to INR of 1.79 and was taken for mechanical thrombectomy. SLP recommended NPO status on 6/8, a repeat MBS was performed on 6/13 without improvement and a PEG tube was placed on 6/14. Further imaging was significant for R breast mass and she was recommended for outpatient follow up and imaging. PM&R was consulted and deemed her an appropriate candidate for IRF. She was medically stabilized and cleared for discharge to [facility].     ROS/subjective: [place]  (    )  patient seen and examined bedside  Awake alert but hypophonic, aphasic-  ineffective  spontanoeously voiding into diaper- last PVR 36 cc  to change PVRs to daily- last BM 6/20  no apparent pain  verbalizing in [place]-  reports right leg weakness  ENT to evaluate cords- RO granuloma      MEDICATIONS  (STANDING):  apixaban 5 milliGRAM(s) Oral two times a day  atorvastatin 40 milliGRAM(s) Oral at bedtime  cefuroxime   Tablet 250 milliGRAM(s) Oral every 12 hours  dextrose 5%. 1000 milliLiter(s) (50 mL/Hr) IV Continuous <Continuous>  dextrose 5%. 1000 milliLiter(s) (100 mL/Hr) IV Continuous <Continuous>  dextrose 50% Injectable 12.5 Gram(s) IV Push once  dextrose 50% Injectable 25 Gram(s) IV Push once  dextrose 50% Injectable 25 Gram(s) IV Push once  glucagon  Injectable 1 milliGRAM(s) IntraMuscular once  insulin lispro (ADMELOG) corrective regimen sliding scale   SubCutaneous three times a day before meals  insulin lispro (ADMELOG) corrective regimen sliding scale   SubCutaneous at bedtime  labetalol 400 milliGRAM(s) Oral every 8 hours  losartan 100 milliGRAM(s) Oral daily  nystatin    Suspension 934641 Unit(s) Oral every 6 hours  pantoprazole   Suspension 40 milliGRAM(s) Oral daily  polyethylene glycol 3350 17 Gram(s) Oral daily    MEDICATIONS  (PRN):  bacitracin   Ointment 1 Application(s) Topical three times a day PRN to skin wound  dextrose Oral Gel 15 Gram(s) Oral once PRN Blood Glucose LESS THAN 70 milliGRAM(s)/deciliter    LABS                        10.8   11.30 )-----------( 342      ( 19 Jun 2023 06:26 )             32.7     06-19    134<L>  |  99  |  37<H>  ----------------------------<  149<H>  4.4   |  28  |  0.89    Ca    8.6      19 Jun 2023 06:26    TPro  5.9<L>  /  Alb  2.2<L>  /  TBili  0.8  /  DBili  x   /  AST  46<H>  /  ALT  38  /  AlkPhos  149<H>  06-19        CAPILLARY BLOOD GLUCOSE      POCT Blood Glucose.: 136 mg/dL (21 Jun 2023 12:17)  POCT Blood Glucose.: 143 mg/dL (21 Jun 2023 07:51)  POCT Blood Glucose.: 126 mg/dL (20 Jun 2023 22:40)  POCT Blood Glucose.: 110 mg/dL (20 Jun 2023 17:36)          Vital Signs Last 24 Hrs  T(C): 36.6 (21 Jun 2023 07:43), Max: 37.2 (20 Jun 2023 22:32)  T(F): 97.8 (21 Jun 2023 07:43), Max: 98.9 (20 Jun 2023 22:32)  HR: 83 (21 Jun 2023 07:43) (83 - 94)  BP: 109/60 (21 Jun 2023 15:08) (99/64 - 130/65)  BP(mean): --  RR: 14 (21 Jun 2023 07:43) (14 - 15)  SpO2: 96% (21 Jun 2023 07:43) (93% - 96%)    Parameters below as of 21 Jun 2023 07:43  Patient On (Oxygen Delivery Method): room air        PHYSICAL       Gen - NAD, Comfortable, nonverbal  HEENT - NCAT, EOMI, MMM  Neck - Supple, No limited ROM  Pulm - CTAB, No wheeze  Cardiovascular - RRR, S1S2  Chest - good chest expansion, good respiratory effort  Abdomen - Soft, NT/ND, +BS, + PEG no suprapubic tenderness  Extremities - No Cyanosis, no clubbing, no edema, no calf tenderness  Neuro-     Cognitive - awake, alert follows visual commands     Communication - hypophonic, non -fluent, difficulty with prompting     Cranial Nerves - right facial weakness, decreased right  shoulder shrug      Motor -  Right  hemiparesis neglect low tone                    LEFT    UE - 5/5                    RIGHT UE - 0/5                    LEFT    LE - 5/5                    RIGHT LE - 0/5       Tone - normal  Psychiatric - Mood stable, Affect WNL  Skin:  skin tear to mid and left chest wall- diffuse ecchymoses chest wall/ extremities    IDT - 6/20    SW- 7 steps to get in. 15 steps inside. Daugther was helping prior.   SP - NPO. With PEG. Mild to mod cognitive. MBS thursday.   OT- Total assist for most ADL's. She has good trunk control.   PT-  Transfer - Max assist x2.   Goal -  Mod assist.   TDD- 7/7  to MEMO

## 2023-06-21 NOTE — PROGRESS NOTE ADULT - SUBJECTIVE AND OBJECTIVE BOX
Patient is a 82y old  Female who presents with a chief complaint of Functional deficits s/p CVA (2023 10:12)      INTERVAL History of Present Illness/OVERNIGHT EVENTS: participating in PT/OT     MEDICATIONS  (STANDING):  apixaban 5 milliGRAM(s) Oral two times a day  atorvastatin 40 milliGRAM(s) Oral at bedtime  cefuroxime   Tablet 250 milliGRAM(s) Oral every 12 hours  dextrose 5%. 1000 milliLiter(s) (50 mL/Hr) IV Continuous <Continuous>  dextrose 5%. 1000 milliLiter(s) (100 mL/Hr) IV Continuous <Continuous>  dextrose 50% Injectable 12.5 Gram(s) IV Push once  dextrose 50% Injectable 25 Gram(s) IV Push once  dextrose 50% Injectable 25 Gram(s) IV Push once  glucagon  Injectable 1 milliGRAM(s) IntraMuscular once  insulin lispro (ADMELOG) corrective regimen sliding scale   SubCutaneous three times a day before meals  insulin lispro (ADMELOG) corrective regimen sliding scale   SubCutaneous at bedtime  labetalol 400 milliGRAM(s) Oral every 8 hours  losartan 100 milliGRAM(s) Oral daily  nystatin    Suspension 948944 Unit(s) Oral every 6 hours  pantoprazole   Suspension 40 milliGRAM(s) Oral daily  polyethylene glycol 3350 17 Gram(s) Oral daily    MEDICATIONS  (PRN):  bacitracin   Ointment 1 Application(s) Topical three times a day PRN to skin wound  dextrose Oral Gel 15 Gram(s) Oral once PRN Blood Glucose LESS THAN 70 milliGRAM(s)/deciliter      Allergies    No Known Allergies    Intolerances        REVIEW OF SYSTEMS:  Other systems currently negative unless otherwise specified above.    Vital Signs Last 24 Hrs  T(C): 36.6 (2023 07:43), Max: 37.2 (2023 22:32)  T(F): 97.8 (2023 07:43), Max: 98.9 (2023 22:32)  HR: 83 (2023 07:43) (83 - 94)  BP: 99/64 (2023 07:43) (95/64 - 130/65)  BP(mean): --  RR: 14 (2023 07:43) (14 - 15)  SpO2: 96% (2023 07:43) (93% - 96%)    Parameters below as of 2023 07:43  Patient On (Oxygen Delivery Method): room air            PHYSICAL EXAM:  GENERAL: No apparent distress, appears stated age  EYES: Conjunctiva and sclera clear, no discharge  ENMT: Moist mucous membranes, no nasal discharge  CHEST/LUNG: Clear to auscultation bilaterally, no wheeze or rales  HEART: Regular rhythm, no rubs or gallops  ABDOMEN: Soft, Nontender, +PEG  EXTREMITIES:  No clubbing, cyanosis or edema  SKIN: +Ecchymoses      LABS:            Urinalysis Basic - ( 2023 14:25 )    Color: Dark Yellow / Appearance: Cloudy / S.016 / pH: x  Gluc: x / Ketone: Negative mg/dL  / Bili: Negative / Urobili: 2.0 mg/dL   Blood: x / Protein: Trace mg/dL / Nitrite: Positive   Leuk Esterase: Large / RBC: 0 /HPF / WBC 15 /HPF   Sq Epi: x / Non Sq Epi: x / Bacteria: Many /HPF      CAPILLARY BLOOD GLUCOSE      POCT Blood Glucose.: 143 mg/dL (2023 07:51)  POCT Blood Glucose.: 126 mg/dL (2023 22:40)  POCT Blood Glucose.: 110 mg/dL (2023 17:36)  POCT Blood Glucose.: 190 mg/dL (2023 12:35)        RADIOLOGY & ADDITIONAL TESTS:      Images reviewed personally    Consultant Notes Reviewed and Care Discussed with relevant Consultants.

## 2023-06-22 DIAGNOSIS — I63.9 CEREBRAL INFARCTION, UNSPECIFIED: ICD-10-CM

## 2023-06-22 DIAGNOSIS — R49.0 DYSPHONIA: ICD-10-CM

## 2023-06-22 LAB
ALBUMIN SERPL ELPH-MCNC: 2.2 G/DL — LOW (ref 3.3–5)
ALP SERPL-CCNC: 144 U/L — HIGH (ref 40–120)
ALT FLD-CCNC: 45 U/L — SIGNIFICANT CHANGE UP (ref 10–45)
ANION GAP SERPL CALC-SCNC: 9 MMOL/L — SIGNIFICANT CHANGE UP (ref 5–17)
AST SERPL-CCNC: 53 U/L — HIGH (ref 10–40)
BASOPHILS # BLD AUTO: 0.06 K/UL — SIGNIFICANT CHANGE UP (ref 0–0.2)
BASOPHILS NFR BLD AUTO: 0.8 % — SIGNIFICANT CHANGE UP (ref 0–2)
BILIRUB SERPL-MCNC: 0.6 MG/DL — SIGNIFICANT CHANGE UP (ref 0.2–1.2)
BUN SERPL-MCNC: 35 MG/DL — HIGH (ref 7–23)
CALCIUM SERPL-MCNC: 8.6 MG/DL — SIGNIFICANT CHANGE UP (ref 8.4–10.5)
CHLORIDE SERPL-SCNC: 98 MMOL/L — SIGNIFICANT CHANGE UP (ref 96–108)
CO2 SERPL-SCNC: 25 MMOL/L — SIGNIFICANT CHANGE UP (ref 22–31)
CREAT SERPL-MCNC: 0.76 MG/DL — SIGNIFICANT CHANGE UP (ref 0.5–1.3)
EGFR: 77 ML/MIN/1.73M2 — SIGNIFICANT CHANGE UP
EOSINOPHIL # BLD AUTO: 0.24 K/UL — SIGNIFICANT CHANGE UP (ref 0–0.5)
EOSINOPHIL NFR BLD AUTO: 3.3 % — SIGNIFICANT CHANGE UP (ref 0–6)
GLUCOSE BLDC GLUCOMTR-MCNC: 110 MG/DL — HIGH (ref 70–99)
GLUCOSE BLDC GLUCOMTR-MCNC: 127 MG/DL — HIGH (ref 70–99)
GLUCOSE BLDC GLUCOMTR-MCNC: 169 MG/DL — HIGH (ref 70–99)
GLUCOSE BLDC GLUCOMTR-MCNC: 199 MG/DL — HIGH (ref 70–99)
GLUCOSE SERPL-MCNC: 116 MG/DL — HIGH (ref 70–99)
HCT VFR BLD CALC: 32.9 % — LOW (ref 34.5–45)
HGB BLD-MCNC: 10.7 G/DL — LOW (ref 11.5–15.5)
IMM GRANULOCYTES NFR BLD AUTO: 0.7 % — SIGNIFICANT CHANGE UP (ref 0–0.9)
LYMPHOCYTES # BLD AUTO: 0.95 K/UL — LOW (ref 1–3.3)
LYMPHOCYTES # BLD AUTO: 13.1 % — SIGNIFICANT CHANGE UP (ref 13–44)
MCHC RBC-ENTMCNC: 30.3 PG — SIGNIFICANT CHANGE UP (ref 27–34)
MCHC RBC-ENTMCNC: 32.5 GM/DL — SIGNIFICANT CHANGE UP (ref 32–36)
MCV RBC AUTO: 93.2 FL — SIGNIFICANT CHANGE UP (ref 80–100)
MONOCYTES # BLD AUTO: 0.7 K/UL — SIGNIFICANT CHANGE UP (ref 0–0.9)
MONOCYTES NFR BLD AUTO: 9.6 % — SIGNIFICANT CHANGE UP (ref 2–14)
NEUTROPHILS # BLD AUTO: 5.26 K/UL — SIGNIFICANT CHANGE UP (ref 1.8–7.4)
NEUTROPHILS NFR BLD AUTO: 72.5 % — SIGNIFICANT CHANGE UP (ref 43–77)
NRBC # BLD: 0 /100 WBCS — SIGNIFICANT CHANGE UP (ref 0–0)
PLATELET # BLD AUTO: 330 K/UL — SIGNIFICANT CHANGE UP (ref 150–400)
POTASSIUM SERPL-MCNC: 4.6 MMOL/L — SIGNIFICANT CHANGE UP (ref 3.5–5.3)
POTASSIUM SERPL-SCNC: 4.6 MMOL/L — SIGNIFICANT CHANGE UP (ref 3.5–5.3)
PROT SERPL-MCNC: 5.8 G/DL — LOW (ref 6–8.3)
RBC # BLD: 3.53 M/UL — LOW (ref 3.8–5.2)
RBC # FLD: 13.2 % — SIGNIFICANT CHANGE UP (ref 10.3–14.5)
SODIUM SERPL-SCNC: 132 MMOL/L — LOW (ref 135–145)
WBC # BLD: 7.26 K/UL — SIGNIFICANT CHANGE UP (ref 3.8–10.5)
WBC # FLD AUTO: 7.26 K/UL — SIGNIFICANT CHANGE UP (ref 3.8–10.5)

## 2023-06-22 PROCEDURE — 74230 X-RAY XM SWLNG FUNCJ C+: CPT | Mod: 26

## 2023-06-22 PROCEDURE — 99232 SBSQ HOSP IP/OBS MODERATE 35: CPT | Mod: GC

## 2023-06-22 PROCEDURE — 31575 DIAGNOSTIC LARYNGOSCOPY: CPT

## 2023-06-22 PROCEDURE — 99233 SBSQ HOSP IP/OBS HIGH 50: CPT

## 2023-06-22 RX ORDER — LABETALOL HCL 100 MG
300 TABLET ORAL EVERY 8 HOURS
Refills: 0 | Status: DISCONTINUED | OUTPATIENT
Start: 2023-06-22 | End: 2023-06-27

## 2023-06-22 RX ADMIN — LOSARTAN POTASSIUM 25 MILLIGRAM(S): 100 TABLET, FILM COATED ORAL at 05:52

## 2023-06-22 RX ADMIN — ATORVASTATIN CALCIUM 40 MILLIGRAM(S): 80 TABLET, FILM COATED ORAL at 22:32

## 2023-06-22 RX ADMIN — Medication 400 MILLIGRAM(S): at 15:04

## 2023-06-22 RX ADMIN — Medication 500000 UNIT(S): at 11:58

## 2023-06-22 RX ADMIN — Medication 500000 UNIT(S): at 05:52

## 2023-06-22 RX ADMIN — Medication 500000 UNIT(S): at 23:11

## 2023-06-22 RX ADMIN — Medication 300 MILLIGRAM(S): at 22:33

## 2023-06-22 RX ADMIN — PANTOPRAZOLE SODIUM 40 MILLIGRAM(S): 20 TABLET, DELAYED RELEASE ORAL at 11:57

## 2023-06-22 RX ADMIN — POLYETHYLENE GLYCOL 3350 17 GRAM(S): 17 POWDER, FOR SOLUTION ORAL at 11:57

## 2023-06-22 RX ADMIN — APIXABAN 5 MILLIGRAM(S): 2.5 TABLET, FILM COATED ORAL at 05:39

## 2023-06-22 RX ADMIN — Medication 250 MILLIGRAM(S): at 05:39

## 2023-06-22 RX ADMIN — Medication 500000 UNIT(S): at 17:41

## 2023-06-22 RX ADMIN — APIXABAN 5 MILLIGRAM(S): 2.5 TABLET, FILM COATED ORAL at 17:41

## 2023-06-22 RX ADMIN — Medication 400 MILLIGRAM(S): at 05:40

## 2023-06-22 RX ADMIN — Medication 2: at 11:55

## 2023-06-22 NOTE — PROGRESS NOTE ADULT - SUBJECTIVE AND OBJECTIVE BOX
Patient is a 82y old  Female who presents with a chief complaint of Functional deficits s/p CVA (21 Jun 2023 16:26)      INTERVAL History of Present Illness/OVERNIGHT EVENTS: participating in PT/OT     MEDICATIONS  (STANDING):  apixaban 5 milliGRAM(s) Oral two times a day  atorvastatin 40 milliGRAM(s) Oral at bedtime  cefuroxime   Tablet 250 milliGRAM(s) Oral every 12 hours  dextrose 5%. 1000 milliLiter(s) (50 mL/Hr) IV Continuous <Continuous>  dextrose 5%. 1000 milliLiter(s) (100 mL/Hr) IV Continuous <Continuous>  dextrose 50% Injectable 12.5 Gram(s) IV Push once  dextrose 50% Injectable 25 Gram(s) IV Push once  dextrose 50% Injectable 25 Gram(s) IV Push once  glucagon  Injectable 1 milliGRAM(s) IntraMuscular once  insulin lispro (ADMELOG) corrective regimen sliding scale   SubCutaneous three times a day before meals  insulin lispro (ADMELOG) corrective regimen sliding scale   SubCutaneous at bedtime  labetalol 400 milliGRAM(s) Oral every 8 hours  losartan 25 milliGRAM(s) Oral daily  nystatin    Suspension 572453 Unit(s) Oral every 6 hours  pantoprazole   Suspension 40 milliGRAM(s) Oral daily  polyethylene glycol 3350 17 Gram(s) Oral daily    MEDICATIONS  (PRN):  bacitracin   Ointment 1 Application(s) Topical three times a day PRN to skin wound  dextrose Oral Gel 15 Gram(s) Oral once PRN Blood Glucose LESS THAN 70 milliGRAM(s)/deciliter      Allergies    No Known Allergies    Intolerances        REVIEW OF SYSTEMS:  Other systems currently negative unless otherwise specified above.    Vital Signs Last 24 Hrs  T(C): 36.5 (21 Jun 2023 19:58), Max: 36.5 (21 Jun 2023 19:58)  T(F): 97.7 (21 Jun 2023 19:58), Max: 97.7 (21 Jun 2023 19:58)  HR: 107 (22 Jun 2023 05:34) (84 - 107)  BP: 120/73 (22 Jun 2023 05:34) (107/65 - 127/79)  BP(mean): --  RR: 14 (21 Jun 2023 19:58) (14 - 14)  SpO2: 97% (21 Jun 2023 19:58) (97% - 97%)    Parameters below as of 21 Jun 2023 19:58  Patient On (Oxygen Delivery Method): room air            PHYSICAL EXAM:  GENERAL: No apparent distress, appears stated age  EYES: Conjunctiva and sclera clear, no discharge  ENMT: Moist mucous membranes, no nasal discharge  CHEST/LUNG: Clear to auscultation bilaterally, no wheeze or rales  HEART: Regular rhythm, no rubs or gallops  ABDOMEN: Soft, Nontender, +PEG  EXTREMITIES:  No clubbing, cyanosis or edema  SKIN: No rash, no new discoloration      LABS:                        10.7   7.26  )-----------( 330      ( 22 Jun 2023 07:00 )             32.9     22 Jun 2023 07:00    132    |  98     |  35     ----------------------------<  116    4.6     |  25     |  0.76     Ca    8.6        22 Jun 2023 07:00    TPro  5.8    /  Alb  2.2    /  TBili  0.6    /  DBili  x      /  AST  53     /  ALT  45     /  AlkPhos  144    22 Jun 2023 07:00      Urinalysis Basic - ( 22 Jun 2023 07:00 )    Color: x / Appearance: x / SG: x / pH: x  Gluc: 116 mg/dL / Ketone: x  / Bili: x / Urobili: x   Blood: x / Protein: x / Nitrite: x   Leuk Esterase: x / RBC: x / WBC x   Sq Epi: x / Non Sq Epi: x / Bacteria: x      CAPILLARY BLOOD GLUCOSE      POCT Blood Glucose.: 127 mg/dL (22 Jun 2023 08:18)  POCT Blood Glucose.: 209 mg/dL (21 Jun 2023 23:14)  POCT Blood Glucose.: 121 mg/dL (21 Jun 2023 18:23)  POCT Blood Glucose.: 136 mg/dL (21 Jun 2023 12:17)        RADIOLOGY & ADDITIONAL TESTS:      Images reviewed personally    Consultant Notes Reviewed and Care Discussed with relevant Consultants.

## 2023-06-22 NOTE — CONSULT NOTE ADULT - SUBJECTIVE AND OBJECTIVE BOX
Patient is a 82y old  Female who presents with a chief complaint of Cerebral infarction    HPI:  Andres Falcon is an 82 year old, Hungarian speaking, woman with PMH of HTN, AFIB (on Xarelto), Hepatitis C (on Entecavir); who presented to [facility] ED on 6/1 with acute onset AMS. LKN was 10pm, at 10:30pm she became acutely altered and with poor posture. Upon arrival, she was noted to have decreased arousal, severe R sided weakness, slurred speech and aphasia requiring intubation. 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 She was not an appropriate tenecteplase candidate due to INR of 1.79 and was taken for mechanical thrombectomy. SLP recommended NPO status on 6/8, a repeat MBS was performed on 6/13 without improvement and a PEG tube was placed on 6/14. Further imaging was significant for R breast mass and she was recommended for outpatient follow up and imaging. PM&R was consulted and deemed her an appropriate candidate for IRF. She was medically stabilized and cleared for discharge to [facility].  This AM via  she is without complaints.       PAST MEDICAL & SURGICAL HISTORY:  HTN (hypertension)  Afib  Hepatitis C            No sig social hx    Allergies: NKDA      bacitracin   Ointment 1 Application(s) Topical three times a day PRN  entecavir Solution 0.5 milliGRAM(s) Oral daily  senna Syrup 10 milliLiter(s) Oral at bedtime      REVIEW OF SYSTEMS:  ALL ROS REVIEWED AND NORMAL EXCEPT AS STATED ABOVE    T(C): 36.1 (06-17-23 @ 09:24), Max: 37.1 (06-16-23 @ 19:40)  HR: 86 (06-17-23 @ 09:24) (82 - 90)  BP: 101/66 (06-17-23 @ 09:24) (101/66 - 122/78)  RR: 16 (06-17-23 @ 09:24) (16 - 16)  SpO2: 95% (06-17-23 @ 09:24) (95% - 96%)  Wt(kg): --Vital Signs Last 24 Hrs  T(C): 36.1 (17 Jun 2023 09:24), Max: 37.1 (16 Jun 2023 19:40)  T(F): 97 (17 Jun 2023 09:24), Max: 98.7 (16 Jun 2023 19:40)  HR: 86 (17 Jun 2023 09:24) (82 - 90)  BP: 101/66 (17 Jun 2023 09:24) (101/66 - 122/78)  BP(mean): --  RR: 16 (17 Jun 2023 09:24) (16 - 16)  SpO2: 95% (17 Jun 2023 09:24) (95% - 96%)    Parameters below as of 17 Jun 2023 09:24  Patient On (Oxygen Delivery Method): room air        PHYSICAL EXAM:  GENERAL: NAD, well-groomed, well-developed  HEAD:  Atraumatic, Normocephalic  EYES: EOMI, PERRLA, conjunctiva and sclera clear  ENMT: No tonsillar erythema, exudates, or enlargement; Moist mucous membranes, Good dentition, No lesions  NECK: Supple, No JVD, Normal thyroid  NERVOUS SYSTEM:  Alert & Oriented X3, dysarthria/dysphagia  CHEST/LUNG: Clear to percussion bilaterally; No rales, rhonchi, wheezing, or rubs  HEART: Regular rate and rhythm; No murmurs, rubs, or gallops  ABDOMEN: Soft, Nontender, Nondistended; Bowel sounds present, peg tube noted  EXTREMITIES:  2+ Peripheral Pulses, No clubbing, cyanosis, + edema  LYMPH: No lymphadenopathy noted  SKIN: No rashes or lesions    LABS:                        11.7   11.50 )-----------( 302      ( 17 Jun 2023 05:50 )             35.1     06-17    135  |  101  |  32<H>  ----------------------------<  119<H>  4.0   |  25  |  0.89    Ca    8.6      17 Jun 2023 05:50    TPro  5.9<L>  /  Alb  2.3<L>  /  TBili  0.9  /  DBili  x   /  AST  51<H>  /  ALT  29  /  AlkPhos  141<H>  06-17         CAPILLARY BLOOD GLUCOSE      POCT Blood Glucose.: 128 mg/dL (17 Jun 2023 11:51)  POCT Blood Glucose.: 132 mg/dL (17 Jun 2023 07:52)  POCT Blood Glucose.: 103 mg/dL (16 Jun 2023 22:08)            RADIOLOGY & ADDITIONAL TESTS:    Consultant(s) Notes Reviewed:  [x ] YES  [ ] NO  Care Discussed with Consultants/Other Providers [ x] YES  [ ] NO  
Patient is a 82y old  Female who presents with a chief complaint of Functional deficits s/p CVA (22 Jun 2023 10:19)      HPI:  Andres Falcon is an 82 year old, Hungarian speaking, woman with PMH of HTN, AFIB (on Xarelto), Hepatitis C (on Entecavir); who presented to [facility] ED on 6/1 with acute onset AMS. LKN was 10pm, at 10:30pm she became acutely altered and with poor posture. Upon arrival, she was noted to have decreased arousal, severe R sided weakness, slurred speech and aphasia requiring intubation. 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 She was not an appropriate tenecteplase candidate due to INR of 1.79 and was taken for mechanical thrombectomy. SLP recommended NPO status on 6/8, a repeat MBS was performed on 6/13 without improvement and a PEG tube was placed on 6/14. Further imaging was significant for R breast mass and she was recommended for outpatient follow up and imaging. PM&R was consulted and deemed her an appropriate candidate for IRF. She was medically stabilized and cleared for discharge to [facility].  (16 Jun 2023 14:17)      Interval Events:  Called to see and evaluate patient with dysphonia.  Spoke to patient with the help of [place]  since patient speak Hungarian.  I was informed by the primary team that her voice has been that way since after being extubated but there are times she is understandable.  She currently failed MBS but is tolerating thickened liquids as per Speech Pathology.    PAST MEDICAL & SURGICAL HISTORY:  HTN (hypertension)  Afib  Hepatitis C      Allergies  No Known Allergies      Social History:  SOCIAL HISTORY  Smoking - Denied  EtOH - Denied   Drugs - Denied      FAMILY HISTORY:  No pertinent family history      REVIEW OF SYSTEMS:     CONSTITUTIONAL: No weakness, fevers or chills     EYES/ENT: No visual changes;  No vertigo or throat pain      NECK: No pain or stiffness     RESPIRATORY: No cough, wheezing, hemoptysis; No shortness of breath     CARDIOVASCULAR: No chest pain or palpitations     GASTROINTESTINAL: No abdominal or epigastric pain. No nausea, vomiting, or hematemesis; No diarrhea or constipation. No melena or hematochezia.     GENITOURINARY: No dysuria, frequency or hematuria     NEUROLOGICAL: No numbness. +generalized weakness.     SKIN: No itching, burning, rashes, or lesions   All other review of systems is negative unless indicated above.    MEDICATIONS  (STANDING):  apixaban 5 milliGRAM(s) Oral two times a day  atorvastatin 40 milliGRAM(s) Oral at bedtime  cefuroxime   Tablet 250 milliGRAM(s) Oral every 12 hours  dextrose 5%. 1000 milliLiter(s) (50 mL/Hr) IV Continuous <Continuous>  dextrose 5%. 1000 milliLiter(s) (100 mL/Hr) IV Continuous <Continuous>  dextrose 50% Injectable 12.5 Gram(s) IV Push once  dextrose 50% Injectable 25 Gram(s) IV Push once  dextrose 50% Injectable 25 Gram(s) IV Push once  glucagon  Injectable 1 milliGRAM(s) IntraMuscular once  insulin lispro (ADMELOG) corrective regimen sliding scale   SubCutaneous three times a day before meals  insulin lispro (ADMELOG) corrective regimen sliding scale   SubCutaneous at bedtime  labetalol 400 milliGRAM(s) Oral every 8 hours  losartan 25 milliGRAM(s) Oral daily  nystatin    Suspension 051652 Unit(s) Oral every 6 hours  pantoprazole   Suspension 40 milliGRAM(s) Oral daily  polyethylene glycol 3350 17 Gram(s) Oral daily    MEDICATIONS  (PRN):  bacitracin   Ointment 1 Application(s) Topical three times a day PRN to skin wound  dextrose Oral Gel 15 Gram(s) Oral once PRN Blood Glucose LESS THAN 70 milliGRAM(s)/deciliter                            10.7   7.26  )-----------( 330      ( 22 Jun 2023 07:00 )             32.9     06-22    132<L>  |  98  |  35<H>  ----------------------------<  116<H>  4.6   |  25  |  0.76    Ca    8.6      22 Jun 2023 07:00    TPro  5.8<L>  /  Alb  2.2<L>  /  TBili  0.6  /  DBili  x   /  AST  53<H>  /  ALT  45  /  AlkPhos  144<H>  06-22    I&O's Detail    21 Jun 2023 07:01  -  22 Jun 2023 07:00  --------------------------------------------------------  IN:    Free Water: 750 mL    Jevity 1.5: 711 mL  Total IN: 1461 mL    OUT:  Total OUT: 0 mL    Total NET: 1461 mL        Vital Signs Last 24 Hrs  T(C): 36.5 (21 Jun 2023 19:58), Max: 36.5 (21 Jun 2023 19:58)  T(F): 97.7 (21 Jun 2023 19:58), Max: 97.7 (21 Jun 2023 19:58)  HR: 107 (22 Jun 2023 05:34) (84 - 107)  BP: 120/73 (22 Jun 2023 05:34) (107/65 - 127/79)  BP(mean): --  RR: 14 (21 Jun 2023 19:58) (14 - 14)  SpO2: 97% (21 Jun 2023 19:58) (97% - 97%)    Parameters below as of 21 Jun 2023 19:58  Patient On (Oxygen Delivery Method): room air      CBC Full  -  ( 22 Jun 2023 07:00 )  WBC Count : 7.26 K/uL  RBC Count : 3.53 M/uL  Hemoglobin : 10.7 g/dL  Hematocrit : 32.9 %  Platelet Count - Automated : 330 K/uL  Mean Cell Volume : 93.2 fl  Mean Cell Hemoglobin : 30.3 pg  Mean Cell Hemoglobin Concentration : 32.5 gm/dL  Auto Neutrophil # : 5.26 K/uL  Auto Lymphocyte # : 0.95 K/uL  Auto Monocyte # : 0.70 K/uL  Auto Eosinophil # : 0.24 K/uL  Auto Basophil # : 0.06 K/uL  Auto Neutrophil % : 72.5 %  Auto Lymphocyte % : 13.1 %  Auto Monocyte % : 9.6 %  Auto Eosinophil % : 3.3 %  Auto Basophil % : 0.8 %        PHYSICAL EXAM:     Constitutional Normal: well nourished, well developed, non-dysmorphic, no acute distress     Psychiatric: age appropriate behavior, cooperative     Skin: no obvious skin lesions     Head: Normocephalic, Atraumatic     Lymphatic: no cervical lymphadenopathy     ENT:        External Ear:  Normal without any obvious lesions.        External Nose:  Normal, no structural deformities		        Anterior Nasal Cavity: Drymucosa, no turbinate hypertrophy, +left septal deviation     Oral Cavity:  Good dentition, tongue midline, +white discharge on tongue, no  ulcerations, uvula midline     Neck: No palpable lymphadenopathy     Pulmonary: No Acute Distress.      CV: no peripheral edema/cyanosis     GI: nondistended, nontender     Peripheral vascular: no JVD or edema     Neurologic: awake and alert, mild right-sided facial weakness.    LARYNGOSCOPY EXAM (Scope #[id number]):      -Verbal consent was obtained from patient prior to procedure.       Indication: dysphonia           Flexible laryngoscopy was performed and revealed the following:       -- Nasopharynx had no mass or exudate.       -- Base of tongue was symmetric and not enlarged.       -- Vallecula was clear       -- Epiglottis, both aryepiglottic folds and both false vocal folds were normal       -- Arytenoids both without edema and erythema        -- True vocal folds were fully mobile and without lesions.        -- Post cricoid area was clear.       -- Interarytenoid edema was absent       The patient tolerated the procedure well.

## 2023-06-22 NOTE — PROGRESS NOTE ADULT - SUBJECTIVE AND OBJECTIVE BOX
Chief complaint- SP LACEYA    Andres Falcon is an 82 year old, Pashto speaking, woman with PMH of HTN, AFIB (on Xarelto), Hepatitis C (on Entecavir); who presented to [facility] ED on 6/1 with acute onset AMS. LKN was 10pm, at 10:30pm she became acutely altered and with poor posture. Upon arrival, she was noted to have decreased arousal, severe R sided weakness, slurred speech and aphasia requiring intubation. 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 She was not an appropriate tenecteplase candidate due to INR of 1.79 and was taken for mechanical thrombectomy. SLP recommended NPO status on 6/8, a repeat MBS was performed on 6/13 without improvement and a PEG tube was placed on 6/14. Further imaging was significant for R breast mass and she was recommended for outpatient follow up and imaging. PM&R was consulted and deemed her an appropriate candidate for IRF. She was medically stabilized and cleared for discharge to [facility].     ROS/subjective: (Romanian )  patient seen and examined bedside  Awake alert but speech stronger- able to hear  voiding into diaper- PVR this AM > 400cc- ? st cath  last Bm 6/21- incontinent  no apparent pain  ENT to evaluate cords- patient seen - no abnormality in cords  spoke with  OT- dizzy , diminished BP in therapy this afternoon      MEDICATIONS  (STANDING):  apixaban 5 milliGRAM(s) Oral two times a day  atorvastatin 40 milliGRAM(s) Oral at bedtime  dextrose 5%. 1000 milliLiter(s) (50 mL/Hr) IV Continuous <Continuous>  dextrose 5%. 1000 milliLiter(s) (100 mL/Hr) IV Continuous <Continuous>  dextrose 50% Injectable 12.5 Gram(s) IV Push once  dextrose 50% Injectable 25 Gram(s) IV Push once  dextrose 50% Injectable 25 Gram(s) IV Push once  glucagon  Injectable 1 milliGRAM(s) IntraMuscular once  insulin lispro (ADMELOG) corrective regimen sliding scale   SubCutaneous three times a day before meals  insulin lispro (ADMELOG) corrective regimen sliding scale   SubCutaneous at bedtime  labetalol 400 milliGRAM(s) Oral every 8 hours  losartan 25 milliGRAM(s) Oral daily  nystatin    Suspension 835408 Unit(s) Oral every 6 hours  pantoprazole   Suspension 40 milliGRAM(s) Oral daily  polyethylene glycol 3350 17 Gram(s) Oral daily    MEDICATIONS  (PRN):  bacitracin   Ointment 1 Application(s) Topical three times a day PRN to skin wound  dextrose Oral Gel 15 Gram(s) Oral once PRN Blood Glucose LESS THAN 70 milliGRAM(s)/deciliter                            10.7   7.26  )-----------( 330      ( 22 Jun 2023 07:00 )             32.9     06-22    132<L>  |  98  |  35<H>  ----------------------------<  116<H>  4.6   |  25  |  0.76    Ca    8.6      22 Jun 2023 07:00    TPro  5.8<L>  /  Alb  2.2<L>  /  TBili  0.6  /  DBili  x   /  AST  53<H>  /  ALT  45  /  AlkPhos  144<H>  06-22    Urinalysis Basic - ( 22 Jun 2023 07:00 )    Color: x / Appearance: x / SG: x / pH: x  Gluc: 116 mg/dL / Ketone: x  / Bili: x / Urobili: x   Blood: x / Protein: x / Nitrite: x   Leuk Esterase: x / RBC: x / WBC x   Sq Epi: x / Non Sq Epi: x / Bacteria: x        CAPILLARY BLOOD GLUCOSE      POCT Blood Glucose.: 169 mg/dL (22 Jun 2023 11:45)  POCT Blood Glucose.: 127 mg/dL (22 Jun 2023 08:18)  POCT Blood Glucose.: 209 mg/dL (21 Jun 2023 23:14)  POCT Blood Glucose.: 121 mg/dL (21 Jun 2023 18:23)      Vital Signs Last 24 Hrs  T(C): 36.5 (21 Jun 2023 19:58), Max: 36.5 (21 Jun 2023 19:58)  T(F): 97.7 (21 Jun 2023 19:58), Max: 97.7 (21 Jun 2023 19:58)  HR: 81 (22 Jun 2023 14:59) (81 - 107)  BP: 114/71 (22 Jun 2023 14:59) (107/65 - 127/79)  BP(mean): --  RR: 16 (22 Jun 2023 14:59) (14 - 16)  SpO2: 97% (21 Jun 2023 19:58) (97% - 97%)    Parameters below as of 21 Jun 2023 19:58  Patient On (Oxygen Delivery Method): room air      PHYSICAL       Gen - NAD, Comfortable, nonverbal  HEENT - NCAT, EOMI, MMM  Neck - Supple, No limited ROM  Pulm - CTAB, No wheeze  Cardiovascular - RRR, S1S2  Chest - good chest expansion, good respiratory effort  Abdomen - Soft, NT/ND, +BS, + PEG no suprapubic tenderness  Extremities - No Cyanosis, no clubbing, no edema, no calf tenderness  Neuro-     Cognitive - awake, alert follows visual commands     Communication - hypophoni, non -fluent, difficulty with prompting     Cranial Nerves - right facial weakness, decreased right  shoulder shrug      Motor -  Right  hemiparesis neglect low tone                    LEFT    UE - 5/5                    RIGHT UE - 0/5                    LEFT    LE - 5/5                    RIGHT LE - 0/5       Tone - normal  Psychiatric - Mood stable, Affect WNL  Skin:  skin tear to mid and left chest wall- diffuse ecchymoses chest wall/ extremities    IDT - 6/20    SW- 7 steps to get in. 15 steps inside. Daugther was helping prior.   SP - NPO. With PEG. Mild to mod cognitive. MBS thursday.   OT- Total assist for most ADL's. She has good trunk control.   PT-  Transfer - Max assist x2.   Goal -  Mod assist.   TDD- 7/7  to [facility]

## 2023-06-22 NOTE — PROGRESS NOTE ADULT - ASSESSMENT
AUDI DYKES is a 82 year old, Thai speaking, woman with PMH of HTN, AFIB (on Xarelto), Hepatitis C (on Entecavir); who presented to [facility] ED on 6/1 with acute onset AMS. LKN was 10pm, at 10:30pm she became acutely altered and with poor posture. Upon arrival, she was noted to have decreased arousal, severe R sided weakness, slurred speech and aphasia requiring intubation. 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 She was not an appropriate tenecteplase candidate due to INR of 1.79 and was taken for mechanical thrombectomy. SLP recommended NPO status on 6/8, a repeat MBS was performed on 6/13 without improvement and a PEG tube was placed on 6/14.  Patient now admitted for a multidisciplinary rehab program.   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 Mcdaniel (Heme)     #AFIB  - Eliquis     #HTN  - Amlodipine  - Labetalol 400mg q 8 hrs  - Losartan 100mg daily  off Amlodipine, Hospitalist  Lowered dose of losartan-   BPs still low this afternoon- - will decrease labetolol to 300mg 3x/day  maximum syst BP is 130-   add TEDs, Abd binder    #Hepatitis C  - Entecavir daily   - Hold feeds 2 hours before and 2 hours after medication administration     #R breast mass  - Mammogram outpatient  - Oncology follow up outpatient    #Oral Candida  - Nystatin  - Swish and suction       #Sleep  - melatonin PRN     #Skin  - Skin on admission: skin tear to mid and left chest wall  - Pressure injury/Skin: OOB to Chair, PT/OT   - Bacitracin    #Pain Mgmt   - Tylenol PRN, Oxycodone PRN    #GI/Bowel Mgmt   - Pantoprazole  - Continent c/w Senna, Miralax     #/Bladder Mgmt   -  PVR q6h, CIC>400cc-   +UA but C&S contaminant- received PO ceftin since 6/20- will Dc  spontaneously voiding  PVRS low but AM PVR today > 400cc  will resume PVRS q8h- St cath for residual > 400cc    #FEN   - Diet - NPO with tube feeds  - Glucerna  - Dysphagia  SLP - evaluation and treatment  SP MBS today- still remain NPO but Trial of bedside feeds with ST to begin    #Precautions / PROPHYLAXIS:   - Falls  - ortho: Weight bearing status: WBAT   - Lungs: Aspiration, Incentive Spirometer     TDD- 7/7  to [facility]     - DVT: Eliquis

## 2023-06-22 NOTE — PROGRESS NOTE ADULT - ASSESSMENT
Assessment/Plan:  82 year old, Syriac speaking, woman with hx of HTN, AF (xarelto), HCV (entecavir) that presented to [facility] ED on 6/1 with AMS and underwent imaging and noted to have L MCA infarct with L MCA occlusion but not a candidate for tPa given elevated INR and thus underwent mechanical thrombectomy. Felt to be due to cardioembolic as patient was not actively taking xarelto. She was started on eliquis. Unable to pass SLP testing and underwent PEG placement. She is now admitted to [facility] for acute rehabilitation.          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 Mcdaniel (Heme)   - Eliquis  - PEG feeds      Atrial Fibrillation  - Eliquis   - Labetalol 400mg q 8 hrs    Hypertension  Blood pressure meds with hold parameters     Pre-DM  - a1c 5.9  - monitor glucose    Hepatitis C  - Entecavir daily .     R breast mass  - Mammogram outpatient  - Oncology follow up outpatient    Oral Candida  - Nystatin  - Swish and suction   - oral hygiene    Leukocytosis, resolved  s/p abx for possible acute cystitis  may stop further abx as urine culture>3 organisms    DVT ppx  Eliquis

## 2023-06-22 NOTE — CONSULT NOTE ADULT - PROBLEM SELECTOR RECOMMENDATION 9
-  Normal laryngoscopic examination  -  Occasional dysphonia likely due to debility  -  Continue rehabilitation  -  Continue Speech and Swallow therapy  -  Follow up with Laryngology after discharge if no improvement - Dr. Bethel Hutchinson [phone]

## 2023-06-23 LAB
GLUCOSE BLDC GLUCOMTR-MCNC: 112 MG/DL — HIGH (ref 70–99)
GLUCOSE BLDC GLUCOMTR-MCNC: 129 MG/DL — HIGH (ref 70–99)
GLUCOSE BLDC GLUCOMTR-MCNC: 162 MG/DL — HIGH (ref 70–99)
GLUCOSE BLDC GLUCOMTR-MCNC: 86 MG/DL — SIGNIFICANT CHANGE UP (ref 70–99)

## 2023-06-23 PROCEDURE — 99233 SBSQ HOSP IP/OBS HIGH 50: CPT

## 2023-06-23 PROCEDURE — 99232 SBSQ HOSP IP/OBS MODERATE 35: CPT | Mod: GC

## 2023-06-23 RX ORDER — MIDODRINE HYDROCHLORIDE 2.5 MG/1
2.5 TABLET ORAL
Refills: 0 | Status: DISCONTINUED | OUTPATIENT
Start: 2023-06-23 | End: 2023-06-27

## 2023-06-23 RX ADMIN — LOSARTAN POTASSIUM 25 MILLIGRAM(S): 100 TABLET, FILM COATED ORAL at 05:59

## 2023-06-23 RX ADMIN — Medication 300 MILLIGRAM(S): at 22:17

## 2023-06-23 RX ADMIN — Medication 300 MILLIGRAM(S): at 05:59

## 2023-06-23 RX ADMIN — ATORVASTATIN CALCIUM 40 MILLIGRAM(S): 80 TABLET, FILM COATED ORAL at 22:16

## 2023-06-23 RX ADMIN — Medication 500000 UNIT(S): at 12:10

## 2023-06-23 RX ADMIN — Medication 2: at 12:15

## 2023-06-23 RX ADMIN — MIDODRINE HYDROCHLORIDE 2.5 MILLIGRAM(S): 2.5 TABLET ORAL at 12:28

## 2023-06-23 RX ADMIN — Medication 500000 UNIT(S): at 05:59

## 2023-06-23 RX ADMIN — PANTOPRAZOLE SODIUM 40 MILLIGRAM(S): 20 TABLET, DELAYED RELEASE ORAL at 12:16

## 2023-06-23 RX ADMIN — APIXABAN 5 MILLIGRAM(S): 2.5 TABLET, FILM COATED ORAL at 05:59

## 2023-06-23 RX ADMIN — APIXABAN 5 MILLIGRAM(S): 2.5 TABLET, FILM COATED ORAL at 17:06

## 2023-06-23 RX ADMIN — Medication 500000 UNIT(S): at 17:07

## 2023-06-23 NOTE — PROGRESS NOTE ADULT - ASSESSMENT
82 year old, Lithuanian speaking, woman with hx of HTN, AF (xarelto), HCV (entecavir) that presented to [facility] ED on 6/1 with AMS and underwent imaging and noted to have L MCA infarct with L MCA occlusion but not a candidate for tPa given elevated INR and thus underwent mechanical thrombectomy. Felt to be due to cardioembolic as patient was not actively taking xarelto. She was started on eliquis. Unable to pass SLP testing and underwent PEG placement. She is now admitted to [facility] for acute rehabilitation.          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 Mcdaniel (Heme)   - Eliquis  - PEG feeds    Atrial Fibrillation  - Eliquis   - Labetalol 300mg q 8 hrs    Hypertension  Blood pressure meds with hold parameters     Pre-DM  - a1c 5.9  - monitor glucose    Hepatitis C  - Entecavir daily .     R breast mass  - Mammogram outpatient  - Oncology follow up outpatient    Oral Candida  - Nystatin  - Swish and suction   - oral hygiene    DVT ppx  Eliquis

## 2023-06-23 NOTE — PROGRESS NOTE ADULT - SUBJECTIVE AND OBJECTIVE BOX
Chief complaint- SP GENOVEVA    Andres Falcon is an 82 year old, Tamaz[place] speaking, woman with PMH of HTN, AFIB (on Xarelto), Hepatitis C (on Entecavir); who presented to [facility] ED on 6/1 with acute onset AMS. LKN was 10pm, at 10:30pm she became acutely altered and with poor posture. Upon arrival, she was noted to have decreased arousal, severe R sided weakness, slurred speech and aphasia requiring intubation. 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 She was not an appropriate tenecteplase candidate due to INR of 1.79 and was taken for mechanical thrombectomy. SLP recommended NPO status on 6/8, a repeat MBS was performed on 6/13 without improvement and a PEG tube was placed on 6/14. Further imaging was significant for R breast mass and she was recommended for outpatient follow up and imaging. PM&R was consulted and deemed her an appropriate candidate for IRF. She was medically stabilized and cleared for discharge to [facility].     ROS/subjective: (Irish )  patient seen and examined bedside  Awake alert but speech stronger- able to hear  voiding into diaper- PVR this AM were 603, she was straight cath. Continue.   last Bm this am - incontinence   no apparent pain  ENT to evaluate cords- patient seen - no abnormality in cords  She denies any dizziness. decrease sensation on the right side.     MEDICATIONS  (STANDING):  apixaban 5 milliGRAM(s) Oral two times a day  atorvastatin 40 milliGRAM(s) Oral at bedtime  dextrose 5%. 1000 milliLiter(s) (50 mL/Hr) IV Continuous <Continuous>  dextrose 5%. 1000 milliLiter(s) (100 mL/Hr) IV Continuous <Continuous>  dextrose 50% Injectable 12.5 Gram(s) IV Push once  dextrose 50% Injectable 25 Gram(s) IV Push once  dextrose 50% Injectable 25 Gram(s) IV Push once  glucagon  Injectable 1 milliGRAM(s) IntraMuscular once  insulin lispro (ADMELOG) corrective regimen sliding scale   SubCutaneous three times a day before meals  insulin lispro (ADMELOG) corrective regimen sliding scale   SubCutaneous at bedtime  labetalol 300 milliGRAM(s) Enteral Tube every 8 hours  losartan 25 milliGRAM(s) Oral daily  nystatin    Suspension 113006 Unit(s) Oral every 6 hours  pantoprazole   Suspension 40 milliGRAM(s) Oral daily  polyethylene glycol 3350 17 Gram(s) Oral daily    MEDICATIONS  (PRN):  bacitracin   Ointment 1 Application(s) Topical three times a day PRN to skin wound  dextrose Oral Gel 15 Gram(s) Oral once PRN Blood Glucose LESS THAN 70 milliGRAM(s)/deciliter      LABS                        10.7   7.26  )-----------( 330      ( 22 Jun 2023 07:00 )             32.9     06-22    132<L>  |  98  |  35<H>  ----------------------------<  116<H>  4.6   |  25  |  0.76    Ca    8.6      22 Jun 2023 07:00    TPro  5.8<L>  /  Alb  2.2<L>  /  TBili  0.6  /  DBili  x   /  AST  53<H>  /  ALT  45  /  AlkPhos  144<H>  06-22    Urinalysis Basic - ( 22 Jun 2023 07:00 )    Color: x / Appearance: x / SG: x / pH: x  Gluc: 116 mg/dL / Ketone: x  / Bili: x / Urobili: x   Blood: x / Protein: x / Nitrite: x   Leuk Esterase: x / RBC: x / WBC x   Sq Epi: x / Non Sq Epi: x / Bacteria: x      CAPILLARY BLOOD GLUCOSE      POCT Blood Glucose.: 129 mg/dL (23 Jun 2023 08:09)  POCT Blood Glucose.: 199 mg/dL (22 Jun 2023 23:02)  POCT Blood Glucose.: 110 mg/dL (22 Jun 2023 16:43)  POCT Blood Glucose.: 169 mg/dL (22 Jun 2023 11:45)      ICU Vital Signs Last 24 Hrs  T(C): 36.6 (23 Jun 2023 09:28), Max: 36.6 (23 Jun 2023 09:28)  T(F): 97.8 (23 Jun 2023 09:28), Max: 97.8 (23 Jun 2023 09:28)  HR: 98 (23 Jun 2023 09:28) (76 - 98)  BP: 122/75 (23 Jun 2023 09:28) (114/71 - 134/78)  BP(mean): --  ABP: --  ABP(mean): --  RR: 17 (23 Jun 2023 09:28) (16 - 17)  SpO2: 95% (23 Jun 2023 09:28) (95% - 95%)    O2 Parameters below as of 23 Jun 2023 09:28  Patient On (Oxygen Delivery Method): room air        PHYSICAL       Gen - NAD, Comfortable, nonverbal  HEENT - NCAT, EOMI, MMM  Neck - Supple, No limited ROM  Pulm - CTAB, No wheeze  Cardiovascular - RRR, S1S2  Chest - good chest expansion, good respiratory effort  Abdomen - Soft, NT/ND, +BS, + PEG no suprapubic tenderness  Extremities - No Cyanosis, no clubbing, no edema, no calf tenderness  Neuro-     Cognitive - awake, alert follows visual commands     Communication - hypophoni, non -fluent, difficulty with prompting     Cranial Nerves - right facial weakness, decreased right  shoulder shrug      Motor -  Right  hemiparesis neglect low tone                    LEFT    UE - 5/5                    RIGHT UE - 0/5                    LEFT    LE - 5/5                    RIGHT LE - 0/5       Tone - normal  Psychiatric - Mood stable, Affect WNL  Skin:  skin tear to mid and left chest wall- diffuse ecchymoses chest wall/ extremities    IDT - 6/20    SW- 7 steps to get in. 15 steps inside. Daugther was helping prior.   SP - NPO. With PEG. Mild to mod cognitive. MBS thursday.   OT- Total assist for most ADL's. She has good trunk control.   PT-  Transfer - Max assist x2.   Goal -  Mod assist.   TDD- 7/7  to [facility]      Chief complaint- SP GENOVEVA    Andres Falcon is an 82 year old, Danish speaking, woman with PMH of HTN, AFIB (on Xarelto), Hepatitis C (on Entecavir); who presented to [facility] ED on 6/1 with acute onset AMS. LKN was 10pm, at 10:30pm she became acutely altered and with poor posture. Upon arrival, she was noted to have decreased arousal, severe R sided weakness, slurred speech and aphasia requiring intubation. 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 She was not an appropriate tenecteplase candidate due to INR of 1.79 and was taken for mechanical thrombectomy. SLP recommended NPO status on 6/8, a repeat MBS was performed on 6/13 without improvement and a PEG tube was placed on 6/14. Further imaging was significant for R breast mass and she was recommended for outpatient follow up and imaging. PM&R was consulted and deemed her an appropriate candidate for IRF. She was medically stabilized and cleared for discharge to [facility].     ROS/subjective: (ambrosio )  patient seen and examined bedside. An  was used for communication.   Awake alert but speech stronger- able to hear  voiding into diaper- PVR this AM were 603, she was straight cath. Continue over the weekend.   last BM this am - incontinence   no apparent pain  ENT to evaluate cords- patient seen - no abnormality in cords  She denies any dizziness. decrease sensation on the right side. No other complaint at this time.     MEDICATIONS  (STANDING):  apixaban 5 milliGRAM(s) Oral two times a day  atorvastatin 40 milliGRAM(s) Oral at bedtime  dextrose 5%. 1000 milliLiter(s) (50 mL/Hr) IV Continuous <Continuous>  dextrose 5%. 1000 milliLiter(s) (100 mL/Hr) IV Continuous <Continuous>  dextrose 50% Injectable 12.5 Gram(s) IV Push once  dextrose 50% Injectable 25 Gram(s) IV Push once  dextrose 50% Injectable 25 Gram(s) IV Push once  glucagon  Injectable 1 milliGRAM(s) IntraMuscular once  insulin lispro (ADMELOG) corrective regimen sliding scale   SubCutaneous three times a day before meals  insulin lispro (ADMELOG) corrective regimen sliding scale   SubCutaneous at bedtime  labetalol 300 milliGRAM(s) Enteral Tube every 8 hours  losartan 25 milliGRAM(s) Oral daily  nystatin    Suspension 404405 Unit(s) Oral every 6 hours  pantoprazole   Suspension 40 milliGRAM(s) Oral daily  polyethylene glycol 3350 17 Gram(s) Oral daily    MEDICATIONS  (PRN):  bacitracin   Ointment 1 Application(s) Topical three times a day PRN to skin wound  dextrose Oral Gel 15 Gram(s) Oral once PRN Blood Glucose LESS THAN 70 milliGRAM(s)/deciliter        LABS                        10.7   7.26  )-----------( 330      ( 22 Jun 2023 07:00 )             32.9     06-22    132<L>  |  98  |  35<H>  ----------------------------<  116<H>  4.6   |  25  |  0.76    Ca    8.6      22 Jun 2023 07:00    TPro  5.8<L>  /  Alb  2.2<L>  /  TBili  0.6  /  DBili  x   /  AST  53<H>  /  ALT  45  /  AlkPhos  144<H>  06-22    Urinalysis Basic - ( 22 Jun 2023 07:00 )    Color: x / Appearance: x / SG: x / pH: x  Gluc: 116 mg/dL / Ketone: x  / Bili: x / Urobili: x   Blood: x / Protein: x / Nitrite: x   Leuk Esterase: x / RBC: x / WBC x   Sq Epi: x / Non Sq Epi: x / Bacteria: x      CAPILLARY BLOOD GLUCOSE      POCT Blood Glucose.: 129 mg/dL (23 Jun 2023 08:09)  POCT Blood Glucose.: 199 mg/dL (22 Jun 2023 23:02)  POCT Blood Glucose.: 110 mg/dL (22 Jun 2023 16:43)  POCT Blood Glucose.: 169 mg/dL (22 Jun 2023 11:45)      ICU Vital Signs Last 24 Hrs  T(C): 36.6 (23 Jun 2023 09:28), Max: 36.6 (23 Jun 2023 09:28)  T(F): 97.8 (23 Jun 2023 09:28), Max: 97.8 (23 Jun 2023 09:28)  HR: 98 (23 Jun 2023 09:28) (76 - 98)  BP: 122/75 (23 Jun 2023 09:28) (114/71 - 134/78)  BP(mean): --  ABP: --  ABP(mean): --  RR: 17 (23 Jun 2023 09:28) (16 - 17)  SpO2: 95% (23 Jun 2023 09:28) (95% - 95%)    O2 Parameters below as of 23 Jun 2023 09:28  Patient On (Oxygen Delivery Method): room air        PHYSICAL       Gen - NAD, Comfortable, nonverbal  HEENT - NCAT, EOMI, MMM  Neck - Supple, No limited ROM  Pulm - CTAB, No wheeze  Cardiovascular - RRR, S1S2  Chest - good chest expansion, good respiratory effort  Abdomen - Soft, NT/ND, +BS, + PEG no suprapubic tenderness  Extremities - No Cyanosis, no clubbing, no edema, no calf tenderness  Neuro-     Cognitive - awake, alert follows visual commands     Communication - hypophoni, non -fluent, difficulty with prompting     Cranial Nerves - right facial weakness, decreased right  shoulder shrug      Motor -  Right  hemiparesis neglect low tone                    LEFT    UE - 5/5                    RIGHT UE - 0/5                    LEFT    LE - 5/5                    RIGHT LE - 0/5       Tone - normal  Psychiatric - Mood stable, Affect WNL  Skin:  skin tear to mid and left chest wall- diffuse ecchymoses chest wall/ extremities    IDT - 6/20    SW- 7 steps to get in. 15 steps inside. Daugther was helping prior.   SP - NPO. With PEG. Mild to mod cognitive. MBS thursday.   OT- Total assist for most ADL's. She has good trunk control.   PT-  Transfer - Max assist x2.   Goal -  Mod assist.   TDD- 7/7  to [facility]

## 2023-06-23 NOTE — PROGRESS NOTE ADULT - SUBJECTIVE AND OBJECTIVE BOX
Patient is a 82y old  Female who presents with a chief complaint of Functional deficits s/p CVA (23 Jun 2023 09:28)      INTERVAL History of Present Illness/OVERNIGHT EVENTS: participating in PT/OT     MEDICATIONS  (STANDING):  apixaban 5 milliGRAM(s) Oral two times a day  atorvastatin 40 milliGRAM(s) Oral at bedtime  dextrose 5%. 1000 milliLiter(s) (50 mL/Hr) IV Continuous <Continuous>  dextrose 5%. 1000 milliLiter(s) (100 mL/Hr) IV Continuous <Continuous>  dextrose 50% Injectable 12.5 Gram(s) IV Push once  dextrose 50% Injectable 25 Gram(s) IV Push once  dextrose 50% Injectable 25 Gram(s) IV Push once  glucagon  Injectable 1 milliGRAM(s) IntraMuscular once  insulin lispro (ADMELOG) corrective regimen sliding scale   SubCutaneous three times a day before meals  insulin lispro (ADMELOG) corrective regimen sliding scale   SubCutaneous at bedtime  labetalol 300 milliGRAM(s) Enteral Tube every 8 hours  losartan 25 milliGRAM(s) Oral daily  midodrine. 2.5 milliGRAM(s) Oral <User Schedule>  nystatin    Suspension 267831 Unit(s) Oral every 6 hours  pantoprazole   Suspension 40 milliGRAM(s) Oral daily  polyethylene glycol 3350 17 Gram(s) Oral daily    MEDICATIONS  (PRN):  bacitracin   Ointment 1 Application(s) Topical three times a day PRN to skin wound  dextrose Oral Gel 15 Gram(s) Oral once PRN Blood Glucose LESS THAN 70 milliGRAM(s)/deciliter      Allergies    No Known Allergies    Intolerances        REVIEW OF SYSTEMS:  Other systems currently negative unless otherwise specified above.    Vital Signs Last 24 Hrs  T(C): 36.6 (23 Jun 2023 09:28), Max: 36.6 (23 Jun 2023 09:28)  T(F): 97.8 (23 Jun 2023 09:28), Max: 97.8 (23 Jun 2023 09:28)  HR: 98 (23 Jun 2023 09:28) (76 - 98)  BP: 122/75 (23 Jun 2023 09:28) (114/71 - 134/78)  BP(mean): --  RR: 17 (23 Jun 2023 09:28) (16 - 17)  SpO2: 95% (23 Jun 2023 09:28) (95% - 95%)    Parameters below as of 23 Jun 2023 09:28  Patient On (Oxygen Delivery Method): room air            PHYSICAL EXAM:  GENERAL: No apparent distress, appears stated age  EYES: Conjunctiva and sclera clear, no discharge  ENMT: Moist mucous membranes, no nasal discharge  CHEST/LUNG: Clear to auscultation bilaterally, no wheeze or rales  HEART: Regular rhythm, no rubs or gallops  ABDOMEN: Soft, Nontender, +PEG  EXTREMITIES:  No clubbing, cyanosis or edema        LABS:      Ca    8.6        22 Jun 2023 07:00        Urinalysis Basic - ( 22 Jun 2023 07:00 )    Color: x / Appearance: x / SG: x / pH: x  Gluc: 116 mg/dL / Ketone: x  / Bili: x / Urobili: x   Blood: x / Protein: x / Nitrite: x   Leuk Esterase: x / RBC: x / WBC x   Sq Epi: x / Non Sq Epi: x / Bacteria: x      CAPILLARY BLOOD GLUCOSE      POCT Blood Glucose.: 129 mg/dL (23 Jun 2023 08:09)  POCT Blood Glucose.: 199 mg/dL (22 Jun 2023 23:02)  POCT Blood Glucose.: 110 mg/dL (22 Jun 2023 16:43)  POCT Blood Glucose.: 169 mg/dL (22 Jun 2023 11:45)        RADIOLOGY & ADDITIONAL TESTS:      Images reviewed personally    Consultant Notes Reviewed and Care Discussed with relevant Consultants.

## 2023-06-23 NOTE — PROGRESS NOTE ADULT - ASSESSMENT
AUDI DYKSE is a 82 year old, Lao speaking, woman with PMH of HTN, AFIB (on Xarelto), Hepatitis C (on Entecavir); who presented to [facility] ED on 6/1 with acute onset AMS. LKN was 10pm, at 10:30pm she became acutely altered and with poor posture. Upon arrival, she was noted to have decreased arousal, severe R sided weakness, slurred speech and aphasia requiring intubation. 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 She was not an appropriate tenecteplase candidate due to INR of 1.79 and was taken for mechanical thrombectomy. SLP recommended NPO status on 6/8, a repeat MBS was performed on 6/13 without improvement and a PEG tube was placed on 6/14.  Patient now admitted for a multidisciplinary rehab program.   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 Mcdaniel (Heme)     #AFIB  - Eliquis     #HTN  - Amlodipine  - Labetalol 400mg q 8 hrs  - Losartan 100mg daily  off Amlodipine, Hospitalist  Lowered dose of losartan-   Decrease labetolol to 300mg 3x/day  maximum syst BP is 130-   Added TEDs, Abd binder    #Hepatitis C  - Entecavir daily   - Hold feeds 2 hours before and 2 hours after medication administration     #R breast mass  - Mammogram outpatient  - Oncology follow up outpatient    #Oral Candida  - Nystatin  - Swish and suction       #Sleep  - melatonin PRN     #Skin  - Skin on admission: skin tear to mid and left chest wall  - Pressure injury/Skin: OOB to Chair, PT/OT   - Bacitracin    #Pain Mgmt   - Tylenol PRN, Oxycodone PRN    #GI/Bowel Mgmt   - Pantoprazole  - Continent c/w Senna, Miralax     #/Bladder Mgmt   -  PVR q6h, CIC>400cc-   +UA but C&S contaminant- received PO ceftin since 6/20- will Dc  spontaneously voiding  PVRS low but AM PVR today > 400cc  will resume PVRS q8h- St cath for residual > 400cc    #FEN   - Diet - NPO with tube feeds  - Glucerna  - Dysphagia  SLP - evaluation and treatment  SP MBS today- still remain NPO but Trial of bedside feeds with ST to begin    #Precautions / PROPHYLAXIS:   - Falls  - ortho: Weight bearing status: WBAT   - Lungs: Aspiration, Incentive Spirometer     TDD- 7/7  to [facility]     - DVT: Eliquis AUDI DYKES is a 82 year old, Macedonian speaking, woman with PMH of HTN, AFIB (on Xarelto), Hepatitis C (on Entecavir); who presented to [facility] ED on 6/1 with acute onset AMS. LKN was 10pm, at 10:30pm she became acutely altered and with poor posture. Upon arrival, she was noted to have decreased arousal, severe R sided weakness, slurred speech and aphasia requiring intubation. 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 She was not an appropriate tenecteplase candidate due to INR of 1.79 and was taken for mechanical thrombectomy. SLP recommended NPO status on 6/8, a repeat MBS was performed on 6/13 without improvement and a PEG tube was placed on 6/14.  Patient now admitted for a multidisciplinary rehab program.   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 Mcdaniel (Heme)     #AFIB  - Eliquis     #HTN  - Amlodipine  - Labetalol 400mg q 8 hrs  - Losartan 100mg daily  - off Amlodipine, Hospitalist  Lowered dose of losartan-   - Decrease labetolol to 300mg 3x/day  - maximum syst BP is 134   - Added TEDs, Abd binder    #Hepatitis C  - Entecavir daily   - Hold feeds 2 hours before and 2 hours after medication administration     #R breast mass  - Mammogram outpatient  - Oncology follow up outpatient    #Oral Candida  - Nystatin  - Swish and suction       #Sleep  - melatonin PRN     #Skin  - Skin on admission: skin tear to mid and left chest wall  - Pressure injury/Skin: OOB to Chair, PT/OT   - Bacitracin    #Pain Mgmt   - Tylenol PRN, Oxycodone PRN    #GI/Bowel Mgmt   - Pantoprazole  - Continent c/w Senna, Miralax     #/Bladder Mgmt   -  PVR q6h, CIC>400cc-   +UA but C&S contaminant- received PO ceftin since 6/20- will Dc spontaneously voiding  - high PVR.  PVRS q8h- St cath for residual > 400cc    #FEN   - Diet - NPO with tube feeds  - Glucerna  - Dysphagia  SLP - evaluation and treatment  - MBS done- still remain NPO but Trial of bedside feeds with ST to begin    #Precautions / PROPHYLAXIS:   - Falls  - ortho: Weight bearing status: WBAT   - Lungs: Aspiration, Incentive Spirometer     TDD- 7/7  to [facility]     - DVT: Eliquis AUDI DYKES is a 82 year old, Faroese speaking, woman with PMH of HTN, AFIB (on Xarelto), Hepatitis C (on Entecavir); who presented to [facility] ED on 6/1 with acute onset AMS. LKN was 10pm, at 10:30pm she became acutely altered and with poor posture. Upon arrival, she was noted to have decreased arousal, severe R sided weakness, slurred speech and aphasia requiring intubation. 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 She was not an appropriate tenecteplase candidate due to INR of 1.79 and was taken for mechanical thrombectomy. SLP recommended NPO status on 6/8, a repeat MBS was performed on 6/13 without improvement and a PEG tube was placed on 6/14.  Patient now admitted for a multidisciplinary rehab program.   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 Mcdaniel (Heme)     #AFIB  - Eliquis     #HTN  - Amlodipine  - Labetalol 400mg q 8 hrs  - Losartan 100mg daily  - off Amlodipine, Hospitalist  Lowered dose of losartan-   - Decrease labetolol to 300mg 3x/day  - maximum syst BP is 134   - Added TEDs, Abd binder  today 6/23- hypotensive while OOB in therapy-  will Dc losartan-add low dose midodrine before therapy    Low Na -  BUN in 30s-   increase flushes to 300cc q6h-   change flushes to NS    #Hepatitis C  - Entecavir daily   - Hold feeds 2 hours before and 2 hours after medication administration     #R breast mass  - Mammogram outpatient  - Oncology follow up outpatient    #Oral Candida  - Nystatin  - Swish and suction       #Sleep  - melatonin PRN     #Skin  - Skin on admission: skin tear to mid and left chest wall  - Pressure injury/Skin: OOB to Chair, PT/OT   - Bacitracin    #Pain Mgmt   - Tylenol PRN, Oxycodone PRN    #GI/Bowel Mgmt   - Pantoprazole  - Continent c/w Senna, Miralax     #/Bladder Mgmt   -  PVR q6h, CIC>400cc-   +UA but C&S contaminant- received PO ceftin since 6/20- will Dc spontaneously voiding  - high PVR.  PVRS q8h- St cath for residual > 400cc    #FEN   - Diet - NPO with tube feeds  - Glucerna  - Dysphagia  SLP - evaluation and treatment  - MBS done- still remain NPO but Trial of bedside feeds with ST to begin    #Precautions / PROPHYLAXIS:   - Falls  - ortho: Weight bearing status: WBAT   - Lungs: Aspiration, Incentive Spirometer     TDD- 7/7  to [facility]     - DVT: Eliquis

## 2023-06-24 LAB
GLUCOSE BLDC GLUCOMTR-MCNC: 103 MG/DL — HIGH (ref 70–99)
GLUCOSE BLDC GLUCOMTR-MCNC: 118 MG/DL — HIGH (ref 70–99)
GLUCOSE BLDC GLUCOMTR-MCNC: 154 MG/DL — HIGH (ref 70–99)
GLUCOSE BLDC GLUCOMTR-MCNC: 161 MG/DL — HIGH (ref 70–99)

## 2023-06-24 PROCEDURE — 99232 SBSQ HOSP IP/OBS MODERATE 35: CPT

## 2023-06-24 PROCEDURE — 99232 SBSQ HOSP IP/OBS MODERATE 35: CPT | Mod: GC

## 2023-06-24 RX ADMIN — APIXABAN 5 MILLIGRAM(S): 2.5 TABLET, FILM COATED ORAL at 05:24

## 2023-06-24 RX ADMIN — MIDODRINE HYDROCHLORIDE 2.5 MILLIGRAM(S): 2.5 TABLET ORAL at 08:30

## 2023-06-24 RX ADMIN — Medication 500000 UNIT(S): at 05:27

## 2023-06-24 RX ADMIN — POLYETHYLENE GLYCOL 3350 17 GRAM(S): 17 POWDER, FOR SOLUTION ORAL at 12:04

## 2023-06-24 RX ADMIN — MIDODRINE HYDROCHLORIDE 2.5 MILLIGRAM(S): 2.5 TABLET ORAL at 12:03

## 2023-06-24 RX ADMIN — PANTOPRAZOLE SODIUM 40 MILLIGRAM(S): 20 TABLET, DELAYED RELEASE ORAL at 12:03

## 2023-06-24 RX ADMIN — Medication 500000 UNIT(S): at 17:53

## 2023-06-24 RX ADMIN — Medication 500000 UNIT(S): at 12:03

## 2023-06-24 RX ADMIN — Medication 300 MILLIGRAM(S): at 05:23

## 2023-06-24 RX ADMIN — APIXABAN 5 MILLIGRAM(S): 2.5 TABLET, FILM COATED ORAL at 17:53

## 2023-06-24 RX ADMIN — Medication 300 MILLIGRAM(S): at 22:23

## 2023-06-24 RX ADMIN — ATORVASTATIN CALCIUM 40 MILLIGRAM(S): 80 TABLET, FILM COATED ORAL at 22:23

## 2023-06-24 RX ADMIN — Medication 2: at 12:02

## 2023-06-24 RX ADMIN — Medication 500000 UNIT(S): at 05:03

## 2023-06-24 RX ADMIN — Medication 300 MILLIGRAM(S): at 14:45

## 2023-06-24 NOTE — PROGRESS NOTE ADULT - SUBJECTIVE AND OBJECTIVE BOX
Patient is a 82y old  Female who presents with a chief complaint of Functional deficits s/p CVA (23 Jun 2023 11:33)      INTERVAL History of Present Illness/OVERNIGHT EVENTS: oral diet started  stable hemodynamics    MEDICATIONS  (STANDING):  apixaban 5 milliGRAM(s) Oral two times a day  atorvastatin 40 milliGRAM(s) Oral at bedtime  dextrose 5%. 1000 milliLiter(s) (100 mL/Hr) IV Continuous <Continuous>  dextrose 5%. 1000 milliLiter(s) (50 mL/Hr) IV Continuous <Continuous>  dextrose 50% Injectable 25 Gram(s) IV Push once  dextrose 50% Injectable 12.5 Gram(s) IV Push once  dextrose 50% Injectable 25 Gram(s) IV Push once  glucagon  Injectable 1 milliGRAM(s) IntraMuscular once  insulin lispro (ADMELOG) corrective regimen sliding scale   SubCutaneous at bedtime  insulin lispro (ADMELOG) corrective regimen sliding scale   SubCutaneous three times a day before meals  labetalol 300 milliGRAM(s) Enteral Tube every 8 hours  midodrine. 2.5 milliGRAM(s) Oral <User Schedule>  nystatin    Suspension 308366 Unit(s) Oral every 6 hours  pantoprazole   Suspension 40 milliGRAM(s) Oral daily  polyethylene glycol 3350 17 Gram(s) Oral daily    MEDICATIONS  (PRN):  bacitracin   Ointment 1 Application(s) Topical three times a day PRN to skin wound  dextrose Oral Gel 15 Gram(s) Oral once PRN Blood Glucose LESS THAN 70 milliGRAM(s)/deciliter      Allergies    No Known Allergies    Intolerances        REVIEW OF SYSTEMS:  Other systems currently negative unless otherwise specified above.    Vital Signs Last 24 Hrs  T(C): 36.7 (24 Jun 2023 07:55), Max: 37 (23 Jun 2023 22:09)  T(F): 98 (24 Jun 2023 07:55), Max: 98.6 (23 Jun 2023 22:09)  HR: 78 (24 Jun 2023 07:55) (78 - 98)  BP: 133/71 (24 Jun 2023 07:55) (113/67 - 133/71)  BP(mean): --  RR: 17 (24 Jun 2023 07:55) (17 - 17)  SpO2: 95% (24 Jun 2023 07:55) (94% - 95%)    Parameters below as of 24 Jun 2023 07:55  Patient On (Oxygen Delivery Method): room air            PHYSICAL EXAM:  GENERAL: No apparent distress, appears stated age  EYES: Conjunctiva and sclera clear, no discharge  ENMT: Moist mucous membranes, no nasal discharge  CHEST/LUNG: Clear to auscultation bilaterally, no wheeze or rales  HEART: Regular rhythm, no rubs or gallops  ABDOMEN: Soft, Nontender, +PEG  EXTREMITIES:  No clubbing, cyanosis or edema      LABS:              CAPILLARY BLOOD GLUCOSE      POCT Blood Glucose.: 118 mg/dL (24 Jun 2023 07:45)  POCT Blood Glucose.: 112 mg/dL (23 Jun 2023 22:12)  POCT Blood Glucose.: 86 mg/dL (23 Jun 2023 17:02)  POCT Blood Glucose.: 162 mg/dL (23 Jun 2023 11:53)        RADIOLOGY & ADDITIONAL TESTS:      Images reviewed personally    Consultant Notes Reviewed and Care Discussed with relevant Consultants.

## 2023-06-24 NOTE — PROGRESS NOTE ADULT - SUBJECTIVE AND OBJECTIVE BOX
Cc: weakness    HPI:   Patient with no new medical issues today.  Pain controlled, no chest pain, no N/V, no Fevers/Chills. No other new ROS  Has been tolerating rehabilitation program.    apixaban 5 milliGRAM(s) Oral two times a day  atorvastatin 40 milliGRAM(s) Oral at bedtime  bacitracin   Ointment 1 Application(s) Topical three times a day PRN  dextrose 5%. 1000 milliLiter(s) IV Continuous <Continuous>  dextrose 5%. 1000 milliLiter(s) IV Continuous <Continuous>  dextrose 50% Injectable 12.5 Gram(s) IV Push once  dextrose 50% Injectable 25 Gram(s) IV Push once  dextrose 50% Injectable 25 Gram(s) IV Push once  dextrose Oral Gel 15 Gram(s) Oral once PRN  glucagon  Injectable 1 milliGRAM(s) IntraMuscular once  insulin lispro (ADMELOG) corrective regimen sliding scale   SubCutaneous at bedtime  insulin lispro (ADMELOG) corrective regimen sliding scale   SubCutaneous three times a day before meals  labetalol 300 milliGRAM(s) Enteral Tube every 8 hours  midodrine. 2.5 milliGRAM(s) Oral <User Schedule>  nystatin    Suspension 266037 Unit(s) Oral every 6 hours  pantoprazole   Suspension 40 milliGRAM(s) Oral daily  polyethylene glycol 3350 17 Gram(s) Oral daily      T(C): 36.7 (06-24-23 @ 07:55), Max: 37 (06-23-23 @ 22:09)  HR: 78 (06-24-23 @ 07:55) (78 - 87)  BP: 133/71 (06-24-23 @ 07:55) (113/67 - 133/71)  RR: 17 (06-24-23 @ 07:55) (17 - 17)  SpO2: 95% (06-24-23 @ 07:55) (94% - 95%)    In NAD  HEENT- EOMI  Heart- Well Perfused  Lungs- No resp distress, no use of accessory resp muscles  Abd- + BS, NT  Ext- No calf pain  Neuro- Exam unchanged  Psych- Affect wnl    Imp: Patient with diagnosis of CVA admitted for comprehensive acute rehabilitation.    Plan:  - Continue therapies  - DVT prophylaxis  - Skin- Turn q2h, check skin daily  - Continue current medications; patient medically stable.   - Patient is stable to continue current rehabilitation program.

## 2023-06-24 NOTE — PROGRESS NOTE ADULT - ASSESSMENT
82 year old, Urdu speaking, woman with hx of HTN, AF (xarelto), HCV (entecavir) that presented to [facility] ED on 6/1 with AMS and underwent imaging and noted to have L MCA infarct with L MCA occlusion but not a candidate for tPa given elevated INR and thus underwent mechanical thrombectomy. Felt to be due to cardioembolic as patient was not actively taking xarelto. She was started on eliquis. Unable to pass SLP testing and underwent PEG placement. She is now admitted to [facility] for acute rehabilitation.          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 Mcdaniel (Heme)   - Eliquis  - started oral feeds    Atrial Fibrillation  - Eliquis   - Labetalol 300mg q 8 hrs    Hypertension  Blood pressure meds with hold parameters     Pre-DM  - a1c 5.9  - monitor glucose    Hepatitis C  - Entecavir daily .     R breast mass  - Mammogram outpatient  - Oncology follow up outpatient    DVT ppx  Eliquis

## 2023-06-25 LAB
GLUCOSE BLDC GLUCOMTR-MCNC: 108 MG/DL — HIGH (ref 70–99)
GLUCOSE BLDC GLUCOMTR-MCNC: 119 MG/DL — HIGH (ref 70–99)
GLUCOSE BLDC GLUCOMTR-MCNC: 143 MG/DL — HIGH (ref 70–99)
GLUCOSE BLDC GLUCOMTR-MCNC: 143 MG/DL — HIGH (ref 70–99)

## 2023-06-25 PROCEDURE — 99232 SBSQ HOSP IP/OBS MODERATE 35: CPT | Mod: GC

## 2023-06-25 PROCEDURE — 99232 SBSQ HOSP IP/OBS MODERATE 35: CPT

## 2023-06-25 RX ADMIN — APIXABAN 5 MILLIGRAM(S): 2.5 TABLET, FILM COATED ORAL at 06:34

## 2023-06-25 RX ADMIN — Medication 500000 UNIT(S): at 17:40

## 2023-06-25 RX ADMIN — Medication 500000 UNIT(S): at 00:39

## 2023-06-25 RX ADMIN — Medication 500000 UNIT(S): at 06:38

## 2023-06-25 RX ADMIN — Medication 500000 UNIT(S): at 12:56

## 2023-06-25 RX ADMIN — Medication 300 MILLIGRAM(S): at 21:45

## 2023-06-25 RX ADMIN — MIDODRINE HYDROCHLORIDE 2.5 MILLIGRAM(S): 2.5 TABLET ORAL at 12:56

## 2023-06-25 RX ADMIN — APIXABAN 5 MILLIGRAM(S): 2.5 TABLET, FILM COATED ORAL at 17:41

## 2023-06-25 RX ADMIN — MIDODRINE HYDROCHLORIDE 2.5 MILLIGRAM(S): 2.5 TABLET ORAL at 08:23

## 2023-06-25 RX ADMIN — Medication 300 MILLIGRAM(S): at 06:34

## 2023-06-25 RX ADMIN — PANTOPRAZOLE SODIUM 40 MILLIGRAM(S): 20 TABLET, DELAYED RELEASE ORAL at 12:56

## 2023-06-25 RX ADMIN — Medication 300 MILLIGRAM(S): at 14:40

## 2023-06-25 RX ADMIN — ATORVASTATIN CALCIUM 40 MILLIGRAM(S): 80 TABLET, FILM COATED ORAL at 21:43

## 2023-06-25 RX ADMIN — POLYETHYLENE GLYCOL 3350 17 GRAM(S): 17 POWDER, FOR SOLUTION ORAL at 12:57

## 2023-06-25 NOTE — PROGRESS NOTE ADULT - SUBJECTIVE AND OBJECTIVE BOX
Cc:   Weakness    HPI:   Patient with no new medical issues today.  Pain controlled, no chest pain, no N/V, no Fevers/Chills. No other new ROS  Has been tolerating rehabilitation program.    apixaban 5 milliGRAM(s) Oral two times a day  atorvastatin 40 milliGRAM(s) Oral at bedtime  bacitracin   Ointment 1 Application(s) Topical three times a day PRN  dextrose 5%. 1000 milliLiter(s) IV Continuous <Continuous>  dextrose 5%. 1000 milliLiter(s) IV Continuous <Continuous>  dextrose 50% Injectable 12.5 Gram(s) IV Push once  dextrose 50% Injectable 25 Gram(s) IV Push once  dextrose 50% Injectable 25 Gram(s) IV Push once  dextrose Oral Gel 15 Gram(s) Oral once PRN  glucagon  Injectable 1 milliGRAM(s) IntraMuscular once  insulin lispro (ADMELOG) corrective regimen sliding scale   SubCutaneous at bedtime  insulin lispro (ADMELOG) corrective regimen sliding scale   SubCutaneous three times a day before meals  labetalol 300 milliGRAM(s) Enteral Tube every 8 hours  midodrine. 2.5 milliGRAM(s) Oral <User Schedule>  nystatin    Suspension 282532 Unit(s) Oral every 6 hours  pantoprazole   Suspension 40 milliGRAM(s) Oral daily  polyethylene glycol 3350 17 Gram(s) Oral daily      T(C): 36.6 (06-25-23 @ 09:26), Max: 36.6 (06-25-23 @ 09:26)  HR: 88 (06-25-23 @ 09:26) (84 - 92)  BP: 129/71 (06-25-23 @ 09:26) (110/72 - 155/86)  RR: 17 (06-25-23 @ 09:26) (16 - 17)  SpO2: 95% (06-25-23 @ 09:26) (95% - 96%)    In NAD  HEENT- EOMI  Heart- Well Perfused  Lungs- No distress, no use of accessory muscles  Abd- (+) BS, NT  Ext- No calf pain  Neuro- Exam unchanged  Psych- Affect wnl    Imp: Patient with diagnosis of CVA admitted for comprehensive acute rehabilitation.    Plan:  - Continue therapies  - DVT prophylaxis  - Skin- Turn q2h, check skin daily  - Continue current medications; patient medically stable.   - Patient is stable to continue current rehabilitation program.

## 2023-06-25 NOTE — PROGRESS NOTE ADULT - SUBJECTIVE AND OBJECTIVE BOX
Patient is a 82y old  Female who presents with a chief complaint of Functional deficits s/p CVA (24 Jun 2023 11:58)      INTERVAL History of Present Illness/OVERNIGHT EVENTS: oral feeds    MEDICATIONS  (STANDING):  apixaban 5 milliGRAM(s) Oral two times a day  atorvastatin 40 milliGRAM(s) Oral at bedtime  dextrose 5%. 1000 milliLiter(s) (100 mL/Hr) IV Continuous <Continuous>  dextrose 5%. 1000 milliLiter(s) (50 mL/Hr) IV Continuous <Continuous>  dextrose 50% Injectable 12.5 Gram(s) IV Push once  dextrose 50% Injectable 25 Gram(s) IV Push once  dextrose 50% Injectable 25 Gram(s) IV Push once  glucagon  Injectable 1 milliGRAM(s) IntraMuscular once  insulin lispro (ADMELOG) corrective regimen sliding scale   SubCutaneous at bedtime  insulin lispro (ADMELOG) corrective regimen sliding scale   SubCutaneous three times a day before meals  labetalol 300 milliGRAM(s) Enteral Tube every 8 hours  midodrine. 2.5 milliGRAM(s) Oral <User Schedule>  nystatin    Suspension 390630 Unit(s) Oral every 6 hours  pantoprazole   Suspension 40 milliGRAM(s) Oral daily  polyethylene glycol 3350 17 Gram(s) Oral daily    MEDICATIONS  (PRN):  bacitracin   Ointment 1 Application(s) Topical three times a day PRN to skin wound  dextrose Oral Gel 15 Gram(s) Oral once PRN Blood Glucose LESS THAN 70 milliGRAM(s)/deciliter      Allergies    No Known Allergies    Intolerances        REVIEW OF SYSTEMS:  Other systems currently negative unless otherwise specified above.    Vital Signs Last 24 Hrs  T(C): 36.5 (24 Jun 2023 22:03), Max: 36.5 (24 Jun 2023 22:03)  T(F): 97.7 (24 Jun 2023 22:03), Max: 97.7 (24 Jun 2023 22:03)  HR: 84 (25 Jun 2023 06:49) (84 - 92)  BP: 155/86 (25 Jun 2023 06:49) (110/72 - 155/86)  BP(mean): --  RR: 16 (24 Jun 2023 22:03) (16 - 16)  SpO2: 96% (24 Jun 2023 22:03) (96% - 96%)    Parameters below as of 24 Jun 2023 22:03  Patient On (Oxygen Delivery Method): room air            PHYSICAL EXAM:  GENERAL: No apparent distress, appears stated age  EYES: Conjunctiva and sclera clear, no discharge  ENMT: Moist mucous membranes, no nasal discharge  CHEST/LUNG: Clear to auscultation bilaterally, no wheeze or rales  HEART: Regular rhythm, no rubs or gallops  ABDOMEN: Soft, Nontender, +PEG  EXTREMITIES:  No clubbing, cyanosis or edema      LABS:              CAPILLARY BLOOD GLUCOSE      POCT Blood Glucose.: 119 mg/dL (25 Jun 2023 08:06)  POCT Blood Glucose.: 154 mg/dL (24 Jun 2023 20:09)  POCT Blood Glucose.: 103 mg/dL (24 Jun 2023 16:57)  POCT Blood Glucose.: 161 mg/dL (24 Jun 2023 11:44)        RADIOLOGY & ADDITIONAL TESTS:      Images reviewed personally    Consultant Notes Reviewed and Care Discussed with relevant Consultants. Patient is a 82y old  Female who presents with a chief complaint of Functional deficits s/p CVA (24 Jun 2023 11:58)      INTERVAL History of Present Illness/OVERNIGHT EVENTS: oral feeds    MEDICATIONS  (STANDING):  apixaban 5 milliGRAM(s) Oral two times a day  atorvastatin 40 milliGRAM(s) Oral at bedtime  dextrose 5%. 1000 milliLiter(s) (100 mL/Hr) IV Continuous <Continuous>  dextrose 5%. 1000 milliLiter(s) (50 mL/Hr) IV Continuous <Continuous>  dextrose 50% Injectable 12.5 Gram(s) IV Push once  dextrose 50% Injectable 25 Gram(s) IV Push once  dextrose 50% Injectable 25 Gram(s) IV Push once  glucagon  Injectable 1 milliGRAM(s) IntraMuscular once  insulin lispro (ADMELOG) corrective regimen sliding scale   SubCutaneous at bedtime  insulin lispro (ADMELOG) corrective regimen sliding scale   SubCutaneous three times a day before meals  labetalol 300 milliGRAM(s) Enteral Tube every 8 hours  midodrine. 2.5 milliGRAM(s) Oral <User Schedule>  nystatin    Suspension 869890 Unit(s) Oral every 6 hours  pantoprazole   Suspension 40 milliGRAM(s) Oral daily  polyethylene glycol 3350 17 Gram(s) Oral daily    MEDICATIONS  (PRN):  bacitracin   Ointment 1 Application(s) Topical three times a day PRN to skin wound  dextrose Oral Gel 15 Gram(s) Oral once PRN Blood Glucose LESS THAN 70 milliGRAM(s)/deciliter      Allergies    No Known Allergies    Intolerances        REVIEW OF SYSTEMS:  Other systems currently negative unless otherwise specified above.    Vital Signs Last 24 Hrs  T(C): 36.5 (24 Jun 2023 22:03), Max: 36.5 (24 Jun 2023 22:03)  T(F): 97.7 (24 Jun 2023 22:03), Max: 97.7 (24 Jun 2023 22:03)  HR: 84 (25 Jun 2023 06:49) (84 - 92)  BP: 155/86 (25 Jun 2023 06:49) (110/72 - 155/86)  BP(mean): --  RR: 16 (24 Jun 2023 22:03) (16 - 16)  SpO2: 96% (24 Jun 2023 22:03) (96% - 96%)    Parameters below as of 24 Jun 2023 22:03  Patient On (Oxygen Delivery Method): room air            PHYSICAL EXAM:  GENERAL: No apparent distress, appears stated age  EYES: Conjunctiva and sclera clear, no discharge  ENMT: Moist mucous membranes, no nasal discharge  CHEST/LUNG: Clear to auscultation bilaterally, no wheeze or rales  HEART: Irregular rhythm, no rubs or gallops  ABDOMEN: Soft, Nontender, +PEG  EXTREMITIES:  No clubbing, cyanosis or edema      LABS:              CAPILLARY BLOOD GLUCOSE      POCT Blood Glucose.: 119 mg/dL (25 Jun 2023 08:06)  POCT Blood Glucose.: 154 mg/dL (24 Jun 2023 20:09)  POCT Blood Glucose.: 103 mg/dL (24 Jun 2023 16:57)  POCT Blood Glucose.: 161 mg/dL (24 Jun 2023 11:44)        RADIOLOGY & ADDITIONAL TESTS:      Images reviewed personally    Consultant Notes Reviewed and Care Discussed with relevant Consultants.

## 2023-06-25 NOTE — PROGRESS NOTE ADULT - ASSESSMENT
82 year old, Italian speaking, woman with hx of HTN, AF (xarelto), HCV (entecavir) that presented to [facility] ED on 6/1 with AMS and underwent imaging and noted to have L MCA infarct with L MCA occlusion but not a candidate for tPa given elevated INR and thus underwent mechanical thrombectomy. Felt to be due to cardioembolic as patient was not actively taking xarelto. She was started on eliquis. Unable to pass SLP testing and underwent PEG placement. She is now admitted to [facility] for acute rehabilitation.          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 Mcdaniel (Heme)   - Eliquis  - started oral feeds    Atrial Fibrillation  - Eliquis   - Labetalol 300mg q 8 hrs    Hypertension  Blood pressure meds with hold parameters     Pre-DM  - a1c 5.9  - monitor glucose    Hepatitis C  - Entecavir daily .     R breast mass  - Mammogram outpatient  - Oncology follow up outpatient    DVT ppx  Eliquis    Fall/Aspiration precautions

## 2023-06-26 LAB
ALBUMIN SERPL ELPH-MCNC: 2.3 G/DL — LOW (ref 3.3–5)
ALP SERPL-CCNC: 139 U/L — HIGH (ref 40–120)
ALT FLD-CCNC: 35 U/L — SIGNIFICANT CHANGE UP (ref 10–45)
ANION GAP SERPL CALC-SCNC: 4 MMOL/L — LOW (ref 5–17)
AST SERPL-CCNC: 32 U/L — SIGNIFICANT CHANGE UP (ref 10–40)
BASOPHILS # BLD AUTO: 0.05 K/UL — SIGNIFICANT CHANGE UP (ref 0–0.2)
BASOPHILS NFR BLD AUTO: 0.6 % — SIGNIFICANT CHANGE UP (ref 0–2)
BILIRUB SERPL-MCNC: 0.8 MG/DL — SIGNIFICANT CHANGE UP (ref 0.2–1.2)
BUN SERPL-MCNC: 19 MG/DL — SIGNIFICANT CHANGE UP (ref 7–23)
CALCIUM SERPL-MCNC: 8.3 MG/DL — LOW (ref 8.4–10.5)
CHLORIDE SERPL-SCNC: 101 MMOL/L — SIGNIFICANT CHANGE UP (ref 96–108)
CO2 SERPL-SCNC: 30 MMOL/L — SIGNIFICANT CHANGE UP (ref 22–31)
CREAT SERPL-MCNC: 0.84 MG/DL — SIGNIFICANT CHANGE UP (ref 0.5–1.3)
EGFR: 69 ML/MIN/1.73M2 — SIGNIFICANT CHANGE UP
EOSINOPHIL # BLD AUTO: 0.18 K/UL — SIGNIFICANT CHANGE UP (ref 0–0.5)
EOSINOPHIL NFR BLD AUTO: 2.1 % — SIGNIFICANT CHANGE UP (ref 0–6)
GLUCOSE BLDC GLUCOMTR-MCNC: 128 MG/DL — HIGH (ref 70–99)
GLUCOSE BLDC GLUCOMTR-MCNC: 137 MG/DL — HIGH (ref 70–99)
GLUCOSE SERPL-MCNC: 119 MG/DL — HIGH (ref 70–99)
HCT VFR BLD CALC: 32.5 % — LOW (ref 34.5–45)
HGB BLD-MCNC: 10.7 G/DL — LOW (ref 11.5–15.5)
IMM GRANULOCYTES NFR BLD AUTO: 0.5 % — SIGNIFICANT CHANGE UP (ref 0–0.9)
LYMPHOCYTES # BLD AUTO: 0.96 K/UL — LOW (ref 1–3.3)
LYMPHOCYTES # BLD AUTO: 11.4 % — LOW (ref 13–44)
MCHC RBC-ENTMCNC: 30.8 PG — SIGNIFICANT CHANGE UP (ref 27–34)
MCHC RBC-ENTMCNC: 32.9 GM/DL — SIGNIFICANT CHANGE UP (ref 32–36)
MCV RBC AUTO: 93.7 FL — SIGNIFICANT CHANGE UP (ref 80–100)
MONOCYTES # BLD AUTO: 0.67 K/UL — SIGNIFICANT CHANGE UP (ref 0–0.9)
MONOCYTES NFR BLD AUTO: 8 % — SIGNIFICANT CHANGE UP (ref 2–14)
NEUTROPHILS # BLD AUTO: 6.5 K/UL — SIGNIFICANT CHANGE UP (ref 1.8–7.4)
NEUTROPHILS NFR BLD AUTO: 77.4 % — HIGH (ref 43–77)
NRBC # BLD: 0 /100 WBCS — SIGNIFICANT CHANGE UP (ref 0–0)
PLATELET # BLD AUTO: 308 K/UL — SIGNIFICANT CHANGE UP (ref 150–400)
POTASSIUM SERPL-MCNC: 3.7 MMOL/L — SIGNIFICANT CHANGE UP (ref 3.5–5.3)
POTASSIUM SERPL-SCNC: 3.7 MMOL/L — SIGNIFICANT CHANGE UP (ref 3.5–5.3)
PROT SERPL-MCNC: 5.7 G/DL — LOW (ref 6–8.3)
RBC # BLD: 3.47 M/UL — LOW (ref 3.8–5.2)
RBC # FLD: 13.2 % — SIGNIFICANT CHANGE UP (ref 10.3–14.5)
SODIUM SERPL-SCNC: 135 MMOL/L — SIGNIFICANT CHANGE UP (ref 135–145)
WBC # BLD: 8.4 K/UL — SIGNIFICANT CHANGE UP (ref 3.8–10.5)
WBC # FLD AUTO: 8.4 K/UL — SIGNIFICANT CHANGE UP (ref 3.8–10.5)

## 2023-06-26 PROCEDURE — 99232 SBSQ HOSP IP/OBS MODERATE 35: CPT | Mod: GC

## 2023-06-26 RX ORDER — INSULIN LISPRO 100/ML
VIAL (ML) SUBCUTANEOUS
Refills: 0 | Status: DISCONTINUED | OUTPATIENT
Start: 2023-06-26 | End: 2023-06-26

## 2023-06-26 RX ORDER — INSULIN LISPRO 100/ML
VIAL (ML) SUBCUTANEOUS
Refills: 0 | Status: DISCONTINUED | OUTPATIENT
Start: 2023-06-26 | End: 2023-07-07

## 2023-06-26 RX ORDER — LANOLIN ALCOHOL/MO/W.PET/CERES
3 CREAM (GRAM) TOPICAL AT BEDTIME
Refills: 0 | Status: DISCONTINUED | OUTPATIENT
Start: 2023-06-26 | End: 2023-07-07

## 2023-06-26 RX ORDER — SENNA PLUS 8.6 MG/1
2 TABLET ORAL AT BEDTIME
Refills: 0 | Status: DISCONTINUED | OUTPATIENT
Start: 2023-06-26 | End: 2023-07-07

## 2023-06-26 RX ADMIN — PANTOPRAZOLE SODIUM 40 MILLIGRAM(S): 20 TABLET, DELAYED RELEASE ORAL at 12:18

## 2023-06-26 RX ADMIN — Medication 300 MILLIGRAM(S): at 08:35

## 2023-06-26 RX ADMIN — ATORVASTATIN CALCIUM 40 MILLIGRAM(S): 80 TABLET, FILM COATED ORAL at 21:36

## 2023-06-26 RX ADMIN — APIXABAN 5 MILLIGRAM(S): 2.5 TABLET, FILM COATED ORAL at 17:25

## 2023-06-26 RX ADMIN — Medication 500000 UNIT(S): at 00:01

## 2023-06-26 RX ADMIN — APIXABAN 5 MILLIGRAM(S): 2.5 TABLET, FILM COATED ORAL at 05:29

## 2023-06-26 RX ADMIN — Medication 3 MILLIGRAM(S): at 21:37

## 2023-06-26 RX ADMIN — Medication 500000 UNIT(S): at 05:29

## 2023-06-26 RX ADMIN — Medication 500000 UNIT(S): at 12:19

## 2023-06-26 RX ADMIN — MIDODRINE HYDROCHLORIDE 2.5 MILLIGRAM(S): 2.5 TABLET ORAL at 12:19

## 2023-06-26 RX ADMIN — SENNA PLUS 2 TABLET(S): 8.6 TABLET ORAL at 21:37

## 2023-06-26 RX ADMIN — Medication 500000 UNIT(S): at 17:25

## 2023-06-26 RX ADMIN — Medication 300 MILLIGRAM(S): at 13:53

## 2023-06-26 RX ADMIN — Medication 500000 UNIT(S): at 21:37

## 2023-06-26 RX ADMIN — POLYETHYLENE GLYCOL 3350 17 GRAM(S): 17 POWDER, FOR SOLUTION ORAL at 12:19

## 2023-06-26 NOTE — PROGRESS NOTE ADULT - ASSESSMENT
AUDI DYKES is a 82 year old, Spanish speaking, woman with PMH of HTN, AFIB (on Xarelto), Hepatitis C (on Entecavir); who presented to [facility] ED on 6/1 with acute onset AMS. LKN was 10pm, at 10:30pm she became acutely altered and with poor posture. Upon arrival, she was noted to have decreased arousal, severe R sided weakness, slurred speech and aphasia requiring intubation. 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 She was not an appropriate tenecteplase candidate due to INR of 1.79 and was taken for mechanical thrombectomy. SLP recommended NPO status on 6/8, a repeat MBS was performed on 6/13 without improvement and a PEG tube was placed on 6/14.  Patient now admitted for a multidisciplinary rehab program.   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 Mcdaniel (Heme)     #AFIB  - Eliquis     #HTN  - Amlodipine  - Labetalol 400mg q 8 hrs  - Losartan 100mg daily - discontinued   - off Amlodipine  - Decrease labetolol to 300mg 3x/day  - maximum syst BP is 134   - TEDs, Abd binder  - Midodrine 5mg BID.     # Hyponatermia   - Improved.     #Hepatitis C  - Entecavir daily   - Hold feeds 2 hours before and 2 hours after medication administration     #R breast mass  - Mammogram outpatient  - Oncology follow up outpatient    #Oral Candida  - Nystatin  - Swish and suction       #Sleep  - melatonin PRN     #Skin  - Skin on admission: skin tear to mid and left chest wall  - Pressure injury/Skin: OOB to Chair, PT/OT   - Bacitracin    #Pain Mgmt   - Tylenol PRN, Oxycodone PRN    #GI/Bowel Mgmt   - Pantoprazole  - Continent c/w Senna, Miralax     #/Bladder Mgmt   -  PVR q6h, CIC>400cc-   +UA but C&S contaminant- received PO ceftin since 6/20- will Dc spontaneously voiding  - high PVR.  PVRS q8h- St cath for residual > 400cc    #FEN   - Diet - NPO with tube feeds  - Glucerna  - Dysphagia  SLP - evaluation and treatment  - MBS done-Puree with Mild thick.     #Precautions / PROPHYLAXIS:   - Falls  - ortho: Weight bearing status: WBAT   - Lungs: Aspiration, Incentive Spirometer     TDD- 7/7  to MEMO     - DVT: Eliquis

## 2023-06-27 LAB — GLUCOSE BLDC GLUCOMTR-MCNC: 120 MG/DL — HIGH (ref 70–99)

## 2023-06-27 PROCEDURE — 99232 SBSQ HOSP IP/OBS MODERATE 35: CPT

## 2023-06-27 PROCEDURE — 99233 SBSQ HOSP IP/OBS HIGH 50: CPT | Mod: GC

## 2023-06-27 RX ORDER — MIDODRINE HYDROCHLORIDE 2.5 MG/1
2.5 TABLET ORAL
Refills: 0 | Status: DISCONTINUED | OUTPATIENT
Start: 2023-06-27 | End: 2023-06-27

## 2023-06-27 RX ORDER — MIDODRINE HYDROCHLORIDE 2.5 MG/1
2.5 TABLET ORAL THREE TIMES A DAY
Refills: 0 | Status: DISCONTINUED | OUTPATIENT
Start: 2023-06-27 | End: 2023-07-07

## 2023-06-27 RX ORDER — METOPROLOL TARTRATE 50 MG
50 TABLET ORAL
Refills: 0 | Status: DISCONTINUED | OUTPATIENT
Start: 2023-06-27 | End: 2023-07-03

## 2023-06-27 RX ADMIN — Medication 500000 UNIT(S): at 21:52

## 2023-06-27 RX ADMIN — PANTOPRAZOLE SODIUM 40 MILLIGRAM(S): 20 TABLET, DELAYED RELEASE ORAL at 12:25

## 2023-06-27 RX ADMIN — Medication 3 MILLIGRAM(S): at 21:49

## 2023-06-27 RX ADMIN — Medication 300 MILLIGRAM(S): at 06:28

## 2023-06-27 RX ADMIN — Medication 50 MILLIGRAM(S): at 18:07

## 2023-06-27 RX ADMIN — APIXABAN 5 MILLIGRAM(S): 2.5 TABLET, FILM COATED ORAL at 06:28

## 2023-06-27 RX ADMIN — ATORVASTATIN CALCIUM 40 MILLIGRAM(S): 80 TABLET, FILM COATED ORAL at 21:49

## 2023-06-27 RX ADMIN — APIXABAN 5 MILLIGRAM(S): 2.5 TABLET, FILM COATED ORAL at 18:07

## 2023-06-27 RX ADMIN — Medication 500000 UNIT(S): at 06:28

## 2023-06-27 RX ADMIN — Medication 500000 UNIT(S): at 12:25

## 2023-06-27 RX ADMIN — Medication 500000 UNIT(S): at 18:07

## 2023-06-27 RX ADMIN — SENNA PLUS 2 TABLET(S): 8.6 TABLET ORAL at 21:49

## 2023-06-27 NOTE — PROGRESS NOTE ADULT - ASSESSMENT
AUDI DYKES is a 82 year old, Occitan speaking, woman with PMH of HTN, AFIB (on Xarelto), Hepatitis C (on Entecavir); who presented to [facility] ED on 6/1 with acute onset AMS. LKN was 10pm, at 10:30pm she became acutely altered and with poor posture. Upon arrival, she was noted to have decreased arousal, severe R sided weakness, slurred speech and aphasia requiring intubation. 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 She was not an appropriate tenecteplase candidate due to INR of 1.79 and was taken for mechanical thrombectomy. SLP recommended NPO status on 6/8, a repeat MBS was performed on 6/13 without improvement and a PEG tube was placed on 6/14.  Patient now admitted for a multidisciplinary rehab program.   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 Mcdaniel (Heme)     #AFIB  - Eliquis     #HTN  - Amlodipine  - Labetalol 400mg q 8 hrs  - Losartan 100mg daily - discontinued   Amlodipine DCd, Labetalol Dcd  To satrt Lopressor 50mg 2x/day per hospitalist  change midodrine to PRN  Follow BPs closely    # Hyponatermia   - Improved.   to continue NS flushes  check CMP on Thursday    #Hepatitis C  - Entecavir daily   - Hold feeds 2 hours before and 2 hours after medication administration     #R breast mass  - Mammogram outpatient  - Oncology follow up outpatient    #Oral Candida  - Nystatin  - Swish and suction       #Sleep  - melatonin PRN     #Skin  - Skin on admission: skin tear to mid and left chest wall  - Pressure injury/Skin: OOB to Chair, PT/OT   - Bacitracin    #Pain Mgmt   - Tylenol PRN, Oxycodone PRN    #GI/Bowel Mgmt   - Pantoprazole  - Continent c/w Senna, Miralax     #/Bladder Mgmt   -  PVR q6h, CIC>400cc-   +UA but C&S contaminant- received PO ceftin since 6/20- will Dc spontaneously voiding  - high PVR.  PVRS q8h- St cath for residual > 400cc    #FEN   - Diet - NPO with tube feeds  - Glucerna  - Dysphagia  SLP - evaluation and treatment  - MBS done-Puree with Mild thick. - Tube Feeds DCD- continue speech therapy    #Precautions / PROPHYLAXIS:   - Falls  - ortho: Weight bearing status: WBAT   - Lungs: Aspiration, Incentive Spirometer     TDD- 7/7  to MEMO     - DVT: Eliquis AUDI DYKES is a 82 year old, Irish speaking, woman with PMH of HTN, AFIB (on Xarelto), Hepatitis C (on Entecavir); who presented to [facility] ED on 6/1 with acute onset AMS. LKN was 10pm, at 10:30pm she became acutely altered and with poor posture. Upon arrival, she was noted to have decreased arousal, severe R sided weakness, slurred speech and aphasia requiring intubation. 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 She was not an appropriate tenecteplase candidate due to INR of 1.79 and was taken for mechanical thrombectomy. SLP recommended NPO status on 6/8, a repeat MBS was performed on 6/13 without improvement and a PEG tube was placed on 6/14.  Patient now admitted for a multidisciplinary rehab program.   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 Mcdaniel (Heme)     #AFIB  - Eliquis     #HTN  - Amlodipine  - Labetalol 400mg q 8 hrs  - Losartan 100mg daily - discontinued   Amlodipine DCd, Labetalol Dcd  To satrt Lopressor 50mg 2x/day per hospitalist  change midodrine to PRN  Follow BPs closely    # Hyponatermia   - Improved.   to continue NS flushes  check CMP on Thursday    #Hepatitis C  - Entecavir daily   - Hold feeds 2 hours before and 2 hours after medication administration     #R breast mass  - Mammogram outpatient  - Oncology follow up outpatient    #Oral Candida  - Nystatin  - Swish and suction       #Sleep  - melatonin PRN     #Skin  - Skin on admission: skin tear to mid and left chest wall  - Pressure injury/Skin: OOB to Chair, PT/OT   - Bacitracin    #Pain Mgmt   - Tylenol PRN, Oxycodone PRN    #GI/Bowel Mgmt   - Pantoprazole  - Continent c/w Senna, Miralax     #/Bladder Mgmt   -  PVR q6h, CIC>400cc-   +UA but C&S contaminant- received PO ceftin since 6/20- will Dc spontaneously voiding  - high PVR.  PVRS q8h- St cath for residual > 400cc    #FEN   - Diet - NPO with tube feeds  - Glucerna  - Dysphagia  SLP - evaluation and treatment  - MBS done-Puree with Mild thick. - Tube Feeds DCD- continue speech therapy    #Precautions / PROPHYLAXIS:   - Falls  - ortho: Weight bearing status: WBAT   - Lungs: Aspiration, Incentive Spirometer     TDD- 7/7  to MEMO     - DVT: Eliquis    20 additional minutes spent today on coordination of care including team conference as well as phone conversation with patient's daughter Daniel - I updated her on plan of care- All questions were answered by me as well.

## 2023-06-27 NOTE — PROGRESS NOTE ADULT - SUBJECTIVE AND OBJECTIVE BOX
Medicine Progress Note    Patient is a 82y old  Female who presents with a chief complaint of Functional deficits s/p CVA (26 Jun 2023 10:27)      SUBJECTIVE / OVERNIGHT EVENTS:  seen and examined  Chart reviewed  No overnight events  Limb weakness improving with therapy  BM+  No pain  No complaints    ADDITIONAL REVIEW OF SYSTEMS:  denied fever/chills/CP/SOB/cough/palpitation/dizziness/abdominal pian/nausea/vomiting/diarrhoea/constipation/dysuria/leg or calf pain/headaches.all other ROS neg    MEDICATIONS  (STANDING):  apixaban 5 milliGRAM(s) Oral two times a day  atorvastatin 40 milliGRAM(s) Oral at bedtime  dextrose 5%. 1000 milliLiter(s) (50 mL/Hr) IV Continuous <Continuous>  dextrose 5%. 1000 milliLiter(s) (100 mL/Hr) IV Continuous <Continuous>  dextrose 50% Injectable 12.5 Gram(s) IV Push once  dextrose 50% Injectable 25 Gram(s) IV Push once  dextrose 50% Injectable 25 Gram(s) IV Push once  glucagon  Injectable 1 milliGRAM(s) IntraMuscular once  insulin lispro (ADMELOG) corrective regimen sliding scale   SubCutaneous before breakfast  melatonin 3 milliGRAM(s) Oral at bedtime  metoprolol tartrate 50 milliGRAM(s) Oral two times a day  nystatin    Suspension 536908 Unit(s) Oral every 6 hours  pantoprazole   Suspension 40 milliGRAM(s) Oral daily  senna 2 Tablet(s) Oral at bedtime    MEDICATIONS  (PRN):  bacitracin   Ointment 1 Application(s) Topical three times a day PRN to skin wound  dextrose Oral Gel 15 Gram(s) Oral once PRN Blood Glucose LESS THAN 70 milliGRAM(s)/deciliter  midodrine. 2.5 milliGRAM(s) Oral three times a day PRN SBP<90    CAPILLARY BLOOD GLUCOSE      POCT Blood Glucose.: 120 mg/dL (27 Jun 2023 08:47)  POCT Blood Glucose.: 137 mg/dL (26 Jun 2023 12:13)    I&O's Summary    26 Jun 2023 07:01  -  27 Jun 2023 07:00  --------------------------------------------------------  IN: 1200 mL / OUT: 0 mL / NET: 1200 mL        PHYSICAL EXAM:  Vital Signs Last 24 Hrs  T(C): 36.4 (27 Jun 2023 08:59), Max: 36.9 (26 Jun 2023 21:32)  T(F): 97.6 (27 Jun 2023 08:59), Max: 98.5 (26 Jun 2023 21:32)  HR: 94 (27 Jun 2023 08:59) (83 - 95)  BP: 131/80 (27 Jun 2023 08:59) (109/64 - 146/74)  BP(mean): --  RR: 16 (27 Jun 2023 08:59) (16 - 16)  SpO2: 93% (27 Jun 2023 08:59) (93% - 96%)    Parameters below as of 27 Jun 2023 08:59  Patient On (Oxygen Delivery Method): room air    GENERAL: Not in distress. Alert    HEENT: clear conjuctiva, MMM. no pallor or icterus  CARDIOVASCULAR: irregular, S1, S2. soft SM. no rubs/gallop  LUNGS: BLAE+, no rales, no wheezing, no rhonchi.    ABDOMEN: ND. Soft,  NT, no guarding / rebound / rigidity. BS normoactive  BACK: No spine tenderness.  EXTREMITIES: no edema. no leg or calf TP.  SKIN: warm and dry. PEG site clean  PSYCHIATRIC: Calm.  No agitation.  CNS: AAO. Right sided weakness    LABS:                        10.7   8.40  )-----------( 308      ( 26 Jun 2023 06:36 )             32.5     06-26    135  |  101  |  19  ----------------------------<  119<H>  3.7   |  30  |  0.84    Ca    8.3<L>      26 Jun 2023 06:36    TPro  5.7<L>  /  Alb  2.3<L>  /  TBili  0.8  /  DBili  x   /  AST  32  /  ALT  35  /  AlkPhos  139<H>  06-26          Urinalysis Basic - ( 26 Jun 2023 06:36 )    Color: x / Appearance: x / SG: x / pH: x  Gluc: 119 mg/dL / Ketone: x  / Bili: x / Urobili: x   Blood: x / Protein: x / Nitrite: x   Leuk Esterase: x / RBC: x / WBC x   Sq Epi: x / Non Sq Epi: x / Bacteria: x        COVID-19 PCR: NotDetec (16 Jun 2023 20:00)      RADIOLOGY & ADDITIONAL TESTS:  Imaging from Last 24 Hours:    Electrocardiogram/QTc Interval:    COORDINATION OF CARE:  Care Discussed with Consultants/Other Providers:

## 2023-06-27 NOTE — PROGRESS NOTE ADULT - ASSESSMENT
82 year old, Frisian speaking, woman with hx of HTN, AF (xarelto), HCV (entecavir) that presented to [facility] ED on 6/1 with AMS and underwent imaging and noted to have L MCA infarct with L MCA occlusion but not a candidate for tPa given elevated INR and thus underwent mechanical thrombectomy. Felt to be due to cardioembolic as patient was not actively taking xarelto. She was started on eliquis. Unable to pass SLP testing and underwent PEG placement. She is now admitted to [facility] for acute rehabilitation.        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 Mcdaniel (Heme)   - passed SLP. on minced and moist. tolerating  - aspiration, fall, seizure precautions    Atrial Fibrillation  HTN  Orthostasis  - OH better  - c/w Eliquis   - c/w Labetalol 300mg q 8 hrs. changed to metoprolol 50 mg BID. 6/27  - made Midodrine PRN 6/27  - monitor Orthostatic VS daily and adjust meds      Pre-DM  - a1c 5.9  - monitor glucose    Hepatitis C  - Entecavir daily .     R breast mass  - Mammogram outpatient  - Oncology follow up outpatient  - patient is aware    DVT ppx: Eliquis    d/w rehab team

## 2023-06-27 NOTE — PROGRESS NOTE ADULT - SUBJECTIVE AND OBJECTIVE BOX
Chief complaint- SP LACEYA    Andres Falcon is an 82 year old, Portuguese speaking, woman with PMH of HTN, AFIB (on Xarelto), Hepatitis C (on Entecavir); who presented to [facility] ED on 6/1 with acute onset AMS. LKN was 10pm, at 10:30pm she became acutely altered and with poor posture. Upon arrival, she was noted to have decreased arousal, severe R sided weakness, slurred speech and aphasia requiring intubation. 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 She was not an appropriate tenecteplase candidate due to INR of 1.79 and was taken for mechanical thrombectomy. SLP recommended NPO status on 6/8, a repeat MBS was performed on 6/13 without improvement and a PEG tube was placed on 6/14. Further imaging was significant for R breast mass and she was recommended for outpatient follow up and imaging. PM&R was consulted and deemed her an appropriate candidate for IRF. She was medically stabilized and cleared for discharge to [facility].     ROS/subjective:   patient seen and examined bedside.   no reported complaints  eating well  Voice quality better today  Continues to use the abdominal binder and teds.  BPs transiently elevated- To DC labetolol- switch to lopressor- change midodrine to PRN and follow BPs  voiding into diaper- no st caths overnight- incontinent but residuals <300cc  last BM yesterday- incontinence  no apparent pain  She denies any dizziness. decrease sensation on the right side. No other complaint at this time.       MEDICATIONS  (STANDING):  apixaban 5 milliGRAM(s) Oral two times a day  atorvastatin 40 milliGRAM(s) Oral at bedtime  dextrose 5%. 1000 milliLiter(s) (50 mL/Hr) IV Continuous <Continuous>  dextrose 5%. 1000 milliLiter(s) (100 mL/Hr) IV Continuous <Continuous>  dextrose 50% Injectable 12.5 Gram(s) IV Push once  dextrose 50% Injectable 25 Gram(s) IV Push once  dextrose 50% Injectable 25 Gram(s) IV Push once  glucagon  Injectable 1 milliGRAM(s) IntraMuscular once  insulin lispro (ADMELOG) corrective regimen sliding scale   SubCutaneous before breakfast  melatonin 3 milliGRAM(s) Oral at bedtime  metoprolol tartrate 50 milliGRAM(s) Oral two times a day  nystatin    Suspension 043709 Unit(s) Oral every 6 hours  pantoprazole   Suspension 40 milliGRAM(s) Oral daily  senna 2 Tablet(s) Oral at bedtime    MEDICATIONS  (PRN):  bacitracin   Ointment 1 Application(s) Topical three times a day PRN to skin wound  dextrose Oral Gel 15 Gram(s) Oral once PRN Blood Glucose LESS THAN 70 milliGRAM(s)/deciliter  midodrine. 2.5 milliGRAM(s) Oral three times a day PRN SBP<90                            10.7   8.40  )-----------( 308      ( 26 Jun 2023 06:36 )             32.5     06-26    135  |  101  |  19  ----------------------------<  119<H>  3.7   |  30  |  0.84    Ca    8.3<L>      26 Jun 2023 06:36    TPro  5.7<L>  /  Alb  2.3<L>  /  TBili  0.8  /  DBili  x   /  AST  32  /  ALT  35  /  AlkPhos  139<H>  06-26    Urinalysis Basic - ( 26 Jun 2023 06:36 )    Color: x / Appearance: x / SG: x / pH: x  Gluc: 119 mg/dL / Ketone: x  / Bili: x / Urobili: x   Blood: x / Protein: x / Nitrite: x   Leuk Esterase: x / RBC: x / WBC x   Sq Epi: x / Non Sq Epi: x / Bacteria: x        CAPILLARY BLOOD GLUCOSE      POCT Blood Glucose.: 120 mg/dL (27 Jun 2023 08:47)      Vital Signs Last 24 Hrs  T(C): 36.4 (27 Jun 2023 08:59), Max: 36.9 (26 Jun 2023 21:32)  T(F): 97.6 (27 Jun 2023 08:59), Max: 98.5 (26 Jun 2023 21:32)  HR: 94 (27 Jun 2023 08:59) (83 - 94)  BP: 131/80 (27 Jun 2023 08:59) (109/64 - 146/74)  BP(mean): --  RR: 16 (27 Jun 2023 08:59) (16 - 16)  SpO2: 93% (27 Jun 2023 08:59) (93% - 96%)    Parameters below as of 27 Jun 2023 08:59  Patient On (Oxygen Delivery Method): room air    PHYSICAL       Gen - NAD, Comfortable, nonverbal  HEENT - NCAT, EOMI, MMM  Neck - Supple, No limited ROM  Pulm - CTAB, No wheeze  Cardiovascular - RRR, S1S2  Chest - good chest expansion, good respiratory effort  Abdomen - Soft, NT/ND, +BS, + PEG no suprapubic tenderness  Extremities - No Cyanosis, no clubbing, no edema, no calf tenderness  Neuro-     Cognitive - awake, alert follows visual commands     Communication - hypophonia, non -fluent, difficulty with prompting     Cranial Nerves - right facial weakness, decreased right  shoulder shrug      Motor -  Right  hemiparesis neglect low tone                    LEFT    UE - 5/5                    RIGHT UE - 0/5                    LEFT    LE - 5/5                    RIGHT LE - 0/5       Tone - some increased Flexor tone in RUE/RLE noted  Psychiatric - Mood stable, Affect WNL  Skin:  skin tear to mid and left chest wall- diffuse ecchymoses chest wall/ extremities      DT - 6/27    SW- 7 steps to get in. 15 steps inside. Daugther was helping prior.   SP - puree/mild thick. Mild to mod cognitive.   OT- Max/Total assist for most ADL's. She has good trunk control.   PT-  Transfer mod x2- stand Max assist x2.   Goal -  Mod assist.   TDD- 7/7  to [facility].

## 2023-06-28 LAB — GLUCOSE BLDC GLUCOMTR-MCNC: 110 MG/DL — HIGH (ref 70–99)

## 2023-06-28 PROCEDURE — 99232 SBSQ HOSP IP/OBS MODERATE 35: CPT | Mod: GC

## 2023-06-28 RX ADMIN — SENNA PLUS 2 TABLET(S): 8.6 TABLET ORAL at 22:03

## 2023-06-28 RX ADMIN — ATORVASTATIN CALCIUM 40 MILLIGRAM(S): 80 TABLET, FILM COATED ORAL at 22:02

## 2023-06-28 RX ADMIN — Medication 500000 UNIT(S): at 06:03

## 2023-06-28 RX ADMIN — Medication 500000 UNIT(S): at 17:20

## 2023-06-28 RX ADMIN — PANTOPRAZOLE SODIUM 40 MILLIGRAM(S): 20 TABLET, DELAYED RELEASE ORAL at 12:32

## 2023-06-28 RX ADMIN — APIXABAN 5 MILLIGRAM(S): 2.5 TABLET, FILM COATED ORAL at 17:20

## 2023-06-28 RX ADMIN — Medication 3 MILLIGRAM(S): at 22:03

## 2023-06-28 RX ADMIN — APIXABAN 5 MILLIGRAM(S): 2.5 TABLET, FILM COATED ORAL at 06:03

## 2023-06-28 RX ADMIN — Medication 50 MILLIGRAM(S): at 17:20

## 2023-06-28 RX ADMIN — Medication 500000 UNIT(S): at 12:32

## 2023-06-28 RX ADMIN — Medication 50 MILLIGRAM(S): at 06:03

## 2023-06-28 NOTE — PROGRESS NOTE ADULT - SUBJECTIVE AND OBJECTIVE BOX
Chief complaint- SP GENOVEVA    Andres Falcon is an 82 year old, Slovenian speaking, woman with PMH of HTN, AFIB (on Xarelto), Hepatitis C (on Entecavir); who presented to [facility] ED on 6/1 with acute onset AMS. LKN was 10pm, at 10:30pm she became acutely altered and with poor posture. Upon arrival, she was noted to have decreased arousal, severe R sided weakness, slurred speech and aphasia requiring intubation. 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 She was not an appropriate tenecteplase candidate due to INR of 1.79 and was taken for mechanical thrombectomy. SLP recommended NPO status on 6/8, a repeat MBS was performed on 6/13 without improvement and a PEG tube was placed on 6/14. Further imaging was significant for R breast mass and she was recommended for outpatient follow up and imaging. PM&R was consulted and deemed her an appropriate candidate for IRF. She was medically stabilized and cleared for discharge to [facility].     ROS/subjective: (Slovenian  used)  patient seen and examined in OT  no reported complaints  eating well  Voice quality good  tolerated diet- moved bowels this AM- incontinent  PVRS all in 200s - no str caths  Continues to use the abdominal binder and teds.  Syst  in OT- Off Labetolol- on Toprol- Midodrine DCd  reports pain in Right> Left knee  She denies any dizziness. decrease sensation on the right side. No other complaint at this time.       MEDICATIONS  (STANDING):  apixaban 5 milliGRAM(s) Oral two times a day  atorvastatin 40 milliGRAM(s) Oral at bedtime  dextrose 5%. 1000 milliLiter(s) (50 mL/Hr) IV Continuous <Continuous>  dextrose 5%. 1000 milliLiter(s) (100 mL/Hr) IV Continuous <Continuous>  dextrose 50% Injectable 25 Gram(s) IV Push once  dextrose 50% Injectable 12.5 Gram(s) IV Push once  dextrose 50% Injectable 25 Gram(s) IV Push once  glucagon  Injectable 1 milliGRAM(s) IntraMuscular once  insulin lispro (ADMELOG) corrective regimen sliding scale   SubCutaneous before breakfast  melatonin 3 milliGRAM(s) Oral at bedtime  metoprolol tartrate 50 milliGRAM(s) Oral two times a day  nystatin    Suspension 056189 Unit(s) Oral every 6 hours  pantoprazole   Suspension 40 milliGRAM(s) Oral daily  senna 2 Tablet(s) Oral at bedtime    MEDICATIONS  (PRN):  bacitracin   Ointment 1 Application(s) Topical three times a day PRN to skin wound  dextrose Oral Gel 15 Gram(s) Oral once PRN Blood Glucose LESS THAN 70 milliGRAM(s)/deciliter  midodrine. 2.5 milliGRAM(s) Oral three times a day PRN SBP<90                            10.7   8.40  )-----------( 308      ( 26 Jun 2023 06:36 )             32.5     06-26    135  |  101  |  19  ----------------------------<  119<H>  3.7   |  30  |  0.84    Ca    8.3<L>      26 Jun 2023 06:36    TPro  5.7<L>  /  Alb  2.3<L>  /  TBili  0.8  /  DBili  x   /  AST  32  /  ALT  35  /  AlkPhos  139<H>  06-26    x        CAPILLARY BLOOD GLUCOSE      POCT Blood Glucose.: 110 mg/dL (28 Jun 2023 07:53)      Vital Signs Last 24 Hrs  T(C): 37.1 (28 Jun 2023 07:34), Max: 37.1 (28 Jun 2023 07:34)  T(F): 98.7 (28 Jun 2023 07:34), Max: 98.7 (28 Jun 2023 07:34)  HR: 87 (28 Jun 2023 07:34) (87 - 96)  BP: 147/82 (28 Jun 2023 07:34) (123/70 - 148/76)  BP(mean): --  RR: 16 (28 Jun 2023 07:34) (16 - 16)  SpO2: 96% (28 Jun 2023 07:34) (96% - 98%)    Parameters below as of 28 Jun 2023 07:34  Patient On (Oxygen Delivery Method): room air        PHYSICAL       Gen - NAD, Comfortable, nonverbal  HEENT - NCAT, EOMI, MMM  Neck - Supple, No limited ROM  Pulm - CTAB, No wheeze  Cardiovascular - RRR, S1S2  Chest - good chest expansion, good respiratory effort  Abdomen - Soft, NT/ND, +BS, + PEG no suprapubic tenderness  Extremities - No Cyanosis, no clubbing, no edema, no calf tenderness  Neuro-     Cognitive - awake, alert follows visual commands     Communication - hypophonia, non -fluent, difficulty with prompting     Cranial Nerves - right facial weakness, decreased right  shoulder shrug      Motor -  Right  hemiparesis neglect low tone                    LEFT    UE - 5/5                    RIGHT UE - 0/5                    LEFT    LE - 5/5                    RIGHT LE - 0/5       Tone - some increased Flexor tone in RUE/RLE noted  Psychiatric - Mood stable, Affect WNL  Skin:  skin tear to mid and left chest wall- diffuse ecchymoses chest wall/ extremities      DT - 6/27    SW- 7 steps to get in. 15 steps inside. Daugther was helping prior.   SP - puree/mild thick. Mild to mod cognitive.   OT- Max/Total assist for most ADL's. She has good trunk control.   PT-  Transfer mod x2- stand Max assist x2.   Goal -  Mod assist.   TDD- 7/7  to [facility].

## 2023-06-28 NOTE — PROGRESS NOTE ADULT - ASSESSMENT
AUDI DYKES is a 82 year old, Syriac speaking, woman with PMH of HTN, AFIB (on Xarelto), Hepatitis C (on Entecavir); who presented to [facility] ED on 6/1 with acute onset AMS. LKN was 10pm, at 10:30pm she became acutely altered and with poor posture. Upon arrival, she was noted to have decreased arousal, severe R sided weakness, slurred speech and aphasia requiring intubation. 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 She was not an appropriate tenecteplase candidate due to INR of 1.79 and was taken for mechanical thrombectomy. SLP recommended NPO status on 6/8, a repeat MBS was performed on 6/13 without improvement and a PEG tube was placed on 6/14.  Patient now admitted for a multidisciplinary rehab program.   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 Mcdaniel (Heme)     #AFIB  - Eliquis     #HTN  - Amlodipine  - Labetalol 400mg q 8 hrs  - Losartan 100mg daily - discontinued   Amlodipine DCd, Labetalol Dcd   Lopressor 50mg 2x/day per hospitalist  Follow BPs - Syst BP this   change midodrine to PRN  Follow BPs closely    # Hyponatermia   - Improved.   to continue NS flushes  check CMP on Thursday    #Hepatitis C  - Entecavir daily   - Hold feeds 2 hours before and 2 hours after medication administration     #R breast mass  - Mammogram outpatient  - Oncology follow up outpatient    #Oral Candida  - Nystatin  - Swish and suction       #Sleep  - melatonin PRN     #Skin  - Skin on admission: skin tear to mid and left chest wall- intact  - Pressure injury/Skin: OOB to Chair, PT/OT   - Bacitracin    #Pain Mgmt   - Tylenol PRN, Oxycodone PRN    #GI/Bowel Mgmt   - Pantoprazole  - Continent c/w Senna, Miralax     #/Bladder Mgmt   -  PVR q6h, CIC>400cc-   +UA but C&S contaminant- received PO ceftin since 6/20- will Dc spontaneously voiding  - high PVR.  PVRS q8h- St cath for residual > 400cc  6/28- PVrs in 200s- spnt voiding in diapers- incontinent    #FEN   - Diet - NPO with tube feeds  - Glucerna  - Dysphagia  SLP - evaluation and treatment  - MBS done-Puree with Mild thick. - Tube Feeds DCD- continue speech therapy    #Precautions / PROPHYLAXIS:   - Falls  - ortho: Weight bearing status: WBAT   - Lungs: Aspiration, Incentive Spirometer     TDD- 7/7  to [facility]     - DVT: Eliquis

## 2023-06-29 LAB
ALBUMIN SERPL ELPH-MCNC: 2.4 G/DL — LOW (ref 3.3–5)
ALP SERPL-CCNC: 132 U/L — HIGH (ref 40–120)
ALT FLD-CCNC: 30 U/L — SIGNIFICANT CHANGE UP (ref 10–45)
ANION GAP SERPL CALC-SCNC: 8 MMOL/L — SIGNIFICANT CHANGE UP (ref 5–17)
AST SERPL-CCNC: 29 U/L — SIGNIFICANT CHANGE UP (ref 10–40)
BASOPHILS # BLD AUTO: 0.05 K/UL — SIGNIFICANT CHANGE UP (ref 0–0.2)
BASOPHILS NFR BLD AUTO: 0.7 % — SIGNIFICANT CHANGE UP (ref 0–2)
BILIRUB SERPL-MCNC: 0.7 MG/DL — SIGNIFICANT CHANGE UP (ref 0.2–1.2)
BUN SERPL-MCNC: 14 MG/DL — SIGNIFICANT CHANGE UP (ref 7–23)
CALCIUM SERPL-MCNC: 8.3 MG/DL — LOW (ref 8.4–10.5)
CHLORIDE SERPL-SCNC: 104 MMOL/L — SIGNIFICANT CHANGE UP (ref 96–108)
CO2 SERPL-SCNC: 27 MMOL/L — SIGNIFICANT CHANGE UP (ref 22–31)
CREAT SERPL-MCNC: 0.56 MG/DL — SIGNIFICANT CHANGE UP (ref 0.5–1.3)
EGFR: 91 ML/MIN/1.73M2 — SIGNIFICANT CHANGE UP
EOSINOPHIL # BLD AUTO: 0.17 K/UL — SIGNIFICANT CHANGE UP (ref 0–0.5)
EOSINOPHIL NFR BLD AUTO: 2.4 % — SIGNIFICANT CHANGE UP (ref 0–6)
GLUCOSE BLDC GLUCOMTR-MCNC: 119 MG/DL — HIGH (ref 70–99)
GLUCOSE SERPL-MCNC: 125 MG/DL — HIGH (ref 70–99)
HCT VFR BLD CALC: 32.6 % — LOW (ref 34.5–45)
HGB BLD-MCNC: 10.8 G/DL — LOW (ref 11.5–15.5)
IMM GRANULOCYTES NFR BLD AUTO: 0.8 % — SIGNIFICANT CHANGE UP (ref 0–0.9)
LYMPHOCYTES # BLD AUTO: 0.96 K/UL — LOW (ref 1–3.3)
LYMPHOCYTES # BLD AUTO: 13.3 % — SIGNIFICANT CHANGE UP (ref 13–44)
MCHC RBC-ENTMCNC: 30.6 PG — SIGNIFICANT CHANGE UP (ref 27–34)
MCHC RBC-ENTMCNC: 33.1 GM/DL — SIGNIFICANT CHANGE UP (ref 32–36)
MCV RBC AUTO: 92.4 FL — SIGNIFICANT CHANGE UP (ref 80–100)
MONOCYTES # BLD AUTO: 0.78 K/UL — SIGNIFICANT CHANGE UP (ref 0–0.9)
MONOCYTES NFR BLD AUTO: 10.8 % — SIGNIFICANT CHANGE UP (ref 2–14)
NEUTROPHILS # BLD AUTO: 5.2 K/UL — SIGNIFICANT CHANGE UP (ref 1.8–7.4)
NEUTROPHILS NFR BLD AUTO: 72 % — SIGNIFICANT CHANGE UP (ref 43–77)
NRBC # BLD: 0 /100 WBCS — SIGNIFICANT CHANGE UP (ref 0–0)
PLATELET # BLD AUTO: 294 K/UL — SIGNIFICANT CHANGE UP (ref 150–400)
POTASSIUM SERPL-MCNC: 3.3 MMOL/L — LOW (ref 3.5–5.3)
POTASSIUM SERPL-SCNC: 3.3 MMOL/L — LOW (ref 3.5–5.3)
PROT SERPL-MCNC: 6.2 G/DL — SIGNIFICANT CHANGE UP (ref 6–8.3)
RBC # BLD: 3.53 M/UL — LOW (ref 3.8–5.2)
RBC # FLD: 13.4 % — SIGNIFICANT CHANGE UP (ref 10.3–14.5)
SODIUM SERPL-SCNC: 139 MMOL/L — SIGNIFICANT CHANGE UP (ref 135–145)
WBC # BLD: 7.22 K/UL — SIGNIFICANT CHANGE UP (ref 3.8–10.5)
WBC # FLD AUTO: 7.22 K/UL — SIGNIFICANT CHANGE UP (ref 3.8–10.5)

## 2023-06-29 PROCEDURE — 99232 SBSQ HOSP IP/OBS MODERATE 35: CPT | Mod: GC

## 2023-06-29 RX ORDER — AMLODIPINE BESYLATE 2.5 MG/1
5 TABLET ORAL DAILY
Refills: 0 | Status: DISCONTINUED | OUTPATIENT
Start: 2023-06-29 | End: 2023-06-29

## 2023-06-29 RX ORDER — POTASSIUM CHLORIDE 20 MEQ
40 PACKET (EA) ORAL ONCE
Refills: 0 | Status: COMPLETED | OUTPATIENT
Start: 2023-06-29 | End: 2023-06-29

## 2023-06-29 RX ADMIN — Medication 500000 UNIT(S): at 17:40

## 2023-06-29 RX ADMIN — Medication 500000 UNIT(S): at 05:49

## 2023-06-29 RX ADMIN — Medication 500000 UNIT(S): at 11:19

## 2023-06-29 RX ADMIN — ATORVASTATIN CALCIUM 40 MILLIGRAM(S): 80 TABLET, FILM COATED ORAL at 21:05

## 2023-06-29 RX ADMIN — Medication 50 MILLIGRAM(S): at 17:40

## 2023-06-29 RX ADMIN — Medication 40 MILLIEQUIVALENT(S): at 10:27

## 2023-06-29 RX ADMIN — SENNA PLUS 2 TABLET(S): 8.6 TABLET ORAL at 21:05

## 2023-06-29 RX ADMIN — Medication 50 MILLIGRAM(S): at 05:49

## 2023-06-29 RX ADMIN — Medication 500000 UNIT(S): at 21:21

## 2023-06-29 RX ADMIN — PANTOPRAZOLE SODIUM 40 MILLIGRAM(S): 20 TABLET, DELAYED RELEASE ORAL at 11:19

## 2023-06-29 RX ADMIN — APIXABAN 5 MILLIGRAM(S): 2.5 TABLET, FILM COATED ORAL at 05:49

## 2023-06-29 RX ADMIN — Medication 500000 UNIT(S): at 00:15

## 2023-06-29 RX ADMIN — APIXABAN 5 MILLIGRAM(S): 2.5 TABLET, FILM COATED ORAL at 17:41

## 2023-06-29 RX ADMIN — Medication 3 MILLIGRAM(S): at 21:07

## 2023-06-29 NOTE — PROGRESS NOTE ADULT - SUBJECTIVE AND OBJECTIVE BOX
Chief complaint- SP GENOVEVA    Andres Falcon is an 82 year old, Afghan speaking, woman with PMH of HTN, AFIB (on Xarelto), Hepatitis C (on Entecavir); who presented to [facility] ED on 6/1 with acute onset AMS. LKN was 10pm, at 10:30pm she became acutely altered and with poor posture. Upon arrival, she was noted to have decreased arousal, severe R sided weakness, slurred speech and aphasia requiring intubation. 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 She was not an appropriate tenecteplase candidate due to INR of 1.79 and was taken for mechanical thrombectomy. SLP recommended NPO status on 6/8, a repeat MBS was performed on 6/13 without improvement and a PEG tube was placed on 6/14. Further imaging was significant for R breast mass and she was recommended for outpatient follow up and imaging. PM&R was consulted and deemed her an appropriate candidate for IRF. She was medically stabilized and cleared for discharge to [facility].     ROS/subjective: (Afghan  used)  patient seen and examined in OT  no reported complaints  eating well  Voice quality good  tolerated diet- moved bowels this yestarday   PVRS all in 200s - she hasn't been straight cath.   Continues to use the abdominal binder and teds.  Syst  to 150's - on Toprol- Midodrine made PRN and added Norvasc 5mg   reports pain in Right> Left knee  She denies any dizziness. decrease sensation on the right side. No other complaint at this time.   Replated potassium this am.     MEDICATIONS  (STANDING):  amLODIPine   Tablet 5 milliGRAM(s) Oral daily  apixaban 5 milliGRAM(s) Oral two times a day  atorvastatin 40 milliGRAM(s) Oral at bedtime  dextrose 5%. 1000 milliLiter(s) (50 mL/Hr) IV Continuous <Continuous>  dextrose 5%. 1000 milliLiter(s) (100 mL/Hr) IV Continuous <Continuous>  dextrose 50% Injectable 25 Gram(s) IV Push once  dextrose 50% Injectable 12.5 Gram(s) IV Push once  dextrose 50% Injectable 25 Gram(s) IV Push once  glucagon  Injectable 1 milliGRAM(s) IntraMuscular once  insulin lispro (ADMELOG) corrective regimen sliding scale   SubCutaneous before breakfast  melatonin 3 milliGRAM(s) Oral at bedtime  metoprolol tartrate 50 milliGRAM(s) Oral two times a day  nystatin    Suspension 515147 Unit(s) Oral every 6 hours  pantoprazole   Suspension 40 milliGRAM(s) Oral daily  potassium chloride   Powder 40 milliEquivalent(s) Oral once  senna 2 Tablet(s) Oral at bedtime    MEDICATIONS  (PRN):  bacitracin   Ointment 1 Application(s) Topical three times a day PRN to skin wound  dextrose Oral Gel 15 Gram(s) Oral once PRN Blood Glucose LESS THAN 70 milliGRAM(s)/deciliter  midodrine. 2.5 milliGRAM(s) Oral three times a day PRN SBP<90      LABS                          10.8   7.22  )-----------( 294      ( 29 Jun 2023 06:56 )             32.6     06-29    139  |  104  |  14  ----------------------------<  125<H>  3.3<L>   |  27  |  0.56    Ca    8.3<L>      29 Jun 2023 06:56    TPro  6.2  /  Alb  2.4<L>  /  TBili  0.7  /  DBili  x   /  AST  29  /  ALT  30  /  AlkPhos  132<H>  06-29      Urinalysis Basic - ( 29 Jun 2023 06:56 )    Color: x / Appearance: x / SG: x / pH: x  Gluc: 125 mg/dL / Ketone: x  / Bili: x / Urobili: x   Blood: x / Protein: x / Nitrite: x   Leuk Esterase: x / RBC: x / WBC x   Sq Epi: x / Non Sq Epi: x / Bacteria: x        CAPILLARY BLOOD GLUCOSE      POCT Blood Glucose.: 110 mg/dL (28 Jun 2023 07:53)      ICU Vital Signs Last 24 Hrs  T(C): 36.7 (29 Jun 2023 07:44), Max: 36.8 (28 Jun 2023 19:26)  T(F): 98.1 (29 Jun 2023 07:44), Max: 98.2 (28 Jun 2023 19:26)  HR: 94 (29 Jun 2023 07:44) (87 - 95)  BP: 156/80 (29 Jun 2023 07:44) (149/79 - 156/80)  BP(mean): --  ABP: --  ABP(mean): --  RR: 16 (29 Jun 2023 07:44) (16 - 16)  SpO2: 94% (29 Jun 2023 07:44) (94% - 96%)    O2 Parameters below as of 29 Jun 2023 07:44  Patient On (Oxygen Delivery Method): room air      PHYSICAL       Gen - NAD, Comfortable, nonverbal  HEENT - NCAT, EOMI, MMM  Neck - Supple, No limited ROM  Pulm - CTAB, No wheeze  Cardiovascular - RRR, S1S2  Chest - good chest expansion, good respiratory effort  Abdomen - Soft, NT/ND, +BS, + PEG no suprapubic tenderness  Extremities - No Cyanosis, no clubbing, no edema, no calf tenderness  Neuro-     Cognitive - awake, alert follows visual commands     Communication - hypophonia, non -fluent, difficulty with prompting     Cranial Nerves - right facial weakness, decreased right  shoulder shrug      Motor -  Right  hemiparesis neglect low tone                    LEFT    UE - 5/5                    RIGHT UE - 0/5                    LEFT    LE - 5/5                    RIGHT LE - 0/5       Tone - some increased Flexor tone in RUE/RLE noted  Psychiatric - Mood stable, Affect WNL  Skin:  skin tear to mid and left chest wall- diffuse ecchymoses chest wall/ extremities      DT - 6/27    SW- 7 steps to get in. 15 steps inside. Daugther was helping prior.   SP - puree/mild thick. Mild to mod cognitive.   OT- Max/Total assist for most ADL's. She has good trunk control.   PT-  Transfer mod x2- stand Max assist x2.   Goal -  Mod assist.   TDD- 7/7  to [facility].   Chief complaint- YOJANA TOMAS    Andres Falcon is an 82 year old, Irish speaking, woman with PMH of HTN, AFIB (on Xarelto), Hepatitis C (on Entecavir); who presented to [facility] ED on 6/1 with acute onset AMS. LKN was 10pm, at 10:30pm she became acutely altered and with poor posture. Upon arrival, she was noted to have decreased arousal, severe R sided weakness, slurred speech and aphasia requiring intubation. 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 She was not an appropriate tenecteplase candidate due to INR of 1.79 and was taken for mechanical thrombectomy. SLP recommended NPO status on 6/8, a repeat MBS was performed on 6/13 without improvement and a PEG tube was placed on 6/14. Further imaging was significant for R breast mass and she was recommended for outpatient follow up and imaging. PM&R was consulted and deemed her an appropriate candidate for IRF. She was medically stabilized and cleared for discharge to [facility].     ROS/subjective: (Irish  used)  patient seen and examined in OT  no reported complaints  eating well  Voice quality good  tolerated diet- moved bowels this yestarday   PVRS all in 200s - she hasn't been straight cath. Will discontinue the bladder scan.   Continues to use the abdominal binder and teds.  Syst  to 150's. She drops to the 130's in therapy - on Toprol- Midodrine made PRN.   reports pain in Right> Left knee  She denies any dizziness. decrease sensation on the right side. No other complaint at this time.   Replated potassium this am. Which was 3.3 this am.     MEDICATIONS  (STANDING):  amLODIPine   Tablet 5 milliGRAM(s) Oral daily  apixaban 5 milliGRAM(s) Oral two times a day  atorvastatin 40 milliGRAM(s) Oral at bedtime  dextrose 5%. 1000 milliLiter(s) (50 mL/Hr) IV Continuous <Continuous>  dextrose 5%. 1000 milliLiter(s) (100 mL/Hr) IV Continuous <Continuous>  dextrose 50% Injectable 25 Gram(s) IV Push once  dextrose 50% Injectable 12.5 Gram(s) IV Push once  dextrose 50% Injectable 25 Gram(s) IV Push once  glucagon  Injectable 1 milliGRAM(s) IntraMuscular once  insulin lispro (ADMELOG) corrective regimen sliding scale   SubCutaneous before breakfast  melatonin 3 milliGRAM(s) Oral at bedtime  metoprolol tartrate 50 milliGRAM(s) Oral two times a day  nystatin    Suspension 325328 Unit(s) Oral every 6 hours  pantoprazole   Suspension 40 milliGRAM(s) Oral daily  potassium chloride   Powder 40 milliEquivalent(s) Oral once  senna 2 Tablet(s) Oral at bedtime    MEDICATIONS  (PRN):  bacitracin   Ointment 1 Application(s) Topical three times a day PRN to skin wound  dextrose Oral Gel 15 Gram(s) Oral once PRN Blood Glucose LESS THAN 70 milliGRAM(s)/deciliter  midodrine. 2.5 milliGRAM(s) Oral three times a day PRN SBP<90      LABS                          10.8   7.22  )-----------( 294      ( 29 Jun 2023 06:56 )             32.6     06-29    139  |  104  |  14  ----------------------------<  125<H>  3.3<L>   |  27  |  0.56    Ca    8.3<L>      29 Jun 2023 06:56    TPro  6.2  /  Alb  2.4<L>  /  TBili  0.7  /  DBili  x   /  AST  29  /  ALT  30  /  AlkPhos  132<H>  06-29      Urinalysis Basic - ( 29 Jun 2023 06:56 )    Color: x / Appearance: x / SG: x / pH: x  Gluc: 125 mg/dL / Ketone: x  / Bili: x / Urobili: x   Blood: x / Protein: x / Nitrite: x   Leuk Esterase: x / RBC: x / WBC x   Sq Epi: x / Non Sq Epi: x / Bacteria: x        CAPILLARY BLOOD GLUCOSE      POCT Blood Glucose.: 110 mg/dL (28 Jun 2023 07:53)      ICU Vital Signs Last 24 Hrs  T(C): 36.7 (29 Jun 2023 07:44), Max: 36.8 (28 Jun 2023 19:26)  T(F): 98.1 (29 Jun 2023 07:44), Max: 98.2 (28 Jun 2023 19:26)  HR: 94 (29 Jun 2023 07:44) (87 - 95)  BP: 156/80 (29 Jun 2023 07:44) (149/79 - 156/80)  BP(mean): --  ABP: --  ABP(mean): --  RR: 16 (29 Jun 2023 07:44) (16 - 16)  SpO2: 94% (29 Jun 2023 07:44) (94% - 96%)    O2 Parameters below as of 29 Jun 2023 07:44  Patient On (Oxygen Delivery Method): room air      PHYSICAL       Gen - NAD, Comfortable, nonverbal  HEENT - NCAT, EOMI, MMM  Neck - Supple, No limited ROM  Pulm - CTAB, No wheeze  Cardiovascular - RRR, S1S2  Chest - good chest expansion, good respiratory effort  Abdomen - Soft, NT/ND, +BS, + PEG no suprapubic tenderness  Extremities - No Cyanosis, no clubbing, no edema, no calf tenderness  Neuro-     Cognitive - awake, alert follows visual commands     Communication - hypophonia, non -fluent, difficulty with prompting     Cranial Nerves - right facial weakness, decreased right  shoulder shrug      Motor -  Right  hemiparesis neglect low tone                    LEFT    UE - 5/5                    RIGHT UE - 0/5                    LEFT    LE - 5/5                    RIGHT LE - 0/5       Tone - some increased Flexor tone in RUE/RLE noted  Psychiatric - Mood stable, Affect WNL  Skin:  skin tear to mid and left chest wall- diffuse ecchymoses chest wall/ extremities      DT - 6/27    SW- 7 steps to get in. 15 steps inside. Daugther was helping prior.   SP - puree/mild thick. Mild to mod cognitive.   OT- Max/Total assist for most ADL's. She has good trunk control.   PT-  Transfer mod x2- stand Max assist x2.   Goal -  Mod assist.   TDD- 7/7  to [facility].

## 2023-06-29 NOTE — PROGRESS NOTE ADULT - ASSESSMENT
AUDI DYKES is a 82 year old, Croatian speaking, woman with PMH of HTN, AFIB (on Xarelto), Hepatitis C (on Entecavir); who presented to [facility] ED on 6/1 with acute onset AMS. LKN was 10pm, at 10:30pm she became acutely altered and with poor posture. Upon arrival, she was noted to have decreased arousal, severe R sided weakness, slurred speech and aphasia requiring intubation. 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 She was not an appropriate tenecteplase candidate due to INR of 1.79 and was taken for mechanical thrombectomy. SLP recommended NPO status on 6/8, a repeat MBS was performed on 6/13 without improvement and a PEG tube was placed on 6/14.  Patient now admitted for a multidisciplinary rehab program.   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 Mcdaniel (Heme)     #AFIB  - Eliquis     #HTN  - Amlodipine  - Labetalol 400mg q 8 hrs  - Losartan 100mg daily - discontinued   Amlodipine DCd, Labetalol Dcd   Lopressor 50mg 2x/day per hospitalist  Follow BPs - Syst BP this   change midodrine to PRN  Follow BPs closely    # Hyponatermia   - Improved.   to continue NS flushes  check CMP on Thursday    #Hepatitis C  - Entecavir daily   - Hold feeds 2 hours before and 2 hours after medication administration     #R breast mass  - Mammogram outpatient  - Oncology follow up outpatient    #Oral Candida  - Nystatin  - Swish and suction       #Sleep  - melatonin PRN     #Skin  - Skin on admission: skin tear to mid and left chest wall- intact  - Pressure injury/Skin: OOB to Chair, PT/OT   - Bacitracin    #Pain Mgmt   - Tylenol PRN, Oxycodone PRN    #GI/Bowel Mgmt   - Pantoprazole  - Continent c/w Senna, Miralax     #/Bladder Mgmt   -  PVR q6h, CIC>400cc-   +UA but C&S contaminant- received PO ceftin since 6/20- will Dc spontaneously voiding  - high PVR.  PVRS q8h- St cath for residual > 400cc  6/28- PVrs in 200s- spnt voiding in diapers- incontinent    #FEN   - Diet - NPO with tube feeds  - Glucerna  - Dysphagia  SLP - evaluation and treatment  - MBS done-Puree with Mild thick. - Tube Feeds DCD- continue speech therapy    #Precautions / PROPHYLAXIS:   - Falls  - ortho: Weight bearing status: WBAT   - Lungs: Aspiration, Incentive Spirometer     TDD- 7/7  to [facility]     - DVT: Eliquis     AUDI DYKES is a 82 year old, Japanese speaking, woman with PMH of HTN, AFIB (on Xarelto), Hepatitis C (on Entecavir); who presented to [facility] ED on 6/1 with acute onset AMS. LKN was 10pm, at 10:30pm she became acutely altered and with poor posture. Upon arrival, she was noted to have decreased arousal, severe R sided weakness, slurred speech and aphasia requiring intubation. 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 She was not an appropriate tenecteplase candidate due to INR of 1.79 and was taken for mechanical thrombectomy. SLP recommended NPO status on 6/8, a repeat MBS was performed on 6/13 without improvement and a PEG tube was placed on 6/14.  Patient now admitted for a multidisciplinary rehab program.   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 Mcdaniel (Heme)     #AFIB  - Eliquis     #HTN  - Amlodipine - discontinued   - Labetalol 400mg q 8 hrs - discontinued   - Losartan 100mg daily - discontinued   - Lopressor 50mg 2x/day per hospitalist  - Follow BPs - Syst BP this , drops to 130's in therapy.  - change midodrine to PRN  - Follow BPs closely    # Hyponatermia   - Improved.     #Hepatitis C  - Entecavir daily   - Hold feeds 2 hours before and 2 hours after medication administration     #R breast mass  - Mammogram outpatient  - Oncology follow up outpatient    #Oral Candida  - Nystatin  - Swish and suction       #Sleep  - melatonin PRN     #Skin  - Skin on admission: skin tear to mid and left chest wall- intact  - Pressure injury/Skin: OOB to Chair, PT/OT   - Bacitracin    #Pain Mgmt   - Tylenol PRN, Oxycodone PRN    #GI/Bowel Mgmt   - Pantoprazole  - Continent c/w Senna, Miralax     #/Bladder Mgmt   -  PVR q6h, CIC>400cc-   +UA but C&S contaminant- received PO ceftin since 6/20- will Dc spontaneously voiding  - 6/28- PVrs in 200s- spnt voiding in diapers- incontinent - d.c the bladder scan.     #FEN   - Diet - NPO with tube feeds  - Glucerna  - Dysphagia  SLP - evaluation and treatment  - MBS done-Puree with Mild thick. - Tube Feeds DCD- continue speech therapy    #Precautions / PROPHYLAXIS:   - Falls  - ortho: Weight bearing status: WBAT   - Lungs: Aspiration, Incentive Spirometer     TDD- 7/7  to [facility]     - DVT: Eliquis     AUDI DYKES is a 82 year old, Bulgarian speaking, woman with PMH of HTN, AFIB (on Xarelto), Hepatitis C (on Entecavir); who presented to [facility] ED on 6/1 with acute onset AMS. LKN was 10pm, at 10:30pm she became acutely altered and with poor posture. Upon arrival, she was noted to have decreased arousal, severe R sided weakness, slurred speech and aphasia requiring intubation. 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 She was not an appropriate tenecteplase candidate due to INR of 1.79 and was taken for mechanical thrombectomy. SLP recommended NPO status on 6/8, a repeat MBS was performed on 6/13 without improvement and a PEG tube was placed on 6/14.  Patient now admitted for a multidisciplinary rehab program.   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 Mcdaniel (Heme)     #AFIB  - Eliquis     #HTN  - Amlodipine - discontinued   - Labetalol 400mg q 8 hrs - discontinued   - Losartan 100mg daily - discontinued   - Lopressor 50mg 2x/day per hospitalist  - Follow BPs - Syst BP this , drops to 130's in therapy.  - change midodrine to PRN  - Follow BPs closely    # Hyponatermia   - Improved.     #Hypokalemia  - 3.3 this am.  - 40mg repleted.   - BMP in the am    #Hepatitis C  - Entecavir daily   - Hold feeds 2 hours before and 2 hours after medication administration     #R breast mass  - Mammogram outpatient  - Oncology follow up outpatient    #Oral Candida  - Nystatin  - Swish and suction       #Sleep  - melatonin PRN     #Skin  - Skin on admission: skin tear to mid and left chest wall- intact  - Pressure injury/Skin: OOB to Chair, PT/OT   - Bacitracin    #Pain Mgmt   - Tylenol PRN, Oxycodone PRN    #GI/Bowel Mgmt   - Pantoprazole  - Continent c/w Senna, Miralax     #/Bladder Mgmt   -  PVR q6h, CIC>400cc-   +UA but C&S contaminant- received PO ceftin since 6/20- will Dc spontaneously voiding  - 6/28- PVrs in 200s- spnt voiding in diapers- incontinent - d.c the bladder scan.     #FEN   - Diet - NPO with tube feeds  - Glucerna  - Dysphagia  SLP - evaluation and treatment  - MBS done-Puree with Mild thick. - Tube Feeds DCD- continue speech therapy    #Precautions / PROPHYLAXIS:   - Falls  - ortho: Weight bearing status: WBAT   - Lungs: Aspiration, Incentive Spirometer     TDD- 7/7  to [facility]     - DVT: Eliquis

## 2023-06-30 LAB
ANION GAP SERPL CALC-SCNC: 8 MMOL/L — SIGNIFICANT CHANGE UP (ref 5–17)
BUN SERPL-MCNC: 14 MG/DL — SIGNIFICANT CHANGE UP (ref 7–23)
CALCIUM SERPL-MCNC: 8.4 MG/DL — SIGNIFICANT CHANGE UP (ref 8.4–10.5)
CHLORIDE SERPL-SCNC: 104 MMOL/L — SIGNIFICANT CHANGE UP (ref 96–108)
CO2 SERPL-SCNC: 27 MMOL/L — SIGNIFICANT CHANGE UP (ref 22–31)
CREAT SERPL-MCNC: 0.73 MG/DL — SIGNIFICANT CHANGE UP (ref 0.5–1.3)
EGFR: 82 ML/MIN/1.73M2 — SIGNIFICANT CHANGE UP
GLUCOSE BLDC GLUCOMTR-MCNC: 105 MG/DL — HIGH (ref 70–99)
GLUCOSE BLDC GLUCOMTR-MCNC: 127 MG/DL — HIGH (ref 70–99)
GLUCOSE SERPL-MCNC: 119 MG/DL — HIGH (ref 70–99)
POTASSIUM SERPL-MCNC: 3.5 MMOL/L — SIGNIFICANT CHANGE UP (ref 3.5–5.3)
POTASSIUM SERPL-SCNC: 3.5 MMOL/L — SIGNIFICANT CHANGE UP (ref 3.5–5.3)
SODIUM SERPL-SCNC: 139 MMOL/L — SIGNIFICANT CHANGE UP (ref 135–145)

## 2023-06-30 PROCEDURE — 99232 SBSQ HOSP IP/OBS MODERATE 35: CPT | Mod: GC

## 2023-06-30 RX ORDER — POTASSIUM CHLORIDE 20 MEQ
40 PACKET (EA) ORAL ONCE
Refills: 0 | Status: COMPLETED | OUTPATIENT
Start: 2023-06-30 | End: 2023-06-30

## 2023-06-30 RX ADMIN — Medication 500000 UNIT(S): at 23:05

## 2023-06-30 RX ADMIN — ATORVASTATIN CALCIUM 40 MILLIGRAM(S): 80 TABLET, FILM COATED ORAL at 22:50

## 2023-06-30 RX ADMIN — PANTOPRAZOLE SODIUM 40 MILLIGRAM(S): 20 TABLET, DELAYED RELEASE ORAL at 12:22

## 2023-06-30 RX ADMIN — APIXABAN 5 MILLIGRAM(S): 2.5 TABLET, FILM COATED ORAL at 17:46

## 2023-06-30 RX ADMIN — Medication 500000 UNIT(S): at 12:22

## 2023-06-30 RX ADMIN — Medication 500000 UNIT(S): at 17:46

## 2023-06-30 RX ADMIN — SENNA PLUS 2 TABLET(S): 8.6 TABLET ORAL at 22:50

## 2023-06-30 RX ADMIN — Medication 3 MILLIGRAM(S): at 22:50

## 2023-06-30 RX ADMIN — Medication 500000 UNIT(S): at 05:48

## 2023-06-30 RX ADMIN — Medication 40 MILLIEQUIVALENT(S): at 18:04

## 2023-06-30 RX ADMIN — APIXABAN 5 MILLIGRAM(S): 2.5 TABLET, FILM COATED ORAL at 05:48

## 2023-06-30 RX ADMIN — Medication 50 MILLIGRAM(S): at 05:48

## 2023-06-30 RX ADMIN — Medication 50 MILLIGRAM(S): at 17:46

## 2023-06-30 NOTE — PROGRESS NOTE ADULT - SUBJECTIVE AND OBJECTIVE BOX
Chief complaint- YOJANA TOMAS    Andres Falcon is an 82 year old, American speaking, woman with PMH of HTN, AFIB (on Xarelto), Hepatitis C (on Entecavir); who presented to [facility] ED on 6/1 with acute onset AMS. LKN was 10pm, at 10:30pm she became acutely altered and with poor posture. Upon arrival, she was noted to have decreased arousal, severe R sided weakness, slurred speech and aphasia requiring intubation. 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 She was not an appropriate tenecteplase candidate due to INR of 1.79 and was taken for mechanical thrombectomy. SLP recommended NPO status on 6/8, a repeat MBS was performed on 6/13 without improvement and a PEG tube was placed on 6/14. Further imaging was significant for R breast mass and she was recommended for outpatient follow up and imaging. PM&R was consulted and deemed her an appropriate candidate for IRF. She was medically stabilized and cleared for discharge to [facility].     ROS/subjective: (American  used)  patient seen and examined in OT - Communication via an  - [id number]  no reported complaints  eating well  Voice quality good  tolerated diet- moved bowels this yesterday   Continues to use the abdominal binder and teds.  Syst  to 150's. She drops to the 130's in therapy - on Toprol- Midodrine made PRN.   reports pain in Right> Left knee  She denies any dizziness. decrease sensation on the right side. No other complaint at this time.   Potassium 3.5 this am.     MEDICATIONS  (STANDING):  amLODIPine   Tablet 5 milliGRAM(s) Oral daily  apixaban 5 milliGRAM(s) Oral two times a day  atorvastatin 40 milliGRAM(s) Oral at bedtime  dextrose 5%. 1000 milliLiter(s) (50 mL/Hr) IV Continuous <Continuous>  dextrose 5%. 1000 milliLiter(s) (100 mL/Hr) IV Continuous <Continuous>  dextrose 50% Injectable 25 Gram(s) IV Push once  dextrose 50% Injectable 12.5 Gram(s) IV Push once  dextrose 50% Injectable 25 Gram(s) IV Push once  glucagon  Injectable 1 milliGRAM(s) IntraMuscular once  insulin lispro (ADMELOG) corrective regimen sliding scale   SubCutaneous before breakfast  melatonin 3 milliGRAM(s) Oral at bedtime  metoprolol tartrate 50 milliGRAM(s) Oral two times a day  nystatin    Suspension 520284 Unit(s) Oral every 6 hours  pantoprazole   Suspension 40 milliGRAM(s) Oral daily  potassium chloride   Powder 40 milliEquivalent(s) Oral once  senna 2 Tablet(s) Oral at bedtime    MEDICATIONS  (PRN):  bacitracin   Ointment 1 Application(s) Topical three times a day PRN to skin wound  dextrose Oral Gel 15 Gram(s) Oral once PRN Blood Glucose LESS THAN 70 milliGRAM(s)/deciliter  midodrine. 2.5 milliGRAM(s) Oral three times a day PRN SBP<90      LABS                                     10.8   7.22  )-----------( 294      ( 29 Jun 2023 06:56 )             32.6     06-30    139  |  104  |  14  ----------------------------<  119<H>  3.5   |  27  |  0.73    Ca    8.4      30 Jun 2023 06:25    TPro  6.2  /  Alb  2.4<L>  /  TBili  0.7  /  DBili  x   /  AST  29  /  ALT  30  /  AlkPhos  132<H>  06-29      Urinalysis Basic - ( 30 Jun 2023 06:25 )    Color: x / Appearance: x / SG: x / pH: x  Gluc: 119 mg/dL / Ketone: x  / Bili: x / Urobili: x   Blood: x / Protein: x / Nitrite: x   Leuk Esterase: x / RBC: x / WBC x   Sq Epi: x / Non Sq Epi: x / Bacteria: x      CAPILLARY BLOOD GLUCOSE      POCT Blood Glucose.: 127 mg/dL (30 Jun 2023 07:40)        ICU Vital Signs Last 24 Hrs  T(C): 36.5 (30 Jun 2023 07:39), Max: 36.8 (29 Jun 2023 19:47)  T(F): 97.7 (30 Jun 2023 07:39), Max: 98.3 (29 Jun 2023 19:47)  HR: 81 (30 Jun 2023 07:39) (80 - 97)  BP: 156/84 (30 Jun 2023 07:39) (136/77 - 158/85)  BP(mean): --  ABP: --  ABP(mean): --  RR: 16 (30 Jun 2023 07:39) (16 - 16)  SpO2: 97% (30 Jun 2023 07:39) (97% - 97%)    O2 Parameters below as of 30 Jun 2023 07:39  Patient On (Oxygen Delivery Method): room air      PHYSICAL       Gen - NAD, Comfortable, nonverbal  HEENT - NCAT, EOMI, MMM  Neck - Supple, No limited ROM  Pulm - CTAB, No wheeze  Cardiovascular - RRR, S1S2  Chest - good chest expansion, good respiratory effort  Abdomen - Soft, NT/ND, +BS, + PEG no suprapubic tenderness  Extremities - No Cyanosis, no clubbing, no edema, no calf tenderness  Neuro-     Cognitive - awake, alert follows visual commands     Communication - hypophonia, non -fluent, difficulty with prompting     Cranial Nerves - right facial weakness, decreased right  shoulder shrug      Motor -  Right  hemiparesis neglect low tone                    LEFT    UE - 5/5                    RIGHT UE - 0/5                    LEFT    LE - 5/5                    RIGHT LE - 0/5       Tone - some increased Flexor tone in RUE/RLE noted  Psychiatric - Mood stable, Affect WNL  Skin:  skin tear to mid and left chest wall- diffuse ecchymoses chest wall/ extremities      DT - 6/27    SW- 7 steps to get in. 15 steps inside. Daugther was helping prior.   SP - puree/mild thick. Mild to mod cognitive.   OT- Max/Total assist for most ADL's. She has good trunk control.   PT-  Transfer mod x2- stand Max assist x2.   Goal -  Mod assist.   TDD- 7/7  to [facility].   Chief complaint- YOJANA TOMAS    Andres Falcon is an 82 year old, Micronesian speaking, woman with PMH of HTN, AFIB (on Xarelto), Hepatitis C (on Entecavir); who presented to [facility] ED on 6/1 with acute onset AMS. LKN was 10pm, at 10:30pm she became acutely altered and with poor posture. Upon arrival, she was noted to have decreased arousal, severe R sided weakness, slurred speech and aphasia requiring intubation. 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 She was not an appropriate tenecteplase candidate due to INR of 1.79 and was taken for mechanical thrombectomy. SLP recommended NPO status on 6/8, a repeat MBS was performed on 6/13 without improvement and a PEG tube was placed on 6/14. Further imaging was significant for R breast mass and she was recommended for outpatient follow up and imaging. PM&R was consulted and deemed her an appropriate candidate for IRF. She was medically stabilized and cleared for discharge to [facility].     ROS/subjective: (Micronesian  used)  patient seen and examined in OT - Communication via an  - [id number]  no reported complaints  eating well  Voice quality good  tolerated diet- moved bowels this yesterday   Continues to use the abdominal binder and teds.  Syst  to 150's. She drops to the 130's in therapy - on Toprol- Midodrine made PRN.   reports pain in Right> Left knee  She denies any dizziness. decrease sensation on the right side. No other complaint at this time.   Potassium 3.5 this am. - will give additional dose of potassium today- check MG level on Monday    MEDICATIONS  (STANDING):  amLODIPine   Tablet 5 milliGRAM(s) Oral daily  apixaban 5 milliGRAM(s) Oral two times a day  atorvastatin 40 milliGRAM(s) Oral at bedtime  dextrose 5%. 1000 milliLiter(s) (50 mL/Hr) IV Continuous <Continuous>  dextrose 5%. 1000 milliLiter(s) (100 mL/Hr) IV Continuous <Continuous>  dextrose 50% Injectable 25 Gram(s) IV Push once  dextrose 50% Injectable 12.5 Gram(s) IV Push once  dextrose 50% Injectable 25 Gram(s) IV Push once  glucagon  Injectable 1 milliGRAM(s) IntraMuscular once  insulin lispro (ADMELOG) corrective regimen sliding scale   SubCutaneous before breakfast  melatonin 3 milliGRAM(s) Oral at bedtime  metoprolol tartrate 50 milliGRAM(s) Oral two times a day  nystatin    Suspension 928359 Unit(s) Oral every 6 hours  pantoprazole   Suspension 40 milliGRAM(s) Oral daily  potassium chloride   Powder 40 milliEquivalent(s) Oral once  senna 2 Tablet(s) Oral at bedtime    MEDICATIONS  (PRN):  bacitracin   Ointment 1 Application(s) Topical three times a day PRN to skin wound  dextrose Oral Gel 15 Gram(s) Oral once PRN Blood Glucose LESS THAN 70 milliGRAM(s)/deciliter  midodrine. 2.5 milliGRAM(s) Oral three times a day PRN SBP<90      LABS                                 10.8   7.22  )-----------( 294      ( 29 Jun 2023 06:56 )             32.6     06-30    139  |  104  |  14  ----------------------------<  119<H>  3.5   |  27  |  0.73    Ca    8.4      30 Jun 2023 06:25    TPro  6.2  /  Alb  2.4<L>  /  TBili  0.7  /  DBili  x   /  AST  29  /  ALT  30  /  AlkPhos  132<H>  06-29      Urinalysis Basic - ( 30 Jun 2023 06:25 )    Color: x / Appearance: x / SG: x / pH: x  Gluc: 119 mg/dL / Ketone: x  / Bili: x / Urobili: x   Blood: x / Protein: x / Nitrite: x   Leuk Esterase: x / RBC: x / WBC x   Sq Epi: x / Non Sq Epi: x / Bacteria: x      CAPILLARY BLOOD GLUCOSE      POCT Blood Glucose.: 127 mg/dL (30 Jun 2023 07:40)        ICU Vital Signs Last 24 Hrs  T(C): 36.5 (30 Jun 2023 07:39), Max: 36.8 (29 Jun 2023 19:47)  T(F): 97.7 (30 Jun 2023 07:39), Max: 98.3 (29 Jun 2023 19:47)  HR: 81 (30 Jun 2023 07:39) (80 - 97)  BP: 156/84 (30 Jun 2023 07:39) (136/77 - 158/85)  BP(mean): --  ABP: --  ABP(mean): --  RR: 16 (30 Jun 2023 07:39) (16 - 16)  SpO2: 97% (30 Jun 2023 07:39) (97% - 97%)    O2 Parameters below as of 30 Jun 2023 07:39  Patient On (Oxygen Delivery Method): room air      PHYSICAL       Gen - NAD, Comfortable, nonverbal  HEENT - NCAT, EOMI, MMM  Neck - Supple, No limited ROM  Pulm - CTAB, No wheeze  Cardiovascular - RRR, S1S2  Chest - good chest expansion, good respiratory effort  Abdomen - Soft, NT/ND, +BS, + PEG no suprapubic tenderness  Extremities - No Cyanosis, no clubbing, no edema, no calf tenderness  Neuro-     Cognitive - awake, alert follows visual commands     Communication - hypophonia, non -fluent, difficulty with prompting     Cranial Nerves - right facial weakness, decreased right  shoulder shrug      Motor -  Right  hemiparesis neglect low tone                    LEFT    UE - 5/5                    RIGHT UE - 0/5                    LEFT    LE - 5/5                    RIGHT LE - 0/5       Tone - some increased Flexor tone in RUE/RLE noted- gross extension RLE facilitated  Psychiatric - Mood stable, Affect WNL  Skin:  skin tear to mid and left chest wall- diffuse ecchymoses chest wall/ extremities      DT - 6/27    SW- 7 steps to get in. 15 steps inside. Daughter was helping prior.   SP - puree/mild thick. Mild to mod cognitive.   OT- Max/Total assist for most ADL's. She has good trunk control.   PT-  Transfer mod x2- stand Max assist x2.   Goal -  Mod assist.   TDD- 7/7  to [facility].

## 2023-06-30 NOTE — PROGRESS NOTE ADULT - ASSESSMENT
AUDI DYKES is a 82 year old, Czech speaking, woman with PMH of HTN, AFIB (on Xarelto), Hepatitis C (on Entecavir); who presented to [facility] ED on 6/1 with acute onset AMS. LKN was 10pm, at 10:30pm she became acutely altered and with poor posture. Upon arrival, she was noted to have decreased arousal, severe R sided weakness, slurred speech and aphasia requiring intubation. 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 She was not an appropriate tenecteplase candidate due to INR of 1.79 and was taken for mechanical thrombectomy. SLP recommended NPO status on 6/8, a repeat MBS was performed on 6/13 without improvement and a PEG tube was placed on 6/14.  Patient now admitted for a multidisciplinary rehab program.   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 Mcdaniel (Heme)     #AFIB  - Eliquis     #HTN  - Amlodipine - discontinued   - Labetalol 400mg q 8 hrs - discontinued   - Losartan 100mg daily - discontinued   - Lopressor 50mg 2x/day per hospitalist  - Follow BPs - Syst BP this , drops to 130's in therapy.  - change midodrine to PRN  - Follow BPs closely    # Hyponatermia   - Improved.     #Hypokalemia  - 3.5 this am.   - Resolved.     #Hepatitis C  - Entecavir daily   - Hold feeds 2 hours before and 2 hours after medication administration     #R breast mass  - Mammogram outpatient  - Oncology follow up outpatient    #Oral Candida  - Nystatin  - Swish and suction       #Sleep  - melatonin PRN     #Skin  - Skin on admission: skin tear to mid and left chest wall- intact  - Pressure injury/Skin: OOB to Chair, PT/OT   - Bacitracin    #Pain Mgmt   - Tylenol PRN, Oxycodone PRN    #GI/Bowel Mgmt   - Pantoprazole  - Continent c/w Senna, Miralax     #/Bladder Mgmt   -  PVR q6h, CIC>400cc-   +UA but C&S contaminant- received PO ceftin since 6/20  - 6/28- PVrs in 200s- spnt voiding in diapers- incontinent - d.c the bladder scan.     #FEN   - Diet - NPO with tube feeds  - Glucerna  - Dysphagia  SLP - evaluation and treatment  - MBS done-Puree with Mild thick. -   - Tube Feeds Discontinued.     #Precautions / PROPHYLAXIS:   - Falls  - ortho: Weight bearing status: WBAT   - Lungs: Aspiration, Incentive Spirometer     TDD- 7/7  to [facility]     - DVT: Eliquis     AUDI DYKES is a 82 year old, Mohawk speaking, woman with PMH of HTN, AFIB (on Xarelto), Hepatitis C (on Entecavir); who presented to [facility] ED on 6/1 with acute onset AMS. LKN was 10pm, at 10:30pm she became acutely altered and with poor posture. Upon arrival, she was noted to have decreased arousal, severe R sided weakness, slurred speech and aphasia requiring intubation. 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 She was not an appropriate tenecteplase candidate due to INR of 1.79 and was taken for mechanical thrombectomy. SLP recommended NPO status on 6/8, a repeat MBS was performed on 6/13 without improvement and a PEG tube was placed on 6/14.  Patient now admitted for a multidisciplinary rehab program.   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 Mcdaniel (Heme)     #AFIB  - Eliquis     #HTN  - Amlodipine - discontinued   - Labetalol 400mg q 8 hrs - discontinued   - Losartan 100mg daily - discontinued   - Lopressor 50mg 2x/day per hospitalist  - Follow BPs - Syst BP this , drops to 130's in therapy.  - change midodrine to PRN  - Follow BPs closely- ? restart Low dose amlodipine    # Hyponatermia   - Improved.     #Hypokalemia  - 3.5 this am.   - will give additional K supplement today  Check Mg on Monday/ CMP as well     #Hepatitis C  - Entecavir daily   - Hold feeds 2 hours before and 2 hours after medication administration     #R breast mass  - Mammogram outpatient  - Oncology follow up outpatient    #Oral Candida  - Nystatin  - Swish and suction       #Sleep  - melatonin PRN     #Skin  - Skin on admission: skin tear to mid and left chest wall- intact  - Pressure injury/Skin: OOB to Chair, PT/OT   - Bacitracin    #Pain Mgmt   - Tylenol PRN, Oxycodone PRN    #GI/Bowel Mgmt   - Pantoprazole  - Continent c/w Senna, Miralax     #/Bladder Mgmt   -  PVR q6h, CIC>400cc-   +UA but C&S contaminant- received PO ceftin since 6/20  - 6/28- PVrs in 200s- spnt voiding in diapers- incontinent - d.c the bladder scan.     #FEN   - Diet - NPO with tube feeds  - Glucerna  - Dysphagia  SLP - evaluation and treatment  - MBS done-Puree with Mild thick. -   - Tube Feeds Discontinued.     #Precautions / PROPHYLAXIS:   - Falls  - ortho: Weight bearing status: WBAT   - Lungs: Aspiration, Incentive Spirometer     TDD- 7/7  to [facility]     - DVT: Eliquis

## 2023-06-30 NOTE — PROGRESS NOTE ADULT - ATTENDING COMMENTS
Rehab Attending- Patient seen and examined by me- Case discussed, above note reviewed by me with modifications made    patient seen and evaluated in OT with   no reported complaints  active Right hip extension to command  sitting balance better  BPs tending up- 150s- consider restarting Low dose Amlodipine  Also K repeated- borderline- will give additional K today- check MG level on Monday  to continue intensive rehab program
Rehab Attending- Patient seen and examined by me - Case discussed, above note reviewed by me with modifications made    patient seen and evaluated bedside  more awake alert- BPs better  + UA- C&S obtained- started on Ceftin PO x 3 days  + Thrush- ? switch to diflucan in G tuce  discussed in team- tentative Dc to [facility] on 7/7  ENT to see regarding hypophonia- RO granulomas on cord  discussed in team conference- tentative Dc 7/7 to [facility]  to continue intensive rehab program    20 additional minutes spent today on coordination of care including team conference as well as conversation with patient's daughter Daniel- I updated her on plan of care- All questions were answered by me as well.
Rehab Attending- Patient seen and examined by me - Case discussed, above note reviewed by me with modifications made    patient seen and evaluated bedside with Rosetta   BPS better at rest - but hypotensive - dosing off in Rehab  Will Dc Losartan  add midodrine low dose before therapy  Na 132 BUN 35 will change/increase flushes to 300cc NS q6h  still NPO - Speech to work bedside with PO feeds  Vocal quality much improved  seen By ENT- cords OK  to continue intensive rehab program
Rehab Attending- Patient seen and examined by me - Case discussed, above note reviewed by me with modifications made    patient seen and evaluated bedside with Rosetta  [id number]  OOB to WC- Tolerating Puree diet  No reported pain- just weakness right side  More awake and alert- On Midodrine   syst  in OT- no reported dizziness   poor sleep with start standing melatonin 3mg  labs reviewed by me- Na 135, BUN /cr improved 19/.8  to DC miralax- change to senna with thickened prune juice daily  voiding but still with intermittent high PVRs requiring St caths  to continue intensive rehab program
Rehab Attending- Patient seen and examined by me - Case discussed, above note reviewed by me with modifications made    patient seen and evaluated bedside  doing well   noted Flex tone RLE- maintains short sit  tolerating diet  labs reviewed by me- Low K- supplemented -check BMP in AM  Na, Glucose WNL  BPs OK 140s > 130 in PT with toprol   to hold further meds at present  moving bowels , pvrs <200- will Dc  still incontinent however  to continue intensive rehab program

## 2023-07-01 LAB — GLUCOSE BLDC GLUCOMTR-MCNC: 125 MG/DL — HIGH (ref 70–99)

## 2023-07-01 PROCEDURE — 99232 SBSQ HOSP IP/OBS MODERATE 35: CPT | Mod: GC

## 2023-07-01 PROCEDURE — 93971 EXTREMITY STUDY: CPT | Mod: 26,RT

## 2023-07-01 RX ORDER — LOSARTAN POTASSIUM 100 MG/1
50 TABLET, FILM COATED ORAL DAILY
Refills: 0 | Status: DISCONTINUED | OUTPATIENT
Start: 2023-07-01 | End: 2023-07-02

## 2023-07-01 RX ADMIN — Medication 50 MILLIGRAM(S): at 05:47

## 2023-07-01 RX ADMIN — APIXABAN 5 MILLIGRAM(S): 2.5 TABLET, FILM COATED ORAL at 17:28

## 2023-07-01 RX ADMIN — Medication 500000 UNIT(S): at 11:30

## 2023-07-01 RX ADMIN — Medication 500000 UNIT(S): at 17:27

## 2023-07-01 RX ADMIN — SENNA PLUS 2 TABLET(S): 8.6 TABLET ORAL at 21:14

## 2023-07-01 RX ADMIN — Medication 50 MILLIGRAM(S): at 17:27

## 2023-07-01 RX ADMIN — PANTOPRAZOLE SODIUM 40 MILLIGRAM(S): 20 TABLET, DELAYED RELEASE ORAL at 11:30

## 2023-07-01 RX ADMIN — Medication 3 MILLIGRAM(S): at 21:12

## 2023-07-01 RX ADMIN — Medication 500000 UNIT(S): at 05:46

## 2023-07-01 RX ADMIN — APIXABAN 5 MILLIGRAM(S): 2.5 TABLET, FILM COATED ORAL at 05:47

## 2023-07-01 RX ADMIN — LOSARTAN POTASSIUM 50 MILLIGRAM(S): 100 TABLET, FILM COATED ORAL at 14:05

## 2023-07-01 RX ADMIN — ATORVASTATIN CALCIUM 40 MILLIGRAM(S): 80 TABLET, FILM COATED ORAL at 21:13

## 2023-07-01 NOTE — PROGRESS NOTE ADULT - SUBJECTIVE AND OBJECTIVE BOX
Cc:   Weakness    HPI:   Patient with no new medical issues today. Slept well   Pain controlled, no chest pain, no N/V, no Fevers/Chills. No other new ROS  Has been tolerating rehabilitation program.    apixaban 5 milliGRAM(s) Oral two times a day  atorvastatin 40 milliGRAM(s) Oral at bedtime  bacitracin   Ointment 1 Application(s) Topical three times a day PRN  dextrose 5%. 1000 milliLiter(s) IV Continuous <Continuous>  dextrose 5%. 1000 milliLiter(s) IV Continuous <Continuous>  dextrose 50% Injectable 25 Gram(s) IV Push once  dextrose 50% Injectable 12.5 Gram(s) IV Push once  dextrose 50% Injectable 25 Gram(s) IV Push once  dextrose Oral Gel 15 Gram(s) Oral once PRN  glucagon  Injectable 1 milliGRAM(s) IntraMuscular once  insulin lispro (ADMELOG) corrective regimen sliding scale   SubCutaneous before breakfast  melatonin 3 milliGRAM(s) Oral at bedtime  metoprolol tartrate 50 milliGRAM(s) Oral two times a day  midodrine. 2.5 milliGRAM(s) Oral three times a day PRN  nystatin    Suspension 495513 Unit(s) Oral every 6 hours  pantoprazole   Suspension 40 milliGRAM(s) Oral daily  senna 2 Tablet(s) Oral at bedtime      T(C): 36.6 (07-01-23 @ 08:55), Max: 36.6 (07-01-23 @ 08:55)  HR: 86 (07-01-23 @ 08:55) (86 - 118)  BP: 165/94 (07-01-23 @ 08:55) (153/78 - 173/105)  RR: 17 (07-01-23 @ 08:55) (16 - 17)  SpO2: 97% (07-01-23 @ 08:55) (97% - 97%)    In NAD, comfortable   HEENT- EOMI  Heart- Well Perfused  Lungs- No distress, no use of accessory muscles  Abd- + BS, NT  Ext- No calf pain  Neuro- Exam unchanged  Psych- Affect wnl    Imp: Patient with diagnosis of Left MCA ischemic stroke admitted for comprehensive acute rehabilitation.    Plan:  - Continue therapies  - DVT prophylaxis  - Skin- Turn q2h, check skin daily  - Continue current medications; patient medically stable.   - Patient is stable to continue current rehabilitation program.    Cc:   Weakness    HPI:   Patient with no new medical issues today. Slept well   Pain controlled, no chest pain, no N/V, no Fevers/Chills. No other new ROS  Has been tolerating rehabilitation program.    apixaban 5 milliGRAM(s) Oral two times a day  atorvastatin 40 milliGRAM(s) Oral at bedtime  bacitracin   Ointment 1 Application(s) Topical three times a day PRN  dextrose 5%. 1000 milliLiter(s) IV Continuous <Continuous>  dextrose 5%. 1000 milliLiter(s) IV Continuous <Continuous>  dextrose 50% Injectable 25 Gram(s) IV Push once  dextrose 50% Injectable 12.5 Gram(s) IV Push once  dextrose 50% Injectable 25 Gram(s) IV Push once  dextrose Oral Gel 15 Gram(s) Oral once PRN  glucagon  Injectable 1 milliGRAM(s) IntraMuscular once  insulin lispro (ADMELOG) corrective regimen sliding scale   SubCutaneous before breakfast  melatonin 3 milliGRAM(s) Oral at bedtime  metoprolol tartrate 50 milliGRAM(s) Oral two times a day  midodrine. 2.5 milliGRAM(s) Oral three times a day PRN  nystatin    Suspension [id number] Unit(s) Oral every 6 hours  pantoprazole   Suspension 40 milliGRAM(s) Oral daily  senna 2 Tablet(s) Oral at bedtime      T(C): 36.6 (07-01-23 @ 08:55), Max: 36.6 (07-01-23 @ 08:55)  HR: 86 (07-01-23 @ 08:55) (86 - 118)  BP: 165/94 (07-01-23 @ 08:55) (153/78 - 173/105)  RR: 17 (07-01-23 @ 08:55) (16 - 17)  SpO2: 97% (07-01-23 @ 08:55) (97% - 97%)    In NAD, comfortable   HEENT- EOMI  Heart- Well Perfused  Lungs- No distress, no use of accessory muscles  Abd- + BS, NT  Ext- No calf pain, RLE edema   Neuro- Exam unchanged  Psych- Affect wnl    Imp: Patient with diagnosis of Left MCA ischemic stroke admitted for comprehensive acute rehabilitation.    Plan:  - Continue therapies  - DVT prophylaxis, Venous doppler ordered   - Skin- Turn q2h, check skin daily  - Continue current medications; patient medically stable.   - Patient is stable to continue current rehabilitation program.

## 2023-07-02 LAB — GLUCOSE BLDC GLUCOMTR-MCNC: 149 MG/DL — HIGH (ref 70–99)

## 2023-07-02 PROCEDURE — 99232 SBSQ HOSP IP/OBS MODERATE 35: CPT | Mod: GC

## 2023-07-02 RX ORDER — LOSARTAN POTASSIUM 100 MG/1
100 TABLET, FILM COATED ORAL DAILY
Refills: 0 | Status: DISCONTINUED | OUTPATIENT
Start: 2023-07-03 | End: 2023-07-07

## 2023-07-02 RX ADMIN — Medication 50 MILLIGRAM(S): at 05:40

## 2023-07-02 RX ADMIN — PANTOPRAZOLE SODIUM 40 MILLIGRAM(S): 20 TABLET, DELAYED RELEASE ORAL at 11:46

## 2023-07-02 RX ADMIN — APIXABAN 5 MILLIGRAM(S): 2.5 TABLET, FILM COATED ORAL at 05:40

## 2023-07-02 RX ADMIN — Medication 3 MILLIGRAM(S): at 21:50

## 2023-07-02 RX ADMIN — Medication 500000 UNIT(S): at 00:02

## 2023-07-02 RX ADMIN — APIXABAN 5 MILLIGRAM(S): 2.5 TABLET, FILM COATED ORAL at 18:23

## 2023-07-02 RX ADMIN — Medication 50 MILLIGRAM(S): at 17:05

## 2023-07-02 RX ADMIN — Medication 500000 UNIT(S): at 11:46

## 2023-07-02 RX ADMIN — ATORVASTATIN CALCIUM 40 MILLIGRAM(S): 80 TABLET, FILM COATED ORAL at 21:51

## 2023-07-02 RX ADMIN — SENNA PLUS 2 TABLET(S): 8.6 TABLET ORAL at 21:51

## 2023-07-02 RX ADMIN — LOSARTAN POTASSIUM 50 MILLIGRAM(S): 100 TABLET, FILM COATED ORAL at 05:40

## 2023-07-02 RX ADMIN — Medication 500000 UNIT(S): at 05:40

## 2023-07-02 RX ADMIN — Medication 500000 UNIT(S): at 17:04

## 2023-07-02 NOTE — PROGRESS NOTE ADULT - SUBJECTIVE AND OBJECTIVE BOX
Cc:   Weakness of the right side     HPI:   Patient with no new medical issues today. Slept well, BM (07/01) RLE doppler was negative for DVT.  BP is not well controlled 164/94 after 2 doses of Losartan 50 mg.   Pain controlled, no chest pain, no N/V, no Fevers/Chills. No other new ROS  Has been tolerating rehabilitation program.    apixaban 5 milliGRAM(s) Oral two times a day  atorvastatin 40 milliGRAM(s) Oral at bedtime  bacitracin   Ointment 1 Application(s) Topical three times a day PRN  dextrose 5%. 1000 milliLiter(s) IV Continuous <Continuous>  dextrose 5%. 1000 milliLiter(s) IV Continuous <Continuous>  dextrose 50% Injectable 25 Gram(s) IV Push once  dextrose 50% Injectable 12.5 Gram(s) IV Push once  dextrose 50% Injectable 25 Gram(s) IV Push once  dextrose Oral Gel 15 Gram(s) Oral once PRN  glucagon  Injectable 1 milliGRAM(s) IntraMuscular once  insulin lispro (ADMELOG) corrective regimen sliding scale   SubCutaneous before breakfast  melatonin 3 milliGRAM(s) Oral at bedtime  metoprolol tartrate 50 milliGRAM(s) Oral two times a day  midodrine. 2.5 milliGRAM(s) Oral three times a day PRN  nystatin    Suspension 277625 Unit(s) Oral every 6 hours  pantoprazole   Suspension 40 milliGRAM(s) Oral daily  senna 2 Tablet(s) Oral at bedtime      T(C): 36.6 (07-01-23 @ 08:55), Max: 36.6 (07-01-23 @ 08:55)  HR: 86 (07-01-23 @ 08:55) (86 - 118)  BP: 165/94 (07-01-23 @ 08:55) (153/78 - 173/105)  RR: 17 (07-01-23 @ 08:55) (16 - 17)  SpO2: 97% (07-01-23 @ 08:55) (97% - 97%)    In NAD, comfortable   HEENT- EOMI  Heart- Well Perfused  Lungs- No distress, no use of accessory muscles  Abd- (+) BS, NT  Ext- No calf pain, RLE edema   Neuro- Exam unchanged  Psych- Affect wnl    Imp: Patient with diagnosis of Left MCA ischemic stroke admitted for comprehensive acute rehabilitation.    Plan:  - Continue therapies  - DVT prophylaxis, RLE Venous doppler negative for DVT  - Skin- Turn q2h, check skin daily  - Continue current medications; patient medically stable except for her BP.  Hospitalist D/C Labetalol, On Losartan 50 mg daily (07/01), Metoprolol 50 mg twice daily.  Will increase Losartan to 100 mg daily.    - Patient is stable to continue current rehabilitation program.

## 2023-07-03 LAB
ALBUMIN SERPL ELPH-MCNC: 2.3 G/DL — LOW (ref 3.3–5)
ALP SERPL-CCNC: 136 U/L — HIGH (ref 40–120)
ALT FLD-CCNC: 28 U/L — SIGNIFICANT CHANGE UP (ref 10–45)
ANION GAP SERPL CALC-SCNC: 11 MMOL/L — SIGNIFICANT CHANGE UP (ref 5–17)
AST SERPL-CCNC: 28 U/L — SIGNIFICANT CHANGE UP (ref 10–40)
BASOPHILS # BLD AUTO: 0.06 K/UL — SIGNIFICANT CHANGE UP (ref 0–0.2)
BASOPHILS NFR BLD AUTO: 0.7 % — SIGNIFICANT CHANGE UP (ref 0–2)
BILIRUB SERPL-MCNC: 0.8 MG/DL — SIGNIFICANT CHANGE UP (ref 0.2–1.2)
BUN SERPL-MCNC: 13 MG/DL — SIGNIFICANT CHANGE UP (ref 7–23)
CALCIUM SERPL-MCNC: 8.1 MG/DL — LOW (ref 8.4–10.5)
CHLORIDE SERPL-SCNC: 103 MMOL/L — SIGNIFICANT CHANGE UP (ref 96–108)
CO2 SERPL-SCNC: 24 MMOL/L — SIGNIFICANT CHANGE UP (ref 22–31)
CREAT SERPL-MCNC: 0.57 MG/DL — SIGNIFICANT CHANGE UP (ref 0.5–1.3)
EGFR: 90 ML/MIN/1.73M2 — SIGNIFICANT CHANGE UP
EOSINOPHIL # BLD AUTO: 0.09 K/UL — SIGNIFICANT CHANGE UP (ref 0–0.5)
EOSINOPHIL NFR BLD AUTO: 1 % — SIGNIFICANT CHANGE UP (ref 0–6)
GLUCOSE BLDC GLUCOMTR-MCNC: 146 MG/DL — HIGH (ref 70–99)
GLUCOSE SERPL-MCNC: 141 MG/DL — HIGH (ref 70–99)
HCT VFR BLD CALC: 35.5 % — SIGNIFICANT CHANGE UP (ref 34.5–45)
HGB BLD-MCNC: 11.7 G/DL — SIGNIFICANT CHANGE UP (ref 11.5–15.5)
IMM GRANULOCYTES NFR BLD AUTO: 0.8 % — SIGNIFICANT CHANGE UP (ref 0–0.9)
LYMPHOCYTES # BLD AUTO: 1.11 K/UL — SIGNIFICANT CHANGE UP (ref 1–3.3)
LYMPHOCYTES # BLD AUTO: 12.6 % — LOW (ref 13–44)
MAGNESIUM SERPL-MCNC: 1.6 MG/DL — SIGNIFICANT CHANGE UP (ref 1.6–2.6)
MCHC RBC-ENTMCNC: 30 PG — SIGNIFICANT CHANGE UP (ref 27–34)
MCHC RBC-ENTMCNC: 33 GM/DL — SIGNIFICANT CHANGE UP (ref 32–36)
MCV RBC AUTO: 91 FL — SIGNIFICANT CHANGE UP (ref 80–100)
MONOCYTES # BLD AUTO: 0.86 K/UL — SIGNIFICANT CHANGE UP (ref 0–0.9)
MONOCYTES NFR BLD AUTO: 9.8 % — SIGNIFICANT CHANGE UP (ref 2–14)
NEUTROPHILS # BLD AUTO: 6.63 K/UL — SIGNIFICANT CHANGE UP (ref 1.8–7.4)
NEUTROPHILS NFR BLD AUTO: 75.1 % — SIGNIFICANT CHANGE UP (ref 43–77)
NRBC # BLD: 0 /100 WBCS — SIGNIFICANT CHANGE UP (ref 0–0)
PLATELET # BLD AUTO: 314 K/UL — SIGNIFICANT CHANGE UP (ref 150–400)
POTASSIUM SERPL-MCNC: 3.3 MMOL/L — LOW (ref 3.5–5.3)
POTASSIUM SERPL-SCNC: 3.3 MMOL/L — LOW (ref 3.5–5.3)
PROT SERPL-MCNC: 6.3 G/DL — SIGNIFICANT CHANGE UP (ref 6–8.3)
RBC # BLD: 3.9 M/UL — SIGNIFICANT CHANGE UP (ref 3.8–5.2)
RBC # FLD: 13.2 % — SIGNIFICANT CHANGE UP (ref 10.3–14.5)
SODIUM SERPL-SCNC: 138 MMOL/L — SIGNIFICANT CHANGE UP (ref 135–145)
WBC # BLD: 8.82 K/UL — SIGNIFICANT CHANGE UP (ref 3.8–10.5)
WBC # FLD AUTO: 8.82 K/UL — SIGNIFICANT CHANGE UP (ref 3.8–10.5)

## 2023-07-03 PROCEDURE — 99233 SBSQ HOSP IP/OBS HIGH 50: CPT

## 2023-07-03 PROCEDURE — 93010 ELECTROCARDIOGRAM REPORT: CPT

## 2023-07-03 PROCEDURE — 99233 SBSQ HOSP IP/OBS HIGH 50: CPT | Mod: GC

## 2023-07-03 RX ORDER — DILTIAZEM HCL 120 MG
10 CAPSULE, EXT RELEASE 24 HR ORAL ONCE
Refills: 0 | Status: COMPLETED | OUTPATIENT
Start: 2023-07-03 | End: 2023-07-03

## 2023-07-03 RX ORDER — DILTIAZEM HCL 120 MG
30 CAPSULE, EXT RELEASE 24 HR ORAL ONCE
Refills: 0 | Status: COMPLETED | OUTPATIENT
Start: 2023-07-03 | End: 2023-07-03

## 2023-07-03 RX ORDER — MAGNESIUM SULFATE 500 MG/ML
1 VIAL (ML) INJECTION ONCE
Refills: 0 | Status: COMPLETED | OUTPATIENT
Start: 2023-07-03 | End: 2023-07-03

## 2023-07-03 RX ORDER — POTASSIUM CHLORIDE 20 MEQ
40 PACKET (EA) ORAL ONCE
Refills: 0 | Status: COMPLETED | OUTPATIENT
Start: 2023-07-03 | End: 2023-07-03

## 2023-07-03 RX ORDER — METOPROLOL TARTRATE 50 MG
75 TABLET ORAL
Refills: 0 | Status: DISCONTINUED | OUTPATIENT
Start: 2023-07-03 | End: 2023-07-05

## 2023-07-03 RX ORDER — MINERAL OIL
133 OIL (ML) MISCELLANEOUS ONCE
Refills: 0 | Status: COMPLETED | OUTPATIENT
Start: 2023-07-03 | End: 2023-07-03

## 2023-07-03 RX ADMIN — Medication 500000 UNIT(S): at 05:55

## 2023-07-03 RX ADMIN — Medication 500000 UNIT(S): at 11:39

## 2023-07-03 RX ADMIN — APIXABAN 5 MILLIGRAM(S): 2.5 TABLET, FILM COATED ORAL at 18:01

## 2023-07-03 RX ADMIN — Medication 10 MILLIGRAM(S): at 05:59

## 2023-07-03 RX ADMIN — PANTOPRAZOLE SODIUM 40 MILLIGRAM(S): 20 TABLET, DELAYED RELEASE ORAL at 11:39

## 2023-07-03 RX ADMIN — SENNA PLUS 2 TABLET(S): 8.6 TABLET ORAL at 21:10

## 2023-07-03 RX ADMIN — Medication 10 MILLIGRAM(S): at 12:59

## 2023-07-03 RX ADMIN — Medication 30 MILLIGRAM(S): at 11:38

## 2023-07-03 RX ADMIN — Medication 100 GRAM(S): at 20:03

## 2023-07-03 RX ADMIN — Medication 133 MILLILITER(S): at 23:45

## 2023-07-03 RX ADMIN — Medication 50 MILLIGRAM(S): at 05:55

## 2023-07-03 RX ADMIN — Medication 500000 UNIT(S): at 00:05

## 2023-07-03 RX ADMIN — Medication 40 MILLIEQUIVALENT(S): at 11:39

## 2023-07-03 RX ADMIN — Medication 75 MILLIGRAM(S): at 18:02

## 2023-07-03 RX ADMIN — Medication 3 MILLIGRAM(S): at 21:10

## 2023-07-03 RX ADMIN — ATORVASTATIN CALCIUM 40 MILLIGRAM(S): 80 TABLET, FILM COATED ORAL at 21:10

## 2023-07-03 RX ADMIN — LOSARTAN POTASSIUM 100 MILLIGRAM(S): 100 TABLET, FILM COATED ORAL at 05:54

## 2023-07-03 RX ADMIN — Medication 500000 UNIT(S): at 18:02

## 2023-07-03 RX ADMIN — APIXABAN 5 MILLIGRAM(S): 2.5 TABLET, FILM COATED ORAL at 05:54

## 2023-07-03 NOTE — PROGRESS NOTE ADULT - ASSESSMENT
81 y/o F, Azeri speaking, with hx of HTN, AFib (xarelto), HCV (entecavir) that presented to [facility] ED on 6/1 with AMS and underwent imaging and noted to have L MCA infarct with L MCA occlusion but not a candidate for tPa given elevated INR and thus underwent mechanical thrombectomy. Felt to be due to cardioembolic as patient was not actively taking xarelto. She was started on eliquis. Unable to pass SLP testing and underwent PEG placement. She is now admitted to [facility] for acute rehabilitation.    CVA   Dysphagia s/p PEG  - PT/OT/SLP per primary team  - L MCA territory infarct with L MCA occlusion likely cardioembolic from known AFIB  - S/p TICI 2C  - c/w Atorvastatin, Eliquis  - Repeat Hypercoagulability studies outpatient (in 12 weeks) with Hematology   - Outpatient follow up with SANDI Boyd (Neuro) and Dr. Mcdaniel (Heme)   - aspiration, fall, seizure precautions    Hypokalemia  -Replete and monitor    Atrial Fibrillation  HTN  Orthostatic hypotension - improved  - c/w Eliquis   - Labetalol 300mg q8h changed to metoprolol 50 mg BID 6/27 - increase to 75mg BID 7/3  - Midodrine prn  - monitor Orthostatic VS daily and adjust meds    Pre-DM, HbA1c 5.9  - Monitor, can dc FS    Hepatitis C  - Entecavir qd    R breast mass  - Mammogram outpatient  - Oncology follow up outpatient  - patient is aware    DVT ppx: Eliquis 81 y/o F, Turkish speaking, with hx of HTN, AFib (xarelto), HCV (entecavir) that presented to [facility] ED on 6/1 with AMS and underwent imaging and noted to have L MCA infarct with L MCA occlusion but not a candidate for tPa given elevated INR and thus underwent mechanical thrombectomy. Felt to be due to cardioembolic as patient was not actively taking xarelto. She was started on eliquis. Unable to pass SLP testing and underwent PEG placement. She is now admitted to [facility] for acute rehabilitation.    CVA   Dysphagia s/p PEG  - PT/OT/SLP per primary team  - L MCA territory infarct with L MCA occlusion likely cardioembolic from known AFIB  - S/p TICI 2C  - c/w Atorvastatin, Eliquis  - Repeat Hypercoagulability studies outpatient (in 12 weeks) with Hematology   - Outpatient follow up with SANDI Boyd (Neuro) and Dr. Mcdaniel (Heme)   - aspiration, fall, seizure precautions    Hypokalemia  -Replete and monitor    Atrial Fibrillation  HTN  Orthostatic hypotension - improved  - Continue losartan 100mg qd  - c/w Eliquis  - Labetalol 300mg q8h changed to metoprolol 50 mg BID 6/27 - increase to 75mg BID 7/3  - Midodrine prn  - monitor Orthostatic VS daily and adjust meds    Pre-DM, HbA1c 5.9  - Monitor, can dc FS    Hepatitis C  - Entecavir qd - end date to be confirmed    R breast mass  - Mammogram outpatient  - Oncology follow up outpatient  - patient is aware    DVT ppx: Eliquis

## 2023-07-03 NOTE — PROGRESS NOTE ADULT - SUBJECTIVE AND OBJECTIVE BOX
Patient is a 82y old  Female who presents with a chief complaint of Functional deficits s/p CVA (02 Jul 2023 11:08)      Patient seen and examined at bedside, comfortable. denies acute medical complaints.     ALLERGIES:  No Known Allergies    MEDICATIONS  (STANDING):  apixaban 5 milliGRAM(s) Oral two times a day  atorvastatin 40 milliGRAM(s) Oral at bedtime  dextrose 5%. 1000 milliLiter(s) (50 mL/Hr) IV Continuous <Continuous>  dextrose 5%. 1000 milliLiter(s) (100 mL/Hr) IV Continuous <Continuous>  dextrose 50% Injectable 25 Gram(s) IV Push once  dextrose 50% Injectable 12.5 Gram(s) IV Push once  dextrose 50% Injectable 25 Gram(s) IV Push once  glucagon  Injectable 1 milliGRAM(s) IntraMuscular once  insulin lispro (ADMELOG) corrective regimen sliding scale   SubCutaneous before breakfast  losartan 100 milliGRAM(s) Oral daily  melatonin 3 milliGRAM(s) Oral at bedtime  metoprolol tartrate 50 milliGRAM(s) Oral two times a day  nystatin    Suspension 025257 Unit(s) Oral every 6 hours  pantoprazole   Suspension 40 milliGRAM(s) Oral daily  senna 2 Tablet(s) Oral at bedtime    MEDICATIONS  (PRN):  bacitracin   Ointment 1 Application(s) Topical three times a day PRN to skin wound  bisacodyl Suppository 10 milliGRAM(s) Rectal daily PRN Constipation  dextrose Oral Gel 15 Gram(s) Oral once PRN Blood Glucose LESS THAN 70 milliGRAM(s)/deciliter  midodrine. 2.5 milliGRAM(s) Oral three times a day PRN SBP<90    Vital Signs Last 24 Hrs  T(F): 97.4 (03 Jul 2023 07:08), Max: 98.2 (02 Jul 2023 16:57)  HR: 98 (03 Jul 2023 06:07) (98 - 108)  BP: 150/92 (03 Jul 2023 06:07) (143/85 - 158/82)  RR: 16 (03 Jul 2023 07:08) (15 - 16)  SpO2: 95% (03 Jul 2023 07:08) (95% - 97%)  I&O's Summary    02 Jul 2023 07:01  -  03 Jul 2023 07:00  --------------------------------------------------------  IN: 600 mL / OUT: 0 mL / NET: 600 mL          PHYSICAL EXAM:  General: NAD  ENT: MMM, no scleral icterus  Neck: Supple, No JVD  Lungs: Clear to auscultation bilaterally, no wheezes, rales, rhonchi  Cardio: Irreg Irreg, S1/S2  Abdomen: Soft, Nontender, Nondistended; Bowel sounds present  Extremities: RUE edema, Rt hemiparesis    LABS:                        11.7   8.82  )-----------( 314      ( 03 Jul 2023 06:36 )             35.5       07-03    138  |  103  |  13  ----------------------------<  141  3.3   |  24  |  0.57    Ca    8.1      03 Jul 2023 06:36  Mg     1.6     07-03    TPro  6.3  /  Alb  2.3  /  TBili  0.8  /  DBili  x   /  AST  28  /  ALT  28  /  AlkPhos  136  07-03                06-02 Chol 97 mg/dL LDL -- HDL 47 mg/dL Trig 85 mg/dL              POCT Blood Glucose.: 146 mg/dL (03 Jul 2023 08:16)      Urinalysis Basic - ( 03 Jul 2023 06:36 )    Color: x / Appearance: x / SG: x / pH: x  Gluc: 141 mg/dL / Ketone: x  / Bili: x / Urobili: x   Blood: x / Protein: x / Nitrite: x   Leuk Esterase: x / RBC: x / WBC x   Sq Epi: x / Non Sq Epi: x / Bacteria: x        COVID-19 PCR: NotDetec (06-16-23 @ 20:00)      RADIOLOGY & ADDITIONAL TESTS:    Care Discussed with Consultants/Other Providers: yes, rehab

## 2023-07-03 NOTE — PROGRESS NOTE ADULT - SUBJECTIVE AND OBJECTIVE BOX
Chief complaint- YOJANA TOMAS    Andres Falcon is an 82 year old, Brazilian speaking, woman with PMH of HTN, AFIB (on Xarelto), Hepatitis C (on Entecavir); who presented to [facility] ED on 6/1 with acute onset AMS. LKN was 10pm, at 10:30pm she became acutely altered and with poor posture. Upon arrival, she was noted to have decreased arousal, severe R sided weakness, slurred speech and aphasia requiring intubation. 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 She was not an appropriate tenecteplase candidate due to INR of 1.79 and was taken for mechanical thrombectomy. SLP recommended NPO status on 6/8, a repeat MBS was performed on 6/13 without improvement and a PEG tube was placed on 6/14. Further imaging was significant for R breast mass and she was recommended for outpatient follow up and imaging. PM&R was consulted and deemed her an appropriate candidate for IRF. She was medically stabilized and cleared for discharge to [facility].     ROS/subjective: (Brazilian  used)  patient seen and examined in OT - Communication via an  - [id number]  no reported complaints  constipated over weekend- small SM with suppos yesterday- feels that she needs additional meds  Voice quality good  Continues to use the abdominal binder and teds.  HR increased to 130s today with syst BPs > 170 and accompanying chest pain- corrected with PO Then IV cardizem  lopressor dose increased to 75mg 2x/day- to start in AM  reports pain in Right leg but on further questioning with daugther later in day - no reported pain  She denies any dizziness. decrease sensation on the right side. No other complaint at this time.       MEDICATIONS  (STANDING):  apixaban 5 milliGRAM(s) Oral two times a day  atorvastatin 40 milliGRAM(s) Oral at bedtime  dextrose 5%. 1000 milliLiter(s) (50 mL/Hr) IV Continuous <Continuous>  dextrose 5%. 1000 milliLiter(s) (100 mL/Hr) IV Continuous <Continuous>  dextrose 50% Injectable 12.5 Gram(s) IV Push once  dextrose 50% Injectable 25 Gram(s) IV Push once  dextrose 50% Injectable 25 Gram(s) IV Push once  glucagon  Injectable 1 milliGRAM(s) IntraMuscular once  insulin lispro (ADMELOG) corrective regimen sliding scale   SubCutaneous before breakfast  losartan 100 milliGRAM(s) Oral daily  melatonin 3 milliGRAM(s) Oral at bedtime  metoprolol tartrate 75 milliGRAM(s) Oral two times a day  nystatin    Suspension 156600 Unit(s) Oral every 6 hours  pantoprazole   Suspension 40 milliGRAM(s) Oral daily  senna 2 Tablet(s) Oral at bedtime    MEDICATIONS  (PRN):  bacitracin   Ointment 1 Application(s) Topical three times a day PRN to skin wound  bisacodyl Suppository 10 milliGRAM(s) Rectal daily PRN Constipation  dextrose Oral Gel 15 Gram(s) Oral once PRN Blood Glucose LESS THAN 70 milliGRAM(s)/deciliter  midodrine. 2.5 milliGRAM(s) Oral three times a day PRN SBP<90                            11.7   8.82  )-----------( 314      ( 03 Jul 2023 06:36 )             35.5     07-03    138  |  103  |  13  ----------------------------<  141<H>  3.3<L>   |  24  |  0.57    Ca    8.1<L>      03 Jul 2023 06:36  Mg     1.6     07-03    TPro  6.3  /  Alb  2.3<L>  /  TBili  0.8  /  DBili  x   /  AST  28  /  ALT  28  /  AlkPhos  136<H>  07-03    Urinalysis Basic - ( 03 Jul 2023 06:36 )    Color: x / Appearance: x / SG: x / pH: x  Gluc: 141 mg/dL / Ketone: x  / Bili: x / Urobili: x   Blood: x / Protein: x / Nitrite: x   Leuk Esterase: x / RBC: x / WBC x   Sq Epi: x / Non Sq Epi: x / Bacteria: x        CAPILLARY BLOOD GLUCOSE      POCT Blood Glucose.: 146 mg/dL (03 Jul 2023 08:16)      Vital Signs Last 24 Hrs  T(C): 36.3 (03 Jul 2023 07:08), Max: 36.8 (03 Jul 2023 06:07)  T(F): 97.4 (03 Jul 2023 07:08), Max: 98.2 (03 Jul 2023 06:07)  HR: 139 (03 Jul 2023 18:00) (97 - 139)  BP: 159/80 (03 Jul 2023 18:00) (132/70 - 163/88)  BP(mean): --  RR: 16 (03 Jul 2023 18:00) (15 - 16)  SpO2: 97% (03 Jul 2023 18:00) (95% - 97%)    Parameters below as of 03 Jul 2023 18:00  Patient On (Oxygen Delivery Method): room air      Gen - NAD, Comfortable, nonverbal  HEENT - NCAT, EOMI, MMM  Neck - Supple, No limited ROM  Pulm - CTAB, No wheeze  Cardiovascular - RRR, S1S2  Chest - good chest expansion, good respiratory effort  Abdomen - Soft, NT/ND, +BS, + PEG no suprapubic tenderness  Extremities - No Cyanosis, no clubbing, no edema, no calf tenderness  Neuro-     Cognitive - awake, alert follows visual commands     Communication - hypophonia, non -fluent, difficulty with prompting     Cranial Nerves - right facial weakness, decreased right  shoulder shrug      Motor -  Right  hemiparesis neglect low tone                    LEFT    UE - 5/5                    RIGHT UE - 0/5                    LEFT    LE - 5/5                    RIGHT LE - 0/5       Tone - some increased Flexor tone in RUE/RLE noted- gross extension RLE facilitated  Psychiatric - Mood stable, Affect WNL  Skin:  skin tear to mid and left chest wall- diffuse ecchymoses chest wall/ extremities      IDT -7/3     SW- 7 steps to get in. 15 steps inside. Daughter was helping prior.   SP - puree/mild thick. mod-severe cognitive. still pocketing food  OT- Max/Total assist for most ADL's. She has good trunk control.   PT-  Transfer mod x2- stand Max assist x2. ambulated 15 steps with immobilizer/ rail max A x1  Goal -  Mod assist.   TDD- 7/7  to MEMO.

## 2023-07-03 NOTE — PROVIDER CONTACT NOTE (OTHER) - BACKGROUND
Hx of Afib. HR was elevated around noon- Cardizem tablet administered and 1 hour later, Cardizem IVP administered by MD. Pt is currently due for 1800 dose of metoprolol 75mg tablets.
Hx Afib, on metoprolol 50mg BID, last dose at 05:55
pt with cva

## 2023-07-03 NOTE — PROGRESS NOTE ADULT - ASSESSMENT
AUDI DYKES is a 82 year old, Yi speaking, woman with PMH of HTN, AFIB (on Xarelto), Hepatitis C (on Entecavir); who presented to [facility] ED on 6/1 with acute onset AMS. LKN was 10pm, at 10:30pm she became acutely altered and with poor posture. Upon arrival, she was noted to have decreased arousal, severe R sided weakness, slurred speech and aphasia requiring intubation. 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 She was not an appropriate tenecteplase candidate due to INR of 1.79 and was taken for mechanical thrombectomy. SLP recommended NPO status on 6/8, a repeat MBS was performed on 6/13 without improvement and a PEG tube was placed on 6/14.  Patient now admitted for a multidisciplinary rehab program.   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 Mcdaniel (Heme)     #AFIB  - Eliquis   episode Rapid A fib this AM-treated with PO then IV Cardizem- Lopressor Dose increased    #HTN  - Amlodipine - discontinued   - Labetalol 400mg q 8 hrs - discontinued   - Losartan 100mg daily - discontinued   Losartan restarted over weekend secondary to elevated BPs- 100mg daily  Lopressor increased to 75mg 2x/day by hospitalist    # Hyponatermia   - Improved. Na Now 138    #Hypokalemia  - 3.3 this am.   additional K supplement given  Mg 1.6- will supplement with Mg rider if IV in  Check Mg / CMP in AM    #Hepatitis C  - Entecavir daily   - Hold feeds 2 hours before and 2 hours after medication administration     #R breast mass  - Mammogram outpatient  - Oncology follow up outpatient    #Oral Candida  - Nystatin  - Swish and suction   improved      #Sleep  - melatonin PRN     #Skin  - Skin on admission: skin tear to mid and left chest wall- intact  - Pressure injury/Skin: OOB to Chair, PT/OT   - Bacitracin    #Pain Mgmt   - Tylenol PRN, Oxycodone PRN    #GI/Bowel Mgmt   - Pantoprazole  - Continent c/w Senna, Miralax     #/Bladder Mgmt   -  PVR q6h, CIC>400cc-   +UA but C&S contaminant- received PO ceftin since 6/20  - 6/28- PVrs in 200s- spnt voiding in diapers- incontinent - d.c the bladder scan.     #FEN   - Diet - NPO with tube feeds  - Glucerna  - Dysphagia  SLP - evaluation and treatment  - MBS done-Puree with Mild thick. -   - Tube Feeds Discontinued.     #Precautions / PROPHYLAXIS:   - Falls  - ortho: Weight bearing status: WBAT   - Lungs: Aspiration, Incentive Spirometer     TDD- 7/7  to MEMO     - DVT: Eliquis    20 additional minutes spent today on coordination of care including team conference as well as conversation with patient's daughter Daniel - I updated her on plan of care- All questions were answered by me as well.

## 2023-07-03 NOTE — PROVIDER CONTACT NOTE (OTHER) - ACTION/TREATMENT ORDERED:
Continue to monitor.
MD stated to give metoprolol now and recheck VS in 1 hour.
MD ordered Cardizem 30mg tablet STAT, EKG. Administered med at 1200. Repeat VS at 1240 was 146/81,  on ausculation, O2 sat 96%. MD notified. MD assessed. MD ordered and administered Cardizem IVP

## 2023-07-03 NOTE — PROVIDER CONTACT NOTE (OTHER) - ASSESSMENT
AOX4. Patient speaks Bolivian-family at bedside to translate. Patient prefers family to translate. Pt states that she feels chest discomfort-unable to put number on it, but "low". Denies palpitations and SOB. /68.
AOX4. Rosetta speaking-  device utilized (#[id number], Valentino). /80, -139. Patient states that she does not have chest pain, palpitations, or SOB.
noted low BP . See flow sheet

## 2023-07-03 NOTE — CHART NOTE - NSCHARTNOTEFT_GEN_A_CORE
IDT - 6/27    SW- 7 steps to get in. 15 steps inside. Daugther was helping prior.   SP - puree/mild thick. Mild to mod cognitive.   OT- Max/Total assist for most ADL's. She has good trunk control.   PT-  Transfer mod x2- stand Max assist x2.   Goal -  Mod assist.   TDD- 7/7  to MEMO
Nutrition Follow Up Note  Hospital Course   (Per Electronic Medical Record)    Source:  Patient Unable to Speak or Provide Reliable Information   Speech Therapy  [X]   Nursing Staff [X]   Medical Record [X]      /Translation Phone Services Used  ID# [id number]    Diet:   NPO  Aspiration Precautions  Discussed w/ Speech Therapy     Enteral/Parenteral Nutrition:   Jevity 1.5Cal 1Can Bolus x4Day (Provides 1,420kcal & 60.4grams of Protein)   Tolerates Tubefeeding Well (Per Nursing Staff)  Manual Flush 250ml Q6hrs  Banatrol 1 Packet TID (Provides 120kcal & 6grams of Fiber)    Current Weight: 174.1lb on 6/16  Obtain New Weight  Obtain Weights Weekly     Pertinent Medications: MEDICATIONS  (STANDING):  apixaban 5 milliGRAM(s) Oral two times a day  atorvastatin 40 milliGRAM(s) Oral at bedtime  dextrose 5%. 1000 milliLiter(s) (50 mL/Hr) IV Continuous <Continuous>  dextrose 5%. 1000 milliLiter(s) (100 mL/Hr) IV Continuous <Continuous>  dextrose 50% Injectable 25 Gram(s) IV Push once  dextrose 50% Injectable 12.5 Gram(s) IV Push once  dextrose 50% Injectable 25 Gram(s) IV Push once  glucagon  Injectable 1 milliGRAM(s) IntraMuscular once  insulin lispro (ADMELOG) corrective regimen sliding scale   SubCutaneous three times a day before meals  insulin lispro (ADMELOG) corrective regimen sliding scale   SubCutaneous at bedtime  labetalol 400 milliGRAM(s) Oral every 8 hours  losartan 100 milliGRAM(s) Oral daily  nystatin    Suspension 739027 Unit(s) Oral every 6 hours  pantoprazole   Suspension 40 milliGRAM(s) Oral daily  polyethylene glycol 3350 17 Gram(s) Oral daily    MEDICATIONS  (PRN):  bacitracin   Ointment 1 Application(s) Topical three times a day PRN to skin wound  dextrose Oral Gel 15 Gram(s) Oral once PRN Blood Glucose LESS THAN 70 milliGRAM(s)/deciliter    Pertinent Labs:  06-19 Na134 mmol/L<L> Glu 149 mg/dL<H> K+ 4.4 mmol/L Cr  0.89 mg/dL BUN 37 mg/dL<H> 06-19 Alb 2.2 g/dL<L> 06-02 Chol 97 mg/dL LDL --    HDL 47 mg/dL<L> Trig 85 mg/dL    Skin: No Pressure Ulcers  Surgical Incision on Abdomen  (as Per Nursing Flow Sheet)     Edema: None Noted (as Per Documentation)     Last Bowel Movement: on 6/18    Estimated Needs:   [X] No Change Since Previous Assessment    Previous Nutrition Diagnosis:   Moderate Malnutrition     Nutrition Diagnosis is [X] Ongoing - Continues on Tubefeeding     New Nutrition Diagnosis: [X] Not Applicable    Interventions:   1. Recommend Continue Nutrition Plan of Care     Monitoring & Evaluation:   [X] Weights   [X] Skin Integrity   [X] Follow Up (Per Protocol)  [X] Tolerance to Diet Prescription   [X] Other: Labs     Registered Dietitian/Nutritionist Remains Available.  Jesse Benton RDN    Phone# [phone]
Nutrition Follow Up Note  Hospital Course   (Per Electronic Medical Record)    Source:  Patient [X]  Speech Therapy [X]  Medical Record [X]      /Translation Phone Services Used  ID# [id number]    Diet:   Puree (IDDSI Level 4/Dysphagia 1) Diet w/ Mildly Thick Liquids (IDDSI Level 2/Nectar Thick)  Tolerates Diet Consistency Well - Discussed w/ Speech Therapy   No Chewing/Swallowing Difficulties (Per Patient Observation)    No Recent Nausea, Vomiting, Diarrhea or Constipation (as Per Patient)  Consumes % of Meals (as Per Documentation) - States Good PO Intake/Appetite (Per Patient)  on Ensure Plus High Protein 8oz PO BID (Provides 700kcal & 40grams of Protein)  Patient Takes Nutrition Supplement Well    Enteral/Parenteral Nutrition: Not Applicable    Current Weight: 174.1lb on 6/16  Obtain New Weight  Obtain Weights Weekly     Pertinent Medications: MEDICATIONS  (STANDING):  apixaban 5 milliGRAM(s) Oral two times a day  atorvastatin 40 milliGRAM(s) Oral at bedtime  dextrose 5%. 1000 milliLiter(s) (50 mL/Hr) IV Continuous <Continuous>  dextrose 5%. 1000 milliLiter(s) (100 mL/Hr) IV Continuous <Continuous>  dextrose 50% Injectable 12.5 Gram(s) IV Push once  dextrose 50% Injectable 25 Gram(s) IV Push once  dextrose 50% Injectable 25 Gram(s) IV Push once  glucagon  Injectable 1 milliGRAM(s) IntraMuscular once  insulin lispro (ADMELOG) corrective regimen sliding scale   SubCutaneous before breakfast  losartan 100 milliGRAM(s) Oral daily  melatonin 3 milliGRAM(s) Oral at bedtime  metoprolol tartrate 50 milliGRAM(s) Oral two times a day  nystatin    Suspension 865842 Unit(s) Oral every 6 hours  pantoprazole   Suspension 40 milliGRAM(s) Oral daily  senna 2 Tablet(s) Oral at bedtime    MEDICATIONS  (PRN):  bacitracin   Ointment 1 Application(s) Topical three times a day PRN to skin wound  bisacodyl Suppository 10 milliGRAM(s) Rectal daily PRN Constipation  dextrose Oral Gel 15 Gram(s) Oral once PRN Blood Glucose LESS THAN 70 milliGRAM(s)/deciliter  midodrine. 2.5 milliGRAM(s) Oral three times a day PRN SBP<90    Pertinent Labs:  07-03 Na138 mmol/L Glu 141 mg/dL<H> K+ 3.3 mmol/L<L> Cr  0.57 mg/dL BUN 13 mg/dL 07-03 Alb 2.3 g/dL<L>    Skin: No Pressure Ulcers  Surgical Incision on Abdomen  (as Per Nursing Flow Sheet)     Edema: +2 Edema Noted to Right Hand & Right Foot  (as Per Documentation)     Last Bowel Movement: on 7/3 (Per Patient)     Estimated Needs:   [X] No Change Since Previous Assessment    Previous Nutrition Diagnosis:   Moderate Malnutrition     Nutrition Diagnosis is [X] Ongoing - Continues on Nutrition Supplement     New Nutrition Diagnosis: [X] Not Applicable    Interventions:   1. Recommend Continue Nutrition Plan of Care     Monitoring & Evaluation:   [X] Weights   [X] PO Intake   [X] Skin Integrity   [X] Follow Up (Per Protocol)  [X] Tolerance to Diet Prescription   [X] Other: Labs     Registered Dietitian/Nutritionist Remains Available.  Jesse Benton RDN, CDN    Phone# [phone]
[facility] Interdiscplinary Plan of Care    REHABILITATION DIAGNOSIS:  Cerebral infarction          COMORBIDITIES/COMPLICATING CONDITIONS IMPACTING REHABILITATION:  HEALTH ISSUES - PROBLEM Dx:  aphasia  dysphagia  urinary retention      PAST MEDICAL & SURGICAL HISTORY:  HTN (hypertension)      Afib      Hepatitis C          Based upon consideration of the patient's impairments, functional status, complicating conditions and any other contributing factors and after information garnered from the assessments of all therapy disciplines involved in treating the patient and other pertinent clinicians:    INTERDISCIPLINARY REHABILITATION INTERVENTIONS:    [ X  ] Transfer Training  [ X  ] Bed Mobility  [ X  ] Therapeutic Exercise  [ X ] Balance/Coordination Exercises  [ X ] Locomotion retraining  [ X  ] Stairs  [  X ] Functional Transfer Training  [  x ] Bowel/Bladder program  [   ] Pain Management  [   ] Skin/Wound Care  [   ] Visual/Perceptual Training  [   ] Therapeutic Recreation Activities  [ x  ] Neuromuscular Re-education  [ X  ] Activities of Daily Living  [   ] Speech Exercise  [ x  ] Swallowing Exercises  [   ] Vital Stim  [   ] Dietary Supplements  [   ] Calorie Count  [   ] Cognitive Exercises  [   ] Congnitive/Linguistic Treatment  [   ] Behavior Program  [   ] Neuropsych Therapy  [ X  ] Patient/Family Counseling  [ X ] Family Training  [ X  ] Community Re-entry  [   ] Orthotic Evaluation  [   ] Prosthetic Eval/Training    MEDICAL PROGNOSIS:  good    REHAB POTENTIAL:  fair  EXPECTED DAILY THERAPY:         PT:1hr       OT:1hr       ST:1hr       P&O:    EXPECTED INTENSITY OF PROGRAM:  3 hrs / Day    EXPECTED FREQUENCY OF PROGRAM: 5 Days/ Week    ESTIMATED LOS:  [  ] 5-7 Days  [  ] 7-10 Days  [  ] 10- 14 Days  [ x ] 14- 18 Days  [  ] 18- 21 Days    ESTIMATED DISPOSITION:  [  ] Home   [  ] Home with Outpatient Therapies  [  ] Home with Home Therapies  [  ] Assisted Living  [ x ] Nursing Home  [  ] Long Term Acute Care    INTERDISCIPLINARY FUNCTIONAL OUTCOMES/GOALS:         Gait/Mobility:3       Transfers:3       ADLs:3       Functional Transfers:3       Medication Management:3       Communication:3       Cognitive:6       Dysphagia:4       Bladder5       Bowel:5     Functional Independent Measures:   7 = Independent  6 = Modified Independent  5 = Supervision  4 = Minimal Assist/ Contact Guard  3 = Moderate Assistance  2 = Maximum Assistance  1 = Total Assistance  0 = Unable to assess
Nutrition Follow Up Note  Hospital Course   (Per Electronic Medical Record)    Source:  Patient Unable to Provide Reliable Information  Nursing Staff [X]  Medical Record [X]      Diet:   Puree (IDDSI Level 4/Dysphagia 1) Diet w/ Mildly Thick Liquids (IDDSI Level 2/Nectar Thick) -   Tolerates Diet Consistency Well (Per Patient Observation)   No Chewing/Swallowing Difficulties - Will Discuss w/ Speech Therapy   Consumes % of Meals (as Per Documentation)  on Ensure Plus High Protein 8oz PO BID (Provides 700kcal & 40grams of Protein)  Patient Takes Nutrition Supplement Well (Per Patient Observation)    Enteral/Parenteral Nutrition: Not Applicable  Previus on Tubefeeding - DC'ed on 6/23    Current Weight: 174.1lb on 6/16  Obtain New Weight  Obtain Weights Weekly     Pertinent Medications: MEDICATIONS  (STANDING):  apixaban 5 milliGRAM(s) Oral two times a day  atorvastatin 40 milliGRAM(s) Oral at bedtime  dextrose 5%. 1000 milliLiter(s) (50 mL/Hr) IV Continuous <Continuous>  dextrose 5%. 1000 milliLiter(s) (100 mL/Hr) IV Continuous <Continuous>  dextrose 50% Injectable 12.5 Gram(s) IV Push once  dextrose 50% Injectable 25 Gram(s) IV Push once  dextrose 50% Injectable 25 Gram(s) IV Push once  glucagon  Injectable 1 milliGRAM(s) IntraMuscular once  insulin lispro (ADMELOG) corrective regimen sliding scale   SubCutaneous at bedtime  insulin lispro (ADMELOG) corrective regimen sliding scale   SubCutaneous three times a day before meals  labetalol 300 milliGRAM(s) Enteral Tube every 8 hours  midodrine. 2.5 milliGRAM(s) Oral <User Schedule>  nystatin    Suspension 345262 Unit(s) Oral every 6 hours  pantoprazole   Suspension 40 milliGRAM(s) Oral daily  polyethylene glycol 3350 17 Gram(s) Oral daily    MEDICATIONS  (PRN):  bacitracin   Ointment 1 Application(s) Topical three times a day PRN to skin wound  dextrose Oral Gel 15 Gram(s) Oral once PRN Blood Glucose LESS THAN 70 milliGRAM(s)/deciliter    Pertinent Labs:  06-26 Na135 mmol/L Glu 119 mg/dL<H> K+ 3.7 mmol/L Cr  0.84 mg/dL BUN 19 mg/dL 06-26 Alb 2.3 g/dL<L> 06-02 Chol 97 mg/dL LDL --    HDL 47 mg/dL<L> Trig 85 mg/dL    POCT (over Last 3 Days) - Ranging from     Skin: No Pressure Ulcers  Surgical Incision on Abdomen  (as Per Nursing Flow Sheet)     Edema: None Noted Recently (as Per Documentation)     Last Bowel Movement: on 6/25 - Per Nursing Staff     Estimated Needs:   [X] No Change Since Previous Assessment    Previous Nutrition Diagnosis:   Moderate Malnutrition     Nutrition Diagnosis is [X] Ongoing - Continues on Nutrition Supplement & Patient Takes Nutrition Supplement Well    New Nutrition Diagnosis: [X] Not Applicable    Interventions:   1. Recommend Continue Nutrition Plan of Care     Monitoring & Evaluation:   [X] Weights   [X] PO Intake   [X] Skin Integrity   [X] Follow Up (Per Protocol)  [X] Tolerance to Diet Prescription   [X] Other: Labs & POCT    Registered Dietitian/Nutritionist Remains Available.  Jesse Benton RDN    Phone# [phone]

## 2023-07-04 LAB
ALBUMIN SERPL ELPH-MCNC: 2.4 G/DL — LOW (ref 3.3–5)
ALP SERPL-CCNC: 149 U/L — HIGH (ref 40–120)
ALT FLD-CCNC: 31 U/L — SIGNIFICANT CHANGE UP (ref 10–45)
ANION GAP SERPL CALC-SCNC: 10 MMOL/L — SIGNIFICANT CHANGE UP (ref 5–17)
AST SERPL-CCNC: 38 U/L — SIGNIFICANT CHANGE UP (ref 10–40)
BILIRUB SERPL-MCNC: 0.8 MG/DL — SIGNIFICANT CHANGE UP (ref 0.2–1.2)
BUN SERPL-MCNC: 16 MG/DL — SIGNIFICANT CHANGE UP (ref 7–23)
CALCIUM SERPL-MCNC: 8.4 MG/DL — SIGNIFICANT CHANGE UP (ref 8.4–10.5)
CHLORIDE SERPL-SCNC: 105 MMOL/L — SIGNIFICANT CHANGE UP (ref 96–108)
CO2 SERPL-SCNC: 24 MMOL/L — SIGNIFICANT CHANGE UP (ref 22–31)
CREAT SERPL-MCNC: 0.63 MG/DL — SIGNIFICANT CHANGE UP (ref 0.5–1.3)
EGFR: 88 ML/MIN/1.73M2 — SIGNIFICANT CHANGE UP
GLUCOSE BLDC GLUCOMTR-MCNC: 130 MG/DL — HIGH (ref 70–99)
GLUCOSE SERPL-MCNC: 121 MG/DL — HIGH (ref 70–99)
MAGNESIUM SERPL-MCNC: 1.9 MG/DL — SIGNIFICANT CHANGE UP (ref 1.6–2.6)
POTASSIUM SERPL-MCNC: 3.8 MMOL/L — SIGNIFICANT CHANGE UP (ref 3.5–5.3)
POTASSIUM SERPL-SCNC: 3.8 MMOL/L — SIGNIFICANT CHANGE UP (ref 3.5–5.3)
PROT SERPL-MCNC: 6.6 G/DL — SIGNIFICANT CHANGE UP (ref 6–8.3)
SODIUM SERPL-SCNC: 139 MMOL/L — SIGNIFICANT CHANGE UP (ref 135–145)

## 2023-07-04 PROCEDURE — 99232 SBSQ HOSP IP/OBS MODERATE 35: CPT | Mod: GC

## 2023-07-04 PROCEDURE — 99233 SBSQ HOSP IP/OBS HIGH 50: CPT

## 2023-07-04 RX ORDER — ENTECAVIR 0.5 MG/1
0.5 TABLET ORAL DAILY
Refills: 0 | Status: DISCONTINUED | OUTPATIENT
Start: 2023-07-04 | End: 2023-07-07

## 2023-07-04 RX ADMIN — APIXABAN 5 MILLIGRAM(S): 2.5 TABLET, FILM COATED ORAL at 05:39

## 2023-07-04 RX ADMIN — PANTOPRAZOLE SODIUM 40 MILLIGRAM(S): 20 TABLET, DELAYED RELEASE ORAL at 12:53

## 2023-07-04 RX ADMIN — LOSARTAN POTASSIUM 100 MILLIGRAM(S): 100 TABLET, FILM COATED ORAL at 05:39

## 2023-07-04 RX ADMIN — Medication 500000 UNIT(S): at 05:39

## 2023-07-04 RX ADMIN — ATORVASTATIN CALCIUM 40 MILLIGRAM(S): 80 TABLET, FILM COATED ORAL at 21:54

## 2023-07-04 RX ADMIN — Medication 75 MILLIGRAM(S): at 18:04

## 2023-07-04 RX ADMIN — Medication 75 MILLIGRAM(S): at 05:38

## 2023-07-04 RX ADMIN — Medication 3 MILLIGRAM(S): at 21:54

## 2023-07-04 RX ADMIN — SENNA PLUS 2 TABLET(S): 8.6 TABLET ORAL at 21:54

## 2023-07-04 RX ADMIN — Medication 500000 UNIT(S): at 12:53

## 2023-07-04 RX ADMIN — Medication 500000 UNIT(S): at 18:05

## 2023-07-04 RX ADMIN — APIXABAN 5 MILLIGRAM(S): 2.5 TABLET, FILM COATED ORAL at 18:04

## 2023-07-04 RX ADMIN — Medication 500000 UNIT(S): at 00:02

## 2023-07-04 NOTE — PROGRESS NOTE ADULT - SUBJECTIVE AND OBJECTIVE BOX
Patient is a 82y old  Female who presents with a chief complaint of Functional deficits s/p CVA (03 Jul 2023 18:37)      Patient seen and examined at bedside. no acute events overnight.    ALLERGIES:  No Known Allergies    MEDICATIONS  (STANDING):  apixaban 5 milliGRAM(s) Oral two times a day  atorvastatin 40 milliGRAM(s) Oral at bedtime  dextrose 5%. 1000 milliLiter(s) (50 mL/Hr) IV Continuous <Continuous>  dextrose 5%. 1000 milliLiter(s) (100 mL/Hr) IV Continuous <Continuous>  dextrose 50% Injectable 12.5 Gram(s) IV Push once  dextrose 50% Injectable 25 Gram(s) IV Push once  dextrose 50% Injectable 25 Gram(s) IV Push once  glucagon  Injectable 1 milliGRAM(s) IntraMuscular once  insulin lispro (ADMELOG) corrective regimen sliding scale   SubCutaneous before breakfast  losartan 100 milliGRAM(s) Oral daily  melatonin 3 milliGRAM(s) Oral at bedtime  metoprolol tartrate 75 milliGRAM(s) Oral two times a day  nystatin    Suspension 740644 Unit(s) Oral every 6 hours  pantoprazole   Suspension 40 milliGRAM(s) Oral daily  senna 2 Tablet(s) Oral at bedtime    MEDICATIONS  (PRN):  bacitracin   Ointment 1 Application(s) Topical three times a day PRN to skin wound  bisacodyl Suppository 10 milliGRAM(s) Rectal daily PRN Constipation  dextrose Oral Gel 15 Gram(s) Oral once PRN Blood Glucose LESS THAN 70 milliGRAM(s)/deciliter  midodrine. 2.5 milliGRAM(s) Oral three times a day PRN SBP<90    Vital Signs Last 24 Hrs  T(F): 99.4 (04 Jul 2023 07:32), Max: 99.4 (04 Jul 2023 07:32)  HR: 95 (04 Jul 2023 07:32) (88 - 139)  BP: 165/79 (04 Jul 2023 07:32) (132/70 - 165/79)  RR: 14 (04 Jul 2023 07:32) (14 - 16)  SpO2: 96% (04 Jul 2023 07:32) (95% - 97%)  I&O's Summary    03 Jul 2023 07:01  -  04 Jul 2023 07:00  --------------------------------------------------------  IN: 700 mL / OUT: 0 mL / NET: 700 mL      PHYSICAL EXAM:  General: NAD  ENT: MMM, no scleral icterus  Neck: Supple, No JVD  Lungs: Clear to auscultation bilaterally, no wheezes, rales, rhonchi  Cardio: Irreg Irreg, S1/S2  Abdomen: Soft, Nontender, Nondistended; Bowel sounds present  Extremities: RUE edema, Rt hemiparesis      LABS:                        11.7   8.82  )-----------( 314      ( 03 Jul 2023 06:36 )             35.5       07-04    139  |  105  |  16  ----------------------------<  121  3.8   |  24  |  0.63    Ca    8.4      04 Jul 2023 06:40  Mg     1.9     07-04    TPro  6.6  /  Alb  2.4  /  TBili  0.8  /  DBili  x   /  AST  38  /  ALT  31  /  AlkPhos  149  07-04                06-02 Chol 97 mg/dL LDL -- HDL 47 mg/dL Trig 85 mg/dL              POCT Blood Glucose.: 130 mg/dL (04 Jul 2023 08:03)      Urinalysis Basic - ( 04 Jul 2023 06:40 )    Color: x / Appearance: x / SG: x / pH: x  Gluc: 121 mg/dL / Ketone: x  / Bili: x / Urobili: x   Blood: x / Protein: x / Nitrite: x   Leuk Esterase: x / RBC: x / WBC x   Sq Epi: x / Non Sq Epi: x / Bacteria: x        COVID-19 PCR: NotDetec (06-16-23 @ 20:00)      RADIOLOGY & ADDITIONAL TESTS:     Care Discussed with Consultants/Other Providers: yes, rehab

## 2023-07-04 NOTE — PROGRESS NOTE ADULT - SUBJECTIVE AND OBJECTIVE BOX
Cc:   Tired    HPI: Patient HR this am was 100 and 95, /93 and 165/79.  Last BM on (07/03), sleeping well.  Dr Martinez is involved in her care.  Her K and MG levels are WNL.   Pain controlled, no chest pain, no N/V, no Fevers/Chills. No other new ROS  Has been tolerating rehabilitation program.    apixaban 5 milliGRAM(s) Oral two times a day  atorvastatin 40 milliGRAM(s) Oral at bedtime  bacitracin   Ointment 1 Application(s) Topical three times a day PRN  bisacodyl Suppository 10 milliGRAM(s) Rectal daily PRN  dextrose 5%. 1000 milliLiter(s) IV Continuous <Continuous>  dextrose 5%. 1000 milliLiter(s) IV Continuous <Continuous>  dextrose 50% Injectable 12.5 Gram(s) IV Push once  dextrose 50% Injectable 25 Gram(s) IV Push once  dextrose 50% Injectable 25 Gram(s) IV Push once  dextrose Oral Gel 15 Gram(s) Oral once PRN  glucagon  Injectable 1 milliGRAM(s) IntraMuscular once  insulin lispro (ADMELOG) corrective regimen sliding scale   SubCutaneous before breakfast  losartan 100 milliGRAM(s) Oral daily  melatonin 3 milliGRAM(s) Oral at bedtime  metoprolol tartrate 75 milliGRAM(s) Oral two times a day  midodrine. 2.5 milliGRAM(s) Oral three times a day PRN  nystatin    Suspension 301174 Unit(s) Oral every 6 hours  pantoprazole   Suspension 40 milliGRAM(s) Oral daily  senna 2 Tablet(s) Oral at bedtime      T(C): 37.4 (07-04-23 @ 07:32), Max: 37.4 (07-04-23 @ 07:32)  HR: 95 (07-04-23 @ 07:32) (88 - 139)  BP: 165/79 (07-04-23 @ 07:32) (132/70 - 165/79)  RR: 14 (07-04-23 @ 07:32) (14 - 16)  SpO2: 96% (07-04-23 @ 07:32) (95% - 97%)    In NAD, comfortable   HEENT- EOMI  Heart- Well Perfused  Lungs- No distress, no use of accessory muscles  Abd- (+) BS, NT  Ext- No calf pain  Neuro- Exam unchanged  Psych- Affect wnl    Imp: Patient with diagnosis of Left MCA stroke with right hemiplegia  admitted for comprehensive acute rehabilitation.    Plan:  - Continue therapies  - DVT prophylaxis  - Skin- Turn q2h, check skin daily  - Continue current medications;  Discontinue Labetalol, Started Metoprolol 75 mg po twice daily.  If it dose not help will go back to Labetalol, Hospitalist F/U.    - Patient is stable to continue current rehabilitation program.

## 2023-07-04 NOTE — PROGRESS NOTE ADULT - ASSESSMENT
81 y/o F, Nepali speaking, with hx of HTN, AFib (xarelto), HCV (entecavir) that presented to [facility] ED on 6/1 with AMS and underwent imaging and noted to have L MCA infarct with L MCA occlusion but not a candidate for tPa given elevated INR and thus underwent mechanical thrombectomy. Felt to be due to cardioembolic as patient was not actively taking xarelto. She was started on eliquis. Unable to pass SLP testing and underwent PEG placement. She is now admitted to [facility] for acute rehabilitation.    CVA   Dysphagia s/p PEG  - PT/OT/SLP per primary team  - L MCA territory infarct with L MCA occlusion likely cardioembolic from known AFIB  - S/p TICI 2C  - c/w Atorvastatin, Eliquis  - Repeat Hypercoagulability studies outpatient (in 12 weeks) with Hematology   - Outpatient follow up with SANDI Boyd (Neuro) and Dr. Mcdaniel (Heme)   - aspiration, fall, seizure precautions    Hypokalemia  -Replete and monitor    Atrial Fibrillation  HTN  Orthostatic hypotension - improved  - Continue losartan 100mg qd  - c/w Eliquis  - Labetalol 300mg q8h changed to metoprolol 50 mg BID 6/27 - increased to 75mg BID 7/3  - Afib with RVR 7/3 - s/p PO cardizem x1, 10mg IVP x1 with improvement  - 7/4 - If HR, BP remain uncontrolled, dc lopressor and resume labetalol at 200mg q8h and increase as needed  - Midodrine prn  - monitor Orthostatic VS daily and adjust meds    Pre-DM, HbA1c 5.9  - Monitor, can dc FS    Hepatitis C  - Entecavir qd - end date to be confirmed    R breast mass  - Mammogram outpatient  - Oncology follow up outpatient  - patient is aware    DVT ppx: Eliquis

## 2023-07-05 ENCOUNTER — TRANSCRIPTION ENCOUNTER (OUTPATIENT)
Age: 83
End: 2023-07-05

## 2023-07-05 LAB — GLUCOSE BLDC GLUCOMTR-MCNC: 132 MG/DL — HIGH (ref 70–99)

## 2023-07-05 PROCEDURE — 99232 SBSQ HOSP IP/OBS MODERATE 35: CPT | Mod: GC

## 2023-07-05 PROCEDURE — 99232 SBSQ HOSP IP/OBS MODERATE 35: CPT

## 2023-07-05 RX ORDER — METOPROLOL TARTRATE 50 MG
1 TABLET ORAL
Qty: 0 | Refills: 0 | DISCHARGE
Start: 2023-07-05

## 2023-07-05 RX ORDER — PANTOPRAZOLE SODIUM 20 MG/1
40 TABLET, DELAYED RELEASE ORAL
Qty: 0 | Refills: 0 | DISCHARGE
Start: 2023-07-05

## 2023-07-05 RX ORDER — ENTECAVIR 0.5 MG/1
1 TABLET ORAL
Qty: 0 | Refills: 0 | DISCHARGE
Start: 2023-07-05

## 2023-07-05 RX ORDER — METOPROLOL TARTRATE 50 MG
75 TABLET ORAL THREE TIMES A DAY
Refills: 0 | Status: DISCONTINUED | OUTPATIENT
Start: 2023-07-05 | End: 2023-07-07

## 2023-07-05 RX ORDER — NYSTATIN 500MM UNIT
5 POWDER (EA) MISCELLANEOUS
Qty: 0 | Refills: 0 | DISCHARGE
Start: 2023-07-05

## 2023-07-05 RX ORDER — SENNA PLUS 8.6 MG/1
2 TABLET ORAL
Qty: 0 | Refills: 0 | DISCHARGE
Start: 2023-07-05

## 2023-07-05 RX ORDER — LANOLIN ALCOHOL/MO/W.PET/CERES
1 CREAM (GRAM) TOPICAL
Qty: 0 | Refills: 0 | DISCHARGE
Start: 2023-07-05

## 2023-07-05 RX ORDER — APIXABAN 2.5 MG/1
1 TABLET, FILM COATED ORAL
Qty: 0 | Refills: 0 | DISCHARGE
Start: 2023-07-05

## 2023-07-05 RX ORDER — ATORVASTATIN CALCIUM 80 MG/1
1 TABLET, FILM COATED ORAL
Qty: 0 | Refills: 0 | DISCHARGE
Start: 2023-07-05

## 2023-07-05 RX ORDER — LOSARTAN POTASSIUM 100 MG/1
1 TABLET, FILM COATED ORAL
Qty: 0 | Refills: 0 | DISCHARGE
Start: 2023-07-05

## 2023-07-05 RX ADMIN — Medication 500000 UNIT(S): at 06:36

## 2023-07-05 RX ADMIN — Medication 75 MILLIGRAM(S): at 21:18

## 2023-07-05 RX ADMIN — APIXABAN 5 MILLIGRAM(S): 2.5 TABLET, FILM COATED ORAL at 06:36

## 2023-07-05 RX ADMIN — ENTECAVIR 0.5 MILLIGRAM(S): 0.5 TABLET ORAL at 12:42

## 2023-07-05 RX ADMIN — Medication 75 MILLIGRAM(S): at 06:35

## 2023-07-05 RX ADMIN — APIXABAN 5 MILLIGRAM(S): 2.5 TABLET, FILM COATED ORAL at 17:35

## 2023-07-05 RX ADMIN — ATORVASTATIN CALCIUM 40 MILLIGRAM(S): 80 TABLET, FILM COATED ORAL at 21:19

## 2023-07-05 RX ADMIN — Medication 500000 UNIT(S): at 00:51

## 2023-07-05 RX ADMIN — Medication 75 MILLIGRAM(S): at 13:03

## 2023-07-05 RX ADMIN — SENNA PLUS 2 TABLET(S): 8.6 TABLET ORAL at 21:19

## 2023-07-05 RX ADMIN — LOSARTAN POTASSIUM 100 MILLIGRAM(S): 100 TABLET, FILM COATED ORAL at 06:36

## 2023-07-05 RX ADMIN — PANTOPRAZOLE SODIUM 40 MILLIGRAM(S): 20 TABLET, DELAYED RELEASE ORAL at 12:42

## 2023-07-05 RX ADMIN — Medication 500000 UNIT(S): at 12:42

## 2023-07-05 RX ADMIN — Medication 3 MILLIGRAM(S): at 21:19

## 2023-07-05 RX ADMIN — Medication 500000 UNIT(S): at 17:35

## 2023-07-05 NOTE — PROGRESS NOTE ADULT - ASSESSMENT
AUDI DYKES is a 82 year old, Swedish speaking, woman with PMH of HTN, AFIB (on Xarelto), Hepatitis C (on Entecavir); who presented to [facility] ED on 6/1 with acute onset AMS. LKN was 10pm, at 10:30pm she became acutely altered and with poor posture. Upon arrival, she was noted to have decreased arousal, severe R sided weakness, slurred speech and aphasia requiring intubation. 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 She was not an appropriate tenecteplase candidate due to INR of 1.79 and was taken for mechanical thrombectomy. SLP recommended NPO status on 6/8, a repeat MBS was performed on 6/13 without improvement and a PEG tube was placed on 6/14.  Patient now admitted for a multidisciplinary rehab program.   -------------  Rehab Management/MEDICAL MANAGEMENT     #Functional deficits s/p CVA   - Gait Instability, ADL impairments and Functional impairments: Comprehensive Rehab Program of PT/OT/SLP  - 3 hours a day, 5 days a week  - P&O as needed   - L MCA territory infarct with L MCA occlusion likely cardioembolic from known AFIB  - S/p TICI 2C  - Atorvastatin  - Repeat Hypercoagulability studies outpatient (in 12 weeks) with Hematology   - Outpatient follow up with SANDI Boyd (Neuro) and Dr. Mcdaniel (Heme)     #AFIB  - Eliquis   -hx Rapid A fib- IV Cardizem- Lopressor Dose increased to 75mg BID. HR>100 this am-will increase to TID per hospitalist recs.     #HTN  - Amlodipine - discontinued   - Labetalol 400mg q 8 hrs - discontinued   Losartan restarted over weekend secondary to elevated BPs- 100mg daily  Lopressor increased to 75mg TID per Dr Mcdaniel    # Hyponatermia   - Improved    #Hypokalemia  - K supplement given  Mg supplemented  -follow labs    #Hepatitis C  - Entecavir daily   - Hold feeds 2 hours before and 2 hours after medication administration     #R breast mass  - Mammogram outpatient  - Oncology follow up outpatient    #Oral Candida  - Nystatin  - Swish and suction   improved    #Skin  - Skin on admission: skin tear to mid and left chest wall- intact  - Pressure injury/Skin: OOB to Chair, PT/OT   - Bacitracin    #Pain Mgmt   - Tylenol PRN, Oxycodone PRN    #GI/Bowel Mgmt   - Pantoprazole  - Continent c/w Senna, Miralax     #/Bladder Mgmt   +UA but C&S contaminant- received PO ceftin since 6/20  - 6/28- PVrs in 200s- spnt voiding in diapers- incontinent - d.c the bladder scan.     #FEN   - Diet - NPO with tube feeds  - Glucerna  - Dysphagia  SLP - evaluation and treatment  - MBS done-Puree with Mild thick. -   - Tube Feeds Discontinued.     #Precautions / PROPHYLAXIS:   - Falls  - ortho: Weight bearing status: WBAT   - Lungs: Aspiration, Incentive Spirometer     TDD- 7/7  to MEMO     - DVT: Eliquis

## 2023-07-05 NOTE — PROGRESS NOTE ADULT - SUBJECTIVE AND OBJECTIVE BOX
HPI:  Andres Falcon is an 82 year old, [place] speaking, woman with PMH of HTN, AFIB (on Xarelto), Hepatitis C (on Entecavir); who presented to [facility] ED on 6/1 with acute onset AMS. LKN was 10pm, at 10:30pm she became acutely altered and with poor posture. Upon arrival, she was noted to have decreased arousal, severe R sided weakness, slurred speech and aphasia requiring intubation. 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 She was not an appropriate tenecteplase candidate due to INR of 1.79 and was taken for mechanical thrombectomy. SLP recommended NPO status on 6/8, a repeat MBS was performed on 6/13 without improvement and a PEG tube was placed on 6/14. Further imaging was significant for R breast mass and she was recommended for outpatient follow up and imaging. PM&R was consulted and deemed her an appropriate candidate for IRF. She was medically stabilized and cleared for discharge to [facility].        ROS/subjective: ([place]  used) tk [id number]  patient seen and examined in chair  no reported complaints  Voice quality good  Continues to use the abdominal binder and teds.  Sl tachy at rest to 100s-Lopressor to increase  She denies any dizziness. decrease sensation on the right side. No other complaint at this time.   Tolerating Eliquis    VITALS  Vital Signs Last 24 Hrs  T(C): 36.5 (05 Jul 2023 08:58), Max: 36.5 (05 Jul 2023 08:58)  T(F): 97.7 (05 Jul 2023 08:58), Max: 97.7 (05 Jul 2023 08:58)  HR: 88 (05 Jul 2023 08:58) (88 - 106)  BP: 152/85 (05 Jul 2023 08:58) (143/83 - 152/85)  BP(mean): --  RR: 16 (05 Jul 2023 08:58) (16 - 16)  SpO2: 98% (05 Jul 2023 08:58) (95% - 98%)    Parameters below as of 05 Jul 2023 08:58  Patient On (Oxygen Delivery Method): room air      RECENT LABS    07-04    139  |  105  |  16  ----------------------------<  121<H>  3.8   |  24  |  0.63    Ca    8.4      04 Jul 2023 06:40  Mg     1.9     07-04    TPro  6.6  /  Alb  2.4<L>  /  TBili  0.8  /  DBili  x   /  AST  38  /  ALT  31  /  AlkPhos  149<H>  07-04      LIVER FUNCTIONS - ( 04 Jul 2023 06:40 )  Alb: 2.4 g/dL / Pro: 6.6 g/dL / ALK PHOS: 149 U/L / ALT: 31 U/L / AST: 38 U/L / GGT: x           Urinalysis Basic - ( 04 Jul 2023 06:40 )    Color: x / Appearance: x / SG: x / pH: x  Gluc: 121 mg/dL / Ketone: x  / Bili: x / Urobili: x   Blood: x / Protein: x / Nitrite: x   Leuk Esterase: x / RBC: x / WBC x   Sq Epi: x / Non Sq Epi: x / Bacteria: x      CURRENT MEDICATIONS  MEDICATIONS  (STANDING):  apixaban 5 milliGRAM(s) Oral two times a day  atorvastatin 40 milliGRAM(s) Oral at bedtime  dextrose 5%. 1000 milliLiter(s) (50 mL/Hr) IV Continuous <Continuous>  dextrose 5%. 1000 milliLiter(s) (100 mL/Hr) IV Continuous <Continuous>  dextrose 50% Injectable 12.5 Gram(s) IV Push once  dextrose 50% Injectable 25 Gram(s) IV Push once  dextrose 50% Injectable 25 Gram(s) IV Push once  entecavir 0.5 milliGRAM(s) Oral daily  glucagon  Injectable 1 milliGRAM(s) IntraMuscular once  insulin lispro (ADMELOG) corrective regimen sliding scale   SubCutaneous before breakfast  losartan 100 milliGRAM(s) Oral daily  melatonin 3 milliGRAM(s) Oral at bedtime  metoprolol tartrate 75 milliGRAM(s) Oral two times a day  nystatin    Suspension 871939 Unit(s) Oral every 6 hours  pantoprazole   Suspension 40 milliGRAM(s) Oral daily  senna 2 Tablet(s) Oral at bedtime    MEDICATIONS  (PRN):  bacitracin   Ointment 1 Application(s) Topical three times a day PRN to skin wound  bisacodyl Suppository 10 milliGRAM(s) Rectal daily PRN Constipation  dextrose Oral Gel 15 Gram(s) Oral once PRN Blood Glucose LESS THAN 70 milliGRAM(s)/deciliter  midodrine. 2.5 milliGRAM(s) Oral three times a day PRN SBP<90      Gen - NAD, Comfortable  HEENT - NCAT, EOMI, MMM  Neck - Supple, No limited ROM  Pulm - CTAB, No wheeze  Cardiovascular - RR, tachy 100-110  Chest - good chest expansion, good respiratory effort  Abdomen - Soft, NT/ND, +BS, + PEG no suprapubic tenderness  Extremities - No Cyanosis, no clubbing. RUE edema +1, b/l LEs edema +2  Neuro-     Cognitive - awake, alert follows commands     Communication - hypophonia, non -fluent     Cranial Nerves - right facial weakness, decreased right  shoulder shrug      Motor -  Right  hemiparesis neglect                    LEFT    UE - 5/5                    RIGHT UE - 0/5                    LEFT    LE - 5/5                    RIGHT LE - 0/5       Tone - some increased Flexor tone in RUE/RLE noted- gross extension RLE facilitated  Psychiatric - Mood stable, Affect WNL  Skin:  skin tear to mid and left chest wall- diffuse ecchymoses chest wall/ extremities      IDT -7/3     SW- 7 steps to get in. 15 steps inside. Daughter was helping prior.   SP - puree/mild thick. mod-severe cognitive. still pocketing food  OT- Max/Total assist for most ADL's. She has good trunk control.   PT-  Transfer mod x2- stand Max assist x2. ambulated 15 steps with immobilizer/ rail max A x1  Goal -  Mod assist.   TDD- 7/7  to [facility].      Continue comprehensive acute rehab program consisting of 3hrs/day of OT/PT and SLP.

## 2023-07-05 NOTE — DISCHARGE NOTE PROVIDER - PROVIDER TOKENS
PROVIDER:[TOKEN:[[id number]]],PROVIDER:[TOKEN:[[id number]]],PROVIDER:[TOKEN:[[id number]]] PROVIDER:[TOKEN:[[id number]]],PROVIDER:[TOKEN:[[id number]]],PROVIDER:[TOKEN:[[id number]]],PROVIDER:[TOKEN:[[id number]]]

## 2023-07-05 NOTE — DISCHARGE NOTE PROVIDER - NSDCMRMEDTOKEN_GEN_ALL_CORE_FT
apixaban 5 mg oral tablet: 1 tab(s) orally 2 times a day  atorvastatin 40 mg oral tablet: 1 tab(s) orally once a day (at bedtime)  entecavir 0.5 mg oral tablet: 1 tab(s) orally once a day  losartan 100 mg oral tablet: 1 tab(s) orally once a day  melatonin 3 mg oral tablet: 1 tab(s) orally once a day (at bedtime)  metoprolol tartrate 75 mg oral tablet: 1 tab(s) orally 3 times a day  nystatin 100,000 units/mL oral suspension: 5 milliliter(s) orally every 6 hours  pantoprazole 40 mg oral granule, delayed release: 40 milligram(s) orally once a day  polyethylene glycol 3350 oral powder for reconstitution: 17 gram(s) orally once a day  senna leaf extract oral tablet: 2 tab(s) orally once a day (at bedtime)   apixaban 5 mg oral tablet: 1 tab(s) orally 2 times a day  atorvastatin 40 mg oral tablet: 1 tab(s) orally once a day (at bedtime)  entecavir 0.5 mg oral tablet: 1 tab(s) orally once a day  losartan 100 mg oral tablet: 1 tab(s) orally once a day  melatonin 3 mg oral tablet: 1 tab(s) orally once a day (at bedtime)  metoprolol tartrate 75 mg oral tablet: 1 tab(s) orally 3 times a day  nystatin 100,000 units/mL oral suspension: 5 milliliter(s) orally every 6 hours  pantoprazole 40 mg oral granule, delayed release: 40 milligram(s) orally once a day  polyethylene glycol 3350 oral powder for reconstitution: 17 gram(s) orally once a day  potassium chloride 20 mEq oral tablet, extended release: 1 tab(s) orally once a day  senna leaf extract oral tablet: 2 tab(s) orally once a day (at bedtime)

## 2023-07-05 NOTE — DISCHARGE NOTE PROVIDER - NSDCCPCAREPLAN_GEN_ALL_CORE_FT
PRINCIPAL DISCHARGE DIAGNOSIS  Diagnosis: Stroke  Assessment and Plan of Treatment: continue Eliquis and follow up neurology      SECONDARY DISCHARGE DIAGNOSES  Diagnosis: Hypertension  Assessment and Plan of Treatment: follow up cardiology and PCP    Diagnosis: Breast mass  Assessment and Plan of Treatment: follow up hematology     PRINCIPAL DISCHARGE DIAGNOSIS  Diagnosis: Stroke  Assessment and Plan of Treatment: continue Eliquis and follow up neurology      SECONDARY DISCHARGE DIAGNOSES  Diagnosis: Hypertension  Assessment and Plan of Treatment: follow up cardiology and PCP    Diagnosis: Breast mass  Assessment and Plan of Treatment: follow up hematology and PCP in 1 week    Diagnosis: Afib  Assessment and Plan of Treatment: On Eliquis and Lopressor. Lopressor increased to TID for tachycardia 115. Monitor HR at MEMO. Follow up Cardiology in 1 week.

## 2023-07-05 NOTE — DISCHARGE NOTE PROVIDER - HOSPITAL COURSE
82 year old, Setsw[place] speaking, woman with PMH of HTN, AFIB (on Xarelto), Hepatitis C (on Entecavir); who presented to [facility] ED on 6/1 with acute onset AMS. LKN was 10pm, at 10:30pm she became acutely altered and with poor posture. Upon arrival, she was noted to have decreased arousal, severe R sided weakness, slurred speech and aphasia requiring intubation. 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 She was not an appropriate tenecteplase candidate due to INR of 1.79 and was taken for mechanical thrombectomy. SLP recommended NPO status on 6/8, a repeat MBS was performed on 6/13 without improvement and a PEG tube was placed on 6/14. Further imaging was significant for R breast mass and she was recommended for outpatient follow up and imaging. PM&R was consulted and deemed her an appropriate candidate for IRF. She was medically stabilized and cleared for discharge to [facility].   Continued on Eliquis. BP regimen adjusted for hypo/hypertension. Passed swallow eval-PO diet/ NS TF flush for hyponatremia. Cleared for dc to [facility] on 7/7.

## 2023-07-05 NOTE — PROGRESS NOTE ADULT - ASSESSMENT
83 y/o F, Vietnamese speaking, with hx of HTN, AFib (xarelto), HCV (entecavir) that presented to [facility] ED on 6/1 with AMS and underwent imaging and noted to have L MCA infarct with L MCA occlusion but not a candidate for tPa given elevated INR and thus underwent mechanical thrombectomy. Felt to be due to cardioembolic as patient was not actively taking xarelto. She was started on eliquis. Unable to pass SLP testing and underwent PEG placement. She is now admitted to [facility] for acute rehabilitation.    CVA   Dysphagia s/p PEG  - PT/OT/SLP per primary team  - L MCA territory infarct with L MCA occlusion likely cardioembolic from known AFIB  - S/p TICI 2C  - c/w Atorvastatin, Eliquis  - Repeat Hypercoagulability studies outpatient (in 12 weeks) with Hematology   - Outpatient follow up with SANDI Boyd (Neuro) and Dr. Mcdaniel (Heme)   - aspiration, fall, seizure precautions    Hypokalemia  -Replete and monitor    Atrial Fibrillation  HTN  Orthostatic hypotension - improved  - Continue losartan 100mg qd  - c/w Eliquis  - Labetalol 300mg q8h changed to metoprolol 50 mg BID 6/27 - increased to 75mg BID 7/3. rate better controlled today  - Afib with RVR 7/3 - s/p PO cardizem x1, 10mg IVP x1 with improvement  - Midodrine prn  - monitor Orthostatic VS daily and adjust meds    Pre-DM, HbA1c 5.9  - Monitor, can dc FS    Hepatitis C  - Entecavir qd - end date to be confirmed    R breast mass  - Mammogram outpatient  - Oncology follow up outpatient  - patient is aware    DVT ppx: Eliquis

## 2023-07-05 NOTE — PROGRESS NOTE ADULT - NS ATTEND AMEND GEN_ALL_CORE FT
Rehab Attending- Patient seen and examined by me - Case discussed, above note reviewed by me with modifications made    patient seen and evaluated bedside  increased Tone noted both in RUE/ RLE  claims to be ambulating in PT with immobilizer/ rail  HR remains [id number] Bps elevated as well- will increase lopressor to 75mg 3x/day  Moving bowels with enema  K, Mg corrected- labs pending in AM  to Continue intensive rehab program    Vital Signs Last 24 Hrs  T(C): 36.5 (05 Jul 2023 08:58), Max: 36.5 (05 Jul 2023 08:58)  T(F): 97.7 (05 Jul 2023 08:58), Max: 97.7 (05 Jul 2023 08:58)  HR: 113 (05 Jul 2023 13:05) (88 - 113)  BP: 143/79 (05 Jul 2023 13:05) (143/79 - 152/85)  BP(mean): --  RR: 16 (05 Jul 2023 08:58) (16 - 16)  SpO2: 98% (05 Jul 2023 08:58) (95% - 98%)    Parameters below as of 05 Jul 2023 08:58  Patient On (Oxygen Delivery Method): room air    Gen - NAD, Comfortable  HEENT - NCAT, EOMI, MMM  Neck - Supple, No limited ROM  Pulm - CTAB, No wheeze  Cardiovascular - RR, tachy 100-110  Chest - good chest expansion, good respiratory effort  Abdomen - Soft, NT/ND, +BS, + PEG no suprapubic tenderness  Extremities - No Cyanosis, no clubbing. RUE edema +1, b/l LEs edema +2  Neuro-     Cognitive - awake, alert follows commands     Communication - hypophonia, non -fluent     Cranial Nerves - right facial weakness, decreased right  shoulder shrug      Motor -  Right  hemiparesis neglect                    LEFT    UE - 5/5                    RIGHT UE - 0/5                    LEFT    LE - 5/5                    RIGHT LE - 0/5       Tone - some increased Flexor tone in RUE/RLE noted- gross extension RLE facilitated  Psychiatric - Mood stable, Affect WNL  Skin:  skin tear to mid and left chest wall- diffuse ecchymoses chest wall/ extremities

## 2023-07-05 NOTE — DISCHARGE NOTE PROVIDER - CARE PROVIDER_API CALL
Sarah Boyd  NP in Family Health  [address], [address]  [place] [address]  Phone: [phone]  Fax: [phone]  Follow Up Time:     Elissa Mcdaniel  Hematology  [facility] [address]  Phone: [phone]  Fax: [phone]  Follow Up Time:     Singh Quiroz  Gastroenterology  N  OJ FISH, Phys,    Phone: ()-  Fax: ()-  Follow Up Time:    Sarah Boyd  NP in Family Health  [address], [address]  [place] [address]  Phone: [phone]  Fax: [phone]  Follow Up Time:     Elissa Mcdaniel  Hematology  [address] [place] [address]  Phone: [phone]  Fax: [phone]  Follow Up Time:     Singh Quiroz  Gastroenterology  N  OJ FISH, Phys,    Phone: ()-  Fax: ()-  Follow Up Time:     Eyal Gustafson  Cardiology  [address] [place] [address], [address]  [place] [address]  Phone: [phone]  Fax: [phone]  Follow Up Time:

## 2023-07-05 NOTE — DISCHARGE NOTE PROVIDER - NSDCFUADDINST_GEN_ALL_CORE_FT
liquids via teaspoon, NS 300cc flush per PEG q6h liquids via teaspoon, NS 300cc flush per PEG q6h    IF TACHYCARDIC AND BPS ELEVATED CONSIDER ADDING CARDIZEM

## 2023-07-05 NOTE — PROGRESS NOTE ADULT - SUBJECTIVE AND OBJECTIVE BOX
Patient is a 82y old  Female who presents with a chief complaint of Functional deficits s/p CVA (04 Jul 2023 12:42)    Patient seen and examined at bedside. No acute overnight events. Denies pain, denies palpitations  HR 86bpm at time of eval.    ALLERGIES:  No Known Allergies    MEDICATIONS  (STANDING):  apixaban 5 milliGRAM(s) Oral two times a day  atorvastatin 40 milliGRAM(s) Oral at bedtime  dextrose 5%. 1000 milliLiter(s) (50 mL/Hr) IV Continuous <Continuous>  dextrose 5%. 1000 milliLiter(s) (100 mL/Hr) IV Continuous <Continuous>  dextrose 50% Injectable 12.5 Gram(s) IV Push once  dextrose 50% Injectable 25 Gram(s) IV Push once  dextrose 50% Injectable 25 Gram(s) IV Push once  entecavir 0.5 milliGRAM(s) Oral daily  glucagon  Injectable 1 milliGRAM(s) IntraMuscular once  insulin lispro (ADMELOG) corrective regimen sliding scale   SubCutaneous before breakfast  losartan 100 milliGRAM(s) Oral daily  melatonin 3 milliGRAM(s) Oral at bedtime  metoprolol tartrate 75 milliGRAM(s) Oral two times a day  nystatin    Suspension [id number] Unit(s) Oral every 6 hours  pantoprazole   Suspension 40 milliGRAM(s) Oral daily  senna 2 Tablet(s) Oral at bedtime    MEDICATIONS  (PRN):  bacitracin   Ointment 1 Application(s) Topical three times a day PRN to skin wound  bisacodyl Suppository 10 milliGRAM(s) Rectal daily PRN Constipation  dextrose Oral Gel 15 Gram(s) Oral once PRN Blood Glucose LESS THAN 70 milliGRAM(s)/deciliter  midodrine. 2.5 milliGRAM(s) Oral three times a day PRN SBP<90    Vital Signs Last 24 Hrs  T(F): 97.7 (05 Jul 2023 08:58), Max: 97.7 (05 Jul 2023 08:58)  HR: 88 (05 Jul 2023 08:58) (88 - 106)  BP: 152/85 (05 Jul 2023 08:58) (143/83 - 152/85)  RR: 16 (05 Jul 2023 08:58) (16 - 16)  SpO2: 98% (05 Jul 2023 08:58) (95% - 98%)  I&O's Summary    PHYSICAL EXAM:  General: NAD, awake, alert  ENT: MMM, no tonsilar exudate  Neck: Supple, No JVD  Lungs: Clear to auscultation bilaterally, no wheezes. Good air entry bilaterally   Cardio: IRR, normal rate  Abdomen: Soft, Nontender, Nondistended; Bowel sounds present  Extremities: No calf tenderness, No pitting edema    LABS:                        11.7   8.82  )-----------( 314      ( 03 Jul 2023 06:36 )             35.5       07-04    139  |  105  |  16  ----------------------------<  121  3.8   |  24  |  0.63    Ca    8.4      04 Jul 2023 06:40  Mg     1.9     07-04    TPro  6.6  /  Alb  2.4  /  TBili  0.8  /  DBili  x   /  AST  38  /  ALT  31  /  AlkPhos  149  07-04     06-02 Chol 97 mg/dL LDL -- HDL 47 mg/dL Trig 85 mg/dL    POCT Blood Glucose.: 132 mg/dL (05 Jul 2023 08:21)  Urinalysis Basic - ( 04 Jul 2023 06:40 )    Color: x / Appearance: x / SG: x / pH: x  Gluc: 121 mg/dL / Ketone: x  / Bili: x / Urobili: x   Blood: x / Protein: x / Nitrite: x   Leuk Esterase: x / RBC: x / WBC x   Sq Epi: x / Non Sq Epi: x / Bacteria: x    COVID-19 PCR: NotDetec (06-16-23 @ 20:00)    RADIOLOGY & ADDITIONAL TESTS:     Care Discussed with Consultants/Other Providers:

## 2023-07-06 LAB
ALBUMIN SERPL ELPH-MCNC: 2.2 G/DL — LOW (ref 3.3–5)
ALP SERPL-CCNC: 138 U/L — HIGH (ref 40–120)
ALT FLD-CCNC: 39 U/L — SIGNIFICANT CHANGE UP (ref 10–45)
ANION GAP SERPL CALC-SCNC: 8 MMOL/L — SIGNIFICANT CHANGE UP (ref 5–17)
AST SERPL-CCNC: 37 U/L — SIGNIFICANT CHANGE UP (ref 10–40)
BASOPHILS # BLD AUTO: 0.04 K/UL — SIGNIFICANT CHANGE UP (ref 0–0.2)
BASOPHILS NFR BLD AUTO: 0.5 % — SIGNIFICANT CHANGE UP (ref 0–2)
BILIRUB SERPL-MCNC: 0.8 MG/DL — SIGNIFICANT CHANGE UP (ref 0.2–1.2)
BUN SERPL-MCNC: 14 MG/DL — SIGNIFICANT CHANGE UP (ref 7–23)
CALCIUM SERPL-MCNC: 8.3 MG/DL — LOW (ref 8.4–10.5)
CHLORIDE SERPL-SCNC: 104 MMOL/L — SIGNIFICANT CHANGE UP (ref 96–108)
CO2 SERPL-SCNC: 25 MMOL/L — SIGNIFICANT CHANGE UP (ref 22–31)
CREAT SERPL-MCNC: 0.59 MG/DL — SIGNIFICANT CHANGE UP (ref 0.5–1.3)
EGFR: 89 ML/MIN/1.73M2 — SIGNIFICANT CHANGE UP
EOSINOPHIL # BLD AUTO: 0.11 K/UL — SIGNIFICANT CHANGE UP (ref 0–0.5)
EOSINOPHIL NFR BLD AUTO: 1.5 % — SIGNIFICANT CHANGE UP (ref 0–6)
GLUCOSE BLDC GLUCOMTR-MCNC: 116 MG/DL — HIGH (ref 70–99)
GLUCOSE SERPL-MCNC: 120 MG/DL — HIGH (ref 70–99)
HCT VFR BLD CALC: 35 % — SIGNIFICANT CHANGE UP (ref 34.5–45)
HGB BLD-MCNC: 11.7 G/DL — SIGNIFICANT CHANGE UP (ref 11.5–15.5)
IMM GRANULOCYTES NFR BLD AUTO: 0.7 % — SIGNIFICANT CHANGE UP (ref 0–0.9)
LYMPHOCYTES # BLD AUTO: 1.09 K/UL — SIGNIFICANT CHANGE UP (ref 1–3.3)
LYMPHOCYTES # BLD AUTO: 14.4 % — SIGNIFICANT CHANGE UP (ref 13–44)
MCHC RBC-ENTMCNC: 30.2 PG — SIGNIFICANT CHANGE UP (ref 27–34)
MCHC RBC-ENTMCNC: 33.4 GM/DL — SIGNIFICANT CHANGE UP (ref 32–36)
MCV RBC AUTO: 90.2 FL — SIGNIFICANT CHANGE UP (ref 80–100)
MONOCYTES # BLD AUTO: 0.71 K/UL — SIGNIFICANT CHANGE UP (ref 0–0.9)
MONOCYTES NFR BLD AUTO: 9.4 % — SIGNIFICANT CHANGE UP (ref 2–14)
NEUTROPHILS # BLD AUTO: 5.57 K/UL — SIGNIFICANT CHANGE UP (ref 1.8–7.4)
NEUTROPHILS NFR BLD AUTO: 73.5 % — SIGNIFICANT CHANGE UP (ref 43–77)
NRBC # BLD: 0 /100 WBCS — SIGNIFICANT CHANGE UP (ref 0–0)
PLATELET # BLD AUTO: 310 K/UL — SIGNIFICANT CHANGE UP (ref 150–400)
POTASSIUM SERPL-MCNC: 3.3 MMOL/L — LOW (ref 3.5–5.3)
POTASSIUM SERPL-SCNC: 3.3 MMOL/L — LOW (ref 3.5–5.3)
PROT SERPL-MCNC: 6 G/DL — SIGNIFICANT CHANGE UP (ref 6–8.3)
RBC # BLD: 3.88 M/UL — SIGNIFICANT CHANGE UP (ref 3.8–5.2)
RBC # FLD: 13.1 % — SIGNIFICANT CHANGE UP (ref 10.3–14.5)
SODIUM SERPL-SCNC: 137 MMOL/L — SIGNIFICANT CHANGE UP (ref 135–145)
WBC # BLD: 7.57 K/UL — SIGNIFICANT CHANGE UP (ref 3.8–10.5)
WBC # FLD AUTO: 7.57 K/UL — SIGNIFICANT CHANGE UP (ref 3.8–10.5)

## 2023-07-06 PROCEDURE — 99232 SBSQ HOSP IP/OBS MODERATE 35: CPT

## 2023-07-06 PROCEDURE — 99232 SBSQ HOSP IP/OBS MODERATE 35: CPT | Mod: GC

## 2023-07-06 RX ORDER — POTASSIUM CHLORIDE 20 MEQ
20 PACKET (EA) ORAL DAILY
Refills: 0 | Status: DISCONTINUED | OUTPATIENT
Start: 2023-07-06 | End: 2023-07-07

## 2023-07-06 RX ADMIN — Medication 10 MILLIGRAM(S): at 21:52

## 2023-07-06 RX ADMIN — APIXABAN 5 MILLIGRAM(S): 2.5 TABLET, FILM COATED ORAL at 05:19

## 2023-07-06 RX ADMIN — Medication 500000 UNIT(S): at 21:53

## 2023-07-06 RX ADMIN — ATORVASTATIN CALCIUM 40 MILLIGRAM(S): 80 TABLET, FILM COATED ORAL at 21:52

## 2023-07-06 RX ADMIN — Medication 75 MILLIGRAM(S): at 21:52

## 2023-07-06 RX ADMIN — Medication 500000 UNIT(S): at 17:23

## 2023-07-06 RX ADMIN — PANTOPRAZOLE SODIUM 40 MILLIGRAM(S): 20 TABLET, DELAYED RELEASE ORAL at 11:58

## 2023-07-06 RX ADMIN — Medication 75 MILLIGRAM(S): at 13:09

## 2023-07-06 RX ADMIN — ENTECAVIR 0.5 MILLIGRAM(S): 0.5 TABLET ORAL at 11:58

## 2023-07-06 RX ADMIN — Medication 3 MILLIGRAM(S): at 21:53

## 2023-07-06 RX ADMIN — Medication 500000 UNIT(S): at 05:19

## 2023-07-06 RX ADMIN — Medication 500000 UNIT(S): at 11:58

## 2023-07-06 RX ADMIN — APIXABAN 5 MILLIGRAM(S): 2.5 TABLET, FILM COATED ORAL at 17:23

## 2023-07-06 RX ADMIN — Medication 20 MILLIEQUIVALENT(S): at 11:58

## 2023-07-06 RX ADMIN — Medication 500000 UNIT(S): at 01:58

## 2023-07-06 RX ADMIN — Medication 75 MILLIGRAM(S): at 05:19

## 2023-07-06 RX ADMIN — LOSARTAN POTASSIUM 100 MILLIGRAM(S): 100 TABLET, FILM COATED ORAL at 05:19

## 2023-07-06 RX ADMIN — SENNA PLUS 2 TABLET(S): 8.6 TABLET ORAL at 21:52

## 2023-07-06 NOTE — PROGRESS NOTE ADULT - ASSESSMENT
AUDI DYKES is a 82 year old, Romansh speaking, woman with PMH of HTN, AFIB (on Xarelto), Hepatitis C (on Entecavir); who presented to [facility] ED on 6/1 with acute onset AMS. LKN was 10pm, at 10:30pm she became acutely altered and with poor posture. Upon arrival, she was noted to have decreased arousal, severe R sided weakness, slurred speech and aphasia requiring intubation. 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 She was not an appropriate tenecteplase candidate due to INR of 1.79 and was taken for mechanical thrombectomy. SLP recommended NPO status on 6/8, a repeat MBS was performed on 6/13 without improvement and a PEG tube was placed on 6/14.  Patient now admitted for a multidisciplinary rehab program.   -------------  Rehab Management/MEDICAL MANAGEMENT     #Functional deficits s/p CVA   - Gait Instability, ADL impairments and Functional impairments: Comprehensive Rehab Program of PT/OT/SLP  - 3 hours a day, 5 days a week  - P&O as needed   - L MCA territory infarct with L MCA occlusion likely cardioembolic from known AFIB  - S/p TICI 2C  - Atorvastatin  - Repeat Hypercoagulability studies outpatient (in 12 weeks) with Hematology   - Outpatient follow up with SANDI Boyd (Neuro) and Dr. Mcdaniel (Heme)     #AFIB  - Eliquis   -hx Rapid A fib- IV Cardizem- Lopressor Dose increased to 75mg  TID per hospitalist recs.     #HTN  - Amlodipine - discontinued   - Labetalol 400mg q 8 hrs - discontinued   Losartan restarted over weekend secondary to elevated BPs- 100mg daily  Lopressor increased to 75mg TID    # Hyponatermia   - Improved    #Hypokalemia/low mag  - K supplement given  Mg supplemented  -follow labs    #Hepatitis C  - Entecavir daily   - Hold feeds 2 hours before and 2 hours after medication administration     #R breast mass  - Mammogram outpatient  - Oncology follow up outpatient    #Oral Candida  - Nystatin  - Swish and suction   improved    #Skin  - Skin on admission: skin tear to mid and left chest wall- intact  - Pressure injury/Skin: OOB to Chair, PT/OT   - Bacitracin    #Pain Mgmt   - Tylenol PRN, Oxycodone PRN    #GI/Bowel Mgmt   - Pantoprazole  - Continent c/w Senna, Miralax     #/Bladder Mgmt   +UA but C&S contaminant- received PO ceftin since 6/20  - 6/28- PVrs in 200s- spnt voiding in diapers- incontinent - d.c the bladder scan.     #FEN   - Diet - NPO with tube feeds  - Glucerna  - Dysphagia  SLP - evaluation and treatment  - MBS done-Puree with Mild thick. -   - Tube Feeds Discontinued.     #Precautions / PROPHYLAXIS:   - Falls  - ortho: Weight bearing status: WBAT   - Lungs: Aspiration, Incentive Spirometer     TDD- 7/7  to [facility]     - DVT: Eliquis

## 2023-07-06 NOTE — PROGRESS NOTE ADULT - ASSESSMENT
83 y/o F, Vietnamese speaking, with hx of HTN, AFib (xarelto), HCV (entecavir) that presented to [facility] ED on 6/1 with AMS and underwent imaging and noted to have L MCA infarct with L MCA occlusion but not a candidate for tPa given elevated INR and thus underwent mechanical thrombectomy. Felt to be due to cardioembolic as patient was not actively taking xarelto. She was started on eliquis. Unable to pass SLP testing and underwent PEG placement. She is now admitted to [facility] for acute rehabilitation.    CVA   Dysphagia s/p PEG  - PT/OT/SLP per primary team  - L MCA territory infarct with L MCA occlusion likely cardioembolic from known AFIB  - S/p TICI 2C  - c/w Atorvastatin, Eliquis  - Repeat Hypercoagulability studies outpatient (in 12 weeks) with Hematology   - Outpatient follow up with SANDI Boyd (Neuro) and Dr. Mcdaniel (Heme)   - aspiration, fall, seizure precautions    Hypokalemia  -Replete and monitor    Atrial Fibrillation  HTN  Orthostatic hypotension - improved  - Continue losartan 100mg qd  - c/w Eliquis  - Labetalol 300mg q8h changed to lopressor. now rate better controlled on lopressor 75mg tid  - Midodrine prn  - monitor Orthostatic VS daily and adjust meds    Pre-DM, HbA1c 5.9  - Monitor, can dc FS    Hepatitis C  - Entecavir qd - end date to be confirmed    R breast mass  - Mammogram outpatient  - Oncology follow up outpatient  - patient made aware    DVT ppx: Eliquis

## 2023-07-06 NOTE — PROGRESS NOTE ADULT - SUBJECTIVE AND OBJECTIVE BOX
HPI:  Andres Falcon is an 82 year old, Italian speaking, woman with PMH of HTN, AFIB (on Xarelto), Hepatitis C (on Entecavir); who presented to [facility] ED on 6/1 with acute onset AMS. LKN was 10pm, at 10:30pm she became acutely altered and with poor posture. Upon arrival, she was noted to have decreased arousal, severe R sided weakness, slurred speech and aphasia requiring intubation. 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 She was not an appropriate tenecteplase candidate due to INR of 1.79 and was taken for mechanical thrombectomy. SLP recommended NPO status on 6/8, a repeat MBS was performed on 6/13 without improvement and a PEG tube was placed on 6/14. Further imaging was significant for R breast mass and she was recommended for outpatient follow up and imaging. PM&R was consulted and deemed her an appropriate candidate for IRF. She was medically stabilized and cleared for discharge to [facility].        ROS/subjective: (Italian  used) kun #[id number]  patient seen and examined in bed  no reported complaints  Continues to use the abdominal binder and teds.  , Lopressor tid  She denies any dizziness. decrease sensation on the right side. No other complaint at this time.   Tolerating Eliquis      VITALS  Vital Signs Last 24 Hrs  T(C): 37 (06 Jul 2023 08:06), Max: 37 (06 Jul 2023 08:06)  T(F): 98.6 (06 Jul 2023 08:06), Max: 98.6 (06 Jul 2023 08:06)  HR: 93 (06 Jul 2023 08:06) (93 - 113)  BP: 155/96 (06 Jul 2023 08:06) (143/79 - 164/79)  BP(mean): --  RR: 18 (06 Jul 2023 08:06) (16 - 18)  SpO2: 95% (06 Jul 2023 08:06) (95% - 98%)    Parameters below as of 06 Jul 2023 08:06  Patient On (Oxygen Delivery Method): room air      RECENT LABS                        11.7   7.57  )-----------( 310      ( 06 Jul 2023 07:39 )             35.0     07-06    137  |  104  |  14  ----------------------------<  120<H>  3.3<L>   |  25  |  0.59    Ca    8.3<L>      06 Jul 2023 07:39    TPro  6.0  /  Alb  2.2<L>  /  TBili  0.8  /  DBili  x   /  AST  37  /  ALT  39  /  AlkPhos  138<H>  07-06      LIVER FUNCTIONS - ( 06 Jul 2023 07:39 )  Alb: 2.2 g/dL / Pro: 6.0 g/dL / ALK PHOS: 138 U/L / ALT: 39 U/L / AST: 37 U/L / GGT: x           Urinalysis Basic - ( 06 Jul 2023 07:39 )    Color: x / Appearance: x / SG: x / pH: x  Gluc: 120 mg/dL / Ketone: x  / Bili: x / Urobili: x   Blood: x / Protein: x / Nitrite: x   Leuk Esterase: x / RBC: x / WBC x   Sq Epi: x / Non Sq Epi: x / Bacteria: x      CURRENT MEDICATIONS  MEDICATIONS  (STANDING):  apixaban 5 milliGRAM(s) Oral two times a day  atorvastatin 40 milliGRAM(s) Oral at bedtime  dextrose 5%. 1000 milliLiter(s) (50 mL/Hr) IV Continuous <Continuous>  dextrose 5%. 1000 milliLiter(s) (100 mL/Hr) IV Continuous <Continuous>  dextrose 50% Injectable 12.5 Gram(s) IV Push once  dextrose 50% Injectable 25 Gram(s) IV Push once  dextrose 50% Injectable 25 Gram(s) IV Push once  entecavir 0.5 milliGRAM(s) Oral daily  glucagon  Injectable 1 milliGRAM(s) IntraMuscular once  insulin lispro (ADMELOG) corrective regimen sliding scale   SubCutaneous before breakfast  losartan 100 milliGRAM(s) Oral daily  melatonin 3 milliGRAM(s) Oral at bedtime  metoprolol tartrate 75 milliGRAM(s) Oral three times a day  nystatin    Suspension 831117 Unit(s) Oral every 6 hours  pantoprazole   Suspension 40 milliGRAM(s) Oral daily  potassium chloride    Tablet ER 20 milliEquivalent(s) Oral daily  senna 2 Tablet(s) Oral at bedtime    MEDICATIONS  (PRN):  bacitracin   Ointment 1 Application(s) Topical three times a day PRN to skin wound  bisacodyl Suppository 10 milliGRAM(s) Rectal daily PRN Constipation  dextrose Oral Gel 15 Gram(s) Oral once PRN Blood Glucose LESS THAN 70 milliGRAM(s)/deciliter  midodrine. 2.5 milliGRAM(s) Oral three times a day PRN SBP<90      Gen - NAD, Comfortable  HEENT - NCAT, EOMI  Neck - Supple, No limited ROM  Pulm - CTAB, No wheeze  Cardiovascular - RR, tachy 100  Chest - good chest expansion, good respiratory effort  Abdomen - Soft, NT/ND, +BS, + PEG no suprapubic tenderness  Extremities - No Cyanosis, no clubbing. RUE edema +1, b/l LEs edema +2  Neuro-     Cognitive - awake, alert follows commands     Communication - hypophonia     Cranial Nerves - right facial weakness, decreased right  shoulder shrug      Motor -  Right  hemiparesis neglect                    LEFT    UE - 5/5                    RIGHT UE - 0/5                    LEFT    LE - 5/5                    RIGHT LE - 0/5       Tone - some increased Flexor tone in RUE/RLE noted- gross extension RLE facilitated  Psychiatric - Mood depressed  Skin:  skin tear to mid and left chest wall- diffuse ecchymoses chest wall/ extremities      IDT -7/3     SW- 7 steps to get in. 15 steps inside. Daughter was helping prior.   SP - puree/mild thick. mod-severe cognitive. still pocketing food  OT- Max/Total assist for most ADL's. She has good trunk control.   PT-  Transfer mod x2- stand Max assist x2. ambulated 15 steps with immobilizer/ rail max A x1  Goal -  Mod assist.   TDD- 7/7  to [facility].      Continue comprehensive acute rehab program consisting of 3hrs/day of OT/PT and SLP. HPI:  Andres Falcon is an 82 year old, Ecuadorean speaking, woman with PMH of HTN, AFIB (on Xarelto), Hepatitis C (on Entecavir); who presented to [facility] ED on 6/1 with acute onset AMS. LKN was 10pm, at 10:30pm she became acutely altered and with poor posture. Upon arrival, she was noted to have decreased arousal, severe R sided weakness, slurred speech and aphasia requiring intubation. 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 She was not an appropriate tenecteplase candidate due to INR of 1.79 and was taken for mechanical thrombectomy. SLP recommended NPO status on 6/8, a repeat MBS was performed on 6/13 without improvement and a PEG tube was placed on 6/14. Further imaging was significant for R breast mass and she was recommended for outpatient follow up and imaging. PM&R was consulted and deemed her an appropriate candidate for IRF. She was medically stabilized and cleared for discharge to [facility].        ROS/subjective: (Ecuadorean  used) kun #[id number]  patient seen and examined in bed  no reported complaints  Continues to use the abdominal binder and teds.  , Lopressor tid  She denies any dizziness. decrease sensation on the right side. No other complaint at this time.   Tolerating Eliquis  no headaches, no dizziness  no nausea, no vomiting  no SOb, no chest pain      VITALS  Vital Signs Last 24 Hrs  T(C): 37 (06 Jul 2023 08:06), Max: 37 (06 Jul 2023 08:06)  T(F): 98.6 (06 Jul 2023 08:06), Max: 98.6 (06 Jul 2023 08:06)  HR: 93 (06 Jul 2023 08:06) (93 - 113)  BP: 155/96 (06 Jul 2023 08:06) (143/79 - 164/79)  BP(mean): --  RR: 18 (06 Jul 2023 08:06) (16 - 18)  SpO2: 95% (06 Jul 2023 08:06) (95% - 98%)    Parameters below as of 06 Jul 2023 08:06  Patient On (Oxygen Delivery Method): room air      RECENT LABS                        11.7   7.57  )-----------( 310      ( 06 Jul 2023 07:39 )             35.0     07-06    137  |  104  |  14  ----------------------------<  120<H>  3.3<L>   |  25  |  0.59    Ca    8.3<L>      06 Jul 2023 07:39    TPro  6.0  /  Alb  2.2<L>  /  TBili  0.8  /  DBili  x   /  AST  37  /  ALT  39  /  AlkPhos  138<H>  07-06      LIVER FUNCTIONS - ( 06 Jul 2023 07:39 )  Alb: 2.2 g/dL / Pro: 6.0 g/dL / ALK PHOS: 138 U/L / ALT: 39 U/L / AST: 37 U/L / GGT: x           Urinalysis Basic - ( 06 Jul 2023 07:39 )    Color: x / Appearance: x / SG: x / pH: x  Gluc: 120 mg/dL / Ketone: x  / Bili: x / Urobili: x   Blood: x / Protein: x / Nitrite: x   Leuk Esterase: x / RBC: x / WBC x   Sq Epi: x / Non Sq Epi: x / Bacteria: x      CURRENT MEDICATIONS  MEDICATIONS  (STANDING):  apixaban 5 milliGRAM(s) Oral two times a day  atorvastatin 40 milliGRAM(s) Oral at bedtime  dextrose 5%. 1000 milliLiter(s) (50 mL/Hr) IV Continuous <Continuous>  dextrose 5%. 1000 milliLiter(s) (100 mL/Hr) IV Continuous <Continuous>  dextrose 50% Injectable 12.5 Gram(s) IV Push once  dextrose 50% Injectable 25 Gram(s) IV Push once  dextrose 50% Injectable 25 Gram(s) IV Push once  entecavir 0.5 milliGRAM(s) Oral daily  glucagon  Injectable 1 milliGRAM(s) IntraMuscular once  insulin lispro (ADMELOG) corrective regimen sliding scale   SubCutaneous before breakfast  losartan 100 milliGRAM(s) Oral daily  melatonin 3 milliGRAM(s) Oral at bedtime  metoprolol tartrate 75 milliGRAM(s) Oral three times a day  nystatin    Suspension 495482 Unit(s) Oral every 6 hours  pantoprazole   Suspension 40 milliGRAM(s) Oral daily  potassium chloride    Tablet ER 20 milliEquivalent(s) Oral daily  senna 2 Tablet(s) Oral at bedtime    MEDICATIONS  (PRN):  bacitracin   Ointment 1 Application(s) Topical three times a day PRN to skin wound  bisacodyl Suppository 10 milliGRAM(s) Rectal daily PRN Constipation  dextrose Oral Gel 15 Gram(s) Oral once PRN Blood Glucose LESS THAN 70 milliGRAM(s)/deciliter  midodrine. 2.5 milliGRAM(s) Oral three times a day PRN SBP<90      Gen - NAD, Comfortable  HEENT - NCAT, EOMI  Neck - Supple, No limited ROM  Pulm - CTAB, No wheeze  Cardiovascular - RR, tachy 100  Chest - good chest expansion, good respiratory effort  Abdomen - Soft, NT/ND, +BS, + PEG no suprapubic tenderness  Extremities - No Cyanosis, no clubbing. RUE edema +1, b/l LEs edema +2  Neuro-     Cognitive - awake, alert follows commands     Communication - hypophonia better     Cranial Nerves - right facial weakness, decreased right  shoulder shrug      Motor -  Right  hemiparesis neglect                    LEFT    UE - 5/5                    RIGHT UE - 0/5                    LEFT    LE - 5/5                    RIGHT LE - 0/5       Tone - some increased Flexor tone in RUE/RLE noted- gross extension RLE facilitated  Psychiatric - Mood depressed  Skin:  skin tear to mid and left chest wall- diffuse ecchymoses chest wall/ extremities      IDT -7/3     SW- 7 steps to get in. 15 steps inside. Daughter was helping prior.   SP - puree/mild thick. mod-severe cognitive. still pocketing food  OT- Max/Total assist for most ADL's. She has good trunk control.   PT-  Transfer mod x2- stand Max assist x2. ambulated 15 steps with immobilizer/ rail max A x1  Goal -  Mod assist.   TDD- 7/7  to [facility].      Continue comprehensive acute rehab program consisting of 3hrs/day of OT/PT and SLP.

## 2023-07-06 NOTE — PROGRESS NOTE ADULT - SUBJECTIVE AND OBJECTIVE BOX
Patient is a 82y old  Female who presents with a chief complaint of Functional deficits s/p CVA (05 Jul 2023 15:00)    Patient seen and examined at bedside. No acute overnight events. Rate controlled. Having breakfast with assistance.     ALLERGIES:  No Known Allergies    MEDICATIONS  (STANDING):  apixaban 5 milliGRAM(s) Oral two times a day  atorvastatin 40 milliGRAM(s) Oral at bedtime  dextrose 5%. 1000 milliLiter(s) (50 mL/Hr) IV Continuous <Continuous>  dextrose 5%. 1000 milliLiter(s) (100 mL/Hr) IV Continuous <Continuous>  dextrose 50% Injectable 25 Gram(s) IV Push once  dextrose 50% Injectable 12.5 Gram(s) IV Push once  dextrose 50% Injectable 25 Gram(s) IV Push once  entecavir 0.5 milliGRAM(s) Oral daily  glucagon  Injectable 1 milliGRAM(s) IntraMuscular once  insulin lispro (ADMELOG) corrective regimen sliding scale   SubCutaneous before breakfast  losartan 100 milliGRAM(s) Oral daily  melatonin 3 milliGRAM(s) Oral at bedtime  metoprolol tartrate 75 milliGRAM(s) Oral three times a day  nystatin    Suspension [id number] Unit(s) Oral every 6 hours  pantoprazole   Suspension 40 milliGRAM(s) Oral daily  potassium chloride    Tablet ER 20 milliEquivalent(s) Oral daily  senna 2 Tablet(s) Oral at bedtime    MEDICATIONS  (PRN):  bacitracin   Ointment 1 Application(s) Topical three times a day PRN to skin wound  bisacodyl Suppository 10 milliGRAM(s) Rectal daily PRN Constipation  dextrose Oral Gel 15 Gram(s) Oral once PRN Blood Glucose LESS THAN 70 milliGRAM(s)/deciliter  midodrine. 2.5 milliGRAM(s) Oral three times a day PRN SBP<90    Vital Signs Last 24 Hrs  T(F): 98.6 (06 Jul 2023 08:06), Max: 98.6 (06 Jul 2023 08:06)  HR: 93 (06 Jul 2023 08:06) (93 - 113)  BP: 155/96 (06 Jul 2023 08:06) (143/79 - 164/79)  RR: 18 (06 Jul 2023 08:06) (16 - 18)  SpO2: 95% (06 Jul 2023 08:06) (95% - 98%)  I&O's Summary    PHYSICAL EXAM:  General: NAD, awake, alert  ENT: MMM, no tonsilar exudate  Neck: Supple, No JVD  Lungs: Clear to auscultation bilaterally, no wheezes. Good air entry bilaterally   Cardio: IRR, normal rate  Abdomen: Soft, Nontender, Nondistended; Bowel sounds present  Extremities: No calf tenderness, No pitting edema    LABS:                        11.7   7.57  )-----------( 310      ( 06 Jul 2023 07:39 )             35.0       07-06    137  |  104  |  14  ----------------------------<  120  3.3   |  25  |  0.59    Ca    8.3      06 Jul 2023 07:39  Mg     1.9     07-04    TPro  6.0  /  Alb  2.2  /  TBili  0.8  /  DBili  x   /  AST  37  /  ALT  39  /  AlkPhos  138  07-06     06-02 Chol 97 mg/dL LDL -- HDL 47 mg/dL Trig 85 mg/dL    POCT Blood Glucose.: 116 mg/dL (06 Jul 2023 07:37)    Urinalysis Basic - ( 06 Jul 2023 07:39 )    Color: x / Appearance: x / SG: x / pH: x  Gluc: 120 mg/dL / Ketone: x  / Bili: x / Urobili: x   Blood: x / Protein: x / Nitrite: x   Leuk Esterase: x / RBC: x / WBC x   Sq Epi: x / Non Sq Epi: x / Bacteria: x    COVID-19 PCR: [employer] (06-16-23 @ 20:00)    RADIOLOGY & ADDITIONAL TESTS:     Care Discussed with Consultants/Other Providers:

## 2023-07-06 NOTE — PROGRESS NOTE ADULT - NS ATTEND AMEND GEN_ALL_CORE FT
Rehab Attending- Patient seen and examined by me - Case discussed, above note reviewed by me with modifications made    patient seen and evaluated bedside  increased Tone noted both in RUE/ RLE  claims to be ambulating in PT with immobilizer/ rail  HR remains [id number] Bps elevated as well- will increase lopressor to 75mg 3x/day  Moving bowels with enema  K, Mg corrected- labs pending in AM  to Continue intensive rehab program    Vital Signs Last 24 Hrs  T(C): 36.5 (05 Jul 2023 08:58), Max: 36.5 (05 Jul 2023 08:58)  T(F): 97.7 (05 Jul 2023 08:58), Max: 97.7 (05 Jul 2023 08:58)  HR: 113 (05 Jul 2023 13:05) (88 - 113)  BP: 143/79 (05 Jul 2023 13:05) (143/79 - 152/85)  BP(mean): --  RR: 16 (05 Jul 2023 08:58) (16 - 16)  SpO2: 98% (05 Jul 2023 08:58) (95% - 98%)    Parameters below as of 05 Jul 2023 08:58  Patient On (Oxygen Delivery Method): room air    Gen - NAD, Comfortable  HEENT - NCAT, EOMI, MMM  Neck - Supple, No limited ROM  Pulm - CTAB, No wheeze  Cardiovascular - RR, tachy 100-110  Chest - good chest expansion, good respiratory effort  Abdomen - Soft, NT/ND, +BS, + PEG no suprapubic tenderness  Extremities - No Cyanosis, no clubbing. RUE edema +1, b/l LEs edema +2  Neuro-     Cognitive - awake, alert follows commands     Communication - hypophonia, non -fluent     Cranial Nerves - right facial weakness, decreased right  shoulder shrug      Motor -  Right  hemiparesis neglect                    LEFT    UE - 5/5                    RIGHT UE - 0/5                    LEFT    LE - 5/5                    RIGHT LE - 0/5       Tone - some increased Flexor tone in RUE/RLE noted- gross extension RLE facilitated  Psychiatric - Mood stable, Affect WNL  Skin:  skin tear to mid and left chest wall- diffuse ecchymoses chest wall/ extremities Rehab Attending- Patient seen and examined by me - Case discussed, above note reviewed by me with modifications made    patient seen and evaluated bedside  increased flexor Tone noted both in RUE/ RLE  claims to be ambulating in PT with immobilizer/ rail  HR remains [id number]/min on lopressor 75mg 3x/day  BPs still 150-160- ? restart norvasc  last BM 7/5  K still low- will add daily supplementation  Anticipate DC in Am- to [facility]  to Continue intensive rehab program    Vital Signs Last 24 Hrs  T(C): 37 (06 Jul 2023 08:06), Max: 37 (06 Jul 2023 08:06)  T(F): 98.6 (06 Jul 2023 08:06), Max: 98.6 (06 Jul 2023 08:06)  HR: 93 (06 Jul 2023 08:06) (93 - 100)  BP: 155/96 (06 Jul 2023 08:06) (144/84 - 164/79)  BP(mean): --  RR: 18 (06 Jul 2023 08:06) (16 - 18)  SpO2: 95% (06 Jul 2023 08:06) (95% - 98%)    Parameters below as of 06 Jul 2023 08:06  Patient On (Oxygen Delivery Method): room air      Gen - NAD, Comfortable  HEENT - NCAT, EOMI, MMM  Neck - Supple, No limited ROM  Pulm - CTAB, No wheeze  Cardiovascular - RR, tachy 100  Chest - good chest expansion, good respiratory effort  Abdomen - Soft, NT/ND, +BS, + PEG no suprapubic tenderness  Extremities - No Cyanosis, no clubbing. RUE edema +1, b/l LEs edema +2  Neuro-     Cognitive - awake, alert follows commands     Communication - hypophonia, non -fluent     Cranial Nerves - right facial weakness, decreased right  shoulder shrug      Motor -  Right  hemiparesis neglect                    LEFT    UE - 5/5                    RIGHT UE - 0/5                    LEFT    LE - 5/5                    RIGHT LE - 0/5       Tone - some increased Flexor tone in RUE/RLE noted- gross extension RLE facilitated  Psychiatric - Mood stable, Affect WNL  Skin:  skin tear to mid and left chest wall- diffuse ecchymoses chest wall/ extremities

## 2023-07-07 ENCOUNTER — TRANSCRIPTION ENCOUNTER (OUTPATIENT)
Age: 83
End: 2023-07-07

## 2023-07-07 VITALS
SYSTOLIC BLOOD PRESSURE: 145 MMHG | TEMPERATURE: 98 F | OXYGEN SATURATION: 98 % | HEART RATE: 96 BPM | DIASTOLIC BLOOD PRESSURE: 83 MMHG | RESPIRATION RATE: 16 BRPM

## 2023-07-07 LAB — GLUCOSE BLDC GLUCOMTR-MCNC: 122 MG/DL — HIGH (ref 70–99)

## 2023-07-07 PROCEDURE — 99238 HOSP IP/OBS DSCHRG MGMT 30/<: CPT | Mod: GC

## 2023-07-07 PROCEDURE — 99232 SBSQ HOSP IP/OBS MODERATE 35: CPT

## 2023-07-07 RX ORDER — POTASSIUM CHLORIDE 20 MEQ
1 PACKET (EA) ORAL
Qty: 0 | Refills: 0 | DISCHARGE
Start: 2023-07-07

## 2023-07-07 RX ADMIN — Medication 75 MILLIGRAM(S): at 06:38

## 2023-07-07 RX ADMIN — APIXABAN 5 MILLIGRAM(S): 2.5 TABLET, FILM COATED ORAL at 06:38

## 2023-07-07 RX ADMIN — Medication 500000 UNIT(S): at 12:51

## 2023-07-07 RX ADMIN — LOSARTAN POTASSIUM 100 MILLIGRAM(S): 100 TABLET, FILM COATED ORAL at 06:39

## 2023-07-07 RX ADMIN — PANTOPRAZOLE SODIUM 40 MILLIGRAM(S): 20 TABLET, DELAYED RELEASE ORAL at 12:51

## 2023-07-07 RX ADMIN — Medication 500000 UNIT(S): at 06:48

## 2023-07-07 RX ADMIN — ENTECAVIR 0.5 MILLIGRAM(S): 0.5 TABLET ORAL at 12:51

## 2023-07-07 RX ADMIN — Medication 75 MILLIGRAM(S): at 13:35

## 2023-07-07 NOTE — PROGRESS NOTE ADULT - SUBJECTIVE AND OBJECTIVE BOX
Patient is a 82y old  Female who presents with a chief complaint of Functional deficits s/p CVA (06 Jul 2023 13:00)      SUBJECTIVE / OVERNIGHT EVENTS:  Pt seen and examined at bedside. No acute events overnight.    Allergies    No Known Allergies    Intolerances        MEDICATIONS  (STANDING):  apixaban 5 milliGRAM(s) Oral two times a day  atorvastatin 40 milliGRAM(s) Oral at bedtime  dextrose 5%. 1000 milliLiter(s) (50 mL/Hr) IV Continuous <Continuous>  dextrose 5%. 1000 milliLiter(s) (100 mL/Hr) IV Continuous <Continuous>  dextrose 50% Injectable 12.5 Gram(s) IV Push once  dextrose 50% Injectable 25 Gram(s) IV Push once  dextrose 50% Injectable 25 Gram(s) IV Push once  entecavir 0.5 milliGRAM(s) Oral daily  glucagon  Injectable 1 milliGRAM(s) IntraMuscular once  insulin lispro (ADMELOG) corrective regimen sliding scale   SubCutaneous before breakfast  losartan 100 milliGRAM(s) Oral daily  melatonin 3 milliGRAM(s) Oral at bedtime  metoprolol tartrate 75 milliGRAM(s) Oral three times a day  nystatin    Suspension [id number] Unit(s) Oral every 6 hours  pantoprazole   Suspension 40 milliGRAM(s) Oral daily  potassium chloride    Tablet ER 20 milliEquivalent(s) Oral daily  senna 2 Tablet(s) Oral at bedtime    MEDICATIONS  (PRN):  bacitracin   Ointment 1 Application(s) Topical three times a day PRN to skin wound  bisacodyl Suppository 10 milliGRAM(s) Rectal daily PRN Constipation  dextrose Oral Gel 15 Gram(s) Oral once PRN Blood Glucose LESS THAN 70 milliGRAM(s)/deciliter  midodrine. 2.5 milliGRAM(s) Oral three times a day PRN SBP<90      Vital Signs Last 24 Hrs  T(C): 36.9 (06 Jul 2023 21:50), Max: 36.9 (06 Jul 2023 21:50)  T(F): 98.5 (06 Jul 2023 21:50), Max: 98.5 (06 Jul 2023 21:50)  HR: 114 (07 Jul 2023 06:37) (100 - 114)  BP: 150/93 (07 Jul 2023 06:37) (150/93 - 154/82)  BP(mean): --  RR: 16 (06 Jul 2023 21:50) (16 - 16)  SpO2: 96% (06 Jul 2023 21:50) (96% - 96%)    Parameters below as of 06 Jul 2023 21:50  Patient On (Oxygen Delivery Method): room air      CAPILLARY BLOOD GLUCOSE      POCT Blood Glucose.: 122 mg/dL (07 Jul 2023 08:22)    I&O's Summary    06 Jul 2023 07:01  -  07 Jul 2023 07:00  --------------------------------------------------------  IN: 600 mL / OUT: 0 mL / NET: 600 mL        PHYSICAL EXAM:  GENERAL: NAD, elderly female  HEAD:  Atraumatic, Normocephalic  NECK: Supple, No JVD  CHEST/LUNG: Clear to auscultation bilaterally; No wheeze, nonlabored breathing  HEART: Irregularly irregular, No murmurs, rubs, or gallops  ABDOMEN: Soft, Nontender, Nondistended; Bowel sounds present  EXTREMITIES: No clubbing, cyanosis, or edema  PSYCH: calm, appropriate mood    LABS:                        11.7   7.57  )-----------( 310      ( 06 Jul 2023 07:39 )             35.0     07-06    137  |  104  |  14  ----------------------------<  120<H>  3.3<L>   |  25  |  0.59    Ca    8.3<L>      06 Jul 2023 07:39    TPro  6.0  /  Alb  2.2<L>  /  TBili  0.8  /  DBili  x   /  AST  37  /  ALT  39  /  AlkPhos  138<H>  07-06          Urinalysis Basic - ( 06 Jul 2023 07:39 )    Color: x / Appearance: x / SG: x / pH: x  Gluc: 120 mg/dL / Ketone: x  / Bili: x / Urobili: x   Blood: x / Protein: x / Nitrite: x   Leuk Esterase: x / RBC: x / WBC x   Sq Epi: x / Non Sq Epi: x / Bacteria: x        RADIOLOGY & ADDITIONAL TESTS:  Results Reviewed:   Imaging Personally Reviewed:  Electrocardiogram Personally Reviewed:    COORDINATION OF CARE:  Care Discussed with Consultants/Other Providers [Y/N]:  Prior or Outpatient Records Reviewed [Y/N]:

## 2023-07-07 NOTE — PROGRESS NOTE ADULT - ASSESSMENT
81 y/o F, Armenian speaking, with hx of HTN, AFib (xarelto), HCV (entecavir) that presented to [facility] ED on 6/1 with AMS and underwent imaging and noted to have L MCA infarct with L MCA occlusion but not a candidate for tPa given elevated INR and thus underwent mechanical thrombectomy. Felt to be due to cardioembolic as patient was not actively taking xarelto. She was started on eliquis. Unable to pass SLP testing and underwent PEG placement. She is now admitted to [facility] for acute rehabilitation.    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 Mcdaniel (Heme)   - fall, seizure precautions    #Dysphagia   - s/p PEG  - aspiration precaution    Atrial Fibrillation  HTN  Orthostatic hypotension   - Continue losartan 100mg qd  - c/w Eliquis  - Labetalol 300mg q8h changed to lopressor. now rate better controlled on lopressor 75mg tid  - Midodrine prn  - monitor Orthostatic VS daily and adjust meds    Pre-DM  - HbA1c 5.9  - Monitor, can dc FS    Hepatitis C  - Entecavir qd - end date to be confirmed    R breast mass  - Mammogram outpatient  - Oncology follow up outpatient  - patient made aware    DVT ppx: Eliquis

## 2023-07-07 NOTE — PROGRESS NOTE ADULT - NUTRITIONAL ASSESSMENT
This patient has been assessed with a concern for Malnutrition and has been determined to have a diagnosis/diagnoses of Moderate protein-calorie malnutrition.    This patient is being managed with:   Diet NPO with Tube Feed-  Tube Feeding Modality: Gastrostomy  Jevity 1.5 Jude  Total Volume for 24 Hours (mL): 948  Bolus  Total Volume of Bolus (mL):  237  Total # of Feeds: 4  Tube Feed Frequency: Every 6 hours   Tube Feed Start Time: 08:00  Bolus Feed Rate (mL per Hour): 237   Bolus Feed Duration (in Hours): 1  Bolus Feed Instructions:  Please provide bolus tube feed at [id number] 4pm and 8pm  Free Water Flush  Bolus   Total Volume per Flush (mL): 250   Frequency: Every 6 Hours  Free Water Flush Instructions:  Please provide free water flush 1 hour apart from feeds  Banatrol TF     Qty per Day:  TID  Entered: Jun 17 2023  1:10PM  
This patient has been assessed with a concern for Malnutrition and has been determined to have a diagnosis/diagnoses of Moderate protein-calorie malnutrition.    This patient is being managed with:   Diet Pureed-  Mildly Thick Liquids (MILDTHICKLIQS)  Liquid via Teaspoon Only  Free Water Flush Instructions:  Manual Flush NS 300ml Q6hr  Supplement Feeding Modality:  Oral  Ensure Plus High Protein Cans or Servings Per Day:  1       Frequency:  Two Times a day  Entered: Jun 23 2023  3:04PM  
This patient has been assessed with a concern for Malnutrition and has been determined to have a diagnosis/diagnoses of Moderate protein-calorie malnutrition.    This patient is being managed with:   Diet NPO with Tube Feed-  Tube Feeding Modality: Gastrostomy  Jevity 1.5 Jude  Total Volume for 24 Hours (mL): 948  Bolus  Total Volume of Bolus (mL):  237  Total # of Feeds: 4  Tube Feed Frequency: Every 6 hours   Tube Feed Start Time: 08:00  Bolus Feed Rate (mL per Hour): 237   Bolus Feed Duration (in Hours): 1  Bolus Feed Instructions:  Please provide bolus tube feed at [id number] 4pm and 8pm  Free Water Flush  Bolus   Total Volume per Flush (mL): 250   Frequency: Every 6 Hours  Free Water Flush Instructions:  Please provide free water flush 1 hour apart from feeds  Banatrol TF     Qty per Day:  TID  Entered: Jun 17 2023  1:10PM  
This patient has been assessed with a concern for Malnutrition and has been determined to have a diagnosis/diagnoses of Moderate protein-calorie malnutrition.    This patient is being managed with:   Diet Pureed-  Mildly Thick Liquids (MILDTHICKLIQS)  Liquid via Teaspoon Only  Free Water Flush Instructions:  Manual Flush NS 300ml Q6hr  Supplement Feeding Modality:  Oral  Ensure Plus High Protein Cans or Servings Per Day:  1       Frequency:  Two Times a day  Entered: Jun 23 2023  3:04PM  
This patient has been assessed with a concern for Malnutrition and has been determined to have a diagnosis/diagnoses of Moderate protein-calorie malnutrition.    This patient is being managed with:   Diet NPO with Tube Feed-  Tube Feeding Modality: Gastrostomy  Jevity 1.5 Jude  Total Volume for 24 Hours (mL): 948  Bolus  Total Volume of Bolus (mL):  237  Total # of Feeds: 4  Tube Feed Frequency: Every 6 hours   Tube Feed Start Time: 08:00  Bolus Feed Rate (mL per Hour): 237   Bolus Feed Duration (in Hours): 1  Bolus Feed Instructions:  Please provide bolus tube feed at [id number] 4pm and 8pm  Free Water Flush  Bolus   Total Volume per Flush (mL): 250   Frequency: Every 6 Hours  Free Water Flush Instructions:  Please provide free water flush 1 hour apart from feeds  Banatrol TF     Qty per Day:  TID  Entered: Jun 17 2023  1:10PM  
This patient has been assessed with a concern for Malnutrition and has been determined to have a diagnosis/diagnoses of Moderate protein-calorie malnutrition.    This patient is being managed with:   Diet NPO with Tube Feed-  Tube Feeding Modality: Gastrostomy  Jevity 1.5 Jude  Total Volume for 24 Hours (mL): 948  Bolus  Total Volume of Bolus (mL):  237  Total # of Feeds: 4  Tube Feed Frequency: Every 6 hours   Tube Feed Start Time: 08:00  Bolus Feed Rate (mL per Hour): 237   Bolus Feed Duration (in Hours): 1  Bolus Feed Instructions:  Please provide bolus tube feed at [id number] 4pm and 8pm  Free Water Flush  Bolus   Total Volume per Flush (mL): 250   Frequency: Every 6 Hours  Free Water Flush Instructions:  Please provide free water flush 1 hour apart from feeds  Banatrol TF     Qty per Day:  TID  Entered: Jun 17 2023  1:10PM  
This patient has been assessed with a concern for Malnutrition and has been determined to have a diagnosis/diagnoses of Moderate protein-calorie malnutrition.    This patient is being managed with:   Diet NPO with Tube Feed-  Tube Feeding Modality: Gastrostomy  Jevity 1.5 Jude  Total Volume for 24 Hours (mL): 948  Bolus  Total Volume of Bolus (mL):  237  Total # of Feeds: 4  Tube Feed Frequency: Every 6 hours   Tube Feed Start Time: 08:00  Bolus Feed Rate (mL per Hour): 237   Bolus Feed Duration (in Hours): 1  Bolus Feed Instructions:  Please provide bolus tube feed at [id number] 4pm and 8pm  Free Water Flush  Bolus   Total Volume per Flush (mL): 250   Frequency: Every 6 Hours  Free Water Flush Instructions:  Please provide free water flush 1 hour apart from feeds  Banatrol TF     Qty per Day:  TID  Entered: Jun 17 2023  1:10PM  
This patient has been assessed with a concern for Malnutrition and has been determined to have a diagnosis/diagnoses of Moderate protein-calorie malnutrition.    This patient is being managed with:   Diet Pureed-  Mildly Thick Liquids (MILDTHICKLIQS)  Liquid via Teaspoon Only  Free Water Flush Instructions:  Manual Flush NS 300ml Q6hr  Supplement Feeding Modality:  Oral  Ensure Plus High Protein Cans or Servings Per Day:  1       Frequency:  Two Times a day  Entered: Jun 23 2023  3:04PM  

## 2023-07-07 NOTE — PROGRESS NOTE ADULT - ASSESSMENT
AUDI DYKES is a 82 year old, Swedish speaking, woman with PMH of HTN, AFIB (on Xarelto), Hepatitis C (on Entecavir); who presented to [facility] ED on 6/1 with acute onset AMS. LKN was 10pm, at 10:30pm she became acutely altered and with poor posture. Upon arrival, she was noted to have decreased arousal, severe R sided weakness, slurred speech and aphasia requiring intubation. 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 She was not an appropriate tenecteplase candidate due to INR of 1.79 and was taken for mechanical thrombectomy. SLP recommended NPO status on 6/8, a repeat MBS was performed on 6/13 without improvement and a PEG tube was placed on 6/14.  Patient now admitted for a multidisciplinary rehab program.   -------------  Rehab Management/MEDICAL MANAGEMENT     #Functional deficits s/p CVA   - Gait Instability, ADL impairments and Functional impairments: Comprehensive Rehab Program of PT/OT/SLP completed   - L MCA territory infarct with L MCA occlusion likely cardioembolic from known AFIB  - S/p TICI 2C  - Atorvastatin  - Repeat Hypercoagulability studies outpatient (in 12 weeks) with Hematology.    - Outpatient follow up with SANDI Boyd (Neuro) and Dr. Mcdaniel (Heme)     #AFIB  - Eliquis   -hx Rapid A fib- IV Cardizem- Lopressor Dose increased to 75mg  TID per hospitalist recs.     #HTN  - Amlodipine - discontinued   - Labetalol 400mg q 8 hrs - discontinued   Losartan restarted over weekend secondary to elevated BPs- 100mg daily  Lopressor increased to 75mg TID    # Hyponatermia   - Improved    #Hypokalemia/low mag  - K supplement given  Mg supplemented    #Hepatitis C  - Entecavir daily   - Hold feeds 2 hours before and 2 hours after medication administration     #R breast mass  - Mammogram outpatient  - Oncology follow up outpatient    #Oral Candida  - Nystatin  - Swish and suction   improved    #Skin  - Skin on admission: skin tear to mid and left chest wall- intact  - Pressure injury/Skin: OOB to Chair, PT/OT   - Bacitracin    #Pain Mgmt   - Tylenol PRN, Oxycodone PRN    #GI/Bowel Mgmt   - Pantoprazole  - Continent c/w Senna, Miralax     #/Bladder Mgmt   +UA but C&S contaminant- received PO ceftin since 6/20  - 6/28- PVrs in 200s- spnt voiding in diapers- incontinent - d.c the bladder scan.     #FEN   - Diet - NPO with tube feeds  - Glucerna  - Dysphagia  SLP - evaluation and treatment  - MBS done-Puree with Mild thick. -   - Tube Feeds Discontinued.     #Precautions / PROPHYLAXIS:   - Falls  - ortho: Weight bearing status: WBAT   - Lungs: Aspiration, Incentive Spirometer     7/7  to [facility]     - DVT: Eliquis   AUDI DYKES is a 82 year old, Polish speaking, woman with PMH of HTN, AFIB (on Xarelto), Hepatitis C (on Entecavir); who presented to [facility] ED on 6/1 with acute onset AMS. LKN was 10pm, at 10:30pm she became acutely altered and with poor posture. Upon arrival, she was noted to have decreased arousal, severe R sided weakness, slurred speech and aphasia requiring intubation. 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 She was not an appropriate tenecteplase candidate due to INR of 1.79 and was taken for mechanical thrombectomy. SLP recommended NPO status on 6/8, a repeat MBS was performed on 6/13 without improvement and a PEG tube was placed on 6/14.  Patient now admitted for a multidisciplinary rehab program.   -------------  Rehab Management/MEDICAL MANAGEMENT     #Functional deficits s/p CVA   - Gait Instability, ADL impairments and Functional impairments: Comprehensive Rehab Program of PT/OT/SLP completed   - L MCA territory infarct with L MCA occlusion likely cardioembolic from known AFIB  - S/p TICI 2C  - Atorvastatin  - Repeat Hypercoagulability studies outpatient (in 12 weeks) with Hematology.    - Outpatient follow up with SANDI Boyd (Neuro) and Dr. Mcdaniel (Heme)   DC to [facility] today    #AFIB  - Eliquis   -hx Rapid A fib- IV Cardizem- Lopressor Dose increased to 75mg  TID per hospitalist recs.     #HTN  - Amlodipine - discontinued   - Labetalol 400mg q 8 hrs - discontinued   Losartan restarted over weekend secondary to elevated BPs- 100mg daily  Lopressor increased to 75mg TID  ? add Cardizem if BPS/ HR elevated at [facility]    # Hyponatermia   - Improved    #Hypokalemia/low mag  - K supplement given  Mg supplemented    #Hepatitis C  - Entecavir daily   - Hold feeds 2 hours before and 2 hours after medication administration     #R breast mass  - Mammogram outpatient  - Oncology follow up outpatient    #Oral Candida  - Nystatin  - Swish and suction   improved    #Skin  - Skin on admission: skin tear to mid and left chest wall- intact  - Pressure injury/Skin: OOB to Chair, PT/OT   - Bacitracin    #Pain Mgmt   - Tylenol PRN, Oxycodone PRN    #GI/Bowel Mgmt   - Pantoprazole  - Continent c/w Senna, Miralax     #/Bladder Mgmt   +UA but C&S contaminant- received PO ceftin since 6/20  - 6/28- PVrs in 200s- spnt voiding in diapers- incontinent - d.c the bladder scan.     #FEN   - Diet - NPO with tube feeds  - Glucerna  - Dysphagia  SLP - evaluation and treatment  - MBS done-Puree with Mild thick. - continue speech tx at [facility]  - Tube Feeds Discontinued.     #Precautions / PROPHYLAXIS:   - Falls  - ortho: Weight bearing status: WBAT   - Lungs: Aspiration, Incentive Spirometer     7/7  to [facility]     - DVT: Eliquis

## 2023-07-07 NOTE — PROGRESS NOTE ADULT - PROVIDER SPECIALTY LIST ADULT
Hospitalist
Hospitalist
Physiatry
Rehab Medicine
Rehab Medicine
Hospitalist
Physiatry
Physiatry
Rehab Medicine
Rehab Medicine
Hospitalist
Physiatry
Physiatry
Rehab Medicine
Hospitalist
Physiatry
Physiatry
Rehab Medicine
Physiatry
Physiatry
Rehab Medicine
Physiatry

## 2023-07-07 NOTE — DISCHARGE NOTE NURSING/CASE MANAGEMENT/SOCIAL WORK - NSDCPEFALRISK_GEN_ALL_CORE
For information on Fall & Injury Prevention, visit: [email or web address] OR  [email or web address] OR  https://www.cdc.gov/steadi/patient.html

## 2023-07-07 NOTE — DISCHARGE NOTE NURSING/CASE MANAGEMENT/SOCIAL WORK - PATIENT PORTAL LINK FT
You can access the FollowMyHealth Patient Portal offered by [facility] by registering at the following website: [email or web address]. By joining [facility]’s FollowMyHealth portal, you will also be able to view your health information using other applications (apps) compatible with our system.

## 2023-07-07 NOTE — PROGRESS NOTE ADULT - SUBJECTIVE AND OBJECTIVE BOX
HPI:  Andres Falcon is an 82 year old, Vietnamese speaking, woman with PMH of HTN, AFIB (on Xarelto), Hepatitis C (on Entecavir); who presented to [facility] ED on 6/1 with acute onset AMS. LKN was 10pm, at 10:30pm she became acutely altered and with poor posture. Upon arrival, she was noted to have decreased arousal, severe R sided weakness, slurred speech and aphasia requiring intubation. 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 She was not an appropriate tenecteplase candidate due to INR of 1.79 and was taken for mechanical thrombectomy. SLP recommended NPO status on 6/8, a repeat MBS was performed on 6/13 without improvement and a PEG tube was placed on 6/14. Further imaging was significant for R breast mass and she was recommended for outpatient follow up and imaging. PM&R was consulted and deemed her an appropriate candidate for IRF. She was medically stabilized and cleared for discharge to [facility].       ROS/subjective: (Vietnamese  used) kun #[id number]  patient seen and examined in bed  no reported complaints  Continues to use the abdominal binder and teds.  , Lopressor tid  She denies any dizziness. decrease sensation on the right side. No other complaint at this time.   Tolerating Eliquis  no headaches, no dizziness  no nausea, no vomiting  no SOb, no chest pain      VITALS  Vital Signs Last 24 Hrs  T(C): 36.7 (07 Jul 2023 08:30), Max: 36.9 (06 Jul 2023 21:50)  T(F): 98 (07 Jul 2023 08:30), Max: 98.5 (06 Jul 2023 21:50)  HR: 96 (07 Jul 2023 08:30) (96 - 114)  BP: 152/81 (07 Jul 2023 08:30) (150/93 - 154/82)  BP(mean): --  RR: 16 (07 Jul 2023 08:30) (16 - 16)  SpO2: 97% (07 Jul 2023 08:30) (96% - 97%)    Parameters below as of 07 Jul 2023 08:30  Patient On (Oxygen Delivery Method): room air       RECENT LABS                        11.7   7.57  )-----------( 310      ( 06 Jul 2023 07:39 )             35.0     07-06    137  |  104  |  14  ----------------------------<  120<H>  3.3<L>   |  25  |  0.59    Ca    8.3<L>      06 Jul 2023 07:39    TPro  6.0  /  Alb  2.2<L>  /  TBili  0.8  /  DBili  x   /  AST  37  /  ALT  39  /  AlkPhos  138<H>  07-06      LIVER FUNCTIONS - ( 06 Jul 2023 07:39 )  Alb: 2.2 g/dL / Pro: 6.0 g/dL / ALK PHOS: 138 U/L / ALT: 39 U/L / AST: 37 U/L / GGT: x           Urinalysis Basic - ( 06 Jul 2023 07:39 )    Color: x / Appearance: x / SG: x / pH: x  Gluc: 120 mg/dL / Ketone: x  / Bili: x / Urobili: x   Blood: x / Protein: x / Nitrite: x   Leuk Esterase: x / RBC: x / WBC x   Sq Epi: x / Non Sq Epi: x / Bacteria: x      CURRENT MEDICATIONS  MEDICATIONS  (STANDING):  apixaban 5 milliGRAM(s) Oral two times a day  atorvastatin 40 milliGRAM(s) Oral at bedtime  dextrose 5%. 1000 milliLiter(s) (100 mL/Hr) IV Continuous <Continuous>  dextrose 5%. 1000 milliLiter(s) (50 mL/Hr) IV Continuous <Continuous>  dextrose 50% Injectable 12.5 Gram(s) IV Push once  dextrose 50% Injectable 25 Gram(s) IV Push once  dextrose 50% Injectable 25 Gram(s) IV Push once  entecavir 0.5 milliGRAM(s) Oral daily  glucagon  Injectable 1 milliGRAM(s) IntraMuscular once  insulin lispro (ADMELOG) corrective regimen sliding scale   SubCutaneous before breakfast  losartan 100 milliGRAM(s) Oral daily  melatonin 3 milliGRAM(s) Oral at bedtime  metoprolol tartrate 75 milliGRAM(s) Oral three times a day  nystatin    Suspension 612575 Unit(s) Oral every 6 hours  pantoprazole   Suspension 40 milliGRAM(s) Oral daily  potassium chloride    Tablet ER 20 milliEquivalent(s) Oral daily  senna 2 Tablet(s) Oral at bedtime    MEDICATIONS  (PRN):  bacitracin   Ointment 1 Application(s) Topical three times a day PRN to skin wound  bisacodyl Suppository 10 milliGRAM(s) Rectal daily PRN Constipation  dextrose Oral Gel 15 Gram(s) Oral once PRN Blood Glucose LESS THAN 70 milliGRAM(s)/deciliter  midodrine. 2.5 milliGRAM(s) Oral three times a day PRN SBP<90    Gen - NAD, Comfortable  HEENT - NCAT, EOMI  Neck - Supple, No limited ROM  Pulm - CTAB, No wheeze  Cardiovascular - RR, tachy 100  Chest - good chest expansion, good respiratory effort  Abdomen - Soft, NT/ND, +BS, + PEG no suprapubic tenderness  Extremities - No Cyanosis, no clubbing. RUE edema +1, b/l LEs edema +2  Neuro-     Cognitive - awake, alert follows commands     Communication - hypophonia better     Cranial Nerves - right facial weakness, decreased right  shoulder shrug      Motor -  Right  hemiparesis neglect                    LEFT    UE - 5/5                    RIGHT UE - 0/5                    LEFT    LE - 5/5                    RIGHT LE - 0/5       Tone - some increased Flexor tone in RUE/RLE noted- gross extension RLE facilitated  Psychiatric - Mood depressed  Skin:  skin tear to mid and left chest wall- diffuse ecchymoses chest wall/ extremities      IDT -7/3     SW- 7 steps to get in. 15 steps inside. Daughter was helping prior.   SP - puree/mild thick. mod-severe cognitive. still pocketing food  OT- Max/Total assist for most ADL's. She has good trunk control.   PT-  Transfer mod x2- stand Max assist x2. ambulated 15 steps with immobilizer/ rail max A x1  Goal -  Mod assist.   TDD- 7/7  to [facility].    Completed comprehensive acute rehab program consisting of 3hrs/day of OT/PT and SLP. HPI:  Andres Falcon is an 82 year old, Taiwanese speaking, woman with PMH of HTN, AFIB (on Xarelto), Hepatitis C (on Entecavir); who presented to [facility] ED on 6/1 with acute onset AMS. LKN was 10pm, at 10:30pm she became acutely altered and with poor posture. Upon arrival, she was noted to have decreased arousal, severe R sided weakness, slurred speech and aphasia requiring intubation. 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 She was not an appropriate tenecteplase candidate due to INR of 1.79 and was taken for mechanical thrombectomy. SLP recommended NPO status on 6/8, a repeat MBS was performed on 6/13 without improvement and a PEG tube was placed on 6/14. Further imaging was significant for R breast mass and she was recommended for outpatient follow up and imaging. PM&R was consulted and deemed her an appropriate candidate for IRF. She was medically stabilized and cleared for discharge to [facility].       ROS/subjective:  patient seen and examined in bed  no reported complaints  Continues to use the abdominal binder and teds.  , Lopressor tid  She denies any dizziness. decrease sensation on the right side. No other complaint at this time.   Tolerating Eliquis  no headaches, no dizziness  no nausea, no vomiting  no SOb, no chest pain  ready for Dc to [facility]- Bed available      VITALS  Vital Signs Last 24 Hrs  T(C): 36.7 (07 Jul 2023 08:30), Max: 36.9 (06 Jul 2023 21:50)  T(F): 98 (07 Jul 2023 08:30), Max: 98.5 (06 Jul 2023 21:50)  HR: 96 (07 Jul 2023 08:30) (96 - 114)  BP: 152/81 (07 Jul 2023 08:30) (150/93 - 154/82)  BP(mean): --  RR: 16 (07 Jul 2023 08:30) (16 - 16)  SpO2: 97% (07 Jul 2023 08:30) (96% - 97%)    Parameters below as of 07 Jul 2023 08:30  Patient On (Oxygen Delivery Method): room air       RECENT LABS                        11.7   7.57  )-----------( 310      ( 06 Jul 2023 07:39 )             35.0     07-06    137  |  104  |  14  ----------------------------<  120<H>  3.3<L>   |  25  |  0.59    Ca    8.3<L>      06 Jul 2023 07:39    TPro  6.0  /  Alb  2.2<L>  /  TBili  0.8  /  DBili  x   /  AST  37  /  ALT  39  /  AlkPhos  138<H>  07-06      LIVER FUNCTIONS - ( 06 Jul 2023 07:39 )  Alb: 2.2 g/dL / Pro: 6.0 g/dL / ALK PHOS: 138 U/L / ALT: 39 U/L / AST: 37 U/L / GGT: x           Urinalysis Basic - ( 06 Jul 2023 07:39 )    Color: x / Appearance: x / SG: x / pH: x  Gluc: 120 mg/dL / Ketone: x  / Bili: x / Urobili: x   Blood: x / Protein: x / Nitrite: x   Leuk Esterase: x / RBC: x / WBC x   Sq Epi: x / Non Sq Epi: x / Bacteria: x      CURRENT MEDICATIONS  MEDICATIONS  (STANDING):  apixaban 5 milliGRAM(s) Oral two times a day  atorvastatin 40 milliGRAM(s) Oral at bedtime  dextrose 5%. 1000 milliLiter(s) (100 mL/Hr) IV Continuous <Continuous>  dextrose 5%. 1000 milliLiter(s) (50 mL/Hr) IV Continuous <Continuous>  dextrose 50% Injectable 12.5 Gram(s) IV Push once  dextrose 50% Injectable 25 Gram(s) IV Push once  dextrose 50% Injectable 25 Gram(s) IV Push once  entecavir 0.5 milliGRAM(s) Oral daily  glucagon  Injectable 1 milliGRAM(s) IntraMuscular once  insulin lispro (ADMELOG) corrective regimen sliding scale   SubCutaneous before breakfast  losartan 100 milliGRAM(s) Oral daily  melatonin 3 milliGRAM(s) Oral at bedtime  metoprolol tartrate 75 milliGRAM(s) Oral three times a day  nystatin    Suspension 923116 Unit(s) Oral every 6 hours  pantoprazole   Suspension 40 milliGRAM(s) Oral daily  potassium chloride    Tablet ER 20 milliEquivalent(s) Oral daily  senna 2 Tablet(s) Oral at bedtime    MEDICATIONS  (PRN):  bacitracin   Ointment 1 Application(s) Topical three times a day PRN to skin wound  bisacodyl Suppository 10 milliGRAM(s) Rectal daily PRN Constipation  dextrose Oral Gel 15 Gram(s) Oral once PRN Blood Glucose LESS THAN 70 milliGRAM(s)/deciliter  midodrine. 2.5 milliGRAM(s) Oral three times a day PRN SBP<90    Gen - NAD, Comfortable  HEENT - NCAT, EOMI  Neck - Supple, No limited ROM  Pulm - CTAB, No wheeze  Cardiovascular - RR, tachy 100  Chest - good chest expansion, good respiratory effort  Abdomen - Soft, NT/ND, +BS, + PEG no suprapubic tenderness  Extremities - No Cyanosis, no clubbing. RUE edema +1, b/l LEs edema +2  Neuro-     Cognitive - awake, alert follows commands     Communication - hypophonia better     Cranial Nerves - right facial weakness, decreased right  shoulder shrug      Motor -  Right  hemiparesis neglect                    LEFT    UE - 5/5                    RIGHT UE - 0/5                    LEFT    LE - 5/5                    RIGHT LE - 0/5       Tone - some increased Flexor tone in RUE/RLE noted- gross extension RLE facilitated  Psychiatric - Mood depressed  Skin:  skin tear to mid and left chest wall- diffuse ecchymoses chest wall/ extremities      IDT -7/3     SW- 7 steps to get in. 15 steps inside. Daughter was helping prior.   SP - puree/mild thick. mod-severe cognitive. still pocketing food  OT- Max/Total assist for most ADL's. She has good trunk control.   PT-  Transfer mod x2- stand Max assist x2. ambulated 15 steps with immobilizer/ rail max A x1  Goal -  Mod assist.   TDD- 7/7  to [facility].    Completed comprehensive acute rehab program consisting of 3hrs/day of OT/PT and SLP.

## 2023-07-07 NOTE — PROGRESS NOTE ADULT - REASON FOR ADMISSION
Functional deficits s/p CVA
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
Functional deficits s/p CVA

## 2023-07-07 NOTE — PROGRESS NOTE ADULT - NS ATTEND AMEND GEN_ALL_CORE FT
Rehab Attending- Patient seen and examined by me - Case discussed, above note reviewed by me with modifications made    patient seen and evaluated bedside  increased flexor Tone noted both in RUE/ RLE  claims to be ambulating in PT with immobilizer/ rail  HR remains [id number]/min on lopressor 75mg 3x/day  BPs still 150-160- ? restart norvasc  last BM 7/5  K still low- will add daily supplementation  Anticipate DC in Am- to [facility]  to Continue intensive rehab program    Vital Signs Last 24 Hrs  T(C): 37 (06 Jul 2023 08:06), Max: 37 (06 Jul 2023 08:06)  T(F): 98.6 (06 Jul 2023 08:06), Max: 98.6 (06 Jul 2023 08:06)  HR: 93 (06 Jul 2023 08:06) (93 - 100)  BP: 155/96 (06 Jul 2023 08:06) (144/84 - 164/79)  BP(mean): --  RR: 18 (06 Jul 2023 08:06) (16 - 18)  SpO2: 95% (06 Jul 2023 08:06) (95% - 98%)    Parameters below as of 06 Jul 2023 08:06  Patient On (Oxygen Delivery Method): room air      Gen - NAD, Comfortable  HEENT - NCAT, EOMI, MMM  Neck - Supple, No limited ROM  Pulm - CTAB, No wheeze  Cardiovascular - RR, tachy 100  Chest - good chest expansion, good respiratory effort  Abdomen - Soft, NT/ND, +BS, + PEG no suprapubic tenderness  Extremities - No Cyanosis, no clubbing. RUE edema +1, b/l LEs edema +2  Neuro-     Cognitive - awake, alert follows commands     Communication - hypophonia, non -fluent     Cranial Nerves - right facial weakness, decreased right  shoulder shrug      Motor -  Right  hemiparesis neglect                    LEFT    UE - 5/5                    RIGHT UE - 0/5                    LEFT    LE - 5/5                    RIGHT LE - 0/5       Tone - some increased Flexor tone in RUE/RLE noted- gross extension RLE facilitated  Psychiatric - Mood stable, Affect WNL  Skin:  skin tear to mid and left chest wall- diffuse ecchymoses chest wall/ extremities Rehab Attending- Patient seen and examined by me - Case discussed, above note reviewed by me with modifications made    patient seen and evaluated bedside  increased flexor Tone noted both in RUE/ RLE  claims to be ambulating in PT with immobilizer/ rail  ready for Dc  HPR bedside 90- Higher in AM before meds  BP still 150s- consider adding cardizem at [facility]  DC to [facility]- Neuro/ cardio FU at [facility]  Speech Tx - ? advance diet- cont PT/OT      Vital Signs Last 24 Hrs  T(C): 36.7 (07 Jul 2023 08:30), Max: 36.9 (06 Jul 2023 21:50)  T(F): 98 (07 Jul 2023 08:30), Max: 98.5 (06 Jul 2023 21:50)  HR: 96 (07 Jul 2023 08:30) (96 - 114)  BP: 152/81 (07 Jul 2023 08:30) (150/93 - 154/82)  BP(mean): --  RR: 16 (07 Jul 2023 08:30) (16 - 16)  SpO2: 97% (07 Jul 2023 08:30) (96% - 97%)    Parameters below as of 07 Jul 2023 08:30  Patient On (Oxygen Delivery Method): room air        Gen - NAD, Comfortable  HEENT - NCAT, EOMI, MMM  Neck - Supple, No limited ROM  Pulm - CTAB, No wheeze  Cardiovascular - iRReg, HR 90s  Chest - good chest expansion, good respiratory effort  Abdomen - Soft, NT/ND, +BS, + PEG no suprapubic tenderness  Extremities - No Cyanosis, no clubbing. RUE edema +1, b/l LEs edema +2  Neuro-     Cognitive - awake, alert follows commands     Communication - hypophonia better- speech flent     Cranial Nerves - right facial weakness, decreased right  shoulder shrug      Motor -  Right  hemiparesis neglect                    LEFT    UE - 5/5                    RIGHT UE - 0/5                    LEFT    LE - 5/5                    RIGHT LE - 0/5       Tone - some increased Flexor tone in RUE/RLE noted- gross extension RLE facilitated  Psychiatric - Mood stable, Affect WNL  Skin:  skin tear to mid and left chest wall- diffuse ecchymoses chest wall/ extremities

## 2023-07-18 NOTE — PROGRESS NOTE ADULT - SUBJECTIVE AND OBJECTIVE BOX
Chief complaint- YOJANA TOMAS    Andres Falcon is an 82 year old, Croatian speaking, woman with PMH of HTN, AFIB (on Xarelto), Hepatitis C (on Entecavir); who presented to [facility] ED on 6/1 with acute onset AMS. LKN was 10pm, at 10:30pm she became acutely altered and with poor posture. Upon arrival, she was noted to have decreased arousal, severe R sided weakness, slurred speech and aphasia requiring intubation. 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 She was not an appropriate tenecteplase candidate due to INR of 1.79 and was taken for mechanical thrombectomy. SLP recommended NPO status on 6/8, a repeat MBS was performed on 6/13 without improvement and a PEG tube was placed on 6/14. Further imaging was significant for R breast mass and she was recommended for outpatient follow up and imaging. PM&R was consulted and deemed her an appropriate candidate for IRF. She was medically stabilized and cleared for discharge to [facility].     ROS/subjective: ([place]orese )  patient seen and examined bedside. An  was used for communication.   No issues over the weekend.   Noted to be more hypophonic this am. ENT - patient seen - no abnormality in cords  Started on midodrine on Friday. Tolerating well. Continues to use the abdominal binder and teds.  voiding into diaper- PVR were 600 and 1,100 yesterday she was straight cath.   last BM yesterday - incontinence   no apparent pain  She denies any dizziness. decrease sensation on the right side. No other complaint at this time.     MEDICATIONS  (STANDING):  apixaban 5 milliGRAM(s) Oral two times a day  atorvastatin 40 milliGRAM(s) Oral at bedtime  dextrose 5%. 1000 milliLiter(s) (50 mL/Hr) IV Continuous <Continuous>  dextrose 5%. 1000 milliLiter(s) (100 mL/Hr) IV Continuous <Continuous>  dextrose 50% Injectable 12.5 Gram(s) IV Push once  dextrose 50% Injectable 25 Gram(s) IV Push once  dextrose 50% Injectable 25 Gram(s) IV Push once  glucagon  Injectable 1 milliGRAM(s) IntraMuscular once  insulin lispro (ADMELOG) corrective regimen sliding scale   SubCutaneous at bedtime  insulin lispro (ADMELOG) corrective regimen sliding scale   SubCutaneous three times a day before meals  labetalol 300 milliGRAM(s) Enteral Tube every 8 hours  midodrine. 2.5 milliGRAM(s) Oral <User Schedule>  nystatin    Suspension 503932 Unit(s) Oral every 6 hours  pantoprazole   Suspension 40 milliGRAM(s) Oral daily  polyethylene glycol 3350 17 Gram(s) Oral daily    MEDICATIONS  (PRN):  bacitracin   Ointment 1 Application(s) Topical three times a day PRN to skin wound  dextrose Oral Gel 15 Gram(s) Oral once PRN Blood Glucose LESS THAN 70 milliGRAM(s)/deciliter      LABS                                   10.7   8.40  )-----------( 308      ( 26 Jun 2023 06:36 )             32.5     06-26    135  |  101  |  19  ----------------------------<  119<H>  3.7   |  30  |  0.84    Ca    8.3<L>      26 Jun 2023 06:36    TPro  5.7<L>  /  Alb  2.3<L>  /  TBili  0.8  /  DBili  x   /  AST  32  /  ALT  35  /  AlkPhos  139<H>  06-26      Urinalysis Basic - ( 26 Jun 2023 06:36 )    Color: x / Appearance: x / SG: x / pH: x  Gluc: 119 mg/dL / Ketone: x  / Bili: x / Urobili: x   Blood: x / Protein: x / Nitrite: x   Leuk Esterase: x / RBC: x / WBC x   Sq Epi: x / Non Sq Epi: x / Bacteria: x    CAPILLARY BLOOD GLUCOSE    POCT Blood Glucose.: 128 mg/dL (26 Jun 2023 08:31)  POCT Blood Glucose.: 143 mg/dL (25 Jun 2023 21:40)  POCT Blood Glucose.: 108 mg/dL (25 Jun 2023 17:13)  POCT Blood Glucose.: 143 mg/dL (25 Jun 2023 12:08)    VITALS     ICU Vital Signs Last 24 Hrs  T(C): 36.6 (23 Jun 2023 09:28), Max: 36.6 (23 Jun 2023 09:28)  T(F): 97.8 (23 Jun 2023 09:28), Max: 97.8 (23 Jun 2023 09:28)  HR: 98 (23 Jun 2023 09:28) (76 - 98)  BP: 122/75 (23 Jun 2023 09:28) (114/71 - 134/78)  BP(mean): --  ABP: --  ABP(mean): --  RR: 17 (23 Jun 2023 09:28) (16 - 17)  SpO2: 95% (23 Jun 2023 09:28) (95% - 95%)    O2 Parameters below as of 23 Jun 2023 09:28  Patient On (Oxygen Delivery Method): room air        PHYSICAL       Gen - NAD, Comfortable, nonverbal  HEENT - NCAT, EOMI, MMM  Neck - Supple, No limited ROM  Pulm - CTAB, No wheeze  Cardiovascular - RRR, S1S2  Chest - good chest expansion, good respiratory effort  Abdomen - Soft, NT/ND, +BS, + PEG no suprapubic tenderness  Extremities - No Cyanosis, no clubbing, no edema, no calf tenderness  Neuro-     Cognitive - awake, alert follows visual commands     Communication - hypophoni, non -fluent, difficulty with prompting     Cranial Nerves - right facial weakness, decreased right  shoulder shrug      Motor -  Right  hemiparesis neglect low tone                    LEFT    UE - 5/5                    RIGHT UE - 0/5                    LEFT    LE - 5/5                    RIGHT LE - 0/5       Tone - normal  Psychiatric - Mood stable, Affect WNL  Skin:  skin tear to mid and left chest wall- diffuse ecchymoses chest wall/ extremities    IDT - 6/20    SW- 7 steps to get in. 15 steps inside. Daugther was helping prior.   SP - NPO. With PEG. Mild to mod cognitive. MBS thursday.   OT- Total assist for most ADL's. She has good trunk control.   PT-  Transfer - Max assist x2.   Goal -  Mod assist.   TDD- 7/7  to MEMO      Chief complaint- YOJANA TOMAS    Andres Falcon is an 82 year old, Turkmen speaking, woman with PMH of HTN, AFIB (on Xarelto), Hepatitis C (on Entecavir); who presented to [facility] ED on 6/1 with acute onset AMS. LKN was 10pm, at 10:30pm she became acutely altered and with poor posture. Upon arrival, she was noted to have decreased arousal, severe R sided weakness, slurred speech and aphasia requiring intubation. 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 She was not an appropriate tenecteplase candidate due to INR of 1.79 and was taken for mechanical thrombectomy. SLP recommended NPO status on 6/8, a repeat MBS was performed on 6/13 without improvement and a PEG tube was placed on 6/14. Further imaging was significant for R breast mass and she was recommended for outpatient follow up and imaging. PM&R was consulted and deemed her an appropriate candidate for IRF. She was medically stabilized and cleared for discharge to [facility].     ROS/subjective: (ambrosio )  patient seen and examined bedside. An  ( [id number]) was used for communication.   No issues over the weekend.   She states he is not able to sleep, will add melatonin for sleep.   Noted to be more hypophonic this am. ENT - patient seen - no abnormality in cords  Started on midodrine on Friday. Tolerating well. Continues to use the abdominal binder and teds.  He diet was upgraded to puree with thick. Tolerating well.   voiding into diaper- PVR were 500and 600 yesterday she was straight cath.   last BM yesterday - incontinence   no apparent pain  She denies any dizziness. decrease sensation on the right side. No other complaint at this time.     MEDICATIONS  (STANDING):  apixaban 5 milliGRAM(s) Oral two times a day  atorvastatin 40 milliGRAM(s) Oral at bedtime  dextrose 5%. 1000 milliLiter(s) (50 mL/Hr) IV Continuous <Continuous>  dextrose 5%. 1000 milliLiter(s) (100 mL/Hr) IV Continuous <Continuous>  dextrose 50% Injectable 12.5 Gram(s) IV Push once  dextrose 50% Injectable 25 Gram(s) IV Push once  dextrose 50% Injectable 25 Gram(s) IV Push once  glucagon  Injectable 1 milliGRAM(s) IntraMuscular once  insulin lispro (ADMELOG) corrective regimen sliding scale   SubCutaneous at bedtime  insulin lispro (ADMELOG) corrective regimen sliding scale   SubCutaneous three times a day before meals  labetalol 300 milliGRAM(s) Enteral Tube every 8 hours  midodrine. 2.5 milliGRAM(s) Oral <User Schedule>  nystatin    Suspension 374653 Unit(s) Oral every 6 hours  pantoprazole   Suspension 40 milliGRAM(s) Oral daily  polyethylene glycol 3350 17 Gram(s) Oral daily    MEDICATIONS  (PRN):  bacitracin   Ointment 1 Application(s) Topical three times a day PRN to skin wound  dextrose Oral Gel 15 Gram(s) Oral once PRN Blood Glucose LESS THAN 70 milliGRAM(s)/deciliter      LABS                                   10.7   8.40  )-----------( 308      ( 26 Jun 2023 06:36 )             32.5     06-26    135  |  101  |  19  ----------------------------<  119<H>  3.7   |  30  |  0.84    Ca    8.3<L>      26 Jun 2023 06:36    TPro  5.7<L>  /  Alb  2.3<L>  /  TBili  0.8  /  DBili  x   /  AST  32  /  ALT  35  /  AlkPhos  139<H>  06-26      Urinalysis Basic - ( 26 Jun 2023 06:36 )    Color: x / Appearance: x / SG: x / pH: x  Gluc: 119 mg/dL / Ketone: x  / Bili: x / Urobili: x   Blood: x / Protein: x / Nitrite: x   Leuk Esterase: x / RBC: x / WBC x   Sq Epi: x / Non Sq Epi: x / Bacteria: x    CAPILLARY BLOOD GLUCOSE    POCT Blood Glucose.: 128 mg/dL (26 Jun 2023 08:31)  POCT Blood Glucose.: 143 mg/dL (25 Jun 2023 21:40)  POCT Blood Glucose.: 108 mg/dL (25 Jun 2023 17:13)  POCT Blood Glucose.: 143 mg/dL (25 Jun 2023 12:08)    VITALS     ICU Vital Signs Last 24 Hrs  T(C): 36.6 (23 Jun 2023 09:28), Max: 36.6 (23 Jun 2023 09:28)  T(F): 97.8 (23 Jun 2023 09:28), Max: 97.8 (23 Jun 2023 09:28)  HR: 98 (23 Jun 2023 09:28) (76 - 98)  BP: 122/75 (23 Jun 2023 09:28) (114/71 - 134/78)  BP(mean): --  ABP: --  ABP(mean): --  RR: 17 (23 Jun 2023 09:28) (16 - 17)  SpO2: 95% (23 Jun 2023 09:28) (95% - 95%)    O2 Parameters below as of 23 Jun 2023 09:28  Patient On (Oxygen Delivery Method): room air        PHYSICAL       Gen - NAD, Comfortable, nonverbal  HEENT - NCAT, EOMI, MMM  Neck - Supple, No limited ROM  Pulm - CTAB, No wheeze  Cardiovascular - RRR, S1S2  Chest - good chest expansion, good respiratory effort  Abdomen - Soft, NT/ND, +BS, + PEG no suprapubic tenderness  Extremities - No Cyanosis, no clubbing, no edema, no calf tenderness  Neuro-     Cognitive - awake, alert follows visual commands     Communication - hypophoni, non -fluent, difficulty with prompting     Cranial Nerves - right facial weakness, decreased right  shoulder shrug      Motor -  Right  hemiparesis neglect low tone                    LEFT    UE - 5/5                    RIGHT UE - 0/5                    LEFT    LE - 5/5                    RIGHT LE - 0/5       Tone - normal  Psychiatric - Mood stable, Affect WNL  Skin:  skin tear to mid and left chest wall- diffuse ecchymoses chest wall/ extremities    IDT - 6/20    SW- 7 steps to get in. 15 steps inside. Daugther was helping prior.   SP - NPO. With PEG. Mild to mod cognitive. MBS thursday.   OT- Total assist for most ADL's. She has good trunk control.   PT-  Transfer - Max assist x2.   Goal -  Mod assist.   TDD- 7/7  to MEMO      Chief complaint- YOJANA TOMAS    Andres Falcon is an 82 year old, Divehi speaking, woman with PMH of HTN, AFIB (on Xarelto), Hepatitis C (on Entecavir); who presented to [facility] ED on 6/1 with acute onset AMS. LKN was 10pm, at 10:30pm she became acutely altered and with poor posture. Upon arrival, she was noted to have decreased arousal, severe R sided weakness, slurred speech and aphasia requiring intubation. 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 She was not an appropriate tenecteplase candidate due to INR of 1.79 and was taken for mechanical thrombectomy. SLP recommended NPO status on 6/8, a repeat MBS was performed on 6/13 without improvement and a PEG tube was placed on 6/14. Further imaging was significant for R breast mass and she was recommended for outpatient follow up and imaging. PM&R was consulted and deemed her an appropriate candidate for IRF. She was medically stabilized and cleared for discharge to [facility].     ROS/subjective: (ambrosio )  patient seen and examined bedside. An  ( [id number]) was used for communication.   No issues over the weekend.   She states he is not able to sleep, will add melatonin for sleep.   Noted to be more hypophonic this am. ENT - patient seen - no abnormality in cords  Started on midodrine on Friday. Tolerating well. Continues to use the abdominal binder and teds.  He diet was upgraded to puree with thick. Tolerating well.   voiding into diaper- PVR were 500 and 600 yesterday she was straight cath.   last BM yesterday - incontinence   no apparent pain  She denies any dizziness. decrease sensation on the right side. No other complaint at this time.     MEDICATIONS  (STANDING):  apixaban 5 milliGRAM(s) Oral two times a day  atorvastatin 40 milliGRAM(s) Oral at bedtime  dextrose 5%. 1000 milliLiter(s) (50 mL/Hr) IV Continuous <Continuous>  dextrose 5%. 1000 milliLiter(s) (100 mL/Hr) IV Continuous <Continuous>  dextrose 50% Injectable 12.5 Gram(s) IV Push once  dextrose 50% Injectable 25 Gram(s) IV Push once  dextrose 50% Injectable 25 Gram(s) IV Push once  glucagon  Injectable 1 milliGRAM(s) IntraMuscular once  insulin lispro (ADMELOG) corrective regimen sliding scale   SubCutaneous at bedtime  insulin lispro (ADMELOG) corrective regimen sliding scale   SubCutaneous three times a day before meals  labetalol 300 milliGRAM(s) Enteral Tube every 8 hours  midodrine. 2.5 milliGRAM(s) Oral <User Schedule>  nystatin    Suspension 905645 Unit(s) Oral every 6 hours  pantoprazole   Suspension 40 milliGRAM(s) Oral daily  polyethylene glycol 3350 17 Gram(s) Oral daily    MEDICATIONS  (PRN):  bacitracin   Ointment 1 Application(s) Topical three times a day PRN to skin wound  dextrose Oral Gel 15 Gram(s) Oral once PRN Blood Glucose LESS THAN 70 milliGRAM(s)/deciliter      LABS                                   10.7   8.40  )-----------( 308      ( 26 Jun 2023 06:36 )             32.5     06-26    135  |  101  |  19  ----------------------------<  119<H>  3.7   |  30  |  0.84    Ca    8.3<L>      26 Jun 2023 06:36    TPro  5.7<L>  /  Alb  2.3<L>  /  TBili  0.8  /  DBili  x   /  AST  32  /  ALT  35  /  AlkPhos  139<H>  06-26      Urinalysis Basic - ( 26 Jun 2023 06:36 )    Color: x / Appearance: x / SG: x / pH: x  Gluc: 119 mg/dL / Ketone: x  / Bili: x / Urobili: x   Blood: x / Protein: x / Nitrite: x   Leuk Esterase: x / RBC: x / WBC x   Sq Epi: x / Non Sq Epi: x / Bacteria: x    CAPILLARY BLOOD GLUCOSE    POCT Blood Glucose.: 128 mg/dL (26 Jun 2023 08:31)  POCT Blood Glucose.: 143 mg/dL (25 Jun 2023 21:40)  POCT Blood Glucose.: 108 mg/dL (25 Jun 2023 17:13)  POCT Blood Glucose.: 143 mg/dL (25 Jun 2023 12:08)    VITALS     ICU Vital Signs Last 24 Hrs  T(C): 36.6 (23 Jun 2023 09:28), Max: 36.6 (23 Jun 2023 09:28)  T(F): 97.8 (23 Jun 2023 09:28), Max: 97.8 (23 Jun 2023 09:28)  HR: 98 (23 Jun 2023 09:28) (76 - 98)  BP: 122/75 (23 Jun 2023 09:28) (114/71 - 134/78)  BP(mean): --  ABP: --  ABP(mean): --  RR: 17 (23 Jun 2023 09:28) (16 - 17)  SpO2: 95% (23 Jun 2023 09:28) (95% - 95%)    O2 Parameters below as of 23 Jun 2023 09:28  Patient On (Oxygen Delivery Method): room air        PHYSICAL       Gen - NAD, Comfortable, nonverbal  HEENT - NCAT, EOMI, MMM  Neck - Supple, No limited ROM  Pulm - CTAB, No wheeze  Cardiovascular - RRR, S1S2  Chest - good chest expansion, good respiratory effort  Abdomen - Soft, NT/ND, +BS, + PEG no suprapubic tenderness  Extremities - No Cyanosis, no clubbing, no edema, no calf tenderness  Neuro-     Cognitive - awake, alert follows visual commands     Communication - hypophoni, non -fluent, difficulty with prompting     Cranial Nerves - right facial weakness, decreased right  shoulder shrug      Motor -  Right  hemiparesis neglect low tone                    LEFT    UE - 5/5                    RIGHT UE - 0/5                    LEFT    LE - 5/5                    RIGHT LE - 0/5       Tone - normal  Psychiatric - Mood stable, Affect WNL  Skin:  skin tear to mid and left chest wall- diffuse ecchymoses chest wall/ extremities    IDT - 6/20    SW- 7 steps to get in. 15 steps inside. Daugther was helping prior.   SP - NPO. With PEG. Mild to mod cognitive. MBS thursday.   OT- Total assist for most ADL's. She has good trunk control.   PT-  Transfer - Max assist x2.   Goal -  Mod assist.   TDD- 7/7  to MEMO      Chief complaint- YOJANA TOMAS    Andres Falcon is an 82 year old, Danish speaking, woman with PMH of HTN, AFIB (on Xarelto), Hepatitis C (on Entecavir); who presented to [facility] ED on 6/1 with acute onset AMS. LKN was 10pm, at 10:30pm she became acutely altered and with poor posture. Upon arrival, she was noted to have decreased arousal, severe R sided weakness, slurred speech and aphasia requiring intubation. 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 She was not an appropriate tenecteplase candidate due to INR of 1.79 and was taken for mechanical thrombectomy. SLP recommended NPO status on 6/8, a repeat MBS was performed on 6/13 without improvement and a PEG tube was placed on 6/14. Further imaging was significant for R breast mass and she was recommended for outpatient follow up and imaging. PM&R was consulted and deemed her an appropriate candidate for IRF. She was medically stabilized and cleared for discharge to [facility].     ROS/subjective: (Danish - [id number])  patient seen and examined bedside.   No issues over the weekend.   She states he is not able to sleep, will add melatonin for sleep.   Noted to be more hypophonic this am. ENT - patient seen - no abnormality in cords  Started on midodrine on Friday. Tolerating well. Continues to use the abdominal binder and teds.  He diet was upgraded to puree with thick. Tolerating well.   voiding into diaper- PVR were 500 and 600 yesterday she was straight cath.   last BM yesterday - incontinence   no apparent pain  She denies any dizziness. decrease sensation on the right side. No other complaint at this time.     MEDICATIONS  (STANDING):  apixaban 5 milliGRAM(s) Oral two times a day  atorvastatin 40 milliGRAM(s) Oral at bedtime  dextrose 5%. 1000 milliLiter(s) (50 mL/Hr) IV Continuous <Continuous>  dextrose 5%. 1000 milliLiter(s) (100 mL/Hr) IV Continuous <Continuous>  dextrose 50% Injectable 12.5 Gram(s) IV Push once  dextrose 50% Injectable 25 Gram(s) IV Push once  dextrose 50% Injectable 25 Gram(s) IV Push once  glucagon  Injectable 1 milliGRAM(s) IntraMuscular once  insulin lispro (ADMELOG) corrective regimen sliding scale   SubCutaneous at bedtime  insulin lispro (ADMELOG) corrective regimen sliding scale   SubCutaneous three times a day before meals  labetalol 300 milliGRAM(s) Enteral Tube every 8 hours  midodrine. 2.5 milliGRAM(s) Oral <User Schedule>  nystatin    Suspension 951598 Unit(s) Oral every 6 hours  pantoprazole   Suspension 40 milliGRAM(s) Oral daily  polyethylene glycol 3350 17 Gram(s) Oral daily    MEDICATIONS  (PRN):  bacitracin   Ointment 1 Application(s) Topical three times a day PRN to skin wound  dextrose Oral Gel 15 Gram(s) Oral once PRN Blood Glucose LESS THAN 70 milliGRAM(s)/deciliter      LABS                                   10.7   8.40  )-----------( 308      ( 26 Jun 2023 06:36 )             32.5     06-26    135  |  101  |  19  ----------------------------<  119<H>  3.7   |  30  |  0.84    Ca    8.3<L>      26 Jun 2023 06:36    TPro  5.7<L>  /  Alb  2.3<L>  /  TBili  0.8  /  DBili  x   /  AST  32  /  ALT  35  /  AlkPhos  139<H>  06-26      Urinalysis Basic - ( 26 Jun 2023 06:36 )    Color: x / Appearance: x / SG: x / pH: x  Gluc: 119 mg/dL / Ketone: x  / Bili: x / Urobili: x   Blood: x / Protein: x / Nitrite: x   Leuk Esterase: x / RBC: x / WBC x   Sq Epi: x / Non Sq Epi: x / Bacteria: x    CAPILLARY BLOOD GLUCOSE    POCT Blood Glucose.: 128 mg/dL (26 Jun 2023 08:31)  POCT Blood Glucose.: 143 mg/dL (25 Jun 2023 21:40)  POCT Blood Glucose.: 108 mg/dL (25 Jun 2023 17:13)  POCT Blood Glucose.: 143 mg/dL (25 Jun 2023 12:08)    VITALS     ICU Vital Signs Last 24 Hrs  T(C): 36.6 (23 Jun 2023 09:28), Max: 36.6 (23 Jun 2023 09:28)  T(F): 97.8 (23 Jun 2023 09:28), Max: 97.8 (23 Jun 2023 09:28)  HR: 98 (23 Jun 2023 09:28) (76 - 98)  BP: 122/75 (23 Jun 2023 09:28) (114/71 - 134/78)  BP(mean): --  ABP: --  ABP(mean): --  RR: 17 (23 Jun 2023 09:28) (16 - 17)  SpO2: 95% (23 Jun 2023 09:28) (95% - 95%)    O2 Parameters below as of 23 Jun 2023 09:28  Patient On (Oxygen Delivery Method): room air        PHYSICAL       Gen - NAD, Comfortable, nonverbal  HEENT - NCAT, EOMI, MMM  Neck - Supple, No limited ROM  Pulm - CTAB, No wheeze  Cardiovascular - RRR, S1S2  Chest - good chest expansion, good respiratory effort  Abdomen - Soft, NT/ND, +BS, + PEG no suprapubic tenderness  Extremities - No Cyanosis, no clubbing, no edema, no calf tenderness  Neuro-     Cognitive - awake, alert follows visual commands     Communication - hypophonia, non -fluent, difficulty with prompting     Cranial Nerves - right facial weakness, decreased right  shoulder shrug      Motor -  Right  hemiparesis neglect low tone                    LEFT    UE - 5/5                    RIGHT UE - 0/5                    LEFT    LE - 5/5                    RIGHT LE - 0/5       Tone - normal  Psychiatric - Mood stable, Affect WNL  Skin:  skin tear to mid and left chest wall- diffuse ecchymoses chest wall/ extremities    IDT - 6/20    SW- 7 steps to get in. 15 steps inside. Daugther was helping prior.   SP - NPO. With PEG. Mild to mod cognitive. MBS thursday.   OT- Total assist for most ADL's. She has good trunk control.   PT-  Transfer - Max assist x2.   Goal -  Mod assist.   TDD- 7/7  to MEMO      5

## 2023-09-18 NOTE — PATIENT PROFILE ADULT - NSPROGENOTHERPROVIDER_GEN_A_NUR
Follow-up For: Procedure(s) (LRB):  VALVE SPARING AORTIC ROOT REPLACEMENT (N/A)  CORONARY ARTERY BYPASS GRAFT (CABG) (N/A)  HARVEST-VEIN-ENDOVASCULAR (Left)  REPAIR, HERNIA, DIAPHRAGMATIC    Post-Operative Day: Day of Surgery     Past Medical History:   Diagnosis Date    BPH (benign prostatic hyperplasia)     Elevated liver enzymes     Fatty infiltration of liver     Glucose intolerance (impaired glucose tolerance)     Hiatal hernia     Hyperlipidemia     Hypertension     Overweight     Reflux        Past Surgical History:   Procedure Laterality Date    CORONARY ANGIOGRAPHY N/A 9/6/2023    Procedure: ANGIOGRAM, CORONARY ARTERY;  Surgeon: Chon Lopez MD;  Location: [facility] CATH LAB;  Service: Cardiology;  Laterality: N/A;  right radial    inguinal hernia      x2    tonsillectomy         Review of patient's allergies indicates:   Allergen Reactions    Ace inhibitors Other (See Comments)     cough    Losartan      headache       Family History       Problem Relation (Age of Onset)    Aneurysm Father    Arthritis Brother    Cancer Paternal Aunt, Paternal Uncle, Paternal Aunt, Paternal Uncle    Dementia Paternal Grandmother    Diabetes Paternal Grandmother, Brother    Heart disease Father, Paternal Grandfather    Hernia Brother    Hypertension Mother    Macular degeneration Mother    Migraines Sister    Stroke Father          Tobacco Use    Smoking status: Never    Smokeless tobacco: Never   Substance and Sexual Activity    Alcohol use: Yes     Comment: 2 glasses of wine most evenings.    Drug use: No    Sexual activity: Not on file      Review of Systems   Unable to perform ROS: Intubated     Objective:     Vital Signs (Most Recent):  Temp: 97.6 °F (36.4 °C) (09/18/23 1500)  Pulse: 71 (09/18/23 1500)  Resp: 19 (09/18/23 1500)  BP: (!) 152/95 (09/18/23 0612)  SpO2: 100 % (09/18/23 1500) Vital Signs (24h Range):  Temp:  [97 °F (36.1 °C)-97.6 °F (36.4 °C)] 97.6 °F (36.4 °C)  Pulse:  [70-71] 71  Resp:  [19-20] 19  SpO2:   [98 %-100 %] 100 %  BP: (152)/(95) 152/95  Arterial Line BP: (103)/(52) 103/52     Weight: 86.8 kg (191 lb 5.8 oz)  Body mass index is 25.95 kg/m².      Intake/Output Summary (Last 24 hours) at 9/18/2023 1516  Last data filed at 9/18/2023 1500  Gross per 24 hour   Intake 3811 ml   Output 890 ml   Net 2921 ml          Physical Exam  Constitutional:       General: He is not in acute distress.     Appearance: He is not ill-appearing.      Interventions: He is intubated.   HENT:      Head: Atraumatic.      Mouth/Throat:      Comments: Intubated  Cardiovascular:      Rate and Rhythm: Normal rate and regular rhythm.      Heart sounds: No murmur heard.  Pulmonary:      Effort: No respiratory distress. He is intubated.      Comments: Mechanically ventilated  Abdominal:      Palpations: Abdomen is soft.   Musculoskeletal:      Comments: Left leg wrapped in ace bandage   Skin:     Comments: [id number] incision CDI, Chest tubes in place   Neurological:      Comments: Sedated            Vents:  Vent Mode: A/C (09/18/23 1446)  Ventilator Initiated: Yes (09/18/23 1446)  Set Rate: 20 BPM (09/18/23 1446)  Vt Set: 460 mL (09/18/23 1446)  PEEP/CPAP: 5 cmH20 (09/18/23 1446)  Oxygen Concentration (%): 100 (09/18/23 1500)  Peak Airway Pressure: 19 cmH20 (09/18/23 1446)  Plateau Pressure: 0 cmH20 (09/18/23 1446)  Total Ve: 7.82 L/m (09/18/23 1446)  Negative Inspiratory Force (cm H2O): 0 (09/18/23 1446)    Lines/Drains/Airways       Central Venous Catheter Line  Duration             Pulmonary Artery Catheter Assessment  09/18/23 0824 <1 day              Drain  Duration                  Chest Tube 09/18/23 1300 Tube - 1 Anterior Mediastinal 19 Fr. <1 day         Chest Tube 09/18/23 1300 Tube - 2 Mediastinal 19 Fr. <1 day         Chest Tube 09/18/23 1300 Tube - 3 Left Pleural 19 Fr. <1 day         Urethral Catheter 09/18/23 0748 Temperature probe <1 day              Airway  Duration                  Airway - Non-Surgical 09/18/23 0715 <1 day               Arterial Line  Duration             Arterial Line 09/18/23 0821 Left Radial <1 day              Line  Duration                  Pacer Wires 09/18/23 1259 <1 day              Peripheral Intravenous Line  Duration                  Peripheral IV - Single Lumen 14 G  Right Forearm -- days         Peripheral IV - Single Lumen 09/18/23 0655 20 G Anterior;Distal;Left Forearm <1 day                    Significant Labs:    CBC/Anemia Profile:  Recent Labs   Lab 09/18/23  0635 09/18/23  0745 09/18/23  1137 09/18/23  1204 09/18/23  1233 09/18/23  1309   WBC 7.25  --  10.11  --   --   --    HGB 15.6  --  11.0*  --   --   --    HCT 44.0   < > 32.4* 30* 25* 26*     --  106*  --   --   --    MCV 84  --  90  --   --   --    RDW 12.4  --  12.2  --   --   --     < > = values in this interval not displayed.        Chemistries:  Recent Labs   Lab 09/18/23  0635      K 4.1      CO2 23   BUN 16   CREATININE 0.9   CALCIUM 9.1   ALBUMIN 4.3   PROT 7.5   BILITOT 0.7   ALKPHOS 63   ALT 39   AST 38       ABGs:   Recent Labs   Lab 09/18/23  1309   PH 7.336*   PCO2 39.0   HCO3 20.9*   POCSATURATED 97   BE -5     CMP:   Recent Labs   Lab 09/18/23  0635      K 4.1      CO2 23   *   BUN 16   CREATININE 0.9   CALCIUM 9.1   PROT 7.5   ALBUMIN 4.3   BILITOT 0.7   ALKPHOS 63   AST 38   ALT 39   ANIONGAP 11     All pertinent labs within the past 24 hours have been reviewed.    Significant Imaging: I have reviewed all pertinent imaging results/findings within the past 24 hours.   Unable to assess, patient intubated none

## 2023-09-20 NOTE — DIETITIAN INITIAL EVALUATION ADULT - CALCULATED TO (ML/KG)
Update History & Physical    The patient's History and Physical of September 12, 2023 was reviewed with the patient and I examined the patient. There was no change. The surgical site was confirmed by the patient and me. Plan: The risks, benefits, expected outcome, and alternative to the recommended procedure have been discussed with the patient. Patient understands and wants to proceed with the procedure.      Electronically signed by Higinio Dawkins MD on 9/20/2023 at 8:12 AM 1713

## 2023-09-28 PROBLEM — Z00.00 ENCOUNTER FOR PREVENTIVE HEALTH EXAMINATION: Status: ACTIVE | Noted: 2023-09-28

## 2023-10-08 PROCEDURE — 97530 THERAPEUTIC ACTIVITIES: CPT

## 2023-10-08 PROCEDURE — 93971 EXTREMITY STUDY: CPT

## 2023-10-08 PROCEDURE — 92526 ORAL FUNCTION THERAPY: CPT

## 2023-10-08 PROCEDURE — 97112 NEUROMUSCULAR REEDUCATION: CPT

## 2023-10-08 PROCEDURE — 87086 URINE CULTURE/COLONY COUNT: CPT

## 2023-10-08 PROCEDURE — 83735 ASSAY OF MAGNESIUM: CPT

## 2023-10-08 PROCEDURE — 97535 SELF CARE MNGMENT TRAINING: CPT

## 2023-10-08 PROCEDURE — 87635 SARS-COV-2 COVID-19 AMP PRB: CPT

## 2023-10-08 PROCEDURE — 97124 MASSAGE THERAPY: CPT

## 2023-10-08 PROCEDURE — 80048 BASIC METABOLIC PNL TOTAL CA: CPT

## 2023-10-08 PROCEDURE — 36415 COLL VENOUS BLD VENIPUNCTURE: CPT

## 2023-10-08 PROCEDURE — 97167 OT EVAL HIGH COMPLEX 60 MIN: CPT

## 2023-10-08 PROCEDURE — 92611 MOTION FLUOROSCOPY/SWALLOW: CPT

## 2023-10-08 PROCEDURE — 97163 PT EVAL HIGH COMPLEX 45 MIN: CPT

## 2023-10-08 PROCEDURE — 74230 X-RAY XM SWLNG FUNCJ C+: CPT

## 2023-10-08 PROCEDURE — 81001 URINALYSIS AUTO W/SCOPE: CPT

## 2023-10-08 PROCEDURE — 97110 THERAPEUTIC EXERCISES: CPT

## 2023-10-08 PROCEDURE — 92523 SPEECH SOUND LANG COMPREHEN: CPT

## 2023-10-08 PROCEDURE — 80053 COMPREHEN METABOLIC PANEL: CPT

## 2023-10-08 PROCEDURE — 85027 COMPLETE CBC AUTOMATED: CPT

## 2023-10-08 PROCEDURE — 92507 TX SP LANG VOICE COMM INDIV: CPT

## 2023-10-08 PROCEDURE — 93005 ELECTROCARDIOGRAM TRACING: CPT

## 2023-10-08 PROCEDURE — 85025 COMPLETE CBC W/AUTO DIFF WBC: CPT

## 2023-10-08 PROCEDURE — 82962 GLUCOSE BLOOD TEST: CPT

## 2023-10-08 PROCEDURE — 92610 EVALUATE SWALLOWING FUNCTION: CPT

## 2023-10-08 PROCEDURE — 97116 GAIT TRAINING THERAPY: CPT

## 2023-11-06 ENCOUNTER — EMERGENCY (EMERGENCY)
Facility: HOSPITAL | Age: 83
LOS: 1 days | Discharge: ANOTHER HOSPITAL | End: 2023-11-06
Attending: EMERGENCY MEDICINE | Admitting: EMERGENCY MEDICINE
Payer: MEDICAID

## 2023-11-06 VITALS
OXYGEN SATURATION: 96 % | RESPIRATION RATE: 18 BRPM | DIASTOLIC BLOOD PRESSURE: 78 MMHG | TEMPERATURE: 99 F | HEART RATE: 78 BPM | SYSTOLIC BLOOD PRESSURE: 158 MMHG

## 2023-11-06 VITALS
OXYGEN SATURATION: 98 % | HEIGHT: 61.02 IN | DIASTOLIC BLOOD PRESSURE: 87 MMHG | WEIGHT: 147.71 LBS | SYSTOLIC BLOOD PRESSURE: 150 MMHG | TEMPERATURE: 98 F | RESPIRATION RATE: 19 BRPM | HEART RATE: 87 BPM

## 2023-11-06 PROBLEM — B19.20 UNSPECIFIED VIRAL HEPATITIS C WITHOUT HEPATIC COMA: Chronic | Status: ACTIVE | Noted: 2023-06-16

## 2023-11-06 PROBLEM — I48.91 UNSPECIFIED ATRIAL FIBRILLATION: Chronic | Status: ACTIVE | Noted: 2023-06-16

## 2023-11-06 PROBLEM — I10 ESSENTIAL (PRIMARY) HYPERTENSION: Chronic | Status: ACTIVE | Noted: 2023-06-16

## 2023-11-06 LAB
ALBUMIN SERPL ELPH-MCNC: 3.4 G/DL — SIGNIFICANT CHANGE UP (ref 3.3–5)
ALBUMIN SERPL ELPH-MCNC: 3.4 G/DL — SIGNIFICANT CHANGE UP (ref 3.3–5)
ALP SERPL-CCNC: 164 U/L — HIGH (ref 40–120)
ALP SERPL-CCNC: 164 U/L — HIGH (ref 40–120)
ALT FLD-CCNC: 18 U/L — SIGNIFICANT CHANGE UP (ref 10–45)
ALT FLD-CCNC: 18 U/L — SIGNIFICANT CHANGE UP (ref 10–45)
ANION GAP SERPL CALC-SCNC: 7 MMOL/L — SIGNIFICANT CHANGE UP (ref 5–17)
ANION GAP SERPL CALC-SCNC: 7 MMOL/L — SIGNIFICANT CHANGE UP (ref 5–17)
AST SERPL-CCNC: 21 U/L — SIGNIFICANT CHANGE UP (ref 10–40)
AST SERPL-CCNC: 21 U/L — SIGNIFICANT CHANGE UP (ref 10–40)
BASOPHILS # BLD AUTO: 0.07 K/UL — SIGNIFICANT CHANGE UP (ref 0–0.2)
BASOPHILS # BLD AUTO: 0.07 K/UL — SIGNIFICANT CHANGE UP (ref 0–0.2)
BASOPHILS NFR BLD AUTO: 0.9 % — SIGNIFICANT CHANGE UP (ref 0–2)
BASOPHILS NFR BLD AUTO: 0.9 % — SIGNIFICANT CHANGE UP (ref 0–2)
BILIRUB SERPL-MCNC: 0.9 MG/DL — SIGNIFICANT CHANGE UP (ref 0.2–1.2)
BILIRUB SERPL-MCNC: 0.9 MG/DL — SIGNIFICANT CHANGE UP (ref 0.2–1.2)
BUN SERPL-MCNC: 17 MG/DL — SIGNIFICANT CHANGE UP (ref 7–23)
BUN SERPL-MCNC: 17 MG/DL — SIGNIFICANT CHANGE UP (ref 7–23)
CALCIUM SERPL-MCNC: 9.5 MG/DL — SIGNIFICANT CHANGE UP (ref 8.4–10.5)
CALCIUM SERPL-MCNC: 9.5 MG/DL — SIGNIFICANT CHANGE UP (ref 8.4–10.5)
CHLORIDE SERPL-SCNC: 106 MMOL/L — SIGNIFICANT CHANGE UP (ref 96–108)
CHLORIDE SERPL-SCNC: 106 MMOL/L — SIGNIFICANT CHANGE UP (ref 96–108)
CO2 SERPL-SCNC: 26 MMOL/L — SIGNIFICANT CHANGE UP (ref 22–31)
CO2 SERPL-SCNC: 26 MMOL/L — SIGNIFICANT CHANGE UP (ref 22–31)
CREAT SERPL-MCNC: 1 MG/DL — SIGNIFICANT CHANGE UP (ref 0.5–1.3)
CREAT SERPL-MCNC: 1 MG/DL — SIGNIFICANT CHANGE UP (ref 0.5–1.3)
EGFR: 56 ML/MIN/1.73M2 — LOW
EGFR: 56 ML/MIN/1.73M2 — LOW
EOSINOPHIL # BLD AUTO: 0.13 K/UL — SIGNIFICANT CHANGE UP (ref 0–0.5)
EOSINOPHIL # BLD AUTO: 0.13 K/UL — SIGNIFICANT CHANGE UP (ref 0–0.5)
EOSINOPHIL NFR BLD AUTO: 1.6 % — SIGNIFICANT CHANGE UP (ref 0–6)
EOSINOPHIL NFR BLD AUTO: 1.6 % — SIGNIFICANT CHANGE UP (ref 0–6)
GLUCOSE SERPL-MCNC: 119 MG/DL — HIGH (ref 70–99)
GLUCOSE SERPL-MCNC: 119 MG/DL — HIGH (ref 70–99)
HCT VFR BLD CALC: 42 % — SIGNIFICANT CHANGE UP (ref 34.5–45)
HCT VFR BLD CALC: 42 % — SIGNIFICANT CHANGE UP (ref 34.5–45)
HGB BLD-MCNC: 14.1 G/DL — SIGNIFICANT CHANGE UP (ref 11.5–15.5)
HGB BLD-MCNC: 14.1 G/DL — SIGNIFICANT CHANGE UP (ref 11.5–15.5)
IMM GRANULOCYTES NFR BLD AUTO: 0.4 % — SIGNIFICANT CHANGE UP (ref 0–0.9)
IMM GRANULOCYTES NFR BLD AUTO: 0.4 % — SIGNIFICANT CHANGE UP (ref 0–0.9)
LYMPHOCYTES # BLD AUTO: 2.11 K/UL — SIGNIFICANT CHANGE UP (ref 1–3.3)
LYMPHOCYTES # BLD AUTO: 2.11 K/UL — SIGNIFICANT CHANGE UP (ref 1–3.3)
LYMPHOCYTES # BLD AUTO: 26.8 % — SIGNIFICANT CHANGE UP (ref 13–44)
LYMPHOCYTES # BLD AUTO: 26.8 % — SIGNIFICANT CHANGE UP (ref 13–44)
MCHC RBC-ENTMCNC: 30.1 PG — SIGNIFICANT CHANGE UP (ref 27–34)
MCHC RBC-ENTMCNC: 30.1 PG — SIGNIFICANT CHANGE UP (ref 27–34)
MCHC RBC-ENTMCNC: 33.6 GM/DL — SIGNIFICANT CHANGE UP (ref 32–36)
MCHC RBC-ENTMCNC: 33.6 GM/DL — SIGNIFICANT CHANGE UP (ref 32–36)
MCV RBC AUTO: 89.7 FL — SIGNIFICANT CHANGE UP (ref 80–100)
MCV RBC AUTO: 89.7 FL — SIGNIFICANT CHANGE UP (ref 80–100)
MONOCYTES # BLD AUTO: 0.65 K/UL — SIGNIFICANT CHANGE UP (ref 0–0.9)
MONOCYTES # BLD AUTO: 0.65 K/UL — SIGNIFICANT CHANGE UP (ref 0–0.9)
MONOCYTES NFR BLD AUTO: 8.2 % — SIGNIFICANT CHANGE UP (ref 2–14)
MONOCYTES NFR BLD AUTO: 8.2 % — SIGNIFICANT CHANGE UP (ref 2–14)
NEUTROPHILS # BLD AUTO: 4.89 K/UL — SIGNIFICANT CHANGE UP (ref 1.8–7.4)
NEUTROPHILS # BLD AUTO: 4.89 K/UL — SIGNIFICANT CHANGE UP (ref 1.8–7.4)
NEUTROPHILS NFR BLD AUTO: 62.1 % — SIGNIFICANT CHANGE UP (ref 43–77)
NEUTROPHILS NFR BLD AUTO: 62.1 % — SIGNIFICANT CHANGE UP (ref 43–77)
NRBC # BLD: 0 /100 WBCS — SIGNIFICANT CHANGE UP (ref 0–0)
NRBC # BLD: 0 /100 WBCS — SIGNIFICANT CHANGE UP (ref 0–0)
PLATELET # BLD AUTO: 272 K/UL — SIGNIFICANT CHANGE UP (ref 150–400)
PLATELET # BLD AUTO: 272 K/UL — SIGNIFICANT CHANGE UP (ref 150–400)
POTASSIUM SERPL-MCNC: 4.2 MMOL/L — SIGNIFICANT CHANGE UP (ref 3.5–5.3)
POTASSIUM SERPL-MCNC: 4.2 MMOL/L — SIGNIFICANT CHANGE UP (ref 3.5–5.3)
POTASSIUM SERPL-SCNC: 4.2 MMOL/L — SIGNIFICANT CHANGE UP (ref 3.5–5.3)
POTASSIUM SERPL-SCNC: 4.2 MMOL/L — SIGNIFICANT CHANGE UP (ref 3.5–5.3)
PROT SERPL-MCNC: 7 G/DL — SIGNIFICANT CHANGE UP (ref 6–8.3)
PROT SERPL-MCNC: 7 G/DL — SIGNIFICANT CHANGE UP (ref 6–8.3)
RBC # BLD: 4.68 M/UL — SIGNIFICANT CHANGE UP (ref 3.8–5.2)
RBC # BLD: 4.68 M/UL — SIGNIFICANT CHANGE UP (ref 3.8–5.2)
RBC # FLD: 13.7 % — SIGNIFICANT CHANGE UP (ref 10.3–14.5)
RBC # FLD: 13.7 % — SIGNIFICANT CHANGE UP (ref 10.3–14.5)
SODIUM SERPL-SCNC: 139 MMOL/L — SIGNIFICANT CHANGE UP (ref 135–145)
SODIUM SERPL-SCNC: 139 MMOL/L — SIGNIFICANT CHANGE UP (ref 135–145)
TROPONIN I, HIGH SENSITIVITY RESULT: 52.2 NG/L — HIGH
TROPONIN I, HIGH SENSITIVITY RESULT: 52.2 NG/L — HIGH
WBC # BLD: 7.88 K/UL — SIGNIFICANT CHANGE UP (ref 3.8–10.5)
WBC # BLD: 7.88 K/UL — SIGNIFICANT CHANGE UP (ref 3.8–10.5)
WBC # FLD AUTO: 7.88 K/UL — SIGNIFICANT CHANGE UP (ref 3.8–10.5)
WBC # FLD AUTO: 7.88 K/UL — SIGNIFICANT CHANGE UP (ref 3.8–10.5)

## 2023-11-06 PROCEDURE — 80053 COMPREHEN METABOLIC PANEL: CPT

## 2023-11-06 PROCEDURE — 93005 ELECTROCARDIOGRAM TRACING: CPT

## 2023-11-06 PROCEDURE — 36415 COLL VENOUS BLD VENIPUNCTURE: CPT

## 2023-11-06 PROCEDURE — 85025 COMPLETE CBC W/AUTO DIFF WBC: CPT

## 2023-11-06 PROCEDURE — G1004: CPT

## 2023-11-06 PROCEDURE — 99285 EMERGENCY DEPT VISIT HI MDM: CPT | Mod: 25

## 2023-11-06 PROCEDURE — 84484 ASSAY OF TROPONIN QUANT: CPT

## 2023-11-06 PROCEDURE — 93010 ELECTROCARDIOGRAM REPORT: CPT

## 2023-11-06 PROCEDURE — 70450 CT HEAD/BRAIN W/O DYE: CPT | Mod: 26,MG

## 2023-11-06 PROCEDURE — 99284 EMERGENCY DEPT VISIT MOD MDM: CPT

## 2023-11-06 PROCEDURE — 70450 CT HEAD/BRAIN W/O DYE: CPT | Mod: MG

## 2023-11-06 NOTE — ED PROVIDER NOTE - PHYSICAL EXAMINATION
Vital Signs Last 24 Hrs  T(C): 36.6 (06 Nov 2023 15:21), Max: 36.6 (06 Nov 2023 15:21)  T(F): 97.9 (06 Nov 2023 15:21), Max: 97.9 (06 Nov 2023 15:21)  HR: 87 (06 Nov 2023 15:21) (87 - 87)  BP: 150/87 (06 Nov 2023 15:21) (150/87 - 150/87)  BP(mean): --  RR: 19 (06 Nov 2023 15:21) (19 - 19)  SpO2: 98% (06 Nov 2023 15:21) (98% - 98%)    Parameters below as of 06 Nov 2023 15:21  Patient On (Oxygen Delivery Method): nasal cannula  O2 Flow (L/min): 2    PHYSICAL EXAM:  GENERAL: NAD, lying in bed comfortably, frail  HEAD:  Atraumatic, Normocephalic  EYES: EOMI, conjunctiva and sclera clear, no discharge  ENT: Moist mucous membranes  NECK: Supple, No JVD  CHEST/LUNG: Clear to auscultation bilaterally; No rales, rhonchi, wheezing, or rubs. Unlabored respirations  HEART: Regular rate and rhythm; No murmurs, rubs, or gallops  ABDOMEN: Bowel sounds present; Soft, Nontender, Nondistended. No hepatomegally  EXTREMITIES:  No clubbing, cyanosis, or edema  NERVOUS SYSTEM:  Alert & Oriented X3, speech clear. No new deficits   MSK: RUE/RLE 0/5 strength, LUE/LLE 5/5 strength  SKIN: No new rashes or lesions Vital Signs Last 24 Hrs  T(C): 36.6 (06 Nov 2023 15:21), Max: 36.6 (06 Nov 2023 15:21)  T(F): 97.9 (06 Nov 2023 15:21), Max: 97.9 (06 Nov 2023 15:21)  HR: 87 (06 Nov 2023 15:21) (87 - 87)  BP: 150/87 (06 Nov 2023 15:21) (150/87 - 150/87)  BP(mean): --  RR: 19 (06 Nov 2023 15:21) (19 - 19)  SpO2: 98% (06 Nov 2023 15:21) (98% - 98%)    Parameters below as of 06 Nov 2023 15:21  Patient On (Oxygen Delivery Method): nasal cannula  O2 Flow (L/min): 2    PHYSICAL EXAM:  GENERAL: NAD, lying in bed comfortably, frail  HEAD:  Atraumatic, Normocephalic  EYES: EOMI, conjunctiva and sclera clear, no discharge  ENT: Moist mucous membranes  NECK: Supple, No JVD  CHEST/LUNG: Clear to auscultation bilaterally; No rales, rhonchi, wheezing, or rubs. Unlabored respirations  HEART: Irregularly irregular r&r  ABDOMEN: Bowel sounds present; Soft, Nontender, Nondistended. No hepatomegally  EXTREMITIES:  No clubbing, cyanosis, or edema  NERVOUS SYSTEM:  Alert & Oriented X3, speech clear. No new deficits   MSK: RUE/RLE 0/5 strength, LUE/LLE 5/5 strength  SKIN: No new rashes or lesions

## 2023-11-06 NOTE — ED PROVIDER NOTE - OBJECTIVE STATEMENT
Pt is an 84 yo F, PMH of CVA with Rt sided weakness, HTN, AFIB (on Xarelto), Hepatitis C (on Entecavir), presenting for syncope. Pt was in [facility]OH until this morning when she was brushing her teeth and had brief episode of dizziness, followed by syncope while in chair, lasting 1-2 secs, no fall, no head strike. This surprised pt who was BIBA from [facility] to [facility]. Pt denies ha, cp, palpitations, muscle-aches, bowel/bladder incontinence, tongue-biting, new onset motor or sensory deficits. In the ED, pt was was afebrile, HDS.

## 2023-11-06 NOTE — ED ADULT NURSE NOTE - CHIEF COMPLAINT QUOTE
Patient BIBEMS from [facility] for syncopal episode this morning at 11am. Patient states "I was brushing my teeth and I didn't feel good so I went to bed and blacked out for a couple of mins". Patient endorses feeling "normal" at this time. Patient denies chest pain, SOB, headache, dizziness and blurry vision. Patient with a hx of CVA and right sided weakness as per EMS.

## 2023-11-06 NOTE — ED ADULT NURSE NOTE - NSFALLUNIVINTERV_ED_ALL_ED
Bed/Stretcher in lowest position, wheels locked, appropriate side rails in place/Call bell, personal items and telephone in reach/Instruct patient to call for assistance before getting out of bed/chair/stretcher/Non-slip footwear applied when patient is off stretcher/[place] to call system/Physically safe environment - no spills, clutter or unnecessary equipment/Purposeful proactive rounding/Room/bathroom lighting operational, light cord in reach

## 2023-11-06 NOTE — ED PROVIDER NOTE - LANGUAGE ASSISTANCE NEEDED
Reason For Visit  OB & Gyn Reason For Visit:   Patient presents for Annual Gyne Visit. pt here today for pap and to discuss birth control    unsure of last pap (approx 3 yrs ago). History of Present Illness  General Gyne HPI: Pt presents to office for annual exam. Pt reports regular cycles however they are heavy (day 2-5); she has to wear super pads. Pt denies any fatigue, lightheadedness or dizziness. Pt reports being on OCPs last year for management but d/c due to insurance. Pt would like to restart birth control. Pts. age: 31 .    and Para: [id number]   Regular menses    Q 26-32 days. Menstrual bleeding usually lasts 7-9 days. Heavy menses    Last menstrual period was 17.    no severe menstrual pain, dysmenorrhea is present. no non-menstrual bleeding. Age at menarche was 14years old. Not Sexually Active. Not using contraception. No reported positive Pap Smear. History of chlamydia . Last Screening:   Pap Smear performed  per pt. Review of Systems  ROS Complete:   Constitutional: negative. Head and Face: negative. Eyes: negative. ENT: negative. Cardiovascular: negative. Respiratory: negative. Gastrointestinal: negative. Genitourinary: as noted in HPI. Musculoskeletal: negative. Integumentary and Breasts: negative. Neurological: negative. Psychiatric: negative. Endocrine: negative. Hematologic and Lymphatic: negative. Past Medical History   1. History of Joint Pain, Localized In The Knee   2. History of Pregnancy, incidental (Z33.1)    Family History   1. Family history of cardiac disorder (Z82.49) : Mother   2. Family history of hypertension (Z82.49) : Father    Social History   Â· Denied: History of Alcohol Use (History)   Â· Denied: History of Drug Use   Â· Exercising Regularly   Â· Never a smoker   Â· Never smoker    OB History  OB History:   : 2.    number of miscarriages: 4. Allergies   1.  No Known Drug Allergies    Review  Review: past medical history problem list family medical history surgical history social history      Vitals  Signs   Recorded: [id number] 10:41AM   Height: 5 ft 5 in  Weight: 229 lb 0.90 oz  BMI Calculated: 38.12  BSA Calculated: 2.1  Systolic: 596, LUE, Sitting  Diastolic: 78, LUE, Sitting  Heart Rate: 80  Respiration: 16    Physical Exam    Constitutional Exam: General appearance: No acute distress, well appearing and well nourished. overweight. Vital signs reviewed. Breast Exam: Breasts appear normal. palpation of the breast revealed no masses or adenopathy. External Genitalia Exam:. External Genitalia appear normal.   Urethra Exam:. Urethral Meatus appears normal.   Vaginal Exam:. vagina appears normal. Vagina: pink rugae. Cervix Exam: cervix appears normal. A Pap smear was performed. Bimanual exam findings include a soft cervix and a patent cervical os, but no cervical motion tenderness. Uterus / Adnexa Exam: uterus appears normal. Adnexa appears normal. exam limited due to body habitus. Bladder Exam:. palpation of bladder appears normal.   Neck Exam: Normal appearance of the neck. Normal examination of thyroid. Pulmonary Exam: Respiratory effort: No increased work of breathing or signs of respiratory distress. Cardiovascular Exam: Examination of peripheral vasuclar system by observation is normal.   Gastrointestinal Exam: Abdomen: Non-tender, no masses. Lymphatic Exam: Palpation of lymph nodes in neck: No lymphadenopathy. Palpation of lymph nodes in axillae: No axillary adenopathy. Palpation of lymph nodes in groin: No inguinal adenopathy. Skin Exam: Inspection and/or examination of Skin: Normal without rashes, lesions or ulcers. Neurologic Exam: Mood and affect: Normal.      Assessment   1. Encounter for gynecological examination with Papanicolaou smear of cervix (Z01.419)   2. Menorrhagia (N92.0)    Orders   1. PAP WITH HIGH RISK HPV REFLEX; Status:Active;  Requested for:[id number];   : ENDOCERVICAL   2. Lo Loestrin Fe 1 MG-10 MCG / 10 MCG Oral Tablet; TAKE 1 TABLET DAILY AS   DIRECTED    Discussion/Summary  Discussion Summary Free Text: 1. Well woman exam  -breast and pelvic exam performed  -pap smear obtained  -ASCCP guidelines reviewed w/pt  -demonstrated and encouraged pt to perform monthly self breast exam  -f/u in 1 year    2. Menorrhagia  -discussed various etiologies including but not limited polyps, fibroids, anovulatory bleeding, endometriosis and adenomyosis, bleeding disorder   -reviewed with pt hormonal and non-hormonal treatment options  -plan to restart OCPs;  Lo Loestrin FE prescription sent to pharmacy; benefits and risk including but noted limited to VTE, irregular bleeding/spotting, headache, weight gain, mood swings, depression, n/v, bloating reviewed with pt      Signatures   Electronically signed by : Mariely Rivera R.N.; Sep 25 2018 10:42AM CST    Electronically signed by : Mirian Bustillos N.P.; Sep 25 2018 11:06AM CST Yes-Patient/Caregiver accepts free interpretation services...

## 2023-11-06 NOTE — ED PROVIDER NOTE - NSDCPRINTRESULTS_ED_ALL_ED
Let Mr. [address] [place] [address] know that labs returned normal range for folic acid and Y39 level, ferritin elevated most likely due to inflammatory process and changes due to diabetes. Have him stop the daily iron and we will recheck next visit.
Pt given this information
Patient requests all Lab, Cardiology, and Radiology Results on their Discharge Instructions

## 2023-11-06 NOTE — ED ADULT TRIAGE NOTE - CHIEF COMPLAINT QUOTE
Patient BIBEMS from [facility] for syncopal episode this morning at 11am. Patient states "I was brushing my teeth and I didn't feel good so I went to bed and blacked out for a couple of mins". Patient endorses feeling "normal" at this time. Patient denies chest pain, SOB, headache, dizziness and blurry vision. Patient with a hx of CVA and right sided weakness as per EMS.

## 2023-11-06 NOTE — ED PROVIDER NOTE - ATTENDING CONTRIBUTION TO CARE
82yo female bib ems s/p syncopal episode. pt felt dizzy and layed in bed and passed out, no hx of syncope, no symptoms at this time  exam: irreg rhytham, lungs cta, right hemiparesis  plan: ekg, labs, ct head  agree with assessment and plan of resident

## 2023-11-06 NOTE — ED ADULT NURSE NOTE - OBJECTIVE STATEMENT
Assumed pt car for a 63 yr old female complaining of a syncopal event that happened this morning. Pt is Persian speaking,  used. Pt reports she passed out this morning and denies any current complaints. Recent PMHx of Stroke with right sided deficits. Awaiting further deposition. .

## 2023-11-06 NOTE — ED PROVIDER NOTE - PATIENT PORTAL LINK FT
You can access the FollowMyHealth Patient Portal offered by [facility] by registering at the following website: [email or web address]. By joining [facility]’s FollowMyHealth portal, you will also be able to view your health information using other applications (apps) compatible with our system.

## 2023-11-06 NOTE — ED ADULT NURSE REASSESSMENT NOTE - NS ED NURSE REASSESS COMMENT FT1
Received report from day shift RN. Patient received A&Ox3 per family at bedside. Pt offering no complaints and is in no acute distress at this time.  Respirations even and unlabored.  Stretcher in lowest position, wheels locked, side rails up and call light in reach. Vital signs as per flowsheet. Awaiting safe transport.

## 2023-11-06 NOTE — ED PROVIDER NOTE - CLINICAL SUMMARY MEDICAL DECISION MAKING FREE TEXT BOX
Pt here for episode of self-limiting episode of dizziness then LOC for <1-2 secs, likely 2/2 vasovagal syncope. On the differential, but less likely is CVA/TIA, arrhythmia, anemia. Head CT performed, no deficits noted. Labs unremarkable. Will DC with recommendation for conservative mgt including keeping hydrated and avoiding sudden exertion.

## 2023-11-10 NOTE — CHART NOTE - NSCHARTNOTEFT_GEN_A_CORE
83 y o female presenting to [facility] ED on 11/6 complaining of vasovagal syncope.  KIT made a follow up call with  Luz # [id number] to assist with scheduling pcp appointment VM was left with contact information.

## 2024-01-03 ENCOUNTER — INPATIENT (INPATIENT)
Facility: HOSPITAL | Age: 84
LOS: 0 days | Discharge: NURSING, REHAB OR LONG-TERM CARE | DRG: 57 | End: 2024-01-04
Attending: INTERNAL MEDICINE | Admitting: INTERNAL MEDICINE
Payer: MEDICAID

## 2024-01-03 VITALS
SYSTOLIC BLOOD PRESSURE: 153 MMHG | DIASTOLIC BLOOD PRESSURE: 70 MMHG | HEIGHT: 61.02 IN | RESPIRATION RATE: 18 BRPM | TEMPERATURE: 97 F | HEART RATE: 90 BPM | WEIGHT: 147.05 LBS | OXYGEN SATURATION: 97 %

## 2024-01-03 DIAGNOSIS — I63.9 CEREBRAL INFARCTION, UNSPECIFIED: ICD-10-CM

## 2024-01-03 DIAGNOSIS — I48.91 UNSPECIFIED ATRIAL FIBRILLATION: ICD-10-CM

## 2024-01-03 DIAGNOSIS — Z78.9 OTHER SPECIFIED HEALTH STATUS: ICD-10-CM

## 2024-01-03 DIAGNOSIS — E78.5 HYPERLIPIDEMIA, UNSPECIFIED: ICD-10-CM

## 2024-01-03 DIAGNOSIS — R56.9 UNSPECIFIED CONVULSIONS: ICD-10-CM

## 2024-01-03 DIAGNOSIS — I10 ESSENTIAL (PRIMARY) HYPERTENSION: ICD-10-CM

## 2024-01-03 DIAGNOSIS — Z29.9 ENCOUNTER FOR PROPHYLACTIC MEASURES, UNSPECIFIED: ICD-10-CM

## 2024-01-03 DIAGNOSIS — N39.0 URINARY TRACT INFECTION, SITE NOT SPECIFIED: ICD-10-CM

## 2024-01-03 LAB
ALBUMIN SERPL ELPH-MCNC: 3.3 G/DL — SIGNIFICANT CHANGE UP (ref 3.3–5)
ALBUMIN SERPL ELPH-MCNC: 3.3 G/DL — SIGNIFICANT CHANGE UP (ref 3.3–5)
ALP SERPL-CCNC: 162 U/L — HIGH (ref 40–120)
ALP SERPL-CCNC: 162 U/L — HIGH (ref 40–120)
ALT FLD-CCNC: 11 U/L — SIGNIFICANT CHANGE UP (ref 10–45)
ALT FLD-CCNC: 11 U/L — SIGNIFICANT CHANGE UP (ref 10–45)
ANION GAP SERPL CALC-SCNC: 11 MMOL/L — SIGNIFICANT CHANGE UP (ref 5–17)
ANION GAP SERPL CALC-SCNC: 11 MMOL/L — SIGNIFICANT CHANGE UP (ref 5–17)
APPEARANCE UR: CLEAR — SIGNIFICANT CHANGE UP
APPEARANCE UR: CLEAR — SIGNIFICANT CHANGE UP
AST SERPL-CCNC: 20 U/L — SIGNIFICANT CHANGE UP (ref 10–40)
AST SERPL-CCNC: 20 U/L — SIGNIFICANT CHANGE UP (ref 10–40)
BACTERIA # UR AUTO: ABNORMAL /HPF
BACTERIA # UR AUTO: ABNORMAL /HPF
BASOPHILS # BLD AUTO: 0.05 K/UL — SIGNIFICANT CHANGE UP (ref 0–0.2)
BASOPHILS # BLD AUTO: 0.05 K/UL — SIGNIFICANT CHANGE UP (ref 0–0.2)
BASOPHILS NFR BLD AUTO: 0.6 % — SIGNIFICANT CHANGE UP (ref 0–2)
BASOPHILS NFR BLD AUTO: 0.6 % — SIGNIFICANT CHANGE UP (ref 0–2)
BILIRUB SERPL-MCNC: 0.8 MG/DL — SIGNIFICANT CHANGE UP (ref 0.2–1.2)
BILIRUB SERPL-MCNC: 0.8 MG/DL — SIGNIFICANT CHANGE UP (ref 0.2–1.2)
BILIRUB UR-MCNC: NEGATIVE — SIGNIFICANT CHANGE UP
BILIRUB UR-MCNC: NEGATIVE — SIGNIFICANT CHANGE UP
BUN SERPL-MCNC: 17 MG/DL — SIGNIFICANT CHANGE UP (ref 7–23)
BUN SERPL-MCNC: 17 MG/DL — SIGNIFICANT CHANGE UP (ref 7–23)
CALCIUM SERPL-MCNC: 9.4 MG/DL — SIGNIFICANT CHANGE UP (ref 8.4–10.5)
CALCIUM SERPL-MCNC: 9.4 MG/DL — SIGNIFICANT CHANGE UP (ref 8.4–10.5)
CHLORIDE SERPL-SCNC: 104 MMOL/L — SIGNIFICANT CHANGE UP (ref 96–108)
CHLORIDE SERPL-SCNC: 104 MMOL/L — SIGNIFICANT CHANGE UP (ref 96–108)
CO2 SERPL-SCNC: 26 MMOL/L — SIGNIFICANT CHANGE UP (ref 22–31)
CO2 SERPL-SCNC: 26 MMOL/L — SIGNIFICANT CHANGE UP (ref 22–31)
COLOR SPEC: YELLOW — SIGNIFICANT CHANGE UP
COLOR SPEC: YELLOW — SIGNIFICANT CHANGE UP
CREAT SERPL-MCNC: 0.73 MG/DL — SIGNIFICANT CHANGE UP (ref 0.5–1.3)
CREAT SERPL-MCNC: 0.73 MG/DL — SIGNIFICANT CHANGE UP (ref 0.5–1.3)
DIFF PNL FLD: NEGATIVE — SIGNIFICANT CHANGE UP
DIFF PNL FLD: NEGATIVE — SIGNIFICANT CHANGE UP
EGFR: 81 ML/MIN/1.73M2 — SIGNIFICANT CHANGE UP
EGFR: 81 ML/MIN/1.73M2 — SIGNIFICANT CHANGE UP
EOSINOPHIL # BLD AUTO: 0.12 K/UL — SIGNIFICANT CHANGE UP (ref 0–0.5)
EOSINOPHIL # BLD AUTO: 0.12 K/UL — SIGNIFICANT CHANGE UP (ref 0–0.5)
EOSINOPHIL NFR BLD AUTO: 1.5 % — SIGNIFICANT CHANGE UP (ref 0–6)
EOSINOPHIL NFR BLD AUTO: 1.5 % — SIGNIFICANT CHANGE UP (ref 0–6)
EPI CELLS # UR: 2 — SIGNIFICANT CHANGE UP
EPI CELLS # UR: 2 — SIGNIFICANT CHANGE UP
ETHANOL SERPL-MCNC: <3 MG/DL — SIGNIFICANT CHANGE UP (ref 0–3)
ETHANOL SERPL-MCNC: <3 MG/DL — SIGNIFICANT CHANGE UP (ref 0–3)
GLUCOSE SERPL-MCNC: 145 MG/DL — HIGH (ref 70–99)
GLUCOSE SERPL-MCNC: 145 MG/DL — HIGH (ref 70–99)
GLUCOSE UR QL: NEGATIVE MG/DL — SIGNIFICANT CHANGE UP
GLUCOSE UR QL: NEGATIVE MG/DL — SIGNIFICANT CHANGE UP
HCT VFR BLD CALC: 39.8 % — SIGNIFICANT CHANGE UP (ref 34.5–45)
HCT VFR BLD CALC: 39.8 % — SIGNIFICANT CHANGE UP (ref 34.5–45)
HGB BLD-MCNC: 13.4 G/DL — SIGNIFICANT CHANGE UP (ref 11.5–15.5)
HGB BLD-MCNC: 13.4 G/DL — SIGNIFICANT CHANGE UP (ref 11.5–15.5)
IMM GRANULOCYTES NFR BLD AUTO: 0.5 % — SIGNIFICANT CHANGE UP (ref 0–0.9)
IMM GRANULOCYTES NFR BLD AUTO: 0.5 % — SIGNIFICANT CHANGE UP (ref 0–0.9)
KETONES UR-MCNC: NEGATIVE MG/DL — SIGNIFICANT CHANGE UP
KETONES UR-MCNC: NEGATIVE MG/DL — SIGNIFICANT CHANGE UP
LACTATE SERPL-SCNC: 0.5 MMOL/L — LOW (ref 0.7–2)
LACTATE SERPL-SCNC: 0.5 MMOL/L — LOW (ref 0.7–2)
LACTATE SERPL-SCNC: 2.6 MMOL/L — HIGH (ref 0.7–2)
LACTATE SERPL-SCNC: 2.6 MMOL/L — HIGH (ref 0.7–2)
LEUKOCYTE ESTERASE UR-ACNC: ABNORMAL
LEUKOCYTE ESTERASE UR-ACNC: ABNORMAL
LYMPHOCYTES # BLD AUTO: 1.67 K/UL — SIGNIFICANT CHANGE UP (ref 1–3.3)
LYMPHOCYTES # BLD AUTO: 1.67 K/UL — SIGNIFICANT CHANGE UP (ref 1–3.3)
LYMPHOCYTES # BLD AUTO: 20.3 % — SIGNIFICANT CHANGE UP (ref 13–44)
LYMPHOCYTES # BLD AUTO: 20.3 % — SIGNIFICANT CHANGE UP (ref 13–44)
MAGNESIUM SERPL-MCNC: 2 MG/DL — SIGNIFICANT CHANGE UP (ref 1.6–2.6)
MAGNESIUM SERPL-MCNC: 2 MG/DL — SIGNIFICANT CHANGE UP (ref 1.6–2.6)
MCHC RBC-ENTMCNC: 30.9 PG — SIGNIFICANT CHANGE UP (ref 27–34)
MCHC RBC-ENTMCNC: 30.9 PG — SIGNIFICANT CHANGE UP (ref 27–34)
MCHC RBC-ENTMCNC: 33.7 GM/DL — SIGNIFICANT CHANGE UP (ref 32–36)
MCHC RBC-ENTMCNC: 33.7 GM/DL — SIGNIFICANT CHANGE UP (ref 32–36)
MCV RBC AUTO: 91.7 FL — SIGNIFICANT CHANGE UP (ref 80–100)
MCV RBC AUTO: 91.7 FL — SIGNIFICANT CHANGE UP (ref 80–100)
MONOCYTES # BLD AUTO: 0.49 K/UL — SIGNIFICANT CHANGE UP (ref 0–0.9)
MONOCYTES # BLD AUTO: 0.49 K/UL — SIGNIFICANT CHANGE UP (ref 0–0.9)
MONOCYTES NFR BLD AUTO: 6 % — SIGNIFICANT CHANGE UP (ref 2–14)
MONOCYTES NFR BLD AUTO: 6 % — SIGNIFICANT CHANGE UP (ref 2–14)
NEUTROPHILS # BLD AUTO: 5.84 K/UL — SIGNIFICANT CHANGE UP (ref 1.8–7.4)
NEUTROPHILS # BLD AUTO: 5.84 K/UL — SIGNIFICANT CHANGE UP (ref 1.8–7.4)
NEUTROPHILS NFR BLD AUTO: 71.1 % — SIGNIFICANT CHANGE UP (ref 43–77)
NEUTROPHILS NFR BLD AUTO: 71.1 % — SIGNIFICANT CHANGE UP (ref 43–77)
NITRITE UR-MCNC: NEGATIVE — SIGNIFICANT CHANGE UP
NITRITE UR-MCNC: NEGATIVE — SIGNIFICANT CHANGE UP
NRBC # BLD: 0 /100 WBCS — SIGNIFICANT CHANGE UP (ref 0–0)
NRBC # BLD: 0 /100 WBCS — SIGNIFICANT CHANGE UP (ref 0–0)
PH UR: 7.5 — SIGNIFICANT CHANGE UP (ref 5–8)
PH UR: 7.5 — SIGNIFICANT CHANGE UP (ref 5–8)
PLATELET # BLD AUTO: 273 K/UL — SIGNIFICANT CHANGE UP (ref 150–400)
PLATELET # BLD AUTO: 273 K/UL — SIGNIFICANT CHANGE UP (ref 150–400)
POTASSIUM SERPL-MCNC: 4.4 MMOL/L — SIGNIFICANT CHANGE UP (ref 3.5–5.3)
POTASSIUM SERPL-MCNC: 4.4 MMOL/L — SIGNIFICANT CHANGE UP (ref 3.5–5.3)
POTASSIUM SERPL-SCNC: 4.4 MMOL/L — SIGNIFICANT CHANGE UP (ref 3.5–5.3)
POTASSIUM SERPL-SCNC: 4.4 MMOL/L — SIGNIFICANT CHANGE UP (ref 3.5–5.3)
PROT SERPL-MCNC: 6.8 G/DL — SIGNIFICANT CHANGE UP (ref 6–8.3)
PROT SERPL-MCNC: 6.8 G/DL — SIGNIFICANT CHANGE UP (ref 6–8.3)
PROT UR-MCNC: NEGATIVE MG/DL — SIGNIFICANT CHANGE UP
PROT UR-MCNC: NEGATIVE MG/DL — SIGNIFICANT CHANGE UP
RBC # BLD: 4.34 M/UL — SIGNIFICANT CHANGE UP (ref 3.8–5.2)
RBC # BLD: 4.34 M/UL — SIGNIFICANT CHANGE UP (ref 3.8–5.2)
RBC # FLD: 13 % — SIGNIFICANT CHANGE UP (ref 10.3–14.5)
RBC # FLD: 13 % — SIGNIFICANT CHANGE UP (ref 10.3–14.5)
RBC CASTS # UR COMP ASSIST: 0 /HPF — SIGNIFICANT CHANGE UP (ref 0–4)
RBC CASTS # UR COMP ASSIST: 0 /HPF — SIGNIFICANT CHANGE UP (ref 0–4)
SODIUM SERPL-SCNC: 141 MMOL/L — SIGNIFICANT CHANGE UP (ref 135–145)
SODIUM SERPL-SCNC: 141 MMOL/L — SIGNIFICANT CHANGE UP (ref 135–145)
SP GR SPEC: 1.01 — SIGNIFICANT CHANGE UP (ref 1–1.03)
SP GR SPEC: 1.01 — SIGNIFICANT CHANGE UP (ref 1–1.03)
UROBILINOGEN FLD QL: 0.2 MG/DL — SIGNIFICANT CHANGE UP (ref 0.2–1)
UROBILINOGEN FLD QL: 0.2 MG/DL — SIGNIFICANT CHANGE UP (ref 0.2–1)
WBC # BLD: 8.21 K/UL — SIGNIFICANT CHANGE UP (ref 3.8–10.5)
WBC # BLD: 8.21 K/UL — SIGNIFICANT CHANGE UP (ref 3.8–10.5)
WBC # FLD AUTO: 8.21 K/UL — SIGNIFICANT CHANGE UP (ref 3.8–10.5)
WBC # FLD AUTO: 8.21 K/UL — SIGNIFICANT CHANGE UP (ref 3.8–10.5)
WBC UR QL: 0 /HPF — SIGNIFICANT CHANGE UP (ref 0–5)
WBC UR QL: 0 /HPF — SIGNIFICANT CHANGE UP (ref 0–5)

## 2024-01-03 PROCEDURE — 99285 EMERGENCY DEPT VISIT HI MDM: CPT

## 2024-01-03 PROCEDURE — 93010 ELECTROCARDIOGRAM REPORT: CPT

## 2024-01-03 PROCEDURE — 71045 X-RAY EXAM CHEST 1 VIEW: CPT | Mod: 26

## 2024-01-03 PROCEDURE — 99233 SBSQ HOSP IP/OBS HIGH 50: CPT

## 2024-01-03 PROCEDURE — 70450 CT HEAD/BRAIN W/O DYE: CPT | Mod: 26,MA

## 2024-01-03 RX ORDER — ONDANSETRON 8 MG/1
4 TABLET, FILM COATED ORAL EVERY 8 HOURS
Refills: 0 | Status: DISCONTINUED | OUTPATIENT
Start: 2024-01-03 | End: 2024-01-04

## 2024-01-03 RX ORDER — NYSTATIN 500MM UNIT
500000 POWDER (EA) MISCELLANEOUS EVERY 6 HOURS
Refills: 0 | Status: DISCONTINUED | OUTPATIENT
Start: 2024-01-03 | End: 2024-01-04

## 2024-01-03 RX ORDER — LANOLIN ALCOHOL/MO/W.PET/CERES
3 CREAM (GRAM) TOPICAL AT BEDTIME
Refills: 0 | Status: DISCONTINUED | OUTPATIENT
Start: 2024-01-03 | End: 2024-01-04

## 2024-01-03 RX ORDER — POTASSIUM CHLORIDE 20 MEQ
20 PACKET (EA) ORAL DAILY
Refills: 0 | Status: DISCONTINUED | OUTPATIENT
Start: 2024-01-03 | End: 2024-01-04

## 2024-01-03 RX ORDER — LOSARTAN POTASSIUM 100 MG/1
100 TABLET, FILM COATED ORAL DAILY
Refills: 0 | Status: DISCONTINUED | OUTPATIENT
Start: 2024-01-03 | End: 2024-01-04

## 2024-01-03 RX ORDER — SENNA PLUS 8.6 MG/1
2 TABLET ORAL AT BEDTIME
Refills: 0 | Status: DISCONTINUED | OUTPATIENT
Start: 2024-01-03 | End: 2024-01-04

## 2024-01-03 RX ORDER — ATORVASTATIN CALCIUM 80 MG/1
40 TABLET, FILM COATED ORAL AT BEDTIME
Refills: 0 | Status: DISCONTINUED | OUTPATIENT
Start: 2024-01-03 | End: 2024-01-04

## 2024-01-03 RX ORDER — SODIUM CHLORIDE 9 MG/ML
1000 INJECTION INTRAMUSCULAR; INTRAVENOUS; SUBCUTANEOUS ONCE
Refills: 0 | Status: COMPLETED | OUTPATIENT
Start: 2024-01-03 | End: 2024-01-03

## 2024-01-03 RX ORDER — ENTECAVIR 0.5 MG/1
0.5 TABLET ORAL DAILY
Refills: 0 | Status: DISCONTINUED | OUTPATIENT
Start: 2024-01-03 | End: 2024-01-04

## 2024-01-03 RX ORDER — METOPROLOL TARTRATE 50 MG
75 TABLET ORAL THREE TIMES A DAY
Refills: 0 | Status: DISCONTINUED | OUTPATIENT
Start: 2024-01-03 | End: 2024-01-04

## 2024-01-03 RX ORDER — POLYETHYLENE GLYCOL 3350 17 G/17G
17 POWDER, FOR SOLUTION ORAL DAILY
Refills: 0 | Status: DISCONTINUED | OUTPATIENT
Start: 2024-01-03 | End: 2024-01-04

## 2024-01-03 RX ORDER — CEFTRIAXONE 500 MG/1
1000 INJECTION, POWDER, FOR SOLUTION INTRAMUSCULAR; INTRAVENOUS ONCE
Refills: 0 | Status: COMPLETED | OUTPATIENT
Start: 2024-01-03 | End: 2024-01-03

## 2024-01-03 RX ORDER — APIXABAN 2.5 MG/1
5 TABLET, FILM COATED ORAL
Refills: 0 | Status: DISCONTINUED | OUTPATIENT
Start: 2024-01-03 | End: 2024-01-04

## 2024-01-03 RX ORDER — ACETAMINOPHEN 500 MG
650 TABLET ORAL EVERY 6 HOURS
Refills: 0 | Status: DISCONTINUED | OUTPATIENT
Start: 2024-01-03 | End: 2024-01-04

## 2024-01-03 RX ORDER — SODIUM CHLORIDE 9 MG/ML
1000 INJECTION INTRAMUSCULAR; INTRAVENOUS; SUBCUTANEOUS
Refills: 0 | Status: DISCONTINUED | OUTPATIENT
Start: 2024-01-03 | End: 2024-01-04

## 2024-01-03 RX ADMIN — SODIUM CHLORIDE 1000 MILLILITER(S): 9 INJECTION INTRAMUSCULAR; INTRAVENOUS; SUBCUTANEOUS at 17:00

## 2024-01-03 RX ADMIN — SENNA PLUS 2 TABLET(S): 8.6 TABLET ORAL at 22:19

## 2024-01-03 RX ADMIN — Medication 75 MILLIGRAM(S): at 22:19

## 2024-01-03 RX ADMIN — SODIUM CHLORIDE 2000 MILLILITER(S): 9 INJECTION INTRAMUSCULAR; INTRAVENOUS; SUBCUTANEOUS at 14:47

## 2024-01-03 RX ADMIN — CEFTRIAXONE 100 MILLIGRAM(S): 500 INJECTION, POWDER, FOR SOLUTION INTRAMUSCULAR; INTRAVENOUS at 18:11

## 2024-01-03 RX ADMIN — SODIUM CHLORIDE 75 MILLILITER(S): 9 INJECTION INTRAMUSCULAR; INTRAVENOUS; SUBCUTANEOUS at 22:19

## 2024-01-03 RX ADMIN — ATORVASTATIN CALCIUM 40 MILLIGRAM(S): 80 TABLET, FILM COATED ORAL at 22:18

## 2024-01-03 NOTE — ED ADULT NURSE NOTE - NSFALLRISKINTERV_ED_ALL_ED
Assistance OOB with selected safe patient handling equipment if applicable/Assistance with ambulation/Communicate fall risk and risk factors to all staff, patient, and family/Monitor gait and stability/Provide patient with walking aids/Provide visual cue: yellow wristband, yellow gown, etc/Reinforce activity limits and safety measures with patient and family/Call bell, personal items and telephone in reach/Instruct patient to call for assistance before getting out of bed/chair/stretcher/Non-slip footwear applied when patient is off stretcher/[place] to call system/Physically safe environment - no spills, clutter or unnecessary equipment/Purposeful Proactive Rounding/Room/bathroom lighting operational, light cord in reach Assistance OOB with selected safe patient handling equipment if applicable/Assistance with ambulation/Communicate fall risk and risk factors to all staff, patient, and family/Monitor gait and stability/Provide patient with walking aids/Provide visual cue: yellow wristband, yellow gown, etc/Reinforce activity limits and safety measures with patient and family/Call bell, personal items and telephone in reach/Instruct patient to call for assistance before getting out of bed/chair/stretcher/Non-slip footwear applied when patient is off stretcher/[place] to call system/Physically safe environment - no spills, clutter or unnecessary equipment/Purposeful Proactive Rounding/Room/bathroom lighting operational, light cord in reach

## 2024-01-03 NOTE — ED ADULT TRIAGE NOTE - CHIEF COMPLAINT QUOTE
BIB EMS from [facility] s/p seizure like activity witnessed by daughter. Pt does not have h/o seizures. Pt at baseline mental status.

## 2024-01-03 NOTE — H&P ADULT - PROBLEM SELECTOR PLAN 1
-Pt with episode of witnessed head shaking lasting ~ 5 minutes with 15-20 minutes postictal state followed by complete return to baseline  -CT head: No interval change. No acute intracranial bleeding. 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  -Possibly infectious etiology however UA only showing moderate leukocytes, Chest xray personally reviewed, no evidence of cardiopulmonary disease  -Glucose level 119 on admission  -Seizure precautions  -Fall precautions  -Aspiration precautions  -Neuro checks q 4 hrs  -IV hydration   -Neurology consult  -EEG  -NPO until able to pass bedside swallow eval -Pt with episode of witnessed head shaking lasting ~ 5 minutes with 15-20 minutes postictal state followed by complete return to baseline  -CT head: No interval change. No acute intracranial bleeding. 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  -Possibly infectious etiology however UA only showing moderate leukocytes, Chest xray personally reviewed, no evidence of cardiopulmonary disease  -Glucose level 119 on admission  -Seizure precautions  -Fall precautions  -Aspiration precautions  -Neuro checks q 4 hrs  -NPO until able to pass bedside swallow eval  -IV hydration   -EEG  -Neurology consult -Pt with episode of witnessed head shaking lasting ~ 5 minutes with 15-20 minutes postictal state followed by complete return to baseline  -CT head: No interval change. No acute intracranial bleeding. 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  -Differential includes focal seizure s/p CVA in June  -Possibly infectious etiology however UA only showing moderate leukocytes, Chest xray personally reviewed, no evidence of cardiopulmonary disease  -Would still consider TIA  -Admit to tele  -Glucose level 119 on admission  -Seizure precautions  -Fall precautions  -Aspiration precautions  -Neuro checks q 4 hrs  -NPO until able to pass bedside swallow eval  -IV hydration   -EEG  -Neurology consult -Pt with episode of witnessed head shaking lasting ~ 5 minutes with 15-20 minutes postictal state followed by complete return to baseline  -CT head: No interval change. No acute intracranial bleeding. 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  -Differential includes focal seizure s/p CVA in June  -Possibly infectious etiology however UA only showing moderate leukocytes, Chest xray personally reviewed, no evidence of cardiopulmonary disease  -Would still consider TIA  -Admit to tele  -Glucose level 119 on admission  -Seizure precautions  -Fall precautions  -Aspiration precautions  -Neuro checks q 4 hrs  -NPO   -IV hydration   -EEG  -Nutrition consult for PEG feeds  -Neurology consult

## 2024-01-03 NOTE — H&P ADULT - NSHPLABSRESULTS_GEN_ALL_CORE
13.4   8.21  )-----------( 273      ( 2024 14:42 )             39.8     2024 14:42    141    |  104    |  17     ----------------------------<  145    4.4     |  26     |  0.73     Ca    9.4        2024 14:42  Mg     2.0       2024 14:42    TPro  6.8    /  Alb  3.3    /  TBili  0.8    /  DBili  x      /  AST  20     /  ALT  11     /  AlkPhos  162    2024 14:42      CAPILLARY BLOOD GLUCOSE      LIVER FUNCTIONS - ( 2024 14:42 )  Alb: 3.3 g/dL / Pro: 6.8 g/dL / ALK PHOS: 162 U/L / ALT: 11 U/L / AST: 20 U/L / GGT: x           Urinalysis Basic - ( 2024 16:15 )    Color: Yellow / Appearance: Clear / S.006 / pH: x  Gluc: x / Ketone: Negative mg/dL  / Bili: Negative / Urobili: 0.2 mg/dL   Blood: x / Protein: Negative mg/dL / Nitrite: Negative   Leuk Esterase: Moderate / RBC: 0 /HPF / WBC 0 /HPF   Sq Epi: x / Non Sq Epi: x / Bacteria: Few /HPF        IMPRESSION:    No interval change. No acute intracranial bleeding.  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    --- End of Report ---      YASEMIN MORRIS MD; Attending Radiologist  This document has been electronically signed. Naveed  3 2024  4:49PM

## 2024-01-03 NOTE — H&P ADULT - ATTENDING COMMENTS
Seizure  Hx of CVA  Hx of DVT/Afib  Hx of HTN    Patient seen and examined at bedside  From   Will consult neurology, seizure precautions, EEG  Received CTX in ER, UCX pending; will hold on further abx as UA is not impressive  C/w home medications  PT eval  DVT PPX: eliquis  AM labs  DISP: From , Pending course

## 2024-01-03 NOTE — H&P ADULT - ASSESSMENT
Ms. Falcon is an 82 year old, German speaking, woman with PMH of HTN, AFIB (on Xarelto), Hepatitis C (on Entecavir), Left MCA infarct 6/2023 s/p mechanical thrombectomy, residual R hemiparesis and dysphagia s/p PEG tube, no history of seizures, from [facility], presented to  ED 1/3/24 for a witnessed episode of uncontrolled head-shaking. Patient is being admitted for work up of new onset focal seizure.    Patient's daughter, Vincent, called and updated on plan. Phone number: [phone]    Case dw Dr. Brandt     Ms. Falcon is an 82 year old, Azeri speaking, woman with PMH of HTN, AFIB (on Xarelto), Hepatitis C (on Entecavir), Left MCA infarct 6/2023 s/p mechanical thrombectomy, residual R hemiparesis and dysphagia s/p PEG tube, no history of seizures, from [facility], presented to  ED 1/3/24 for a witnessed episode of uncontrolled head-shaking. Patient is being admitted for work up of new onset focal seizure.    Patient's daughter, Vincent, called and updated on plan. Phone number: [phone]    Case dw Dr. Brandt

## 2024-01-03 NOTE — ED PROVIDER NOTE - PHYSICAL EXAMINATION
General:     NAD, well-nourished, well-appearing  Eyes: PERRL, white sclera  Head:     NC/AT, EOMI  Pharynx: pharynx wnl, oral mucosa moist  Neck:     trachea midline  Lungs:     CTA b/l  CVS:     RRR  Abd:     +BS, s/nt/nd  Ext:   no deformities   Skin: no rash  Neuro: awake and alert, right side hemiparesis.

## 2024-01-03 NOTE — ED ADULT NURSE NOTE - OBJECTIVE STATEMENT
Received pt. from previous RN.  Pt. alert and oriented.  Family at bedside. Pt. had witnessed seizure from facility.

## 2024-01-03 NOTE — H&P ADULT - PROBLEM SELECTOR PLAN 3
-EKG on admission: afib, overall unchanged from prior   -Cw home metoprolol with hold parameters  -Cw home xarelto -EKG on admission: afib, overall unchanged from prior   - home metoprolol with hold parameters  -Cw home eliquis -EKG on admission: inverted T in aVL which is present in EKG from 11/23, good R wave progression, resolution of T wave inversion in the lateral leads which was previously present in EKG from 11/23  -Rate controlled  -Cw home metoprolol with hold parameters  -Cw home eliquis

## 2024-01-03 NOTE — H&P ADULT - NSICDXPASTMEDICALHX_GEN_ALL_CORE_FT
PAST MEDICAL HISTORY:  Afib     Cerebrovascular accident (CVA)     Hepatitis C     HTN (hypertension)

## 2024-01-03 NOTE — H&P ADULT - NSHPPHYSICALEXAM_GEN_ALL_CORE
PHYSICAL EXAM:  Vital Signs Last 24 Hrs  T(C): 36.1 (03 Jan 2024 14:05), Max: 36.1 (03 Jan 2024 14:05)  T(F): 97 (03 Jan 2024 14:05), Max: 97 (03 Jan 2024 14:05)  HR: 90 (03 Jan 2024 14:05) (90 - 90)  BP: 153/70 (03 Jan 2024 14:05) (153/70 - 153/70)  RR: 18 (03 Jan 2024 14:05) (18 - 18)  SpO2: 97% (03 Jan 2024 14:05) (97% - 97%)    Parameters below as of 03 Jan 2024 14:05  Patient On (Oxygen Delivery Method): room air      PHYSICAL EXAM:  GENERAL: NAD, lying in bed comfortably  HEAD:  Atraumatic, Normocephalic  EYES: EOMI, PERRLA, conjunctiva and sclera clear  ENT: Moist mucous membranes, no oral cuts or lacerations appreciated on exam  NECK: Supple, No JVD  RESP: Clear to auscultation bilaterally, good air entry bilaterally; No wheezing, rales, or rhonchi. Unlabored respirations  CARDIAC: Regular rate and rhythm. S1 and S2. No murmurs, rubs, or gallops  GI:  Peg tube in place. Soft, Nontender, Nondistended. Bowel sounds present x4 quadrants; No hepatomegaly. No splenomegaly.  : Primafit in place  EXTREMITIES:  2+ Peripheral Pulses. Capillary refill <2 seconds. No clubbing, cyanosis, or edema  NERVOUS SYSTEM:  Alert & Oriented X3, speech clear. No deficits   MSK: Right upper and lower extremity weakness s/p CVA  SKIN: No rashes, bruises, or other lesions

## 2024-01-03 NOTE — ED ADULT NURSE NOTE - BREATHING, MLM
February 21, 2020      Malissa Miles, AMANUEL-NANCY  [address]  [place]  [place] [address]           [facility] General [facility]  [address] [facility] [place] [address]  Phone: [phone]  Fax: [phone]          Patient: Kellie Monte   MR Number: [id number]   YOB: 1948   Date of Visit: 2/21/2020       Dear Malissa Miles:    Thank you for referring Kellie Monte to me for evaluation. Attached you will find relevant portions of my assessment and plan of care.    If you have questions, please do not hesitate to call me. I look forward to following Kellie Monte along with you.    Sincerely,    Jerrod Abarca MD    Enclosure  CC:  No Recipients    If you would like to receive this communication electronically, please contact [email or web address] or [phone] to request more information on [facility] Link access.    For providers and/or their staff who would like to refer a patient to [facility], please contact us through our one-stop-shop provider referral line, [facility], at [phone].    If you feel you have received this communication in error or would no longer like to receive these types of communications, please e-mail [email or web address]          Spontaneous, unlabored and symmetrical

## 2024-01-03 NOTE — H&P ADULT - HISTORY OF PRESENT ILLNESS
Ms. Falcon is an 82 year old, Georgian speaking, woman with PMH of HTN, AFIB (on Xarelto), Hepatitis C (on Entecavir), Left MCA infarct 6/2023 s/p mechanical thrombectomy, residual R hemiparesis and dysphagia s/p PEG tube, no history of seizures, from [facility], presented to  ED 1/3/24 for a witnessed episode of uncontrolled head-shaking. History obtained from witness daughter and HCP Vincent and her  Nat. The patient was speaking on the phone and suddenly stopped responding, and started uncontrollably shaking her head from side to side for 5 minutes. Family reports no arm or leg shaking. After 5 minutes she appeared very tired and was unable to answer questions for 20 minutes; family thinks she was too tired to answer questions. After 20 minutes she returned to her normal baseline mental status. She had a previous unwitnessed syncopal episode 11/6/23 and was evaluated at  ED with negative CT head and DC’d with dx of vasovagal syncope. Prior to her stroke in June of 2023 she never had any episodes like these before.      S/p 1 dose of ceftriaxone in ED.     In the ED Vitals were: temp 97, HR 90, /70, RR 18, SPO2 97%     In the ED labs were notable for: lactate 2.6, and UA with moderate leukocyte esterase     CT head: No interval change. No acute intracranial bleeding. 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     Chest XR personally reviewed negative for evidence of cardiopulmonary disease      EKG personally reviewed afib, inverted T in aVL which is present in EKG from 11/23, good R wave progression, resolution of T wave inversion in the lateral leads which was previously present in EKG from 11/23. Overall unchanged from prior.   Ms. Falcon is an 82 year old, Croatian speaking, woman with PMH of HTN, AFIB (on Xarelto), Hepatitis C (on Entecavir), Left MCA infarct 6/2023 s/p mechanical thrombectomy, residual R hemiparesis and dysphagia s/p PEG tube, no history of seizures, from [facility], presented to  ED 1/3/24 for a witnessed episode of uncontrolled head-shaking. History obtained from witness daughter and HCP Vincent and her  Nat. The patient was speaking on the phone and suddenly stopped responding, and started uncontrollably shaking her head from side to side for 5 minutes. Family reports no arm or leg shaking. After 5 minutes she appeared very tired and was unable to answer questions for 20 minutes; family thinks she was too tired to answer questions. After 20 minutes she returned to her normal baseline mental status. She had a previous unwitnessed syncopal episode 11/6/23 and was evaluated at  ED with negative CT head and DC’d with dx of vasovagal syncope. Prior to her stroke in June of 2023 she never had any episodes like these before.      S/p 1 dose of ceftriaxone in ED.     In the ED Vitals were: temp 97, HR 90, /70, RR 18, SPO2 97%     In the ED labs were notable for: lactate 2.6, and UA with moderate leukocyte esterase     CT head: No interval change. No acute intracranial bleeding. 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     Chest XR personally reviewed negative for evidence of cardiopulmonary disease      EKG personally reviewed afib, inverted T in aVL which is present in EKG from 11/23, good R wave progression, resolution of T wave inversion in the lateral leads which was previously present in EKG from 11/23. Overall unchanged from prior.   Ms. Falcon is an 82 year old, Slovenian speaking, woman with PMH of HTN, AFIB (on Eliquis), Hepatitis C (on Entecavir), Left MCA infarct 6/2023 s/p mechanical thrombectomy, residual R hemiparesis and dysphagia s/p PEG tube, no history of seizures, from [facility], presented to  ED 1/3/24 for a witnessed episode of uncontrolled head-shaking. History obtained from witness daughter and HCP Arleah and her  Nat. The patient was speaking on the phone and suddenly stopped responding, and started uncontrollably shaking her head from side to side for 5 minutes. Family reports no arm or leg shaking. After 5 minutes she appeared very tired and was unable to answer questions for 20 minutes; family thinks she was too tired to answer questions. After 20 minutes she returned to her normal baseline mental status. She had a previous unwitnessed syncopal episode 11/6/23 and was evaluated at  ED with negative CT head and DC’d with dx of vasovagal syncope. Prior to her stroke in June of 2023 she never had any episodes like these before.      S/p 1 dose of ceftriaxone in ED.     In the ED Vitals were: temp 97, HR 90, /70, RR 18, SPO2 97%     In the ED labs were notable for: lactate 2.6, and UA with moderate leukocyte esterase     CT head: No interval change. No acute intracranial bleeding. 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     Chest XR personally reviewed negative for evidence of cardiopulmonary disease      EKG personally reviewed afib, inverted T in aVL which is present in EKG from 11/23, good R wave progression, resolution of T wave inversion in the lateral leads which was previously present in EKG from 11/23. Overall unchanged from prior.   Ms. Falcon is an 82 year old, Czech speaking, woman with PMH of HTN, AFIB (on Eliquis), Hepatitis C (on Entecavir), Left MCA infarct 6/2023 s/p mechanical thrombectomy, residual R hemiparesis and dysphagia s/p PEG tube, no history of seizures, from [facility], presented to  ED 1/3/24 for a witnessed episode of uncontrolled head-shaking. History obtained from witness daughter and HCP Arleah and her  Nat. The patient was speaking on the phone and suddenly stopped responding, and started uncontrollably shaking her head from side to side for 5 minutes. Family reports no arm or leg shaking. After 5 minutes she appeared very tired and was unable to answer questions for 20 minutes; family thinks she was too tired to answer questions. After 20 minutes she returned to her normal baseline mental status. She had a previous unwitnessed syncopal episode 11/6/23 and was evaluated at  ED with negative CT head and DC’d with dx of vasovagal syncope. Prior to her stroke in June of 2023 she never had any episodes like these before.      S/p 1 dose of ceftriaxone in ED.     In the ED Vitals were: temp 97, HR 90, /70, RR 18, SPO2 97%     In the ED labs were notable for: lactate 2.6, and UA with moderate leukocyte esterase     CT head: No interval change. No acute intracranial bleeding. 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     Chest XR personally reviewed negative for evidence of cardiopulmonary disease      EKG personally reviewed afib, inverted T in aVL which is present in EKG from 11/23, good R wave progression, resolution of T wave inversion in the lateral leads which was previously present in EKG from 11/23. Overall unchanged from prior.

## 2024-01-03 NOTE — H&P ADULT - PROBLEM SELECTOR PLAN 2
-UA + for moderate leukocytes  -Denies urinary symptoms   -S/p 1 dose of ceftriaxone in the ED   -Hold further abx treatment, f/up urine cultures, consider abx pending cultures

## 2024-01-03 NOTE — ED PROVIDER NOTE - OBJECTIVE STATEMENT
83-year-old female past medical history of A-fib on Xarelto, CVA with right hemiparesis, hypertension, hepatitis C presents for a witnessed seizure while sitting in the wheelchair.  Patient has no history of seizures.  They were on the phone speaking with family in [place] and family was at bedside when patient had onset of tonic-clonic generalized shaking.  Lasted about 5 minutes with 20-minute postictal period.  Sent to the ED for evaluation.  Patient is Icelandic speaking. 83-year-old female past medical history of A-fib on Xarelto, CVA with right hemiparesis, hypertension, hepatitis C presents for a witnessed seizure while sitting in the wheelchair.  Patient has no history of seizures.  They were on the phone speaking with family in [place] and family was at bedside when patient had onset of tonic-clonic generalized shaking.  Lasted about 5 minutes with 20-minute postictal period.  Sent to the ED for evaluation.  Patient is Yi speaking.

## 2024-01-03 NOTE — ED PROVIDER NOTE - CLINICAL SUMMARY MEDICAL DECISION MAKING FREE TEXT BOX
83-year-old female past medical history of A-fib on Xarelto, CVA with right hemiparesis, hypertension, hepatitis C presents for a witnessed seizure while sitting in the wheelchair.  Patient has no history of seizures.  They were on the phone speaking with family in [place] and family was at bedside when patient had onset of tonic-clonic generalized shaking.  Lasted about 5 minutes with 20-minute postictal period.  Sent to the ED for evaluation.  Patient is Costa Rican speaking.  Exam as stated. Labs reviewed. Lactate 2.6. Likely 2/2 seizure. UTI also. Culture sent. Plan for admission. Family at bedside to provide history. Upon admission, family leaving the bedside. Daughter is reachable by phone [phone] / son in law [phone] / granddaughter [phone] 83-year-old female past medical history of A-fib on Xarelto, CVA with right hemiparesis, hypertension, hepatitis C presents for a witnessed seizure while sitting in the wheelchair.  Patient has no history of seizures.  They were on the phone speaking with family in [place] and family was at bedside when patient had onset of tonic-clonic generalized shaking.  Lasted about 5 minutes with 20-minute postictal period.  Sent to the ED for evaluation.  Patient is Malawian speaking.  Exam as stated. Labs reviewed. Lactate 2.6. Likely 2/2 seizure. UTI also. Culture sent. Plan for admission. Family at bedside to provide history. Upon admission, family leaving the bedside. Daughter is reachable by phone [phone] / son in law [phone] / granddaughter [phone]

## 2024-01-03 NOTE — H&P ADULT - PROBLEM SELECTOR PLAN 4
-Pt s/p left CVA with residual right sided upper and lower extremity weakness  -Peg tub in place for residual dysphagia   -Cw home xarelto -Pt s/p left CVA with residual right sided upper and lower extremity weakness  -Peg tub in place for residual dysphagia   -Cw home eliquis -Pt s/p left CVA with residual right sided upper and lower extremity weakness  -Peg tub in place for residual dysphagia   -Nutrition consult for peg feeds  -Cw home eliquis

## 2024-01-04 ENCOUNTER — TRANSCRIPTION ENCOUNTER (OUTPATIENT)
Age: 84
End: 2024-01-04

## 2024-01-04 VITALS — DIASTOLIC BLOOD PRESSURE: 85 MMHG | HEART RATE: 89 BPM | SYSTOLIC BLOOD PRESSURE: 137 MMHG

## 2024-01-04 LAB
ALBUMIN SERPL ELPH-MCNC: 3.3 G/DL — SIGNIFICANT CHANGE UP (ref 3.3–5)
ALBUMIN SERPL ELPH-MCNC: 3.3 G/DL — SIGNIFICANT CHANGE UP (ref 3.3–5)
ALP SERPL-CCNC: 165 U/L — HIGH (ref 40–120)
ALP SERPL-CCNC: 165 U/L — HIGH (ref 40–120)
ALT FLD-CCNC: 12 U/L — SIGNIFICANT CHANGE UP (ref 10–45)
ALT FLD-CCNC: 12 U/L — SIGNIFICANT CHANGE UP (ref 10–45)
ANION GAP SERPL CALC-SCNC: 12 MMOL/L — SIGNIFICANT CHANGE UP (ref 5–17)
ANION GAP SERPL CALC-SCNC: 12 MMOL/L — SIGNIFICANT CHANGE UP (ref 5–17)
AST SERPL-CCNC: 18 U/L — SIGNIFICANT CHANGE UP (ref 10–40)
AST SERPL-CCNC: 18 U/L — SIGNIFICANT CHANGE UP (ref 10–40)
BASOPHILS # BLD AUTO: 0.06 K/UL — SIGNIFICANT CHANGE UP (ref 0–0.2)
BASOPHILS # BLD AUTO: 0.06 K/UL — SIGNIFICANT CHANGE UP (ref 0–0.2)
BASOPHILS NFR BLD AUTO: 0.7 % — SIGNIFICANT CHANGE UP (ref 0–2)
BASOPHILS NFR BLD AUTO: 0.7 % — SIGNIFICANT CHANGE UP (ref 0–2)
BILIRUB SERPL-MCNC: 1.1 MG/DL — SIGNIFICANT CHANGE UP (ref 0.2–1.2)
BILIRUB SERPL-MCNC: 1.1 MG/DL — SIGNIFICANT CHANGE UP (ref 0.2–1.2)
BUN SERPL-MCNC: 12 MG/DL — SIGNIFICANT CHANGE UP (ref 7–23)
BUN SERPL-MCNC: 12 MG/DL — SIGNIFICANT CHANGE UP (ref 7–23)
CALCIUM SERPL-MCNC: 9.3 MG/DL — SIGNIFICANT CHANGE UP (ref 8.4–10.5)
CALCIUM SERPL-MCNC: 9.3 MG/DL — SIGNIFICANT CHANGE UP (ref 8.4–10.5)
CHLORIDE SERPL-SCNC: 107 MMOL/L — SIGNIFICANT CHANGE UP (ref 96–108)
CHLORIDE SERPL-SCNC: 107 MMOL/L — SIGNIFICANT CHANGE UP (ref 96–108)
CO2 SERPL-SCNC: 23 MMOL/L — SIGNIFICANT CHANGE UP (ref 22–31)
CO2 SERPL-SCNC: 23 MMOL/L — SIGNIFICANT CHANGE UP (ref 22–31)
CREAT SERPL-MCNC: 0.62 MG/DL — SIGNIFICANT CHANGE UP (ref 0.5–1.3)
CREAT SERPL-MCNC: 0.62 MG/DL — SIGNIFICANT CHANGE UP (ref 0.5–1.3)
CULTURE RESULTS: SIGNIFICANT CHANGE UP
CULTURE RESULTS: SIGNIFICANT CHANGE UP
EGFR: 88 ML/MIN/1.73M2 — SIGNIFICANT CHANGE UP
EGFR: 88 ML/MIN/1.73M2 — SIGNIFICANT CHANGE UP
EOSINOPHIL # BLD AUTO: 0.1 K/UL — SIGNIFICANT CHANGE UP (ref 0–0.5)
EOSINOPHIL # BLD AUTO: 0.1 K/UL — SIGNIFICANT CHANGE UP (ref 0–0.5)
EOSINOPHIL NFR BLD AUTO: 1.1 % — SIGNIFICANT CHANGE UP (ref 0–6)
EOSINOPHIL NFR BLD AUTO: 1.1 % — SIGNIFICANT CHANGE UP (ref 0–6)
GLUCOSE SERPL-MCNC: 126 MG/DL — HIGH (ref 70–99)
GLUCOSE SERPL-MCNC: 126 MG/DL — HIGH (ref 70–99)
HCT VFR BLD CALC: 39.7 % — SIGNIFICANT CHANGE UP (ref 34.5–45)
HCT VFR BLD CALC: 39.7 % — SIGNIFICANT CHANGE UP (ref 34.5–45)
HGB BLD-MCNC: 13.8 G/DL — SIGNIFICANT CHANGE UP (ref 11.5–15.5)
HGB BLD-MCNC: 13.8 G/DL — SIGNIFICANT CHANGE UP (ref 11.5–15.5)
IMM GRANULOCYTES NFR BLD AUTO: 0.5 % — SIGNIFICANT CHANGE UP (ref 0–0.9)
IMM GRANULOCYTES NFR BLD AUTO: 0.5 % — SIGNIFICANT CHANGE UP (ref 0–0.9)
LYMPHOCYTES # BLD AUTO: 1.58 K/UL — SIGNIFICANT CHANGE UP (ref 1–3.3)
LYMPHOCYTES # BLD AUTO: 1.58 K/UL — SIGNIFICANT CHANGE UP (ref 1–3.3)
LYMPHOCYTES # BLD AUTO: 17.8 % — SIGNIFICANT CHANGE UP (ref 13–44)
LYMPHOCYTES # BLD AUTO: 17.8 % — SIGNIFICANT CHANGE UP (ref 13–44)
MAGNESIUM SERPL-MCNC: 1.9 MG/DL — SIGNIFICANT CHANGE UP (ref 1.6–2.6)
MAGNESIUM SERPL-MCNC: 1.9 MG/DL — SIGNIFICANT CHANGE UP (ref 1.6–2.6)
MCHC RBC-ENTMCNC: 31.7 PG — SIGNIFICANT CHANGE UP (ref 27–34)
MCHC RBC-ENTMCNC: 31.7 PG — SIGNIFICANT CHANGE UP (ref 27–34)
MCHC RBC-ENTMCNC: 34.8 GM/DL — SIGNIFICANT CHANGE UP (ref 32–36)
MCHC RBC-ENTMCNC: 34.8 GM/DL — SIGNIFICANT CHANGE UP (ref 32–36)
MCV RBC AUTO: 91.1 FL — SIGNIFICANT CHANGE UP (ref 80–100)
MCV RBC AUTO: 91.1 FL — SIGNIFICANT CHANGE UP (ref 80–100)
MONOCYTES # BLD AUTO: 0.57 K/UL — SIGNIFICANT CHANGE UP (ref 0–0.9)
MONOCYTES # BLD AUTO: 0.57 K/UL — SIGNIFICANT CHANGE UP (ref 0–0.9)
MONOCYTES NFR BLD AUTO: 6.4 % — SIGNIFICANT CHANGE UP (ref 2–14)
MONOCYTES NFR BLD AUTO: 6.4 % — SIGNIFICANT CHANGE UP (ref 2–14)
NEUTROPHILS # BLD AUTO: 6.52 K/UL — SIGNIFICANT CHANGE UP (ref 1.8–7.4)
NEUTROPHILS # BLD AUTO: 6.52 K/UL — SIGNIFICANT CHANGE UP (ref 1.8–7.4)
NEUTROPHILS NFR BLD AUTO: 73.5 % — SIGNIFICANT CHANGE UP (ref 43–77)
NEUTROPHILS NFR BLD AUTO: 73.5 % — SIGNIFICANT CHANGE UP (ref 43–77)
NRBC # BLD: 0 /100 WBCS — SIGNIFICANT CHANGE UP (ref 0–0)
NRBC # BLD: 0 /100 WBCS — SIGNIFICANT CHANGE UP (ref 0–0)
PLATELET # BLD AUTO: 270 K/UL — SIGNIFICANT CHANGE UP (ref 150–400)
PLATELET # BLD AUTO: 270 K/UL — SIGNIFICANT CHANGE UP (ref 150–400)
POTASSIUM SERPL-MCNC: 3.6 MMOL/L — SIGNIFICANT CHANGE UP (ref 3.5–5.3)
POTASSIUM SERPL-MCNC: 3.6 MMOL/L — SIGNIFICANT CHANGE UP (ref 3.5–5.3)
POTASSIUM SERPL-SCNC: 3.6 MMOL/L — SIGNIFICANT CHANGE UP (ref 3.5–5.3)
POTASSIUM SERPL-SCNC: 3.6 MMOL/L — SIGNIFICANT CHANGE UP (ref 3.5–5.3)
PROT SERPL-MCNC: 6.8 G/DL — SIGNIFICANT CHANGE UP (ref 6–8.3)
PROT SERPL-MCNC: 6.8 G/DL — SIGNIFICANT CHANGE UP (ref 6–8.3)
RBC # BLD: 4.36 M/UL — SIGNIFICANT CHANGE UP (ref 3.8–5.2)
RBC # BLD: 4.36 M/UL — SIGNIFICANT CHANGE UP (ref 3.8–5.2)
RBC # FLD: 13 % — SIGNIFICANT CHANGE UP (ref 10.3–14.5)
RBC # FLD: 13 % — SIGNIFICANT CHANGE UP (ref 10.3–14.5)
SODIUM SERPL-SCNC: 142 MMOL/L — SIGNIFICANT CHANGE UP (ref 135–145)
SODIUM SERPL-SCNC: 142 MMOL/L — SIGNIFICANT CHANGE UP (ref 135–145)
SPECIMEN SOURCE: SIGNIFICANT CHANGE UP
SPECIMEN SOURCE: SIGNIFICANT CHANGE UP
WBC # BLD: 8.87 K/UL — SIGNIFICANT CHANGE UP (ref 3.8–10.5)
WBC # BLD: 8.87 K/UL — SIGNIFICANT CHANGE UP (ref 3.8–10.5)
WBC # FLD AUTO: 8.87 K/UL — SIGNIFICANT CHANGE UP (ref 3.8–10.5)
WBC # FLD AUTO: 8.87 K/UL — SIGNIFICANT CHANGE UP (ref 3.8–10.5)

## 2024-01-04 PROCEDURE — 99222 1ST HOSP IP/OBS MODERATE 55: CPT

## 2024-01-04 PROCEDURE — 87086 URINE CULTURE/COLONY COUNT: CPT

## 2024-01-04 PROCEDURE — 93005 ELECTROCARDIOGRAM TRACING: CPT

## 2024-01-04 PROCEDURE — 36415 COLL VENOUS BLD VENIPUNCTURE: CPT

## 2024-01-04 PROCEDURE — 99285 EMERGENCY DEPT VISIT HI MDM: CPT | Mod: 25

## 2024-01-04 PROCEDURE — 96361 HYDRATE IV INFUSION ADD-ON: CPT

## 2024-01-04 PROCEDURE — 71045 X-RAY EXAM CHEST 1 VIEW: CPT

## 2024-01-04 PROCEDURE — 96360 HYDRATION IV INFUSION INIT: CPT

## 2024-01-04 PROCEDURE — 70450 CT HEAD/BRAIN W/O DYE: CPT | Mod: MA

## 2024-01-04 PROCEDURE — 80307 DRUG TEST PRSMV CHEM ANLYZR: CPT

## 2024-01-04 PROCEDURE — 83735 ASSAY OF MAGNESIUM: CPT

## 2024-01-04 PROCEDURE — 83605 ASSAY OF LACTIC ACID: CPT

## 2024-01-04 PROCEDURE — 80053 COMPREHEN METABOLIC PANEL: CPT

## 2024-01-04 PROCEDURE — 85025 COMPLETE CBC W/AUTO DIFF WBC: CPT

## 2024-01-04 PROCEDURE — 99239 HOSP IP/OBS DSCHRG MGMT >30: CPT

## 2024-01-04 PROCEDURE — 81001 URINALYSIS AUTO W/SCOPE: CPT

## 2024-01-04 PROCEDURE — 95816 EEG AWAKE AND DROWSY: CPT | Mod: 26

## 2024-01-04 PROCEDURE — 95812 EEG 41-60 MINUTES: CPT

## 2024-01-04 RX ORDER — LEVETIRACETAM 250 MG/1
1 TABLET, FILM COATED ORAL
Qty: 0 | Refills: 0 | DISCHARGE
Start: 2024-01-04

## 2024-01-04 RX ORDER — ACETAMINOPHEN 500 MG
2 TABLET ORAL
Qty: 0 | Refills: 0 | DISCHARGE
Start: 2024-01-04

## 2024-01-04 RX ORDER — LEVETIRACETAM 250 MG/1
500 TABLET, FILM COATED ORAL
Refills: 0 | Status: DISCONTINUED | OUTPATIENT
Start: 2024-01-04 | End: 2024-01-04

## 2024-01-04 RX ORDER — LEVETIRACETAM 250 MG/1
500 TABLET, FILM COATED ORAL ONCE
Refills: 0 | Status: COMPLETED | OUTPATIENT
Start: 2024-01-04 | End: 2024-01-04

## 2024-01-04 RX ADMIN — Medication 650 MILLIGRAM(S): at 06:09

## 2024-01-04 RX ADMIN — APIXABAN 5 MILLIGRAM(S): 2.5 TABLET, FILM COATED ORAL at 05:31

## 2024-01-04 RX ADMIN — POLYETHYLENE GLYCOL 3350 17 GRAM(S): 17 POWDER, FOR SOLUTION ORAL at 12:28

## 2024-01-04 RX ADMIN — LEVETIRACETAM 500 MILLIGRAM(S): 250 TABLET, FILM COATED ORAL at 14:39

## 2024-01-04 RX ADMIN — ENTECAVIR 0.5 MILLIGRAM(S): 0.5 TABLET ORAL at 12:27

## 2024-01-04 RX ADMIN — Medication 500000 UNIT(S): at 12:28

## 2024-01-04 RX ADMIN — Medication 20 MILLIEQUIVALENT(S): at 12:27

## 2024-01-04 RX ADMIN — Medication 75 MILLIGRAM(S): at 05:31

## 2024-01-04 RX ADMIN — LEVETIRACETAM 500 MILLIGRAM(S): 250 TABLET, FILM COATED ORAL at 17:56

## 2024-01-04 RX ADMIN — Medication 500000 UNIT(S): at 05:30

## 2024-01-04 RX ADMIN — APIXABAN 5 MILLIGRAM(S): 2.5 TABLET, FILM COATED ORAL at 17:56

## 2024-01-04 RX ADMIN — LOSARTAN POTASSIUM 100 MILLIGRAM(S): 100 TABLET, FILM COATED ORAL at 05:31

## 2024-01-04 RX ADMIN — Medication 650 MILLIGRAM(S): at 05:30

## 2024-01-04 RX ADMIN — Medication 500000 UNIT(S): at 00:37

## 2024-01-04 RX ADMIN — Medication 75 MILLIGRAM(S): at 13:46

## 2024-01-04 NOTE — DISCHARGE NOTE PROVIDER - HOSPITAL COURSE
Ms. Falcon is an 82 year old, Malay speaking, woman with PMH of HTN, AFIB (on Xarelto), Hepatitis C (on Entecavir), Left MCA infarct 6/2023 s/p mechanical thrombectomy, residual R hemiparesis and dysphagia s/p PEG tube, no history of seizures, from [facility], presented to  ED 1/3/24 for a witnessed episode of uncontrolled head-shaking. Patient is being admitted for work up of new onset focal seizure.               Problem/Plan - 1:  ·  Problem: Seizure.   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  - no further episode since in ER.   - patient reported no ssx of infection. infection w/u so far neg  - neuro cx noted and appreciated. discussed with dr. Vega. started or Keppra 500 mg BID. cleared for DC Glengarif. Patient needs to follow up neurology OP.     Problem/Plan - 2:  ·  Problem: Urinary tract infection.   ·  Plan: -UA + for moderate leukocytes.  -Denies urinary symptoms   -S/p 1 dose of ceftriaxone in the ED   -Hold further abx treatment,     Problem/Plan - 3:  ·  Problem: Atrial fibrillation.   ·  Plan: -EKG on admission: inverted T in aVL which is present in EKG from 11/23, good R wave progression, resolution of T wave inversion in the lateral leads which was previously present in EKG from 11/23  -Rate controlled  -Cw home metoprolol with hold parameters  -Cw home eliquis.     Problem/Plan - 4:  ·  Problem: CVA (cerebral vascular accident).   ·  Plan: -Pt s/p left CVA with residual right sided upper and lower extremity weakness. same per patient  -Peg tub in place for residual dysphagia   -Nutrition consult placed for peg feeds. can be done at   - home eliquis.     Problem/Plan - 5:  ·  Problem: Hypertension.   ·  Plan: -Cw home losartan and metoprolol.     Problem/Plan - 6:  ·  Problem: Hyperlipidemia.   ·  Plan: -Cw home atorvastatin.     Problem/Plan - 7:  ·  Problem: Encounter for deep vein thrombosis (DVT) prophylaxis.   ·  Plan: -Cw home eliquis.     Problem/Plan - 8:  ·  Problem: Full code status.   ·  Plan: full code.    Patient's daughter, Vincent, called and updated on plan. Phone number: [phone] on 01/4/23. agreed    Dispo: Medically stable for DC to . sign out was given to .  Discharging Provider: Ingrid Mortensen MD. cell: [phone]. call with   SNF provider: : informed Ms. Falcon is an 82 year old, Irish speaking, woman with PMH of HTN, AFIB (on Xarelto), Hepatitis C (on Entecavir), Left MCA infarct 6/2023 s/p mechanical thrombectomy, residual R hemiparesis and dysphagia s/p PEG tube, no history of seizures, from [facility], presented to  ED 1/3/24 for a witnessed episode of uncontrolled head-shaking. Patient is being admitted for work up of new onset focal seizure.               Problem/Plan - 1:  ·  Problem: Seizure.   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  - no further episode since in ER.   - patient reported no ssx of infection. infection w/u so far neg  - neuro cx noted and appreciated. discussed with dr. Vega. started or Keppra 500 mg BID. cleared for DC Glengarif. Patient needs to follow up neurology OP.     Problem/Plan - 2:  ·  Problem: Urinary tract infection.   ·  Plan: -UA + for moderate leukocytes.  -Denies urinary symptoms   -S/p 1 dose of ceftriaxone in the ED   -Hold further abx treatment,     Problem/Plan - 3:  ·  Problem: Atrial fibrillation.   ·  Plan: -EKG on admission: inverted T in aVL which is present in EKG from 11/23, good R wave progression, resolution of T wave inversion in the lateral leads which was previously present in EKG from 11/23  -Rate controlled  -Cw home metoprolol with hold parameters  -Cw home eliquis.     Problem/Plan - 4:  ·  Problem: CVA (cerebral vascular accident).   ·  Plan: -Pt s/p left CVA with residual right sided upper and lower extremity weakness. same per patient  -Peg tub in place for residual dysphagia   -Nutrition consult placed for peg feeds. can be done at   - home eliquis.     Problem/Plan - 5:  ·  Problem: Hypertension.   ·  Plan: -Cw home losartan and metoprolol.     Problem/Plan - 6:  ·  Problem: Hyperlipidemia.   ·  Plan: -Cw home atorvastatin.     Problem/Plan - 7:  ·  Problem: Encounter for deep vein thrombosis (DVT) prophylaxis.   ·  Plan: -Cw home eliquis.     Problem/Plan - 8:  ·  Problem: Full code status.   ·  Plan: full code.    Patient's daughter, Vincent, called and updated on plan. Phone number: [phone] on 01/4/23. agreed    Dispo: Medically stable for DC to . sign out was given to .  Discharging Provider: Ingrid Mortensen MD. cell: [phone]. call with   SNF provider: : informed

## 2024-01-04 NOTE — DISCHARGE NOTE PROVIDER - CARE PROVIDER_API CALL
Beth Vega  Neurology  [address] [place] [address]  Phone: [phone]  Fax: [phone]  Follow Up Time:    Beth Vega  Neurology  [address] [place] [address]  Phone: [phone]  Fax: [phone]  Follow Up Time:

## 2024-01-04 NOTE — PROGRESS NOTE ADULT - ASSESSMENT
Ms. Falcon is an 82 year old, Turkmen speaking, woman with PMH of HTN, AFIB (on Xarelto), Hepatitis C (on Entecavir), Left MCA infarct 6/2023 s/p mechanical thrombectomy, residual R hemiparesis and dysphagia s/p PEG tube, no history of seizures, from [facility], presented to  ED 1/3/24 for a witnessed episode of uncontrolled head-shaking. Patient is being admitted for work up of new onset focal seizure.         Ms. Falcon is an 82 year old, Arabic speaking, woman with PMH of HTN, AFIB (on Xarelto), Hepatitis C (on Entecavir), Left MCA infarct 6/2023 s/p mechanical thrombectomy, residual R hemiparesis and dysphagia s/p PEG tube, no history of seizures, from [facility], presented to  ED 1/3/24 for a witnessed episode of uncontrolled head-shaking. Patient is being admitted for work up of new onset focal seizure.

## 2024-01-04 NOTE — PROGRESS NOTE ADULT - PROBLEM SELECTOR PLAN 3
-EKG on admission: inverted T in aVL which is present in EKG from 11/23, good R wave progression, resolution of T wave inversion in the lateral leads which was previously present in EKG from 11/23  -Rate controlled  -Cw home metoprolol with hold parameters  -Cw home eliquis

## 2024-01-04 NOTE — PROGRESS NOTE ADULT - PROBLEM SELECTOR PLAN 1
probable Post-stroke seizure  - EEG done, result pending  - no further episode since in ER.   - patient reported no ssx of infection. infection w/u so far neg  - neuro cx noted and appreciated. discussed with dr. Vega. started or Keppra 500 mg BID. cleared for DC Glengarif. per Dr. Holt no need to wait for EEG result as it will not . patient needs to follow up neurology OP. Dr. Aguilar will follow up EEG result. 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  - no further episode since in ER.   - patient reported no ssx of infection. infection w/u so far neg  - neuro cx noted and appreciated. discussed with dr. Vega. started or Keppra 500 mg BID. cleared for DC Glengarif. per Dr. Holt no need to wait for EEG result as it will not . patient needs to follow up neurology OP. 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  - no further episode since in ER.   - patient reported no ssx of infection. infection w/u so far neg  - neuro cx noted and appreciated. discussed with dr. Vega. started or Keppra 500 mg BID. cleared for DC Glengarif. Patient needs to follow up neurology OP.

## 2024-01-04 NOTE — CONSULT NOTE ADULT - ASSESSMENT
Patient with likely post-stroke seizure activity, witnessed.    Recommend Keppra 500mg BID - family is agreeable  Neurology follow up as outpatient  No further inpatient work up recommended    Remainder of care per primary team

## 2024-01-04 NOTE — PROGRESS NOTE ADULT - PROBLEM SELECTOR PLAN 2
-UA + for moderate leukocytes.  -Denies urinary symptoms   -S/p 1 dose of ceftriaxone in the ED   -Hold further abx treatment,

## 2024-01-04 NOTE — DISCHARGE NOTE PROVIDER - NSDCCPCAREPLAN_GEN_ALL_CORE_FT
PRINCIPAL DISCHARGE DIAGNOSIS  Diagnosis: New onset seizure  Assessment and Plan of Treatment: You came to ER with shaking. could be due to seizure related to stroke. EEG showed non-specific slowing which means not typical seizure. however you are at risk of seziure for stroke. neurologisr  saw you. started on Keppra 500 mg twice daily whcih will help to treat and prevent seizure. continue meds as prescribed. seizure, fall, aspiration precautions. return to ER if symptoms recur or any new consrning symptoms. make sure you avoid anxiety and sleep well at night. see neurologist and primary MD  in 1 week

## 2024-01-04 NOTE — PROGRESS NOTE ADULT - SUBJECTIVE AND OBJECTIVE BOX
Medicine Progress Note    Patient is a 83y old  Female who presents with a chief complaint of seizure (04 Jan 2024 12:01)      SUBJECTIVE / OVERNIGHT EVENTS:  seen and examined  Chart reviewed  No overnight events  BM+  No pain  No complaints  right arm and leg weakness after stroke. " Otherwise I am Ok"    communication through [employer] via  ID [id number]    ADDITIONAL REVIEW OF SYSTEMS:  denied fever/chills/CP/SOB/cough/palpitation/dizziness/abdominal pian/nausea/vomiting/diarrhoea/constipation/dysuria/leg or calf pain/headaches.all other ROS neg    MEDICATIONS  (STANDING):  apixaban 5 milliGRAM(s) Oral two times a day  atorvastatin 40 milliGRAM(s) Oral at bedtime  entecavir 0.5 milliGRAM(s) Oral daily  levETIRAcetam 500 milliGRAM(s) Oral once  levETIRAcetam 500 milliGRAM(s) Oral two times a day  losartan 100 milliGRAM(s) Oral daily  metoprolol tartrate 75 milliGRAM(s) Oral three times a day  nystatin    Suspension 975701 Unit(s) Oral every 6 hours  polyethylene glycol 3350 17 Gram(s) Oral daily  potassium chloride    Tablet ER 20 milliEquivalent(s) Oral daily  senna 2 Tablet(s) Oral at bedtime  sodium chloride 0.9%. 1000 milliLiter(s) (75 mL/Hr) IV Continuous <Continuous>    MEDICATIONS  (PRN):  acetaminophen     Tablet .. 650 milliGRAM(s) Oral every 6 hours PRN Temp greater or equal to 38C (100.4F), Mild Pain (1 - 3)  aluminum hydroxide/magnesium hydroxide/simethicone Suspension 30 milliLiter(s) Oral every 4 hours PRN Dyspepsia  melatonin 3 milliGRAM(s) Oral at bedtime PRN Insomnia  ondansetron Injectable 4 milliGRAM(s) IV Push every 8 hours PRN Nausea and/or Vomiting    CAPILLARY BLOOD GLUCOSE        I&O's Summary      PHYSICAL EXAM:  Vital Signs Last 24 Hrs  T(C): 36.6 (04 Jan 2024 05:18), Max: 36.6 (03 Jan 2024 22:16)  T(F): 97.8 (04 Jan 2024 05:18), Max: 97.8 (03 Jan 2024 22:16)  HR: 89 (04 Jan 2024 13:47) (81 - 92)  BP: 137/85 (04 Jan 2024 13:47) (137/85 - 167/100)  BP(mean): --  RR: 20 (04 Jan 2024 12:29) (20 - 25)  SpO2: 97% (04 Jan 2024 12:29) (96% - 99%)    Parameters below as of 04 Jan 2024 12:29  Patient On (Oxygen Delivery Method): room air    GENERAL: Not in distress. Alert    HEENT: clear conjuctiva, MMM. no pallor or icterus  CARDIOVASCULAR: RRR S1, S2. soft SM. no M/R/G  LUNGS: BLAE+, no rales, no wheezing, no rhonchi.    ABDOMEN: ND. Soft,  NT, no guarding / rebound / rigidity. BS normoactive  BACK: No spine tenderness.  EXTREMITIES: no edema. no leg or calf TP.  SKIN: warm and dry  PSYCHIATRIC: Calm.  No agitation.  CNS: AAO*3. follows commands. RUE and RLE weakness.       LABS:                        13.8   8.87  )-----------( 270      ( 04 Jan 2024 07:16 )             39.7     01-04    142  |  107  |  12  ----------------------------<  126<H>  3.6   |  23  |  0.62    Ca    9.3      04 Jan 2024 07:16  Mg     1.9     01-04    TPro  6.8  /  Alb  3.3  /  TBili  1.1  /  DBili  x   /  AST  18  /  ALT  12  /  AlkPhos  165<H>  01-04          Urinalysis Basic - ( 04 Jan 2024 07:16 )    Color: x / Appearance: x / SG: x / pH: x  Gluc: 126 mg/dL / Ketone: x  / Bili: x / Urobili: x   Blood: x / Protein: x / Nitrite: x   Leuk Esterase: x / RBC: x / WBC x   Sq Epi: x / Non Sq Epi: x / Bacteria: x            RADIOLOGY & ADDITIONAL TESTS:  Imaging from Last 24 Hours:    Electrocardiogram/QTc Interval:    COORDINATION OF CARE:  Care Discussed with Consultants/Other Providers:   Medicine Progress Note    Patient is a 83y old  Female who presents with a chief complaint of seizure (04 Jan 2024 12:01)      SUBJECTIVE / OVERNIGHT EVENTS:  seen and examined  Chart reviewed  No overnight events  BM+  No pain  No complaints  right arm and leg weakness after stroke. " Otherwise I am Ok"    communication through [employer] via  ID [id number]    ADDITIONAL REVIEW OF SYSTEMS:  denied fever/chills/CP/SOB/cough/palpitation/dizziness/abdominal pian/nausea/vomiting/diarrhoea/constipation/dysuria/leg or calf pain/headaches.all other ROS neg    MEDICATIONS  (STANDING):  apixaban 5 milliGRAM(s) Oral two times a day  atorvastatin 40 milliGRAM(s) Oral at bedtime  entecavir 0.5 milliGRAM(s) Oral daily  levETIRAcetam 500 milliGRAM(s) Oral once  levETIRAcetam 500 milliGRAM(s) Oral two times a day  losartan 100 milliGRAM(s) Oral daily  metoprolol tartrate 75 milliGRAM(s) Oral three times a day  nystatin    Suspension 911228 Unit(s) Oral every 6 hours  polyethylene glycol 3350 17 Gram(s) Oral daily  potassium chloride    Tablet ER 20 milliEquivalent(s) Oral daily  senna 2 Tablet(s) Oral at bedtime  sodium chloride 0.9%. 1000 milliLiter(s) (75 mL/Hr) IV Continuous <Continuous>    MEDICATIONS  (PRN):  acetaminophen     Tablet .. 650 milliGRAM(s) Oral every 6 hours PRN Temp greater or equal to 38C (100.4F), Mild Pain (1 - 3)  aluminum hydroxide/magnesium hydroxide/simethicone Suspension 30 milliLiter(s) Oral every 4 hours PRN Dyspepsia  melatonin 3 milliGRAM(s) Oral at bedtime PRN Insomnia  ondansetron Injectable 4 milliGRAM(s) IV Push every 8 hours PRN Nausea and/or Vomiting    CAPILLARY BLOOD GLUCOSE        I&O's Summary      PHYSICAL EXAM:  Vital Signs Last 24 Hrs  T(C): 36.6 (04 Jan 2024 05:18), Max: 36.6 (03 Jan 2024 22:16)  T(F): 97.8 (04 Jan 2024 05:18), Max: 97.8 (03 Jan 2024 22:16)  HR: 89 (04 Jan 2024 13:47) (81 - 92)  BP: 137/85 (04 Jan 2024 13:47) (137/85 - 167/100)  BP(mean): --  RR: 20 (04 Jan 2024 12:29) (20 - 25)  SpO2: 97% (04 Jan 2024 12:29) (96% - 99%)    Parameters below as of 04 Jan 2024 12:29  Patient On (Oxygen Delivery Method): room air    GENERAL: Not in distress. Alert    HEENT: clear conjuctiva, MMM. no pallor or icterus  CARDIOVASCULAR: RRR S1, S2. soft SM. no M/R/G  LUNGS: BLAE+, no rales, no wheezing, no rhonchi.    ABDOMEN: ND. Soft,  NT, no guarding / rebound / rigidity. BS normoactive  BACK: No spine tenderness.  EXTREMITIES: no edema. no leg or calf TP.  SKIN: warm and dry  PSYCHIATRIC: Calm.  No agitation.  CNS: AAO*3. follows commands. RUE and RLE weakness.       LABS:                        13.8   8.87  )-----------( 270      ( 04 Jan 2024 07:16 )             39.7     01-04    142  |  107  |  12  ----------------------------<  126<H>  3.6   |  23  |  0.62    Ca    9.3      04 Jan 2024 07:16  Mg     1.9     01-04    TPro  6.8  /  Alb  3.3  /  TBili  1.1  /  DBili  x   /  AST  18  /  ALT  12  /  AlkPhos  165<H>  01-04          Urinalysis Basic - ( 04 Jan 2024 07:16 )    Color: x / Appearance: x / SG: x / pH: x  Gluc: 126 mg/dL / Ketone: x  / Bili: x / Urobili: x   Blood: x / Protein: x / Nitrite: x   Leuk Esterase: x / RBC: x / WBC x   Sq Epi: x / Non Sq Epi: x / Bacteria: x            RADIOLOGY & ADDITIONAL TESTS:  Imaging from Last 24 Hours:    Electrocardiogram/QTc Interval:    COORDINATION OF CARE:  Care Discussed with Consultants/Other Providers:

## 2024-01-04 NOTE — CONSULT NOTE ADULT - MOTOR
0/5 strength in the right upper extremity  2/5 in the right lower extremity  5/5 strength in the left upper and lower extremities

## 2024-01-04 NOTE — DISCHARGE NOTE PROVIDER - NSDCMRMEDTOKEN_GEN_ALL_CORE_FT
acetaminophen 325 mg oral tablet: 2 tab(s) orally every 6 hours As needed Temp greater or equal to 38C (100.4F), Mild Pain (1 - 3)  apixaban 5 mg oral tablet: 1 tab(s) orally 2 times a day  atorvastatin 40 mg oral tablet: 1 tab(s) orally once a day (at bedtime)  entecavir 0.5 mg oral tablet: 1 tab(s) orally once a day  levETIRAcetam 500 mg oral tablet: 1 tab(s) orally 2 times a day  losartan 100 mg oral tablet: 1 tab(s) orally once a day  melatonin 3 mg oral tablet: 1 tab(s) orally once a day (at bedtime)  metoprolol tartrate 75 mg oral tablet: 1 tab(s) orally 3 times a day  nystatin 100,000 units/mL oral suspension: 5 milliliter(s) orally every 6 hours  pantoprazole 40 mg oral granule, delayed release: 40 milligram(s) orally once a day  polyethylene glycol 3350 oral powder for reconstitution: 17 gram(s) orally once a day  potassium chloride 20 mEq oral tablet, extended release: 1 tab(s) orally once a day  senna leaf extract oral tablet: 2 tab(s) orally once a day (at bedtime)

## 2024-01-04 NOTE — DISCHARGE NOTE NURSING/CASE MANAGEMENT/SOCIAL WORK - PATIENT PORTAL LINK FT
You can access the FollowMyHealth Patient Portal offered by [facility] by registering at the following website: [email or web address]. By joining [employer]’s FollowMyHealth portal, you will also be able to view your health information using other applications (apps) compatible with our system. You can access the FollowMyHealth Patient Portal offered by [facility] by registering at the following website: [email or web address]. By joining [employer]’s FollowMyHealth portal, you will also be able to view your health information using other applications (apps) compatible with our system.

## 2024-01-04 NOTE — CONSULT NOTE ADULT - SUBJECTIVE AND OBJECTIVE BOX
General Neurology  Consult Note    HPI:  Ms. Falcon is an 82 year old, [place] citizen speaking, woman with PMH of HTN, AFIB (on Eliquis), Hepatitis C (on Entecavir), Left MCA infarct 6/2023 s/p mechanical thrombectomy, residual R hemiparesis and dysphagia s/p PEG tube, no history of seizures, from [facility], presented to  ED 1/3/24 for a witnessed episode of uncontrolled head-shaking. Per the chart, daughter and HCP Vincent and her  Nat witnessed. The patient was speaking on the phone and suddenly stopped responding, and started uncontrollably shaking her head from side to side for 5 minutes. Family reports no arm or leg shaking. After 5 minutes she appeared very tired and was unable to answer questions for 20 minutes; family thinks she was too tired to answer questions. After 20 minutes she returned to her normal baseline mental status. She had a previous unwitnessed syncopal episode 11/6/23 and was evaluated at  ED with negative CT head and DC’d with dx of vasovagal syncope. Prior to her stroke in June of 2023 she never had any episodes like these before.      S/p 1 dose of ceftriaxone in ED.     In the ED Vitals were: temp 97, HR 90, /70, RR 18, SPO2 97%     In the ED labs were notable for: lactate 2.6, and UA with moderate leukocyte esterase     CT head: No interval change. No acute intracranial bleeding. 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     Chest XR negative for evidence of cardiopulmonary disease      per chart, EKG unchanged from prior.       General Neurology  Consult Note    HPI:  Ms. Falcon is an 82 year old, Uzbek speaking, woman with PMH of HTN, AFIB (on Eliquis), Hepatitis C (on Entecavir), Left MCA infarct 6/2023 s/p mechanical thrombectomy, residual R hemiparesis and dysphagia s/p PEG tube, no history of seizures, from [facility], presented to  ED 1/3/24 for a witnessed episode of uncontrolled head-shaking. Per the chart, daughter and HCP Vincent and her  Nat witnessed. The patient was speaking on the phone and suddenly stopped responding, and started uncontrollably shaking her head from side to side for 5 minutes. Family reports no arm or leg shaking. After 5 minutes she appeared very tired and was unable to answer questions for 20 minutes; family thinks she was too tired to answer questions. After 20 minutes she returned to her normal baseline mental status. She had a previous unwitnessed syncopal episode 11/6/23 and was evaluated at  ED with negative CT head and DC’d with dx of vasovagal syncope. Prior to her stroke in June of 2023 she never had any episodes like these before.      S/p 1 dose of ceftriaxone in ED.     In the ED Vitals were: temp 97, HR 90, /70, RR 18, SPO2 97%     In the ED labs were notable for: lactate 2.6, and UA with moderate leukocyte esterase     CT head: No interval change. No acute intracranial bleeding. 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     Chest XR negative for evidence of cardiopulmonary disease      per chart, EKG unchanged from prior.       General Neurology  Consult Note    HPI:  Ms. Falcon is an 82 year old, Cuban speaking, woman with PMH of HTN, AFIB (on Eliquis), Hepatitis C (on Entecavir), Left MCA infarct 6/2023 s/p mechanical thrombectomy, residual R hemiparesis and dysphagia s/p PEG tube, no history of seizures, from [facility], presented to  ED 1/3/24 for a witnessed episode of uncontrolled head-shaking. Per the chart, daughter and HCP Vincent and her  Nat witnessed. The patient was speaking on the phone and suddenly stopped responding, and started uncontrollably shaking her head from side to side for 5 minutes. Family reports no arm or leg shaking. After 5 minutes she appeared very tired and was unable to answer questions for 20 minutes; family thinks she was too tired to answer questions. After 20 minutes she returned to her normal baseline mental status. She had a previous unwitnessed syncopal episode 11/6/23 and was evaluated at  ED with negative CT head and DC’d with dx of vasovagal syncope. Prior to her stroke in June of 2023 she never had any episodes like these before.      S/p 1 dose of ceftriaxone in ED.     In the ED Vitals were: temp 97, HR 90, /70, RR 18, SPO2 97%     In the ED labs were notable for: lactate 2.6, and UA with moderate leukocyte esterase     CT head: No interval change. No acute intracranial bleeding. 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     Chest XR negative for evidence of cardiopulmonary disease      per chart, EKG unchanged from prior.      Further history obtained from the patient's daughter at bedside who witnessed the event. The patient was on the phone with family from [facility], had two bites of a banana, then face went blank, staring, not responsive, with upper extremity shaking. No tongue biting. Unclear if she lost urine or bowel movement at the time. She was confused after the episode. Unclear if she had lower extremity shaking as well. Unclear if she had head turning. No history of aggression.   She has not been getting therapy at [facility] and has been there for 6 months now.    General Neurology  Consult Note    HPI:  Ms. Falcon is an 82 year old, Faroese speaking, woman with PMH of HTN, AFIB (on Eliquis), Hepatitis C (on Entecavir), Left MCA infarct 6/2023 s/p mechanical thrombectomy, residual R hemiparesis and dysphagia s/p PEG tube, no history of seizures, from [facility], presented to  ED 1/3/24 for a witnessed episode of uncontrolled head-shaking. Per the chart, daughter and HCP Vincent and her  Nat witnessed. The patient was speaking on the phone and suddenly stopped responding, and started uncontrollably shaking her head from side to side for 5 minutes. Family reports no arm or leg shaking. After 5 minutes she appeared very tired and was unable to answer questions for 20 minutes; family thinks she was too tired to answer questions. After 20 minutes she returned to her normal baseline mental status. She had a previous unwitnessed syncopal episode 11/6/23 and was evaluated at  ED with negative CT head and DC’d with dx of vasovagal syncope. Prior to her stroke in June of 2023 she never had any episodes like these before.      S/p 1 dose of ceftriaxone in ED.     In the ED Vitals were: temp 97, HR 90, /70, RR 18, SPO2 97%     In the ED labs were notable for: lactate 2.6, and UA with moderate leukocyte esterase     CT head: No interval change. No acute intracranial bleeding. 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     Chest XR negative for evidence of cardiopulmonary disease      per chart, EKG unchanged from prior.      Further history obtained from the patient's daughter at bedside who witnessed the event. The patient was on the phone with family from [facility], had two bites of a banana, then face went blank, staring, not responsive, with upper extremity shaking. No tongue biting. Unclear if she lost urine or bowel movement at the time. She was confused after the episode. Unclear if she had lower extremity shaking as well. Unclear if she had head turning. No history of aggression.   She has not been getting therapy at [facility] and has been there for 6 months now.

## 2024-01-04 NOTE — PROGRESS NOTE ADULT - PROBLEM SELECTOR PLAN 8
full code.    Patient's daughter, Vincent, called and updated on plan. Phone number: [phone] on 01/4/23. agreed    Dispo: Medically stable for DC to [facility]. sign out was given to  full code.    Patient's daughter, Vincent, called and updated on plan. Phone number: [phone] on 01/4/23. agreed    Dispo: Medically stable for DC to [facility]. sign out was given to

## 2024-01-04 NOTE — EEG REPORT - NS EEG TEXT BOX
AUDI DYKES [id number]     Study Date: 01-04-24  Duration: 20 min  --------------------------------------------------------------------------------------------------  History:  CC/ HPI Patient is a 83y old  Female who presents with a chief complaint of seizure (04 Jan 2024 12:01)    MEDICATIONS  (STANDING):  apixaban 5 milliGRAM(s) Oral two times a day  atorvastatin 40 milliGRAM(s) Oral at bedtime  entecavir 0.5 milliGRAM(s) Oral daily  levETIRAcetam 500 milliGRAM(s) Oral two times a day  levETIRAcetam 500 milliGRAM(s) Oral once  losartan 100 milliGRAM(s) Oral daily  metoprolol tartrate 75 milliGRAM(s) Oral three times a day  nystatin    Suspension 061608 Unit(s) Oral every 6 hours  polyethylene glycol 3350 17 Gram(s) Oral daily  potassium chloride    Tablet ER 20 milliEquivalent(s) Oral daily  senna 2 Tablet(s) Oral at bedtime  sodium chloride 0.9%. 1000 milliLiter(s) (75 mL/Hr) IV Continuous <Continuous>    --------------------------------------------------------------------------------------------------  Study Interpretation:    [[[Abbreviation Key:  PDR=alpha rhythm/posterior dominant rhythm. A-P=anterior posterior.  Amplitude: ‘very low’:<20; ‘low’:20-49; ‘medium’:; ‘high’:>150uV.  Persistence for periodic/rhythmic patterns (% of epoch) ‘rare’:<1%; ‘occasional’:1-10%; ‘frequent’:10-50%; ‘abundant’:50-90%; ‘continuous’:>90%.  Persistence for sporadic discharges: ‘rare’:<1/hr; ‘occasional’:1/min-1/hr; ‘frequent’:>1/min; ‘abundant’:>1/10 sec.  RPP=rhythmic and periodic patterns; GRDA=generalized rhythmic delta activity; FIRDA=frontal intermittent GRDA; LRDA=lateralized rhythmic delta activity; TIRDA=temporal intermittent rhythmic delta activity;  LPD=PLED=lateralized periodic discharges; GPD=generalized periodic discharges; BIPDs =bilateral independent periodic discharges; Mf=multifocal; SIRPDs=stimulus induced rhythmic, periodic, or ictal appearing discharges; BIRDs=brief potentially ictal rhythmic discharges >4 Hz, lasting .5-10s; PFA (paroxysmal bursts >13 Hz or =8 Hz <10s).  Modifiers: +F=with fast component; +S=with spike component; +R=with rhythmic component.  S-B=burst suppression pattern.  Max=maximal. N1-drowsy; N2-stage II sleep; N3-slow wave sleep. SSS/BETS=small sharp spikes/benign epileptiform transients of sleep. HV=hyperventilation; PS=photic stimulation]]]    Daily EEG Visual Analysis    FINDINGS:      Background:  Continuity: Continuous  Symmetry: Symmetric  Posterior dominant rhythm (PDR): 6.5-7 Hz, reactive to eye closure. Symmetric low-amplitude frontal beta in wakefulness.  State Change: Present  Voltage: Normal  Anterior-Posterior Gradient: Present  Other background findings: None  Breach: Absent    Background Slowing:  Generalized slowing: As above  Focal slowing: None    State Changes:   Drowsiness and stage 2 sleep are not captured.    Interictal Findings:  None    Electrographic and Electroclinical seizures:  None    Other Clinical Events:  None    Activation Procedures:   Hyperventilation is not performed.    Photic stimulation is performed and does not elicit any abnormalities.      Artifacts:  Abundant myogenic and movement artifacts are present, limiting interpretation.    EKG:  Single-lead EKG shows mostly regular rhythm, at times irregular.    EEG Classification / Summary:  Abnormal routine EEG in the awake state.  Mild diffuse slowing.  No focal or epileptiform abnormalities are captured.   Artifacts limit interpretation.    Clinical Impression:  Mild diffuse cerebral dysfunction is nonspecific in etiology.  Artifacts limit interpretation.          -------------------------------------------------------------------------------------------------------    Brook Witt MD  Fellow, [facility] [place]    Tess Cheung MD  Attending Physician, [facility] [place]    -------------------------------------------------------------------------------------------------------    To reach EEG technologist:  Please use the pager number for the appropriate hospital or contact the .  At [facility] - Pager #: [phone]    To reach EEG-reading physician:  [facility] EEG Reading Room Phone #: [phone]  Epilepsy Answering Service after 5PM and before 8:30AM: Phone #: [phone]     AUDI DYKES [id number]     Study Date: 01-04-24  Duration: 20 min  --------------------------------------------------------------------------------------------------  History:  CC/ HPI Patient is a 83y old  Female who presents with a chief complaint of seizure (04 Jan 2024 12:01)    MEDICATIONS  (STANDING):  apixaban 5 milliGRAM(s) Oral two times a day  atorvastatin 40 milliGRAM(s) Oral at bedtime  entecavir 0.5 milliGRAM(s) Oral daily  levETIRAcetam 500 milliGRAM(s) Oral two times a day  levETIRAcetam 500 milliGRAM(s) Oral once  losartan 100 milliGRAM(s) Oral daily  metoprolol tartrate 75 milliGRAM(s) Oral three times a day  nystatin    Suspension 591350 Unit(s) Oral every 6 hours  polyethylene glycol 3350 17 Gram(s) Oral daily  potassium chloride    Tablet ER 20 milliEquivalent(s) Oral daily  senna 2 Tablet(s) Oral at bedtime  sodium chloride 0.9%. 1000 milliLiter(s) (75 mL/Hr) IV Continuous <Continuous>    --------------------------------------------------------------------------------------------------  Study Interpretation:    [[[Abbreviation Key:  PDR=alpha rhythm/posterior dominant rhythm. A-P=anterior posterior.  Amplitude: ‘very low’:<20; ‘low’:20-49; ‘medium’:; ‘high’:>150uV.  Persistence for periodic/rhythmic patterns (% of epoch) ‘rare’:<1%; ‘occasional’:1-10%; ‘frequent’:10-50%; ‘abundant’:50-90%; ‘continuous’:>90%.  Persistence for sporadic discharges: ‘rare’:<1/hr; ‘occasional’:1/min-1/hr; ‘frequent’:>1/min; ‘abundant’:>1/10 sec.  RPP=rhythmic and periodic patterns; GRDA=generalized rhythmic delta activity; FIRDA=frontal intermittent GRDA; LRDA=lateralized rhythmic delta activity; TIRDA=temporal intermittent rhythmic delta activity;  LPD=PLED=lateralized periodic discharges; GPD=generalized periodic discharges; BIPDs =bilateral independent periodic discharges; Mf=multifocal; SIRPDs=stimulus induced rhythmic, periodic, or ictal appearing discharges; BIRDs=brief potentially ictal rhythmic discharges >4 Hz, lasting .5-10s; PFA (paroxysmal bursts >13 Hz or =8 Hz <10s).  Modifiers: +F=with fast component; +S=with spike component; +R=with rhythmic component.  S-B=burst suppression pattern.  Max=maximal. N1-drowsy; N2-stage II sleep; N3-slow wave sleep. SSS/BETS=small sharp spikes/benign epileptiform transients of sleep. HV=hyperventilation; PS=photic stimulation]]]    Daily EEG Visual Analysis    FINDINGS:      Background:  Continuity: Continuous  Symmetry: Symmetric  Posterior dominant rhythm (PDR): 6.5-7 Hz, reactive to eye closure. Symmetric low-amplitude frontal beta in wakefulness.  State Change: Present  Voltage: Normal  Anterior-Posterior Gradient: Present  Other background findings: None  Breach: Absent    Background Slowing:  Generalized slowing: As above  Focal slowing: None    State Changes:   Drowsiness and stage 2 sleep are not captured.    Interictal Findings:  None    Electrographic and Electroclinical seizures:  None    Other Clinical Events:  None    Activation Procedures:   Hyperventilation is not performed.    Photic stimulation is performed and does not elicit any abnormalities.      Artifacts:  Abundant myogenic and movement artifacts are present, limiting interpretation.    EKG:  Single-lead EKG shows mostly regular rhythm, at times irregular.    EEG Classification / Summary:  Abnormal routine EEG in the awake state.  Mild diffuse slowing.  No focal or epileptiform abnormalities are captured.   Artifacts limit interpretation.    Clinical Impression:  Mild diffuse cerebral dysfunction is nonspecific in etiology.  Artifacts limit interpretation.          -------------------------------------------------------------------------------------------------------    Brook Witt MD  Fellow, [facility] [place]    Tess Cheung MD  Attending Physician, [facility] [place]    -------------------------------------------------------------------------------------------------------    To reach EEG technologist:  Please use the pager number for the appropriate hospital or contact the .  At [facility] - Pager #: [phone]    To reach EEG-reading physician:  [facility] EEG Reading Room Phone #: [phone]  Epilepsy Answering Service after 5PM and before 8:30AM: Phone #: [phone]     AUDI DYKES [id number]     Study Date: 01-04-24  Duration: 20 min  --------------------------------------------------------------------------------------------------  History:  CC/ HPI Patient is a 83y old  Female who presents with a chief complaint of seizure (04 Jan 2024 12:01)    MEDICATIONS  (STANDING):  apixaban 5 milliGRAM(s) Oral two times a day  atorvastatin 40 milliGRAM(s) Oral at bedtime  entecavir 0.5 milliGRAM(s) Oral daily  levETIRAcetam 500 milliGRAM(s) Oral two times a day  levETIRAcetam 500 milliGRAM(s) Oral once  losartan 100 milliGRAM(s) Oral daily  metoprolol tartrate 75 milliGRAM(s) Oral three times a day  nystatin    Suspension 140676 Unit(s) Oral every 6 hours  polyethylene glycol 3350 17 Gram(s) Oral daily  potassium chloride    Tablet ER 20 milliEquivalent(s) Oral daily  senna 2 Tablet(s) Oral at bedtime  sodium chloride 0.9%. 1000 milliLiter(s) (75 mL/Hr) IV Continuous <Continuous>    --------------------------------------------------------------------------------------------------  Study Interpretation:    [[[Abbreviation Key:  PDR=alpha rhythm/posterior dominant rhythm. A-P=anterior posterior.  Amplitude: ‘very low’:<20; ‘low’:20-49; ‘medium’:; ‘high’:>150uV.  Persistence for periodic/rhythmic patterns (% of epoch) ‘rare’:<1%; ‘occasional’:1-10%; ‘frequent’:10-50%; ‘abundant’:50-90%; ‘continuous’:>90%.  Persistence for sporadic discharges: ‘rare’:<1/hr; ‘occasional’:1/min-1/hr; ‘frequent’:>1/min; ‘abundant’:>1/10 sec.  RPP=rhythmic and periodic patterns; GRDA=generalized rhythmic delta activity; FIRDA=frontal intermittent GRDA; LRDA=lateralized rhythmic delta activity; TIRDA=temporal intermittent rhythmic delta activity;  LPD=PLED=lateralized periodic discharges; GPD=generalized periodic discharges; BIPDs =bilateral independent periodic discharges; Mf=multifocal; SIRPDs=stimulus induced rhythmic, periodic, or ictal appearing discharges; BIRDs=brief potentially ictal rhythmic discharges >4 Hz, lasting .5-10s; PFA (paroxysmal bursts >13 Hz or =8 Hz <10s).  Modifiers: +F=with fast component; +S=with spike component; +R=with rhythmic component.  S-B=burst suppression pattern.  Max=maximal. N1-drowsy; N2-stage II sleep; N3-slow wave sleep. SSS/BETS=small sharp spikes/benign epileptiform transients of sleep. HV=hyperventilation; PS=photic stimulation]]]    Daily EEG Visual Analysis    FINDINGS:      Background:  Continuity: Continuous  Symmetry: Symmetric  Posterior dominant rhythm (PDR): 6.5-7 Hz, reactive to eye closure. Symmetric low-amplitude frontal beta in wakefulness.  State Change: Present  Voltage: Normal  Anterior-Posterior Gradient: Present  Other background findings: None  Breach: Absent    Background Slowing:  Generalized slowing: As above  Focal slowing: None    State Changes:   Drowsiness and stage 2 sleep are not captured.    Interictal Findings:  None    Electrographic and Electroclinical seizures:  None    Other Clinical Events:  None    Activation Procedures:   Hyperventilation is not performed.    Photic stimulation is performed and does not elicit any abnormalities.      Artifacts:  Abundant myogenic and movement artifacts are present, limiting interpretation.    EKG:  Single-lead EKG shows mostly regular rhythm, at times irregular.    EEG Classification / Summary:  Abnormal routine EEG in the awake state.  Mild diffuse slowing.  No focal or epileptiform abnormalities are captured.   Artifacts limit interpretation.    Clinical Impression:  Mild diffuse cerebral dysfunction is nonspecific in etiology.  Artifacts limit interpretation.  Single-lead EKG shows irregular rhythm at times.          -------------------------------------------------------------------------------------------------------    Brook Witt MD  Fellow, [facility] [place]    Tess Cheung MD  Attending Physician, [facility] [place]    -------------------------------------------------------------------------------------------------------    To reach EEG technologist:  Please use the pager number for the appropriate hospital or contact the .  At [facility] - Pager #: [phone]    To reach EEG-reading physician:  [facility] EEG Reading Room Phone #: [phone]  Epilepsy Answering Service after 5PM and before 8:30AM: Phone #: [phone]     AUDI DYKES [id number]     Study Date: 01-04-24  Duration: 20 min  --------------------------------------------------------------------------------------------------  History:  CC/ HPI Patient is a 83y old  Female who presents with a chief complaint of seizure (04 Jan 2024 12:01)    MEDICATIONS  (STANDING):  apixaban 5 milliGRAM(s) Oral two times a day  atorvastatin 40 milliGRAM(s) Oral at bedtime  entecavir 0.5 milliGRAM(s) Oral daily  levETIRAcetam 500 milliGRAM(s) Oral two times a day  levETIRAcetam 500 milliGRAM(s) Oral once  losartan 100 milliGRAM(s) Oral daily  metoprolol tartrate 75 milliGRAM(s) Oral three times a day  nystatin    Suspension 301646 Unit(s) Oral every 6 hours  polyethylene glycol 3350 17 Gram(s) Oral daily  potassium chloride    Tablet ER 20 milliEquivalent(s) Oral daily  senna 2 Tablet(s) Oral at bedtime  sodium chloride 0.9%. 1000 milliLiter(s) (75 mL/Hr) IV Continuous <Continuous>    --------------------------------------------------------------------------------------------------  Study Interpretation:    [[[Abbreviation Key:  PDR=alpha rhythm/posterior dominant rhythm. A-P=anterior posterior.  Amplitude: ‘very low’:<20; ‘low’:20-49; ‘medium’:; ‘high’:>150uV.  Persistence for periodic/rhythmic patterns (% of epoch) ‘rare’:<1%; ‘occasional’:1-10%; ‘frequent’:10-50%; ‘abundant’:50-90%; ‘continuous’:>90%.  Persistence for sporadic discharges: ‘rare’:<1/hr; ‘occasional’:1/min-1/hr; ‘frequent’:>1/min; ‘abundant’:>1/10 sec.  RPP=rhythmic and periodic patterns; GRDA=generalized rhythmic delta activity; FIRDA=frontal intermittent GRDA; LRDA=lateralized rhythmic delta activity; TIRDA=temporal intermittent rhythmic delta activity;  LPD=PLED=lateralized periodic discharges; GPD=generalized periodic discharges; BIPDs =bilateral independent periodic discharges; Mf=multifocal; SIRPDs=stimulus induced rhythmic, periodic, or ictal appearing discharges; BIRDs=brief potentially ictal rhythmic discharges >4 Hz, lasting .5-10s; PFA (paroxysmal bursts >13 Hz or =8 Hz <10s).  Modifiers: +F=with fast component; +S=with spike component; +R=with rhythmic component.  S-B=burst suppression pattern.  Max=maximal. N1-drowsy; N2-stage II sleep; N3-slow wave sleep. SSS/BETS=small sharp spikes/benign epileptiform transients of sleep. HV=hyperventilation; PS=photic stimulation]]]    Daily EEG Visual Analysis    FINDINGS:      Background:  Continuity: Continuous  Symmetry: Symmetric  Posterior dominant rhythm (PDR): 6.5-7 Hz, reactive to eye closure. Symmetric low-amplitude frontal beta in wakefulness.  State Change: Present  Voltage: Normal  Anterior-Posterior Gradient: Present  Other background findings: None  Breach: Absent    Background Slowing:  Generalized slowing: As above  Focal slowing: None    State Changes:   Drowsiness and stage 2 sleep are not captured.    Interictal Findings:  None    Electrographic and Electroclinical seizures:  None    Other Clinical Events:  None    Activation Procedures:   Hyperventilation is not performed.    Photic stimulation is performed and does not elicit any abnormalities.      Artifacts:  Abundant myogenic and movement artifacts are present, limiting interpretation.    EKG:  Single-lead EKG shows mostly regular rhythm, at times irregular.    EEG Classification / Summary:  Abnormal routine EEG in the awake state.  Mild diffuse slowing.  No focal or epileptiform abnormalities are captured.   Artifacts limit interpretation.    Clinical Impression:  Mild diffuse cerebral dysfunction is nonspecific in etiology.  Artifacts limit interpretation.  Single-lead EKG shows irregular rhythm at times.          -------------------------------------------------------------------------------------------------------    Brook Witt MD  Fellow, [facility] [place]    Tess Cheung MD  Attending Physician, [facility] [place]    -------------------------------------------------------------------------------------------------------    To reach EEG technologist:  Please use the pager number for the appropriate hospital or contact the .  At [facility] - Pager #: [phone]    To reach EEG-reading physician:  [facility] EEG Reading Room Phone #: [phone]  Epilepsy Answering Service after 5PM and before 8:30AM: Phone #: [phone]

## 2024-01-04 NOTE — DISCHARGE NOTE NURSING/CASE MANAGEMENT/SOCIAL WORK - NSDCPEFALRISK_GEN_ALL_CORE
For information on Fall & Injury Prevention, visit: [email or web address] OR  [email or web address] OR  https://www.cdc.gov/steadi/patient.html For information on Fall & Injury Prevention, visit: [email or web address] OR  [email or web address] OR  https://www.cdc.gov/steadi/patient.html

## 2024-01-04 NOTE — PROGRESS NOTE ADULT - PROBLEM SELECTOR PLAN 4
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 same per patient  -Peg tub in place for residual dysphagia   -Nutrition consult placed for peg feeds. can be done at   - home [facility]iqu[facility]

## 2024-05-09 NOTE — ED PROVIDER NOTE - IV ALTEPLASE EXCL ABS HIDDEN
Recent Visits  Date Type Provider Dept   03/07/24 Office Visit Ming Rowan APRN - CNP [facility]   02/22/24 Office Visit Ming Rowan APRN - CNP [facility]   09/22/23 Office Visit Ming Rowan APRN - CNP [facility]   03/30/23 Office Visit Ming Rowan APRN - CNP [facility]   03/09/23 Office Visit Ming Rowan APRN - CNP [facility]   01/13/23 Office Visit Ming Rowan APRN - CNP [facility]   12/16/22 Office Visit Ming Rowan APRN - CNP [facility]   Showing recent visits within past 540 days with a meds authorizing provider and meeting all other requirements  Future Appointments  Date Type Provider Dept   06/13/24 Appointment Ming Rowan APRN - CNP [facility]   Showing future appointments within next 150 days with a meds authorizing provider and meeting all other requirements     
show
No
